# Patient Record
Sex: FEMALE | Race: WHITE | Employment: FULL TIME | URBAN - METROPOLITAN AREA
[De-identification: names, ages, dates, MRNs, and addresses within clinical notes are randomized per-mention and may not be internally consistent; named-entity substitution may affect disease eponyms.]

---

## 2017-01-29 ENCOUNTER — OFFICE VISIT (OUTPATIENT)
Dept: URGENT CARE | Facility: URGENT CARE | Age: 47
End: 2017-01-29
Payer: COMMERCIAL

## 2017-01-29 VITALS
OXYGEN SATURATION: 100 % | DIASTOLIC BLOOD PRESSURE: 80 MMHG | SYSTOLIC BLOOD PRESSURE: 122 MMHG | BODY MASS INDEX: 37.56 KG/M2 | WEIGHT: 220 LBS | TEMPERATURE: 98 F | HEART RATE: 64 BPM | HEIGHT: 64 IN

## 2017-01-29 DIAGNOSIS — J98.01 ACUTE BRONCHOSPASM: Primary | ICD-10-CM

## 2017-01-29 DIAGNOSIS — R05.9 COUGH: ICD-10-CM

## 2017-01-29 PROCEDURE — 99213 OFFICE O/P EST LOW 20 MIN: CPT | Performed by: PHYSICIAN ASSISTANT

## 2017-01-29 RX ORDER — CODEINE PHOSPHATE AND GUAIFENESIN 10; 100 MG/5ML; MG/5ML
1 SOLUTION ORAL EVERY 4 HOURS PRN
Qty: 120 ML | Refills: 0 | Status: SHIPPED | OUTPATIENT
Start: 2017-01-29 | End: 2018-08-16

## 2017-01-29 RX ORDER — ALBUTEROL SULFATE 90 UG/1
2 AEROSOL, METERED RESPIRATORY (INHALATION) EVERY 4 HOURS PRN
Qty: 1 INHALER | Refills: 1 | Status: SHIPPED | OUTPATIENT
Start: 2017-01-29 | End: 2018-08-16

## 2017-01-29 RX ORDER — PREDNISONE 20 MG/1
40 TABLET ORAL DAILY
Qty: 10 TABLET | Refills: 0 | Status: SHIPPED | OUTPATIENT
Start: 2017-01-29 | End: 2017-02-03

## 2017-01-29 RX ORDER — BENZONATATE 100 MG/1
200 CAPSULE ORAL 3 TIMES DAILY PRN
Qty: 42 CAPSULE | Refills: 0 | Status: SHIPPED | OUTPATIENT
Start: 2017-01-29 | End: 2018-08-16

## 2017-01-29 NOTE — NURSING NOTE
"Natalee Maya;   Chief Complaint   Patient presents with     Cough     started not feeling well approx 6 days ago, now feeling like she cant catch her breath when coughing      Urgent Care     Initial /80 mmHg  Pulse 64  Temp(Src) 98  F (36.7  C) (Oral)  Ht 5' 4\" (1.626 m)  Wt 220 lb (99.791 kg)  BMI 37.74 kg/m2  SpO2 100% Estimated body mass index is 37.74 kg/(m^2) as calculated from the following:    Height as of this encounter: 5' 4\" (1.626 m).    Weight as of this encounter: 220 lb (99.791 kg)..  BP completed using cuff size large.  Daylin Branham R.N.  "

## 2017-02-09 NOTE — PROGRESS NOTES
"SUBJECTIVE:  Natalee Maya is a 46 year old female who presents to the clinic today with a chief complaint of cough  for 6 day(s).  Her cough is described as persistent, nonproductive and spasmodic.    The patient's symptoms are mild and moderate and worsening.  Associated symptoms include none. The patient's symptoms are exacerbated by lying down and no particular triggers  Patient has been using OTC cough suppressants  to improve symptoms.    Past Medical History   Diagnosis Date     Calculus of kidney      History of kidney stone -- Abstracted 7/22/02       Current Outpatient Prescriptions   Medication Sig Dispense Refill     albuterol (PROAIR HFA/PROVENTIL HFA/VENTOLIN HFA) 108 (90 BASE) MCG/ACT Inhaler Inhale 2 puffs into the lungs every 4 hours as needed for shortness of breath / dyspnea or wheezing 1 Inhaler 1     guaiFENesin-codeine (ROBITUSSIN AC) 100-10 MG/5ML SOLN solution Take 5 mLs by mouth every 4 hours as needed for cough 120 mL 0     benzonatate (TESSALON) 100 MG capsule Take 2 capsules (200 mg) by mouth 3 times daily as needed for cough 42 capsule 0     IBUPROFEN        ACETAMINOPHEN          Social History   Substance Use Topics     Smoking status: Never Smoker      Smokeless tobacco: Never Used     Alcohol Use: No       ROS  Review of systems negative except as stated above.    OBJECTIVE:  /80 mmHg  Pulse 64  Temp(Src) 98  F (36.7  C) (Oral)  Ht 5' 4\" (1.626 m)  Wt 220 lb (99.791 kg)  BMI 37.74 kg/m2  SpO2 100%  GENERAL APPEARANCE: healthy, alert and no distress  EYES: EOMI,  PERRL, conjunctiva clear  HENT: ear canals and TM's normal.  Nose and mouth without ulcers, erythema or lesions  NECK: supple, nontender, no lymphadenopathy  RESP: rhonchi R upper anterior and L upper anterior  CV: regular rates and rhythm, normal S1 S2, no murmur noted  SKIN: no suspicious lesions or rashes    ASSESSMENT:  Broncospasm  Cough    PLAN:  See orders in Epic  Symptomatic measures encouraged, " humidified air, plenty of fluids.  Red flag signs discussed and to FU with PCP as needed if sx worsen or new sx develop

## 2017-06-01 DIAGNOSIS — J98.01 ACUTE BRONCHOSPASM: ICD-10-CM

## 2017-06-01 DIAGNOSIS — R05.9 COUGH: ICD-10-CM

## 2017-06-01 RX ORDER — PREDNISONE 20 MG/1
40 TABLET ORAL DAILY
Qty: 10 TABLET | Refills: 0 | Status: CANCELLED | OUTPATIENT
Start: 2017-06-01

## 2017-06-01 RX ORDER — BENZONATATE 100 MG/1
200 CAPSULE ORAL 3 TIMES DAILY PRN
Qty: 42 CAPSULE | Refills: 0 | Status: CANCELLED | OUTPATIENT
Start: 2017-06-01

## 2017-06-01 RX ORDER — CODEINE PHOSPHATE AND GUAIFENESIN 10; 100 MG/5ML; MG/5ML
1 SOLUTION ORAL EVERY 4 HOURS PRN
Qty: 120 ML | Refills: 0 | OUTPATIENT
Start: 2017-06-01

## 2017-06-01 NOTE — TELEPHONE ENCOUNTER
Please call pt, needs to be seen for cough suppressant refill.  Has only been seen at , not clear why this refill was sent to me.  Can establish care with one of the clinicians taking new patients or come back to urgent care.

## 2017-06-01 NOTE — TELEPHONE ENCOUNTER
guaiFENesin-codeine (ROBITUSSIN AC) 100-10 MG/5ML SOLN solution      Last Written Prescription Date:  1/29/2017  Last Fill Quantity: 120 ML,   # refills: 0  Last Office Visit with [facility], [facility] or  Health prescribing provider: 5/20/2014  Future Office visit:       Routing refill request to provider for review/approval because:  Drug not on the [facility], [facility] or  Health refill protocol or controlled substance

## 2017-06-01 NOTE — TELEPHONE ENCOUNTER
benzonatate (TESSALON) 100 MG capsule      Last Written Prescription Date:  1/29/2017  Last Fill Quantity: 42,   # refills: 0  Last Office Visit with [facility] or [facility] prescribing provider: 5/20/2014  Future Office visit:       Routing refill request to provider for review/approval because:  Drug not on the [facility], [facility] or [facility] refill protocol or controlled substance    PREDNISONE 25MG      Last Written Prescription Date:  1/29/2017  Last Fill Quantity: 10,   # refills: 0  Last Office Visit with [facility] or  [facility] prescribing provider: 5/20/2014  Future Office visit:       Routing refill request to provider for review/approval because:  Drug not on the [facility] or [facility] refill protocol or controlled substance

## 2017-06-02 NOTE — TELEPHONE ENCOUNTER
Called patient advised refill request will need a office visit with an primary provider for follow up.  Pt stated if worst tomorrow she will come in to urgent care. //Nicky Brower MA// June 2, 2017 5:11 PM

## 2017-06-05 NOTE — TELEPHONE ENCOUNTER
Pt last seen in urgent care in 01/27/17, patient will need to follow up with primary care if having same symptoms or return to urgent care. [facility].   Naz Griffin CMA (AAMA) 6/5/2017 9:07 AM

## 2017-06-05 NOTE — TELEPHONE ENCOUNTER
Routing refill request to provider for review/approval because:  Drug not on the [facility] refill protocol   Louisa Frias, RN  Triage Nurse

## 2017-06-13 ENCOUNTER — OFFICE VISIT (OUTPATIENT)
Dept: PEDIATRICS | Facility: CLINIC | Age: 47
End: 2017-06-13
Payer: COMMERCIAL

## 2017-06-13 VITALS
HEIGHT: 64 IN | OXYGEN SATURATION: 98 % | SYSTOLIC BLOOD PRESSURE: 124 MMHG | DIASTOLIC BLOOD PRESSURE: 78 MMHG | BODY MASS INDEX: 39.95 KG/M2 | HEART RATE: 80 BPM | WEIGHT: 234 LBS | TEMPERATURE: 99.6 F

## 2017-06-13 DIAGNOSIS — Z23 NEED FOR VACCINATION: ICD-10-CM

## 2017-06-13 DIAGNOSIS — R01.1 HEART MURMUR: ICD-10-CM

## 2017-06-13 DIAGNOSIS — Z01.818 PREOP GENERAL PHYSICAL EXAM: Primary | ICD-10-CM

## 2017-06-13 LAB
BASOPHILS # BLD AUTO: 0 10E9/L (ref 0–0.2)
BASOPHILS NFR BLD AUTO: 0.4 %
DIFFERENTIAL METHOD BLD: ABNORMAL
EOSINOPHIL # BLD AUTO: 0.1 10E9/L (ref 0–0.7)
EOSINOPHIL NFR BLD AUTO: 2 %
ERYTHROCYTE [DISTWIDTH] IN BLOOD BY AUTOMATED COUNT: 15.4 % (ref 10–15)
HCT VFR BLD AUTO: 33.7 % (ref 35–47)
HGB BLD-MCNC: 10.6 G/DL (ref 11.7–15.7)
LYMPHOCYTES # BLD AUTO: 1.5 10E9/L (ref 0.8–5.3)
LYMPHOCYTES NFR BLD AUTO: 22 %
MCH RBC QN AUTO: 26.3 PG (ref 26.5–33)
MCHC RBC AUTO-ENTMCNC: 31.5 G/DL (ref 31.5–36.5)
MCV RBC AUTO: 84 FL (ref 78–100)
MONOCYTES # BLD AUTO: 0.5 10E9/L (ref 0–1.3)
MONOCYTES NFR BLD AUTO: 6.5 %
NEUTROPHILS # BLD AUTO: 4.8 10E9/L (ref 1.6–8.3)
NEUTROPHILS NFR BLD AUTO: 69.1 %
PLATELET # BLD AUTO: 237 10E9/L (ref 150–450)
RBC # BLD AUTO: 4.03 10E12/L (ref 3.8–5.2)
WBC # BLD AUTO: 7 10E9/L (ref 4–11)

## 2017-06-13 PROCEDURE — 82728 ASSAY OF FERRITIN: CPT | Performed by: NURSE PRACTITIONER

## 2017-06-13 PROCEDURE — 99214 OFFICE O/P EST MOD 30 MIN: CPT | Mod: 25 | Performed by: NURSE PRACTITIONER

## 2017-06-13 PROCEDURE — 85025 COMPLETE CBC W/AUTO DIFF WBC: CPT | Performed by: NURSE PRACTITIONER

## 2017-06-13 PROCEDURE — 36415 COLL VENOUS BLD VENIPUNCTURE: CPT | Performed by: NURSE PRACTITIONER

## 2017-06-13 PROCEDURE — 90715 TDAP VACCINE 7 YRS/> IM: CPT | Performed by: NURSE PRACTITIONER

## 2017-06-13 PROCEDURE — 90471 IMMUNIZATION ADMIN: CPT | Performed by: NURSE PRACTITIONER

## 2017-06-13 PROCEDURE — 93000 ELECTROCARDIOGRAM COMPLETE: CPT | Performed by: NURSE PRACTITIONER

## 2017-06-13 NOTE — PATIENT INSTRUCTIONS
-Please call to schedule your heart ultrasound (echo) [phone]. Assuming the results are normal, I will call yo    Before Your Surgery      Call your surgeon if there is any change in your health. This includes signs of a cold or flu (such as a sore throat, runny nose, cough, rash or fever).    Do not smoke, drink alcohol or take over the counter medicine (unless your surgeon or primary care doctor tells you to) for the 24 hours before and after surgery.    If you take prescribed drugs: Follow your doctor s orders about which medicines to take and which to stop until after surgery.    Eating and drinking prior to surgery: follow the instructions from your surgeon    Take a shower or bath the night before surgery. Use the soap your surgeon gave you to gently clean your skin. If you do not have soap from your surgeon, use your regular soap. Do not shave or scrub the surgery site.  Wear clean pajamas and have clean sheets on your bed.

## 2017-06-13 NOTE — MR AVS SNAPSHOT
After Visit Summary   6/13/2017    Natalee Maya    MRN: [id number]           Patient Information     Date Of Birth          1970        Visit Information        Provider Department      6/13/2017 2:20 PM Carolina Gomez APRN CNP [place] [facility]        Today's Diagnoses     Preop general physical exam    -  1    Need for vaccination        Heart murmur          Care Instructions    -Please call to schedule your heart ultrasound (echo) [phone]. Assuming the results are normal, I will call yo    Before Your Surgery      Call your surgeon if there is any change in your health. This includes signs of a cold or flu (such as a sore throat, runny nose, cough, rash or fever).    Do not smoke, drink alcohol or take over the counter medicine (unless your surgeon or primary care doctor tells you to) for the 24 hours before and after surgery.    If you take prescribed drugs: Follow your doctor s orders about which medicines to take and which to stop until after surgery.    Eating and drinking prior to surgery: follow the instructions from your surgeon    Take a shower or bath the night before surgery. Use the soap your surgeon gave you to gently clean your skin. If you do not have soap from your surgeon, use your regular soap. Do not shave or scrub the surgery site.  Wear clean pajamas and have clean sheets on your bed.           Follow-ups after your visit        Future tests that were ordered for you today     Open Future Orders        Priority Expected Expires Ordered    Echocardiogram Complete Routine  6/13/2018 6/13/2017            Who to contact     If you have questions or need follow up information about today's clinic visit or your schedule please contact [facility] directly at [phone].  Normal or non-critical lab and imaging results will be communicated to you by MyChart, letter or phone within 4 business days after the clinic has received the results. If  "you do not hear from us within 7 days, please contact the clinic through [employer] or phone. If you have a critical or abnormal lab result, we will notify you by phone as soon as possible.  Submit refill requests through [employer] or call your pharmacy and they will forward the refill request to us. Please allow 3 business days for your refill to be completed.          Additional Information About Your Visit        The [employer] Information     [employer] gives you secure access to your electronic health record. If you see a primary care provider, you can also send messages to your care team and make appointments. If you have questions, please call your primary care clinic.  If you do not have a primary care provider, please call [phone] and they will assist you.        Care EveryWhere ID     This is your Care EveryWhere ID. This could be used by other organizations to access your [place] medical records  [id number]        Your Vitals Were     Pulse Temperature Height Last Period Pulse Oximetry BMI (Body Mass Index)    80 99.6  F (37.6  C) (Tympanic) 5' 4\" (1.626 m) 05/21/2017 (Exact Date) 98% 40.17 kg/m2       Blood Pressure from Last 3 Encounters:   06/13/17 124/78   01/29/17 122/80   04/22/16 128/88    Weight from Last 3 Encounters:   06/13/17 234 lb (106.1 kg)   01/29/17 220 lb (99.8 kg)   04/22/16 219 lb 14.4 oz (99.7 kg)              We Performed the Following     ADMIN 1st VACCINE     CBC with platelets differential     EKG 12-lead complete w/read - Clinics     Ferritin     TDAP VACCINE (ADACEL)        Primary Care Provider    None       No address on file        Thank you!     Thank you for choosing [facility]  for your care. Our goal is always to provide you with excellent care. Hearing back from our patients is one way we can continue to improve our services. Please take a few minutes to complete the written survey that you may receive in the mail after your visit with us. Thank you!           "   Your Updated Medication List - Protect others around you: Learn how to safely use, store and throw away your medicines at www.disposemymeds.org.          This list is accurate as of: 6/13/17  3:31 PM.  Always use your most recent med list.                   Brand Name Dispense Instructions for use    ACETAMINOPHEN          albuterol 108 (90 BASE) MCG/ACT Inhaler    PROAIR HFA/PROVENTIL HFA/VENTOLIN HFA    1 Inhaler    Inhale 2 puffs into the lungs every 4 hours as needed for shortness of breath / dyspnea or wheezing       benzonatate 100 MG capsule    TESSALON    42 capsule    Take 2 capsules (200 mg) by mouth 3 times daily as needed for cough       guaiFENesin-codeine 100-10 MG/5ML Soln solution    ROBITUSSIN AC    120 mL    Take 5 mLs by mouth every 4 hours as needed for cough       IBUPROFEN

## 2017-06-13 NOTE — NURSING NOTE
"Chief Complaint   Patient presents with     Pre-Op Exam       Initial /78 (Cuff Size: Adult Large)  Pulse 80  Temp 99.6  F (37.6  C) (Tympanic)  Ht 5' 4\" (1.626 m)  Wt 234 lb (106.1 kg)  LMP 05/21/2017 (Exact Date)  SpO2 98%  BMI 40.17 kg/m2 Estimated body mass index is 40.17 kg/(m^2) as calculated from the following:    Height as of this encounter: 5' 4\" (1.626 m).    Weight as of this encounter: 234 lb (106.1 kg).  Medication Reconciliation: complete   Vani Farmer CMA    "

## 2017-06-13 NOTE — PROGRESS NOTES
[facility]  [address]  [address]  [place] [address]  [phone]  Dept: [phone]    PRE-OP EVALUATION:  Today's date: 2017    Natalee Maya (: 1970) presents for pre-operative evaluation assessment as requested by Dr. Vanessa.  She requires evaluation and anesthesia risk assessment prior to undergoing surgery/procedure for treatment of right foot .  Proposed procedure: repair posterior tibial tendon - right    Date of Surgery/ Procedure: 17  Time of Surgery/ Procedure: 7:00am  Hospital/Surgical Facility: [facility]  Fax number for surgical facility: [phone]  Primary Physician: None  Type of Anesthesia Anticipated: General    Patient has a Health Care Directive or Living Will:  NO    Preop Questions 2017   1.  Do you have a history of heart attack, stroke, stent, bypass or surgery on an artery in the head, neck, heart or legs? No   2.  Do you ever have any pain or discomfort in your chest? No   3.  Do you have a history of  Heart Failure? No   4.   Are you troubled by shortness of breath when:  walking on a level surface, or up a slight hill, or at night? No   5.  Do you currently have a cold, bronchitis or other respiratory infection? No   6.  Do you have a cough, shortness of breath, or wheezing? No   7.  Do you sometimes get pains in the calves of your legs when you walk? No   8. Do you or anyone in your family have previous history of blood clots? No   9.  Do you or does anyone in your family have a serious bleeding problem such as prolonged bleeding following surgeries or cuts? No   10. Have you ever had problems with anemia or been told to take iron pills? No   11. Have you had any abnormal blood loss such as black, tarry or bloody stools, or abnormal vaginal bleeding? No   12. Have you ever had a blood transfusion? No   13. Have you or any of your relatives ever had problems with anesthesia? No   14. Do you have  sleep apnea, excessive snoring or daytime drowsiness? No   15. Do you have any prosthetic heart valves? No   16. Do you have prosthetic joints? No   17. Is there any chance that you may be pregnant? No         HPI:                                                      Brief HPI related to upcoming procedure: Proposed procedure: repair posterior tibial tendon - right      See problem list for active medical problems.  Problems all longstanding and stable, except as noted/documented.  See ROS for pertinent symptoms related to these conditions.                                                                                                  .    MEDICAL HISTORY:                                                      Patient Active Problem List    Diagnosis Date Noted     CARDIOVASCULAR SCREENING; LDL GOAL LESS THAN 160 10/31/2010     Priority: Medium     Numbness 2009     Priority: Medium      Past Medical History:   Diagnosis Date     Calculus of kidney     History of kidney stone -- Abstracted 02     Past Surgical History:   Procedure Laterality Date     C NONSPECIFIC PROCEDURE       -- Abstracted 02     Current Outpatient Prescriptions   Medication Sig Dispense Refill     albuterol (PROAIR HFA/PROVENTIL HFA/VENTOLIN HFA) 108 (90 BASE) MCG/ACT Inhaler Inhale 2 puffs into the lungs every 4 hours as needed for shortness of breath / dyspnea or wheezing (Patient not taking: Reported on 2017) 1 Inhaler 1     guaiFENesin-codeine (ROBITUSSIN AC) 100-10 MG/5ML SOLN solution Take 5 mLs by mouth every 4 hours as needed for cough (Patient not taking: Reported on 2017) 120 mL 0     benzonatate (TESSALON) 100 MG capsule Take 2 capsules (200 mg) by mouth 3 times daily as needed for cough (Patient not taking: Reported on 2017) 42 capsule 0     IBUPROFEN        ACETAMINOPHEN        OTC products: none    Allergies   Allergen Reactions     No Known Drug Allergies       Latex Allergy: NO    Social  "History   Substance Use Topics     Smoking status: Never Smoker     Smokeless tobacco: Never Used     Alcohol use No     History   Drug Use No       REVIEW OF SYSTEMS:                                                    Constitutional, neuro, ENT, endocrine, pulmonary, cardiac, gastrointestinal, genitourinary, musculoskeletal, integument and psychiatric systems are negative, except as otherwise noted.    EXAM:                                                    /78 (Cuff Size: Adult Large)  Pulse 80  Temp 99.6  F (37.6  C) (Tympanic)  Ht 5' 4\" (1.626 m)  Wt 234 lb (106.1 kg)  LMP 05/21/2017 (Exact Date)  SpO2 98%  BMI 40.17 kg/m2    GENERAL APPEARANCE: healthy, alert and no distress     EYES: EOMI, PERRL     HENT: ear canals and TM's normal and nose and mouth without ulcers or lesions     NECK: no adenopathy, no asymmetry, masses, or scars and thyroid normal to palpation     RESP: lungs clear to auscultation - no rales, rhonchi or wheezes     CV: regular rates and rhythm, normal S1 S2, no S3 or S4  2/6 soft holosystolic murmur,no click or rub     ABDOMEN:  soft, nontender, no HSM or masses and bowel sounds normal     MS: extremities normal- no gross deformities noted, no evidence of inflammation in joints, FROM in all extremities.     SKIN: no suspicious lesions or rashes     NEURO: Normal strength and tone, sensory exam grossly normal, mentation intact and speech normal     PSYCH: mentation appears normal. and affect normal/bright     LYMPHATICS: No axillary, cervical, or supraclavicular nodes    DIAGNOSTICS:                                                    Hemoglobin pending.     Recent Labs   Lab Test  10/17/09   0817   NA  142   POTASSIUM  4.5   CR  0.70        IMPRESSION:                                                    Reason for surgery/procedure: Proposed procedure: repair posterior tibial tendon - right    The proposed surgical procedure is considered INTERMEDIATE risk.    REVISED CARDIAC " RISK INDEX  The patient has the following serious cardiovascular risks for perioperative complications such as (MI, PE, VFib and 3  AV Block):  No serious cardiac risks    The patient has the following additional risks for perioperative complications:  No identified additional risks      ICD-10-CM    1. Preop general physical exam Z01.818 CBC with platelets differential     Ferritin     EKG 12-lead complete w/read - Clinics   2. Need for vaccination Z23 TDAP VACCINE (ADACEL)     ADMIN 1st VACCINE   3. Heart murmur R01.1 Echocardiogram Complete   4. Aortic sclerosis (H) I70.0 New murmur led to EGK and echo. EKG normal. Echo shows mild aortic sclerosis, which should not affect surgery.        RECOMMENDATIONS:                                                        APPROVAL GIVEN to proceed with proposed procedure, without further diagnostic evaluation    Signed Electronically by: SHAQ Peraza CNP    Copy of this evaluation report is provided to requesting physician.    [facility] Preop Guidelines    Screening Questionnaire for Adult Immunization    Are you sick today?   No   Do you have allergies to medications, food, a vaccine component or latex?   No   Have you ever had a serious reaction after receiving a vaccination?   No   Do you have a long-term health problem with heart disease, lung disease, asthma, kidney disease, metabolic disease (e.g. diabetes), anemia, or other blood disorder?   No   Do you have cancer, leukemia, HIV/AIDS, or any other immune system problem?   No   In the past 3 months, have you taken medications that affect  your immune system, such as prednisone, other steroids, or anticancer drugs; drugs for the treatment of rheumatoid arthritis, Crohn s disease, or psoriasis; or have you had radiation treatments?   No   Have you had a seizure, or a brain or other nervous system problem?   No   During the past year, have you received a transfusion of blood or blood     products, or been  given immune (gamma) globulin or antiviral drug?   No   For women: Are you pregnant or is there a chance you could become        pregnant during the next month?   No   Have you received any vaccinations in the past 4 weeks?   No     Immunization questionnaire answers were all negative.      MNVFC doesn't apply on this patient    Per orders of Carolina campos FNP-DNP, injection of Adacel given by Vani Farmer. Patient instructed to remain in clinic for 20 minutes afterwards, and to report any adverse reaction to me immediately.     Screening performed by Vani Farmer on 6/13/2017 at 1:27 PM.

## 2017-06-15 ENCOUNTER — HOSPITAL ENCOUNTER (OUTPATIENT)
Dept: CARDIOLOGY | Facility: CLINIC | Age: 47
Discharge: HOME | End: 2017-06-15
Attending: NURSE PRACTITIONER | Admitting: NURSE PRACTITIONER
Payer: COMMERCIAL

## 2017-06-15 DIAGNOSIS — D50.9 IRON DEFICIENCY ANEMIA, UNSPECIFIED IRON DEFICIENCY ANEMIA TYPE: Primary | ICD-10-CM

## 2017-06-15 DIAGNOSIS — R01.1 HEART MURMUR: ICD-10-CM

## 2017-06-15 LAB — FERRITIN SERPL-MCNC: 7 NG/ML (ref 8–252)

## 2017-06-15 PROCEDURE — 93306 TTE W/DOPPLER COMPLETE: CPT

## 2017-06-15 PROCEDURE — 93306 TTE W/DOPPLER COMPLETE: CPT | Mod: 26 | Performed by: INTERNAL MEDICINE

## 2017-06-15 RX ORDER — FERROUS SULFATE 325(65) MG
325 TABLET ORAL
Qty: 90 TABLET | Refills: 2 | Status: SHIPPED | OUTPATIENT
Start: 2017-06-15 | End: 2018-07-26

## 2017-06-19 DIAGNOSIS — I51.7 CARDIOMEGALY: ICD-10-CM

## 2017-06-19 DIAGNOSIS — R01.1 NEWLY RECOGNIZED MURMUR: ICD-10-CM

## 2017-06-19 DIAGNOSIS — D64.9 ANEMIA, UNSPECIFIED TYPE: Primary | ICD-10-CM

## 2017-06-24 ENCOUNTER — HEALTH MAINTENANCE LETTER (OUTPATIENT)
Age: 47
End: 2017-06-24

## 2017-08-09 ENCOUNTER — PRE VISIT (OUTPATIENT)
Dept: CARDIOLOGY | Facility: CLINIC | Age: 47
End: 2017-08-09

## 2017-09-08 ENCOUNTER — PRE VISIT (OUTPATIENT)
Dept: CARDIOLOGY | Facility: CLINIC | Age: 47
End: 2017-09-08

## 2017-09-11 ENCOUNTER — TELEPHONE (OUTPATIENT)
Dept: PEDIATRICS | Facility: CLINIC | Age: 47
End: 2017-09-11

## 2017-09-11 NOTE — TELEPHONE ENCOUNTER
Panel Management Review      Patient has the following on her problem list: None      Composite cancer screening  Chart review shows that this patient is due/due soon for the following Pap Smear  Summary:    Patient is due/failing the following:   LDL, PAP and PHYSICAL    Action needed:   Patient needs office visit for pap, cholesterol check and physical.    Type of outreach:    Sent [employer] message.    Questions for provider review:    None                                                                                                                                  Vani Farmer CMA    Chart routed to Care Team .

## 2017-09-11 NOTE — LETTER
September 21, 2017      Natalee Maya  [address] [place] [id number]        Dear Natalee,       We care about your health and have reviewed your health plan including your medical conditions, medications, and lab results.  Based on this review, it is recommended that you follow up regarding the following health topic(s):  -Cervical Cancer Screening  -Wellness (Physical) Visit     We recommend you take the following action(s):  -schedule a WELLNESS (Physical) APPOINTMENT.  We will perform the following labs: Lipids (fasting cholesterol - nothing to eat except water and/or meds for 8-10 hours) and pap smear.     Please call us at the [facility] - [phone] (or use [employer]) to address the above recommendations.     Thank you for trusting [facility] and we appreciate the opportunity to serve you.  We look forward to supporting your healthcare needs in the future.    Healthy Regards,    Your Health Care Team  [facility]      Sincerely,    SHAQ Peraza CNP

## 2017-09-15 ENCOUNTER — TELEPHONE (OUTPATIENT)
Dept: PEDIATRICS | Facility: CLINIC | Age: 47
End: 2017-09-15

## 2017-09-15 ENCOUNTER — DOCUMENTATION ONLY (OUTPATIENT)
Dept: CARDIOLOGY | Facility: CLINIC | Age: 47
End: 2017-09-15

## 2017-09-15 NOTE — TELEPHONE ENCOUNTER
Emiliano calls back.  Dr. May reviewed her chart and echo.  Normal functioning heart.  He looked at the heart dimensions which are normal.  Unless symptomatic does not need to be seen and could just have a repeat echo next year.  He would not do anything other than what you are doing unless she is symptomatic.  She said Dr. May would be happy to answer any questions if needed.  He is trying to save her appt if she does not need to be seen.

## 2017-09-15 NOTE — TELEPHONE ENCOUNTER
Please relay this info to patient. We will check in on sx every year at physical.    She is due for her pap. Please have her schedule physical

## 2017-09-15 NOTE — TELEPHONE ENCOUNTER
Received a call from Emiliano at [facility] [phone].    She was referred to Cardiology for a heart murmur.  She said that they saw her in 2009 for a heart murmur and the notes are in epic, seen on 1/6/2009.  She had a systolic murmur, holter monitor was done which was fine, echo was fine.      So, she is wondering if she is symtomatic.  Do you want her to be seen since she was seen in the past?  She said the order was put in in June 2017.  Please advise and they are happy to see her, but want to make sure.

## 2017-09-15 NOTE — TELEPHONE ENCOUNTER
Pt calls back.  Advised of below.  She says she is not symptomatic.      She says she did her pap over the summer.  She will get us paperwork from her pap.  She is going to make an appt with Carolina anyway about b12 and a few other things.

## 2017-09-15 NOTE — PROGRESS NOTES
Patient is schedule to see Dr. May next Tuesday. Patient was referred by PCP to evaluate new murmur and Cardiomegaly. Patient was last seen in our clinic on 01-. Patient had a systolic murmur at that time. Dr. May reviewed patient's recent echo. Valves look fine, no significant abnormalities. Patient has a normal functioning heart. Patient does not have cardiomegaly. Dr. May stated, if patient is symptomatic then patient should keep her appt. If patient is asymptomatic appt is not necessary. He would not recommend any treatments for patient at this time.     I reviewed this with Carolina OVALLE's nurse, Jacklyn. They will get back to me if they still want patient to be seen.     Eliza WREN  [facility]

## 2017-09-18 NOTE — TELEPHONE ENCOUNTER
Called and left VM for patient to contact clinic.  Patient needs physical, pap , LDL.  Vani Farmer, CMA

## 2017-12-26 ENCOUNTER — OFFICE VISIT (OUTPATIENT)
Dept: URGENT CARE | Facility: URGENT CARE | Age: 47
End: 2017-12-26
Payer: COMMERCIAL

## 2017-12-26 VITALS
TEMPERATURE: 97.7 F | RESPIRATION RATE: 20 BRPM | BODY MASS INDEX: 39.48 KG/M2 | OXYGEN SATURATION: 97 % | SYSTOLIC BLOOD PRESSURE: 122 MMHG | HEART RATE: 68 BPM | WEIGHT: 230 LBS | DIASTOLIC BLOOD PRESSURE: 90 MMHG

## 2017-12-26 DIAGNOSIS — R05.9 COUGH: Primary | ICD-10-CM

## 2017-12-26 PROCEDURE — 99213 OFFICE O/P EST LOW 20 MIN: CPT | Performed by: FAMILY MEDICINE

## 2017-12-26 RX ORDER — BENZONATATE 200 MG/1
200 CAPSULE ORAL 3 TIMES DAILY PRN
Qty: 21 CAPSULE | Refills: 0 | Status: SHIPPED | OUTPATIENT
Start: 2017-12-26 | End: 2018-08-16

## 2017-12-26 RX ORDER — PREDNISONE 20 MG/1
40 TABLET ORAL DAILY
Qty: 10 TABLET | Refills: 0 | Status: SHIPPED | OUTPATIENT
Start: 2017-12-26 | End: 2017-12-31

## 2017-12-26 RX ORDER — AZITHROMYCIN 250 MG/1
TABLET, FILM COATED ORAL
Qty: 6 TABLET | Refills: 0 | Status: SHIPPED | OUTPATIENT
Start: 2017-12-26 | End: 2018-08-16

## 2017-12-26 NOTE — NURSING NOTE
"Chief Complaint   Patient presents with     Urgent Care     Cough     cough, sob x 1 week, crackling in morning       Initial /90 (BP Location: Right arm, Patient Position: Chair, Cuff Size: Adult Regular)  Pulse 68  Temp 97.7  F (36.5  C) (Oral)  Resp 20  Wt 230 lb (104.3 kg)  SpO2 97%  BMI 39.48 kg/m2 Estimated body mass index is 39.48 kg/(m^2) as calculated from the following:    Height as of 6/13/17: 5' 4\" (1.626 m).    Weight as of this encounter: 230 lb (104.3 kg).  Medication Reconciliation: unable or not appropriate to perform   Zachary Phan Medical Assistant    "

## 2017-12-26 NOTE — PROGRESS NOTES
SUBJECTIVE:  Natalee Maya is a 47 year old female who presents to the clinic today with a croupy cough for 2 weeks.  Associated symptoms include congestion and cough.  The patient's symptoms are worsening.  The patient's symptoms are exacerbated by no particular triggers.  Patient has been using ibuprofen to improve symptoms.    Past Medical History:   Diagnosis Date     Calculus of kidney     History of kidney stone -- Abstracted 7/22/02      Social History   Substance Use Topics     Smoking status: Never Smoker     Smokeless tobacco: Never Used     Alcohol use No       OBJECTIVE:  Exam:  Blood pressure 122/90, pulse 68, temperature 97.7  F (36.5  C), temperature source Oral, resp. rate 20, weight 230 lb (104.3 kg), SpO2 97 %.  General: Mild distress  Head: NORMAL - atraumatic, nontender.  Ears: normal canals, TMs bilaterally, normal TM mobility  Eyes: NORMAL - no injection no discharge, no periorbital swelling.  Nose: ABNORMAL - swollen nasal turbinates.  Neck: Positive bilateral adenopathy nontender  Throat: ABNORMAL - erythematous.  Resp: ABNORMAL -coarse breath sounds bilaterally with rhonchi at the bases  Cardiac: NORMAL - regular rate and rhythm without murmur.    Current Outpatient Prescriptions   Medication Sig Dispense Refill     azithromycin (ZITHROMAX) 250 MG tablet Two tablets first day, then one tablet daily for four days. 6 tablet 0     benzonatate (TESSALON) 200 MG capsule Take 1 capsule (200 mg) by mouth 3 times daily as needed for cough 21 capsule 0     ferrous sulfate (IRON) 325 (65 FE) MG tablet Take 1 tablet (325 mg) by mouth daily (with breakfast) 90 tablet 2     albuterol (PROAIR HFA/PROVENTIL HFA/VENTOLIN HFA) 108 (90 BASE) MCG/ACT Inhaler Inhale 2 puffs into the lungs every 4 hours as needed for shortness of breath / dyspnea or wheezing 1 Inhaler 1     IBUPROFEN        guaiFENesin-codeine (ROBITUSSIN AC) 100-10 MG/5ML SOLN solution Take 5 mLs by mouth every 4 hours as needed for cough  (Patient not taking: Reported on 6/13/2017) 120 mL 0     benzonatate (TESSALON) 100 MG capsule Take 2 capsules (200 mg) by mouth 3 times daily as needed for cough (Patient not taking: Reported on 6/13/2017) 42 capsule 0     ACETAMINOPHEN            ASSESSMENT:    Acute Bronchitis    PLAN:    Symptomatic measures encouraged, humidified air, plenty of fluids.  Tessalon for cough  Started on antibiotics  Side effects discussed with the patient.  Reportable signs and symptoms discussed.  Follow-up in 2 weeks if not improving and a chest x-ray will be considered.  nneds follow with pcp

## 2018-01-19 ENCOUNTER — TELEPHONE (OUTPATIENT)
Dept: PEDIATRICS | Facility: CLINIC | Age: 48
End: 2018-01-19

## 2018-01-19 ENCOUNTER — HOSPITAL ENCOUNTER (OUTPATIENT)
Dept: OCCUPATIONAL THERAPY | Facility: CLINIC | Age: 48
End: 2018-01-19
Payer: COMMERCIAL

## 2018-01-19 DIAGNOSIS — R26.89 DECREASED MOBILITY: ICD-10-CM

## 2018-01-19 DIAGNOSIS — I89.0 LYMPHEDEMA: Primary | ICD-10-CM

## 2018-01-19 PROCEDURE — 40000445 ZZHC STATISTIC OT VISIT, LYMPHEDEMA: Mod: GO

## 2018-01-19 PROCEDURE — 97165 OT EVAL LOW COMPLEX 30 MIN: CPT | Mod: GO

## 2018-01-19 PROCEDURE — 97140 MANUAL THERAPY 1/> REGIONS: CPT | Mod: GO

## 2018-01-19 NOTE — PROGRESS NOTES
01/19/18 0800   Rehab Discipline   Discipline OT   Type of Visit   Type of visit Initial Edema Evaluation   General Information   Start of care 01/19/18   Referring physician Mimi Vanessa   Orders Evaluate and treat as indicated   Order date 01/19/18   Medical diagnosis lymphedema   Onset of illness / date of surgery 01/19/18   Edema onset 01/19/18  (chronic since June 2017)   Affected body parts LLE;RLE  (RLE>LLE)   Edema etiology Surgery   Edema etiology comments Pt has no remarkable helath history other than heart murmur detected at time pre-op physical June 2017. Pt has no history cancer care, cardiovascular disease, or identified vascular deterioration. She is however obese and has been inactive secondary to recovery from foot/ankle surgery.   Pertinent history of current problem (PT: include personal factors and/or comorbidities that impact the POC; OT: include additional occupational profile info) Pt with PMH significant for 3 pregnancies and foot/ankle surgery June 2017   Surgical / medical history reviewed Yes   Prior level of functional mobility I   Prior treatment Elevation   Community support Family / friend caregiver   Patient role / employment history Employed   Living environment [facility]   Fall Risk Screen   Fall screen completed by OT   Have you fallen 2 or more times in the past year? No   Have you fallen and had an injury in the past year? No   Is patient a fall risk? No   System Outcome Measures   Lymphedema Life Impact Scale (score range 0-72). A higher score indicates greater impairment. 19   Subjective Report   Patient report of symptoms swelling that will no go away in RLE. Socks digging into swelling; discomfort   Patient / Family Goals   Patient / family goals statement to reduce RLE swelling   Pain   Patient currently in pain No   Cognitive Status   Orientation Orientation to person, place and time   Level of consciousness Alert   Follows commands and answers questions 100% of the  time   Personal safety and judgement Intact   Memory Intact   Edema Exam / Assessment   Skin condition Pitting;Intact   Skin condition comments 2+ pitting ankle-knee RLE; 1+ pitting LLE ankle-knee   Pitting 1+;2+   Pitting location BLE   Dorsal pedal pulse (did not palpate-no signs ischemia)   Girth Measurements   Girth Measurements (will obtain upon return for services)   Range of Motion   ROM comments WFL   Strength   Strength comments NT'd   Activities of Daily Living   Activities of Daily Living I   Bed Mobility   Bed mobility I   Transfers   Transfers I   Gait / Locomotion   Gait / Locomotion I   Sensory   Sensory perception comments no issues reported/identified   Coordination   Coordination Gross motor coordination appropriate   Muscle Tone   Muscle tone No deficits were identified   Planned Edema Interventions   Planned edema interventions Manual lymph drainage;Gradient compression bandaging;Fit for compression garment;Exercises;Precautions to prevent infection / exacerbation;Education;Skin care / precautions;Myofascial release;Home management program development   Clinical Impression   Criteria for skilled therapeutic intervention met Yes   Therapy diagnosis lymphedema   Influenced by the following impairments / conditions Stage 1   Assessment of Occupational Performance 1-3 Performance Deficits   Identified Performance Deficits decreased mobility engagement; decreased fit LB clothing   Clinical Decision Making (Complexity) Low complexity   Treatment frequency 2 times / week;3 times / week   Treatment duration 3x/wk x 1 week, then 2x/wk x 1 week, then 0x/wk x 2 weeks, then 1x/wk x 1 week   Patient / family and/or staff in agreement with plan of care Yes   Risks and benefits of therapy have been explained Yes   Clinical impression comments Pt will benefit from skilled lymphedema services to reduce RLE swelling for imporved functional ambulation/mobility and imporved LB clothing fit   Goals   Edema Eval Goals  1;2;3;4;5;6   Goal 1   Goal identifier 1   Goal description In order to improve functional mobility for activities of living, by the completion of intensive treatment,  patient and/or caregiver will:   Target date 02/23/18   Goal 2   Goal identifier 1a   Goal description tolerate gradient compression bandaging/wearing compression garments 23 hrs/day to prevent re-acccumulation of extracellular fluid for reductions needed to aide improvements in mobility and LB clothing fit   Target date 02/23/18   Goal 3   Goal identifier 1b   Goal description demonstrate independence in applying gradient compression bandages to build I with home management of LE edema/lymphedema for improved mobility engagement and LB clothing fit   Target date 02/23/18   Goal 4   Goal identifier 1c   Goal description demonstrate independence in performing prescribed exercises to facilate the muscle pumping system for max reductions to aide improvements in mobility and LB clothing fit   Target date 02/23/18   Goal 5   Goal identifier 1d   Goal description be independent in donning/doffing, wearing schedule, and care of compression garments for I building with home management of LE edema/lymphedema for improvements in mobility and LB clothing fit   Target date 02/23/18   Goal 6   Goal identifier 2   Goal description Reduce RLE swelling from pitting to nonpitting edema for reductions in swelling needed to aide improvements in mobility and LB clothing fit   Target date 02/23/18   Total Evaluation Time   Total evaluation time 13

## 2018-02-07 ENCOUNTER — HOSPITAL ENCOUNTER (OUTPATIENT)
Dept: OCCUPATIONAL THERAPY | Facility: CLINIC | Age: 48
End: 2018-02-07
Payer: COMMERCIAL

## 2018-02-07 DIAGNOSIS — R26.89 DECREASED MOBILITY: ICD-10-CM

## 2018-02-07 DIAGNOSIS — I89.0 LYMPHEDEMA: Primary | ICD-10-CM

## 2018-02-07 PROCEDURE — 40000445 ZZHC STATISTIC OT VISIT, LYMPHEDEMA

## 2018-02-07 PROCEDURE — 97140 MANUAL THERAPY 1/> REGIONS: CPT | Mod: GO

## 2018-02-08 ENCOUNTER — HOSPITAL ENCOUNTER (OUTPATIENT)
Dept: OCCUPATIONAL THERAPY | Facility: CLINIC | Age: 48
End: 2018-02-08
Payer: COMMERCIAL

## 2018-02-08 DIAGNOSIS — R26.89 DECREASED MOBILITY: ICD-10-CM

## 2018-02-08 DIAGNOSIS — I89.0 LYMPHEDEMA: Primary | ICD-10-CM

## 2018-02-08 PROCEDURE — 40000445 ZZHC STATISTIC OT VISIT, LYMPHEDEMA

## 2018-02-08 PROCEDURE — 97140 MANUAL THERAPY 1/> REGIONS: CPT | Mod: GO

## 2018-02-09 ENCOUNTER — HOSPITAL ENCOUNTER (OUTPATIENT)
Dept: OCCUPATIONAL THERAPY | Facility: CLINIC | Age: 48
End: 2018-02-09
Payer: COMMERCIAL

## 2018-02-09 DIAGNOSIS — I89.0 LYMPHEDEMA: Primary | ICD-10-CM

## 2018-02-09 DIAGNOSIS — R26.89 DECREASED MOBILITY: ICD-10-CM

## 2018-02-09 PROCEDURE — 40000445 ZZHC STATISTIC OT VISIT, LYMPHEDEMA

## 2018-02-09 PROCEDURE — 97140 MANUAL THERAPY 1/> REGIONS: CPT | Mod: GO

## 2018-02-09 NOTE — ADDENDUM NOTE
Encounter addended by: Wilfrido Braun OT on: 2/9/2018  8:20 AM<BR>     Actions taken: Charge Capture section accepted, Flowsheet data copied forward, Flowsheet accepted

## 2018-02-14 ENCOUNTER — HOSPITAL ENCOUNTER (OUTPATIENT)
Dept: OCCUPATIONAL THERAPY | Facility: CLINIC | Age: 48
End: 2018-02-14
Payer: COMMERCIAL

## 2018-02-14 DIAGNOSIS — R26.89 DECREASED MOBILITY: ICD-10-CM

## 2018-02-14 DIAGNOSIS — I89.0 LYMPHEDEMA: Primary | ICD-10-CM

## 2018-02-14 PROCEDURE — 40000445 ZZHC STATISTIC OT VISIT, LYMPHEDEMA: Mod: GO

## 2018-02-14 PROCEDURE — 97140 MANUAL THERAPY 1/> REGIONS: CPT | Mod: GO

## 2018-02-14 NOTE — ADDENDUM NOTE
Encounter addended by: Wilfrido Braun OT on: 2/14/2018  7:16 AM<BR>     Actions taken:  activity accessed, Diagnosis association updated

## 2018-02-26 ENCOUNTER — TELEPHONE (OUTPATIENT)
Dept: PEDIATRICS | Facility: CLINIC | Age: 48
End: 2018-02-26

## 2018-02-26 NOTE — LETTER
March 5, 2018      Natalee Maya  [address] [place] [id number]        Dear Natalee,       We care about your health and have reviewed your health plan including your medical conditions, medications, and lab results.  Based on this review, it is recommended that you follow up regarding the following health topic(s):  -Cholesterol  -Cervical Cancer Screening  -Wellness (Physical) Visit     We recommend you take the following action(s):  -schedule a WELLNESS (Physical) APPOINTMENT.  We will perform the following labs: Lipids (fasting cholesterol - nothing to eat except water and/or meds for 8-10 hours) and pap smear..     Please call us at the [facility] - [phone] (or use Online [employer] Platform) to address the above recommendations.     Thank you for trusting [facility] and we appreciate the opportunity to serve you.  We look forward to supporting your healthcare needs in the future.    Healthy Regards,    Your Health Care Team  [facility]      Sincerely,    SHAQ Peraza CNP

## 2018-02-26 NOTE — TELEPHONE ENCOUNTER
Panel Management Review      Patient has the following on her problem list: None      Composite cancer screening  Chart review shows that this patient is due/due soon for the following Pap Smear  Summary:    Patient is due/failing the following:   LDL, PAP and PHYSICAL    Action needed:   Patient needs office visit for PHYSICAL, PAP & CHOLESTEROL CHECK.    Type of outreach:    Sent VODECLICt message.    Questions for provider review:    None                                                                                                                                  Vani Farmer CMA    Chart routed to Care Team .

## 2018-03-12 NOTE — TELEPHONE ENCOUNTER
Called and left VM for patient to contact clinic.  Patient needs physical, pap and cholesterol check.  Vani Farmer, CMA

## 2018-03-27 ENCOUNTER — HOSPITAL ENCOUNTER (OUTPATIENT)
Dept: OCCUPATIONAL THERAPY | Facility: CLINIC | Age: 48
Setting detail: THERAPIES SERIES
End: 2018-03-27
Attending: PODIATRIST
Payer: COMMERCIAL

## 2018-03-27 PROCEDURE — 97535 SELF CARE MNGMENT TRAINING: CPT | Mod: GO

## 2018-03-27 PROCEDURE — 40000445 ZZHC STATISTIC OT VISIT, LYMPHEDEMA

## 2018-06-11 ENCOUNTER — TELEPHONE (OUTPATIENT)
Dept: PEDIATRICS | Facility: CLINIC | Age: 48
End: 2018-06-11

## 2018-06-11 NOTE — LETTER
June 18, 2018      Natalee Maya  [address] [place] [id number]        Dear Natalee,       We care about your health and have reviewed your health plan including your medical conditions, medications, and lab results.  Based on this review, it is recommended that you follow up regarding the following health topic(s):  -Cervical Cancer Screening  -Wellness (Physical) Visit     We recommend you take the following action(s):  -schedule a WELLNESS (Physical) APPOINTMENT.  We will perform the following labs: Lipids (fasting cholesterol - nothing to eat except water and/or meds for 8-10 hours) and pap smear.     Please call us at the [facility] - [phone] (or use riskmethods) to address the above recommendations.     Thank you for trusting [facility] and we appreciate the opportunity to serve you.  We look forward to supporting your healthcare needs in the future.    Healthy Regards,    Your Health Care Team  [facility]

## 2018-06-11 NOTE — TELEPHONE ENCOUNTER
Panel Management Review      Patient has the following on her problem list: None      Composite cancer screening  Chart review shows that this patient is due/due soon for the following Pap Smear  Summary:    Patient is due/failing the following:   LDL, PAP and PHYSICAL    Action needed:   Patient needs office visit for pap, cholesterol check and physical - patient last seen by provider 6/13/17.    Type of outreach:    Sent [employer] Mobile message.    Questions for provider review:    None                                                                                                                                  Vani Farmer CMA    Chart routed to Care Team .

## 2018-07-26 DIAGNOSIS — D50.9 IRON DEFICIENCY ANEMIA, UNSPECIFIED IRON DEFICIENCY ANEMIA TYPE: ICD-10-CM

## 2018-07-26 NOTE — LETTER
August 3, 2018      Natalee Maya  [address] [place] [id number]        Dear Natalee,       We care about your health and have reviewed your health plan including your medical conditions, medications, and lab results.  Based on this review, it is recommended that you follow up regarding the following health topic(s):  -Medications  It has been over a year since you have had an office visit with your provider.  We have given you a 1 month oscar refill of your medication.  Please make an appointment to be seen before further refills are needed.    We recommend you take the following action(s):  -schedule a WELLNESS (Physical) APPOINTMENT.  We will perform the following labs: hemoglobin.     Please call us at the [facility] - [phone] (or use MedWhat) to address the above recommendations.     Thank you for trusting [facility] and we appreciate the opportunity to serve you.  We look forward to supporting your healthcare needs in the future.    Healthy Regards,    Your Health Care Team  [facility]

## 2018-07-27 RX ORDER — FERROUS SULFATE 325(65) MG
TABLET ORAL
Qty: 90 TABLET | Refills: 0 | Status: SHIPPED | OUTPATIENT
Start: 2018-07-27 | End: 2019-02-03

## 2018-08-16 ENCOUNTER — OFFICE VISIT (OUTPATIENT)
Dept: PEDIATRICS | Facility: CLINIC | Age: 48
End: 2018-08-16
Payer: COMMERCIAL

## 2018-08-16 VITALS
OXYGEN SATURATION: 98 % | BODY MASS INDEX: 42.36 KG/M2 | DIASTOLIC BLOOD PRESSURE: 74 MMHG | HEIGHT: 63 IN | TEMPERATURE: 98.1 F | SYSTOLIC BLOOD PRESSURE: 114 MMHG | HEART RATE: 76 BPM | WEIGHT: 239.1 LBS

## 2018-08-16 DIAGNOSIS — Z00.00 HEALTH CARE MAINTENANCE: Primary | ICD-10-CM

## 2018-08-16 DIAGNOSIS — Q25.40 AORTIC ANOMALY: ICD-10-CM

## 2018-08-16 DIAGNOSIS — D64.9 ANEMIA, UNSPECIFIED TYPE: ICD-10-CM

## 2018-08-16 PROBLEM — I51.7 CARDIOMEGALY: Status: RESOLVED | Noted: 2017-06-19 | Resolved: 2018-08-16

## 2018-08-16 PROBLEM — E66.01 MORBID OBESITY (H): Status: ACTIVE | Noted: 2018-08-16

## 2018-08-16 PROCEDURE — G0145 SCR C/V CYTO,THINLAYER,RESCR: HCPCS | Performed by: NURSE PRACTITIONER

## 2018-08-16 PROCEDURE — 87624 HPV HI-RISK TYP POOLED RSLT: CPT | Performed by: NURSE PRACTITIONER

## 2018-08-16 PROCEDURE — 99396 PREV VISIT EST AGE 40-64: CPT | Performed by: NURSE PRACTITIONER

## 2018-08-16 ASSESSMENT — ENCOUNTER SYMPTOMS
PALPITATIONS: 0
PARESTHESIAS: 0
HEADACHES: 0
MYALGIAS: 0
HEMATOCHEZIA: 0
SHORTNESS OF BREATH: 0
BREAST MASS: 0
DIARRHEA: 0
CHILLS: 0
ABDOMINAL PAIN: 0
JOINT SWELLING: 0
WEAKNESS: 0
HEARTBURN: 0
EYE PAIN: 0
FREQUENCY: 0
DIZZINESS: 0
SORE THROAT: 0
HEMATURIA: 0
DYSURIA: 0
NAUSEA: 0
FEVER: 0
CONSTIPATION: 0
ARTHRALGIAS: 0
NERVOUS/ANXIOUS: 0

## 2018-08-16 NOTE — PROGRESS NOTES
SUBJECTIVE:   CC: Natalee Maya is an 47 year old woman who presents for preventive health visit.     Physical   Annual:     Getting at least 3 servings of Calcium per day:  Yes    Bi-annual eye exam:  Yes    Dental care twice a year:  Yes    Sleep apnea or symptoms of sleep apnea:  None    Diet:  Regular (no restrictions)    Frequency of exercise:  2-3 days/week    Duration of exercise:  15-30 minutes    Taking medications regularly:  Yes    Medication side effects:  None    Additional concerns today:  No    Concerns today: wondering if she needs records form [facility] transferred here?  Patient is not fasting.    -------------------------------------    Today's PHQ-2 Score:   PHQ-2 ( 1999 [employer]) 8/16/2018   Q1: Little interest or pleasure in doing things 0   Q2: Feeling down, depressed or hopeless 0   PHQ-2 Score 0   Q1: Little interest or pleasure in doing things Not at all   Q2: Feeling down, depressed or hopeless Not at all   PHQ-2 Score 0     Abuse: Current or Past(Physical, Sexual or Emotional)- No  Do you feel safe in your environment - Yes    Social History   Substance Use Topics     Smoking status: Never Smoker     Smokeless tobacco: Never Used     Alcohol use No     Alcohol Use 8/16/2018   If you drink alcohol do you typically have greater than 3 drinks per day OR greater than 7 drinks per week? No   No flowsheet data found.    Reviewed orders with patient.  Reviewed health maintenance and updated orders accordingly - Yes  Labs reviewed in EPIC    Patient under age 50, mutual decision reflected in health maintenance.      Pertinent mammograms are reviewed under the imaging tab.  History of abnormal Pap smear: NO - age 30-65 PAP every 5 years with negative HPV co-testing recommended     Reviewed and updated as needed this visit by clinical staff  Tobacco  Allergies  Meds  Med Hx  Surg Hx  Fam Hx  Soc Hx        Reviewed and updated as needed this visit by Provider            Review of  "Systems   Constitutional: Negative for chills and fever.   HENT: Negative for congestion, ear pain, hearing loss and sore throat.    Eyes: Negative for pain and visual disturbance.   Respiratory: Negative for shortness of breath.    Cardiovascular: Positive for peripheral edema. Negative for chest pain and palpitations.   Gastrointestinal: Negative for abdominal pain, constipation, diarrhea, heartburn, hematochezia and nausea.   Breasts:  Negative for tenderness, breast mass and discharge.   Genitourinary: Negative for dysuria, frequency, genital sores, hematuria, pelvic pain, urgency, vaginal bleeding and vaginal discharge.   Musculoskeletal: Negative for arthralgias, joint swelling and myalgias.   Skin: Negative for rash.   Neurological: Negative for dizziness, weakness, headaches and paresthesias.   Psychiatric/Behavioral: Negative for mood changes. The patient is not nervous/anxious.      OBJECTIVE:   /74 (BP Location: Right arm, Cuff Size: Adult Large)  Pulse 76  Temp 98.1  F (36.7  C) (Tympanic)  Ht 5' 3.25\" (1.607 m)  Wt 239 lb 1.6 oz (108.5 kg)  LMP 07/24/2018 (Exact Date)  SpO2 98%  BMI 42.02 kg/m2  Physical Exam  GENERAL: healthy, alert and no distress  EYES: Eyes grossly normal to inspection, PERRL and conjunctivae and sclerae normal  HENT: ear canals and TM's normal, nose and mouth without ulcers or lesions  NECK: no adenopathy, no asymmetry, masses, or scars and thyroid normal to palpation  RESP: lungs clear to auscultation - no rales, rhonchi or wheezes  BREAST: normal without masses, tenderness or nipple discharge and no palpable axillary masses or adenopathy  CV: regular rate and rhythm, normal S1 S2, no S3 or S4, no murmur, click or rub, no peripheral edema and peripheral pulses strong  ABDOMEN: soft, nontender, no hepatosplenomegaly, no masses and bowel sounds normal   (female): normal female external genitalia, normal urethral meatus, vaginal mucosa pink, moist, well rugated, and " "normal cervix/adnexa/uterus without masses or discharge  MS: no gross musculoskeletal defects noted, no edema  SKIN: no suspicious lesions or rashes  NEURO: Normal strength and tone, mentation intact and speech normal  PSYCH: mentation appears normal, affect normal/bright    Diagnostic Test Results:  none     ASSESSMENT/PLAN:   1. Health care maintenance  - Lipid panel reflex to direct LDL Fasting; Future  - **Glucose FUTURE 1yr; Future  - Pap imaged thin layer screen with HPV - recommended age 30 - 65  - HPV High Risk Types DNA Cervical  - *MA Screening Digital Bilateral; Future    2. Aortic anomaly  See problem list. Asymptomatic. Recheck echo 2022.    3. Anemia, unspecified type  Unclear etiology. MCV was normal but ferritin was low. She has been taking iron for a year.  Has very heavy  Menses.   - Vitamin B12; Future  - Ferritin; Future  - Iron and iron binding capacity; Future  - CBC with platelets differential; Future    4. BMI 40.0-44.9, adult (H)  Recommended lifestyle changes.   - ENDOCRINOLOGY ADULT REFERRAL    COUNSELING:  Reviewed preventive health counseling, as reflected in patient instructions    BP Readings from Last 1 Encounters:   08/16/18 114/74     Estimated body mass index is 42.02 kg/(m^2) as calculated from the following:    Height as of this encounter: 5' 3.25\" (1.607 m).    Weight as of this encounter: 239 lb 1.6 oz (108.5 kg).      Weight management plan: Discussed healthy diet and exercise guidelines and patient will follow up in 12 months in clinic to re-evaluate.     reports that she has never smoked. She has never used smokeless tobacco.      Counseling Resources:  ATP IV Guidelines  Pooled Cohorts Equation Calculator  Breast Cancer Risk Calculator  FRAX Risk Assessment  ICSI Preventive Guidelines  Dietary Guidelines for Americans, 2010  USDA's MyPlate  ASA Prophylaxis  Lung CA Screening    Carolina Gomez, [facility]  "

## 2018-08-16 NOTE — MR AVS SNAPSHOT
After Visit Summary   8/16/2018    Natalee Maya    MRN: [id number]           Patient Information     Date Of Birth          1970        Visit Information        Provider Department      8/16/2018 1:40 PM Carolina Gomez APRN [facility] ([facility]3] [place]        Today's Diagnoses     Health care maintenance    -  1    Aortic anomaly        Anemia, unspecified type        BMI 40.0-44.9, adult (H)          Care Instructions      Preventive Health Recommendations  Female Ages 40 to 49    Yearly exam:     See your health care provider every year in order to  1. Review health changes.   2. Discuss preventive care.    3. Review your medicines if your doctor prescribed any.      Get a Pap test every three years (unless you have an abnormal result and your provider advises testing more often).      If you get Pap tests with HPV test, you only need to test every 5 years, unless you have an abnormal result. You do not need a Pap test if your uterus was removed (hysterectomy) and you have not had cancer.      You should be tested each year for STDs (sexually transmitted diseases), if you're at risk.     Ask your doctor if you should have a mammogram.      Have a colonoscopy (test for colon cancer) if someone in your family has had colon cancer or polyps before age 50.       Have a cholesterol test every 5 years.       Have a diabetes test (fasting glucose) after age 45. If you are at risk for diabetes, you should have this test every 3 years.    Shots: Get a flu shot each year. Get a tetanus shot every 10 years.     Nutrition:     Eat at least 5 servings of fruits and vegetables each day.    Eat whole-grain bread, whole-wheat pasta and brown rice instead of white grains and rice.    Get adequate Calcium and Vitamin D.      Lifestyle    Exercise at least 150 minutes a week (an average of 30 minutes a day, 5 days a week). This will help you control your weight and prevent disease.    Limit alcohol to  "one drink per day.    No smoking.     Wear sunscreen to prevent skin cancer.    See your dentist every six months for an exam and cleaning.          Follow-ups after your visit        Additional Services     ENDOCRINOLOGY ADULT REFERRAL       Your provider has referred you to: [facility]: [facility] [phone]   [email or web address]      Please be aware that coverage of these services is subject to the terms and limitations of your health insurance plan.  Call member services at your health plan with any benefit or coverage questions.      Please bring the following to your appointment:    >>   Any x-rays, CTs or MRIs which have been performed.  Contact the facility where they were done to arrange for  prior to your scheduled appointment.    >>   List of current medications   >>   This referral request   >>   Any documents/labs given to you for this referral                  Your next 10 appointments already scheduled     Aug 20, 2018  8:00 AM CDT   MA SCREENING DIGITAL BILATERAL with EAMA1   [facility]    [address] ,[address]  [facility] [address] [phone]           Do not use any powder, lotion or deodorant under your arms or on your breast. If you do, we will ask you to remove it before your exam.  Wear comfortable, two-piece clothing.  If you have any allergies, tell your care team.  Bring any previous mammograms from other facilities or have them mailed to the breast center. Three-dimensional (3D) mammograms are available at [place] locations in [facility], [facility], [facility], [facility], and [place]. [facility] locations include [place] [facility] in [place]. Benefits of 3D mammograms include: - Improved rate of cancer detection - Decreases your chance of having to go back for more tests, which means fewer: - \"False-positive\" results " (This means that there is an abnormal area but it isn't cancer.) - Invasive testing procedures, such as a biopsy or surgery - Can provide clearer images of the breast if you have dense breast tissue. 3D mammography is an optional exam that anyone can have with a 2D mammogram. It doesn't replace or take the place of a 2D mammogram. 2D mammograms remain an effective screening test for all women.  Not all insurance companies cover the cost of a 3D mammogram. Check with your insurance.              Future tests that were ordered for you today     Open Future Orders        Priority Expected Expires Ordered    Vitamin B12 Routine  8/16/2019 8/16/2018    Ferritin Routine  8/16/2019 8/16/2018    Iron and iron binding capacity Routine  8/16/2019 8/16/2018    CBC with platelets differential Routine  8/16/2019 8/16/2018    *MA Screening Digital Bilateral Routine  8/16/2019 8/16/2018    Lipid panel reflex to direct LDL Fasting Routine 7/17/2019 8/16/2019 8/16/2018    **Glucose FUTURE 1yr Routine 7/17/2019 8/16/2019 8/16/2018            Who to contact     If you have questions or need follow up information about today's clinic visit or your schedule please contact [facility] directly at [phone].  Normal or non-critical lab and imaging results will be communicated to you by FeeFightershart, letter or phone within 4 business days after the clinic has received the results. If you do not hear from us within 7 days, please contact the clinic through [employer] or phone. If you have a critical or abnormal lab result, we will notify you by phone as soon as possible.  Submit refill requests through [facility] or call your pharmacy and they will forward the refill request to us. Please allow 3 business days for your refill to be completed.          Additional Information About Your Visit        [facility] Information     [facility] gives you secure access to your electronic health record. If you see a primary care provider, you can also  "send messages to your care team and make appointments. If you have questions, please call your primary care clinic.  If you do not have a primary care provider, please call [phone] and they will assist you.        Care EveryWhere ID     This is your Care EveryWhere ID. This could be used by other organizations to access your [facility] medical records  [id number]        Your Vitals Were     Pulse Temperature Height Last Period Pulse Oximetry BMI (Body Mass Index)    76 98.1  F (36.7  C) (Tympanic) 5' 3.25\" (1.607 m) 07/24/2018 (Exact Date) 98% 42.02 kg/m2       Blood Pressure from Last 3 Encounters:   08/16/18 114/74   12/26/17 122/90   06/13/17 124/78    Weight from Last 3 Encounters:   08/16/18 239 lb 1.6 oz (108.5 kg)   12/26/17 230 lb (104.3 kg)   06/13/17 234 lb (106.1 kg)              We Performed the Following     ENDOCRINOLOGY ADULT REFERRAL     HPV High Risk Types DNA Cervical     Pap imaged thin layer screen with HPV - recommended age 30 - 65        Primary Care Provider Office Phone # Fax #    Carolina Gomez, SHAQ [facility] [phone] [address] [place] [address]        Equal Access to Services     [facility] : Hadii aad ku hadasho Soomaali, waaxda luqadaha, qaybta kaalmada adeegyada, waxay idiin haylorenzon addie simeon . So [facility] [phone].    ATENCIÓN: Si habla español, tiene a erickson disposición servicios gratuitos de asistencia lingüística. Lllexie al [phone].    We comply with applicable federal civil rights laws and [place] laws. We do not discriminate on the basis of race, color, national origin, age, disability, sex, sexual orientation, or gender identity.            Thank you!     Thank you for choosing [facility]  for your care. Our goal is always to provide you with excellent care. Hearing back from our patients is one way we can continue to improve our services. Please take a few minutes to complete the written survey that you " may receive in the mail after your visit with us. Thank you!             Your Updated Medication List - Protect others around you: Learn how to safely use, store and throw away your medicines at www.disposemymeds.org.          This list is accurate as of 8/16/18  2:09 PM.  Always use your most recent med list.                   Brand Name Dispense Instructions for use Diagnosis    ACETAMINOPHEN           ferrous sulfate 325 (65 Fe) MG tablet    IRON    90 tablet    TAKE 1 TABLET(325 MG) BY MOUTH DAILY WITH BREAKFAST    Iron deficiency anemia, unspecified iron deficiency anemia type       IBUPROFEN           order for DME     1 each    1: Gradient Compression wraps; 2: BLE knee or thigh high 20-30 or 30-40 mm Hg compression stockings; 3: BLE knee or thigh high custom compression stockings    Lymphedema

## 2018-08-20 ENCOUNTER — RADIANT APPOINTMENT (OUTPATIENT)
Dept: MAMMOGRAPHY | Facility: CLINIC | Age: 48
End: 2018-08-20
Payer: COMMERCIAL

## 2018-08-20 DIAGNOSIS — E53.8 FOLATE DEFICIENCY: ICD-10-CM

## 2018-08-20 DIAGNOSIS — D64.9 ANEMIA, UNSPECIFIED TYPE: ICD-10-CM

## 2018-08-20 DIAGNOSIS — Z12.31 VISIT FOR SCREENING MAMMOGRAM: ICD-10-CM

## 2018-08-20 DIAGNOSIS — Z00.00 HEALTH CARE MAINTENANCE: ICD-10-CM

## 2018-08-20 DIAGNOSIS — R79.89 ABNORMAL CBC: Primary | ICD-10-CM

## 2018-08-20 LAB
BASOPHILS # BLD AUTO: 0 10E9/L (ref 0–0.2)
BASOPHILS NFR BLD AUTO: 0.6 %
CHOLEST SERPL-MCNC: 230 MG/DL
COPATH REPORT: NORMAL
DIFFERENTIAL METHOD BLD: ABNORMAL
EOSINOPHIL # BLD AUTO: 0.1 10E9/L (ref 0–0.7)
EOSINOPHIL NFR BLD AUTO: 3.5 %
ERYTHROCYTE [DISTWIDTH] IN BLOOD BY AUTOMATED COUNT: 13.2 % (ref 10–15)
FERRITIN SERPL-MCNC: 25 NG/ML (ref 8–252)
FOLATE SERPL-MCNC: 3.1 NG/ML
GLUCOSE SERPL-MCNC: 87 MG/DL (ref 70–99)
HCT VFR BLD AUTO: 37.2 % (ref 35–47)
HDLC SERPL-MCNC: 51 MG/DL
HGB BLD-MCNC: 12.2 G/DL (ref 11.7–15.7)
IRON SATN MFR SERPL: 21 % (ref 15–46)
IRON SERPL-MCNC: 70 UG/DL (ref 35–180)
LDLC SERPL CALC-MCNC: 148 MG/DL
LYMPHOCYTES # BLD AUTO: 1.2 10E9/L (ref 0.8–5.3)
LYMPHOCYTES NFR BLD AUTO: 35 %
MCH RBC QN AUTO: 31.1 PG (ref 26.5–33)
MCHC RBC AUTO-ENTMCNC: 32.8 G/DL (ref 31.5–36.5)
MCV RBC AUTO: 95 FL (ref 78–100)
MONOCYTES # BLD AUTO: 0.3 10E9/L (ref 0–1.3)
MONOCYTES NFR BLD AUTO: 9.4 %
NEUTROPHILS # BLD AUTO: 1.8 10E9/L (ref 1.6–8.3)
NEUTROPHILS NFR BLD AUTO: 51.5 %
NONHDLC SERPL-MCNC: 179 MG/DL
PAP: NORMAL
PLATELET # BLD AUTO: 215 10E9/L (ref 150–450)
RBC # BLD AUTO: 3.92 10E12/L (ref 3.8–5.2)
TIBC SERPL-MCNC: 340 UG/DL (ref 240–430)
TRIGL SERPL-MCNC: 157 MG/DL
VIT B12 SERPL-MCNC: 343 PG/ML (ref 193–986)
WBC # BLD AUTO: 3.4 10E9/L (ref 4–11)

## 2018-08-20 PROCEDURE — 77067 SCR MAMMO BI INCL CAD: CPT | Mod: TC

## 2018-08-20 PROCEDURE — 82746 ASSAY OF FOLIC ACID SERUM: CPT | Performed by: NURSE PRACTITIONER

## 2018-08-20 PROCEDURE — 82728 ASSAY OF FERRITIN: CPT | Performed by: NURSE PRACTITIONER

## 2018-08-20 PROCEDURE — 83540 ASSAY OF IRON: CPT | Performed by: NURSE PRACTITIONER

## 2018-08-20 PROCEDURE — 82607 VITAMIN B-12: CPT | Performed by: NURSE PRACTITIONER

## 2018-08-20 PROCEDURE — 83550 IRON BINDING TEST: CPT | Performed by: NURSE PRACTITIONER

## 2018-08-20 PROCEDURE — 36415 COLL VENOUS BLD VENIPUNCTURE: CPT | Performed by: NURSE PRACTITIONER

## 2018-08-20 PROCEDURE — 82947 ASSAY GLUCOSE BLOOD QUANT: CPT | Performed by: NURSE PRACTITIONER

## 2018-08-20 PROCEDURE — 80061 LIPID PANEL: CPT | Performed by: NURSE PRACTITIONER

## 2018-08-20 PROCEDURE — 85025 COMPLETE CBC W/AUTO DIFF WBC: CPT | Performed by: NURSE PRACTITIONER

## 2018-08-23 LAB
FINAL DIAGNOSIS: NORMAL
HPV HR 12 DNA CVX QL NAA+PROBE: NEGATIVE
HPV16 DNA SPEC QL NAA+PROBE: NEGATIVE
HPV18 DNA SPEC QL NAA+PROBE: NEGATIVE
SPECIMEN DESCRIPTION: NORMAL
SPECIMEN SOURCE CVX/VAG CYTO: NORMAL

## 2018-08-28 DIAGNOSIS — E53.8 FOLATE DEFICIENCY: ICD-10-CM

## 2018-08-28 DIAGNOSIS — D64.9 ANEMIA, UNSPECIFIED TYPE: ICD-10-CM

## 2018-08-28 DIAGNOSIS — R79.89 ABNORMAL CBC: ICD-10-CM

## 2018-08-28 LAB
ALBUMIN SERPL-MCNC: 3.7 G/DL (ref 3.4–5)
ALP SERPL-CCNC: 80 U/L (ref 40–150)
ALT SERPL W P-5'-P-CCNC: 30 U/L (ref 0–50)
AST SERPL W P-5'-P-CCNC: 17 U/L (ref 0–45)
BASOPHILS # BLD AUTO: 0.1 10E9/L (ref 0–0.2)
BASOPHILS NFR BLD AUTO: 2 %
BILIRUB DIRECT SERPL-MCNC: 0.1 MG/DL (ref 0–0.2)
BILIRUB SERPL-MCNC: 0.4 MG/DL (ref 0.2–1.3)
COPATH REPORT: NORMAL
DIFFERENTIAL METHOD BLD: ABNORMAL
EOSINOPHIL # BLD AUTO: 0 10E9/L (ref 0–0.7)
EOSINOPHIL NFR BLD AUTO: 1 %
ERYTHROCYTE [DISTWIDTH] IN BLOOD BY AUTOMATED COUNT: 13.7 % (ref 10–15)
FOLATE SERPL-MCNC: 4.4 NG/ML
HCT VFR BLD AUTO: 36 % (ref 35–47)
HGB BLD-MCNC: 12 G/DL (ref 11.7–15.7)
LYMPHOCYTES # BLD AUTO: 0.8 10E9/L (ref 0.8–5.3)
LYMPHOCYTES NFR BLD AUTO: 23 %
MCH RBC QN AUTO: 31.4 PG (ref 26.5–33)
MCHC RBC AUTO-ENTMCNC: 33.3 G/DL (ref 31.5–36.5)
MCV RBC AUTO: 94 FL (ref 78–100)
MONOCYTES # BLD AUTO: 0.4 10E9/L (ref 0–1.3)
MONOCYTES NFR BLD AUTO: 13 %
NEUTROPHILS # BLD AUTO: 2 10E9/L (ref 1.6–8.3)
NEUTROPHILS NFR BLD AUTO: 61 %
PLATELET # BLD AUTO: 205 10E9/L (ref 150–450)
PLATELET # BLD EST: ABNORMAL 10*3/UL
PROT SERPL-MCNC: 6.6 G/DL (ref 6.8–8.8)
RBC # BLD AUTO: 3.82 10E12/L (ref 3.8–5.2)
RBC MORPH BLD: ABNORMAL
RETICS # AUTO: 82.1 10E9/L (ref 25–95)
RETICS/RBC NFR AUTO: 2.2 % (ref 0.5–2)
VIT B12 SERPL-MCNC: 310 PG/ML (ref 193–986)
WBC # BLD AUTO: 3.3 10E9/L (ref 4–11)

## 2018-08-28 PROCEDURE — 99000 SPECIMEN HANDLING OFFICE-LAB: CPT | Performed by: NURSE PRACTITIONER

## 2018-08-28 PROCEDURE — 85060 BLOOD SMEAR INTERPRETATION: CPT | Performed by: NURSE PRACTITIONER

## 2018-08-28 PROCEDURE — 85025 COMPLETE CBC W/AUTO DIFF WBC: CPT | Performed by: NURSE PRACTITIONER

## 2018-08-28 PROCEDURE — 36415 COLL VENOUS BLD VENIPUNCTURE: CPT | Performed by: NURSE PRACTITIONER

## 2018-08-28 PROCEDURE — 83921 ORGANIC ACID SINGLE QUANT: CPT | Mod: 90 | Performed by: NURSE PRACTITIONER

## 2018-08-28 PROCEDURE — 83516 IMMUNOASSAY NONANTIBODY: CPT | Performed by: NURSE PRACTITIONER

## 2018-08-28 PROCEDURE — 85045 AUTOMATED RETICULOCYTE COUNT: CPT | Performed by: NURSE PRACTITIONER

## 2018-08-28 PROCEDURE — 83516 IMMUNOASSAY NONANTIBODY: CPT | Mod: 59 | Performed by: NURSE PRACTITIONER

## 2018-08-28 PROCEDURE — 82607 VITAMIN B-12: CPT | Performed by: NURSE PRACTITIONER

## 2018-08-28 PROCEDURE — 82784 ASSAY IGA/IGD/IGG/IGM EACH: CPT | Performed by: NURSE PRACTITIONER

## 2018-08-28 PROCEDURE — 80076 HEPATIC FUNCTION PANEL: CPT | Performed by: NURSE PRACTITIONER

## 2018-08-28 PROCEDURE — 82746 ASSAY OF FOLIC ACID SERUM: CPT | Performed by: NURSE PRACTITIONER

## 2018-08-29 LAB
IGA SERPL-MCNC: 116 MG/DL (ref 70–380)
TTG IGA SER-ACNC: <1 U/ML
TTG IGG SER-ACNC: <1 U/ML

## 2018-08-30 LAB — METHYLMALONATE SERPL-SCNC: 0.18 UMOL/L (ref 0–0.4)

## 2018-09-10 DIAGNOSIS — E53.8 FOLATE DEFICIENCY: Primary | ICD-10-CM

## 2018-09-10 RX ORDER — FOLIC ACID 1 MG/1
1 TABLET ORAL DAILY
Qty: 100 TABLET | Refills: 3 | Status: SHIPPED | OUTPATIENT
Start: 2018-09-10 | End: 2019-11-10

## 2018-10-15 ENCOUNTER — OFFICE VISIT (OUTPATIENT)
Dept: URGENT CARE | Facility: URGENT CARE | Age: 48
End: 2018-10-15
Payer: COMMERCIAL

## 2018-10-15 VITALS
DIASTOLIC BLOOD PRESSURE: 96 MMHG | HEART RATE: 61 BPM | OXYGEN SATURATION: 99 % | TEMPERATURE: 97.8 F | SYSTOLIC BLOOD PRESSURE: 144 MMHG

## 2018-10-15 DIAGNOSIS — R42 DIZZINESS: Primary | ICD-10-CM

## 2018-10-15 LAB
ANION GAP SERPL CALCULATED.3IONS-SCNC: 7 MMOL/L (ref 3–14)
BASOPHILS # BLD AUTO: 0 10E9/L (ref 0–0.2)
BASOPHILS NFR BLD AUTO: 0.4 %
BUN SERPL-MCNC: 10 MG/DL (ref 7–30)
CALCIUM SERPL-MCNC: 9.6 MG/DL (ref 8.5–10.1)
CHLORIDE SERPL-SCNC: 105 MMOL/L (ref 94–109)
CO2 SERPL-SCNC: 31 MMOL/L (ref 20–32)
CREAT SERPL-MCNC: 0.4 MG/DL (ref 0.52–1.04)
DIFFERENTIAL METHOD BLD: NORMAL
EOSINOPHIL # BLD AUTO: 0.1 10E9/L (ref 0–0.7)
EOSINOPHIL NFR BLD AUTO: 1.9 %
ERYTHROCYTE [DISTWIDTH] IN BLOOD BY AUTOMATED COUNT: 13 % (ref 10–15)
GFR SERPL CREATININE-BSD FRML MDRD: >90 ML/MIN/1.7M2
GLUCOSE SERPL-MCNC: 90 MG/DL (ref 70–99)
HCT VFR BLD AUTO: 37.8 % (ref 35–47)
HGB BLD-MCNC: 12.4 G/DL (ref 11.7–15.7)
LYMPHOCYTES # BLD AUTO: 1.8 10E9/L (ref 0.8–5.3)
LYMPHOCYTES NFR BLD AUTO: 26.5 %
MCH RBC QN AUTO: 31 PG (ref 26.5–33)
MCHC RBC AUTO-ENTMCNC: 32.8 G/DL (ref 31.5–36.5)
MCV RBC AUTO: 95 FL (ref 78–100)
MONOCYTES # BLD AUTO: 0.5 10E9/L (ref 0–1.3)
MONOCYTES NFR BLD AUTO: 7.7 %
NEUTROPHILS # BLD AUTO: 4.3 10E9/L (ref 1.6–8.3)
NEUTROPHILS NFR BLD AUTO: 63.5 %
PLATELET # BLD AUTO: 215 10E9/L (ref 150–450)
POTASSIUM SERPL-SCNC: 4.4 MMOL/L (ref 3.4–5.3)
RBC # BLD AUTO: 4 10E12/L (ref 3.8–5.2)
SODIUM SERPL-SCNC: 143 MMOL/L (ref 133–144)
WBC # BLD AUTO: 6.8 10E9/L (ref 4–11)

## 2018-10-15 PROCEDURE — 80048 BASIC METABOLIC PNL TOTAL CA: CPT | Performed by: PHYSICIAN ASSISTANT

## 2018-10-15 PROCEDURE — 99214 OFFICE O/P EST MOD 30 MIN: CPT | Performed by: PHYSICIAN ASSISTANT

## 2018-10-15 PROCEDURE — 85025 COMPLETE CBC W/AUTO DIFF WBC: CPT | Performed by: PHYSICIAN ASSISTANT

## 2018-10-15 PROCEDURE — 84439 ASSAY OF FREE THYROXINE: CPT | Performed by: PHYSICIAN ASSISTANT

## 2018-10-15 PROCEDURE — 93000 ELECTROCARDIOGRAM COMPLETE: CPT | Performed by: PHYSICIAN ASSISTANT

## 2018-10-15 PROCEDURE — 84443 ASSAY THYROID STIM HORMONE: CPT | Performed by: PHYSICIAN ASSISTANT

## 2018-10-15 PROCEDURE — 36415 COLL VENOUS BLD VENIPUNCTURE: CPT | Performed by: PHYSICIAN ASSISTANT

## 2018-10-15 NOTE — MR AVS SNAPSHOT
After Visit Summary   10/15/2018    Natalee Maya    MRN: [id number]           Patient Information     Date Of Birth          1970        Visit Information        Provider Department      10/15/2018 7:35 PM Stefany Meng PA-C [facility]        Today's Diagnoses     Dizziness    -  1      Care Instructions      Dizziness (Uncertain Cause)  Dizziness is a common symptom. It may be described as lightheadedness, spinning, or feeling like you are going to faint. Dizziness can have many causes.  Be sure to tell the healthcare provider about:    All medicines you take, including prescription, over-the-counter, herbs, and supplements    Any other symptoms you have    Any health problems you are being treated for    Any past major health problems you've had, such as a heart attack, balance issues, hearing problems, or blood pressure problems    Anything that causes the dizziness to get worse or better  Today's exam did not show an exact cause for your dizziness. Other tests may be needed. Follow up with your healthcare provider.  Home care    Dizziness that occurs with sudden standing may be a sign of mild dehydration. Drink extra fluids for the next few days.    If you recently started a new medicine, stopped a medicine, or had the dose of a current medicine changed, talk with the prescribing healthcare provider. Your medicine plan may need adjustment.    If dizziness lasts more than a few seconds, sit or lie down until it passes. This may help prevent injury in case you pass out. Get up slowly when you feel better.    Don't drive or use power tools or dangerous equipment until you have had no dizziness for at least 48 hours.  Follow-up care  Follow up with your healthcare provider for further evaluation within the next 7 days or as advised.  When to seek medical advice  Call your healthcare provider for any of the following:    Worsening of symptoms or new  symptoms    Passing out or seizure    Repeated vomiting    Headache    Palpitations (the sense that your heart is fluttering or beating fast or hard)    Shortness of breath    Blood in vomit or stool (black or red color)    Weakness of an arm or leg or 1 side of the face    Vision or hearing changes    Trouble walking or speaking    Chest, arm, neck, back, or jaw pain  Date Last Reviewed: 11/1/2017 2000-2017 [employer]. [address] [place] [address]. All rights reserved. This information is not intended as a substitute for professional medical care. Always follow your healthcare professional's instructions.                Follow-ups after your visit        Your next 10 appointments already scheduled     Nov 21, 2018  4:00 PM CST   New Visit with SHAQ Virgen CNP   [facility]    [address] [facility] [address] [phone]              Who to contact     If you have questions or need follow up information about today's clinic visit or your schedule please contact [facility] directly at [phone].  Normal or non-critical lab and imaging results will be communicated to you by Sofa Labshart, letter or phone within 4 business days after the clinic has received the results. If you do not hear from us within 7 days, please contact the clinic through Sofa Labshart or phone. If you have a critical or abnormal lab result, we will notify you by phone as soon as possible.  Submit refill requests through [employer] or call your pharmacy and they will forward the refill request to us. Please allow 3 business days for your refill to be completed.          Additional Information About Your Visit        [facility] Information     [employer] gives you secure access to your electronic health record. If you see a primary care provider, you can also send messages to your care team and make appointments. If you have questions,  please call your primary care clinic.  If you do not have a primary care provider, please call [phone] and they will assist you.        Care EveryWhere ID     This is your Care EveryWhere ID. This could be used by other organizations to access your [facility] medical records  [id number]        Your Vitals Were     Pulse Temperature Pulse Oximetry             61 97.8  F (36.6  C) (Oral) 99%          Blood Pressure from Last 3 Encounters:   10/15/18 (!) 144/96   08/16/18 114/74   12/26/17 122/90    Weight from Last 3 Encounters:   08/16/18 239 lb 1.6 oz (108.5 kg)   12/26/17 230 lb (104.3 kg)   06/13/17 234 lb (106.1 kg)              We Performed the Following     Basic metabolic panel  (Ca, Cl, CO2, Creat, Gluc, K, Na, BUN)     CBC with platelets and differential     EKG 12-lead complete w/read - Clinics     TSH with free T4 reflex          Today's Medication Changes          These changes are accurate as of 10/15/18  8:47 PM.  If you have any questions, ask your nurse or doctor.               Stop taking these medicines if you haven't already. Please contact your care team if you have questions.     ACETAMINOPHEN   Stopped by:  Stefany Meng PA-C           IBUPROFEN   Stopped by:  Stefany Meng PA-C                    Primary Care Provider Office Phone # Fax #    Carolina SHAQ [facility] [phone] [address] [place] [address]        Equal Access to Services     [facility] AH: Hadii aad ku hadasho Soomaali, waaxda luqadaha, qaybta kaalmada adeegyada, shen simeon . So [facility] [phone].    ATENCIÓN: Si habla español, tiene a erickson disposición servicios gratuitos de asistencia lingüística. Llame al [phone].    We comply with applicable federal civil rights laws and [place] laws. We do not discriminate on the basis of race, color, national origin, age, disability, sex, sexual orientation, or gender identity.             Thank you!     Thank you for choosing [facility]  for your care. Our goal is always to provide you with excellent care. Hearing back from our patients is one way we can continue to improve our services. Please take a few minutes to complete the written survey that you may receive in the mail after your visit with us. Thank you!             Your Updated Medication List - Protect others around you: Learn how to safely use, store and throw away your medicines at www.disposemymeds.org.          This list is accurate as of 10/15/18  8:47 PM.  Always use your most recent med list.                   Brand Name Dispense Instructions for use Diagnosis    ferrous sulfate 325 (65 Fe) MG tablet    IRON    90 tablet    TAKE 1 TABLET(325 MG) BY MOUTH DAILY WITH BREAKFAST    Iron deficiency anemia, unspecified iron deficiency anemia type       folic acid 1 MG tablet    FOLVITE    100 tablet    Take 1 tablet (1 mg) by mouth daily    Folate deficiency       order for DME     1 each    1: Gradient Compression wraps; 2: BLE knee or thigh high 20-30 or 30-40 mm Hg compression stockings; 3: BLE knee or thigh high custom compression stockings    Lymphedema

## 2018-10-16 ENCOUNTER — TELEPHONE (OUTPATIENT)
Dept: URGENT CARE | Facility: URGENT CARE | Age: 48
End: 2018-10-16

## 2018-10-16 LAB
T4 FREE SERPL-MCNC: 0.97 NG/DL (ref 0.76–1.46)
TSH SERPL DL<=0.005 MIU/L-ACNC: 12.02 MU/L (ref 0.4–4)

## 2018-10-16 NOTE — TELEPHONE ENCOUNTER
Pt's TSH is elevated, which means that her thyroid function is decreased.  She needs to follow up with her primary care provider within 1 week to discuss further evaluation of this lab finding.

## 2018-10-16 NOTE — PATIENT INSTRUCTIONS
Dizziness (Uncertain Cause)  Dizziness is a common symptom. It may be described as lightheadedness, spinning, or feeling like you are going to faint. Dizziness can have many causes.  Be sure to tell the healthcare provider about:    All medicines you take, including prescription, over-the-counter, herbs, and supplements    Any other symptoms you have    Any health problems you are being treated for    Any past major health problems you've had, such as a heart attack, balance issues, hearing problems, or blood pressure problems    Anything that causes the dizziness to get worse or better  Today's exam did not show an exact cause for your dizziness. Other tests may be needed. Follow up with your healthcare provider.  Home care    Dizziness that occurs with sudden standing may be a sign of mild dehydration. Drink extra fluids for the next few days.    If you recently started a new medicine, stopped a medicine, or had the dose of a current medicine changed, talk with the prescribing healthcare provider. Your medicine plan may need adjustment.    If dizziness lasts more than a few seconds, sit or lie down until it passes. This may help prevent injury in case you pass out. Get up slowly when you feel better.    Don't drive or use power tools or dangerous equipment until you have had no dizziness for at least 48 hours.  Follow-up care  Follow up with your healthcare provider for further evaluation within the next 7 days or as advised.  When to seek medical advice  Call your healthcare provider for any of the following:    Worsening of symptoms or new symptoms    Passing out or seizure    Repeated vomiting    Headache    Palpitations (the sense that your heart is fluttering or beating fast or hard)    Shortness of breath    Blood in vomit or stool (black or red color)    Weakness of an arm or leg or 1 side of the face    Vision or hearing changes    Trouble walking or speaking    Chest, arm, neck, back, or jaw pain  Date  Last Reviewed: 11/1/2017 2000-2017 [employer]. [address] [place] [address], [place] [address]. All rights reserved. This information is not intended as a substitute for professional medical care. Always follow your healthcare professional's instructions.

## 2018-10-16 NOTE — PROGRESS NOTES
CHIEF COMPLAINT:   Chief Complaint   Patient presents with     Urgent Care     Dizziness     dizzy spells off and on all day today       HPI: Natalee Maya is a 48 year old female without significant medical history who presents to clinic today for evaluation of dizzy spells. The first episode happened this AM at 9A. She was sitting down, and began to feel lightheaded. The episode lasted approximately 30s and then passed. Her jaw felt tingling with the episodes. These episodes happened several more times throughout the day. She saw her school nurse this afternoon and was noted to have high BP. She denies having weakness, change in mentation, slurred speech with these episodes. She has never had these symptoms in the past. She denies having CP, SOB, palpitations. Her mom has a history of thyroid problems.     Past Medical History:   Diagnosis Date     Calculus of kidney     History of kidney stone -- Abstracted 02     Lymphedema of lower extremity      Past Surgical History:   Procedure Laterality Date     C NONSPECIFIC PROCEDURE       -- Abstracted 02     FOOT SURGERY      2017     Social History   Substance Use Topics     Smoking status: Never Smoker     Smokeless tobacco: Never Used     Alcohol use No     Current Outpatient Prescriptions   Medication     ferrous sulfate (IRON) 325 (65 Fe) MG tablet     folic acid (FOLVITE) 1 MG tablet     order for DME     No current facility-administered medications for this visit.      Allergies   Allergen Reactions     No Known Drug Allergies        10 point ROS of systems including Constitutional, Eyes, Respiratory, Cardiovascular, Gastroenterology, Genitourinary, Integumentary, Muscularskeletal, Psychiatric were all negative except for pertinent positives noted in my HPI.        Exam:  BP (!) 144/96 (Cuff Size: Adult Large)  Pulse 61  Temp 97.8  F (36.6  C) (Oral)  SpO2 99%  Constitutional: healthy, alert and no distress  Head: Normocephalic,  atraumatic.  Eyes: conjunctiva clear, no drainage  ENT: TMs clear and shiny irene, nasal mucosa pink and moist, throat without tonsillar hypertrophy or erythema  Neck: neck is supple, no cervical lymphadenopathy or nuchal rigidity  Cardiovascular: RRR  Respiratory: CTA bilaterally, no rhonchi or rales  Gastrointestinal: soft and nontender  Skin: no rashes  Neurologic:CN2-12 intact, No facial weakness, Speech clear, gait normal. Moves all extremities.    Results for orders placed or performed in visit on 10/15/18   CBC with platelets and differential   Result Value Ref Range    WBC 6.8 4.0 - 11.0 10e9/L    RBC Count 4.00 3.8 - 5.2 10e12/L    Hemoglobin 12.4 11.7 - 15.7 g/dL    Hematocrit 37.8 35.0 - 47.0 %    MCV 95 78 - 100 fl    MCH 31.0 26.5 - 33.0 pg    MCHC 32.8 31.5 - 36.5 g/dL    RDW 13.0 10.0 - 15.0 %    Platelet Count 215 150 - 450 10e9/L    Diff Method Automated Method     % Neutrophils 63.5 %    % Lymphocytes 26.5 %    % Monocytes 7.7 %    % Eosinophils 1.9 %    % Basophils 0.4 %    Absolute Neutrophil 4.3 1.6 - 8.3 10e9/L    Absolute Lymphocytes 1.8 0.8 - 5.3 10e9/L    Absolute Monocytes 0.5 0.0 - 1.3 10e9/L    Absolute Eosinophils 0.1 0.0 - 0.7 10e9/L    Absolute Basophils 0.0 0.0 - 0.2 10e9/L   Basic metabolic panel  (Ca, Cl, CO2, Creat, Gluc, K, Na, BUN)   Result Value Ref Range    Sodium 143 133 - 144 mmol/L    Potassium 4.4 3.4 - 5.3 mmol/L    Chloride 105 94 - 109 mmol/L    Carbon Dioxide 31 20 - 32 mmol/L    Anion Gap 7 3 - 14 mmol/L    Glucose 90 70 - 99 mg/dL    Urea Nitrogen 10 7 - 30 mg/dL    Creatinine 0.40 (L) 0.52 - 1.04 mg/dL    GFR Estimate >90 >60 mL/min/1.7m2    GFR Estimate If Black >90 >60 mL/min/1.7m2    Calcium 9.6 8.5 - 10.1 mg/dL   TSH with free T4 reflex   Result Value Ref Range    TSH 12.02 (H) 0.40 - 4.00 mU/L   T4 free   Result Value Ref Range    T4 Free 0.97 0.76 - 1.46 ng/dL     EKG -- NSR    ASSESSMENT/PLAN:  1. Dizziness  48 year old female presents to the clinic with a one  day history of dizziness. No source of her dizziness was found at todays visit. A broad differential was considered including, MI, cardiac arrhythmia, hypotension, TIA, CVA, BPPV and anemia. I advised that she follow-up with her PCP in 3-5 days. RED FLAG symptoms discussed with patient to ED if any occur. Patient agrees with treatment plan.   - EKG 12-lead complete w/read - Clinics  - CBC with platelets and differential  - Basic metabolic panel  (Ca, Cl, CO2, Creat, Gluc, K, Na, BUN)  - TSH with free T4 reflex  - T4 free  - T4 free      Stefany Meng PA-C

## 2018-10-16 NOTE — NURSING NOTE
Chief Complaint   Patient presents with     Urgent Care     Dizziness     dizzy spells off and on all day today        S GAIL MENDENHALL

## 2018-10-17 NOTE — TELEPHONE ENCOUNTER
Called patient, she is aware of lab results and will schedule an appointment with Carolina Gomez.     Zachary Phan, Medical Assistant

## 2018-10-18 ENCOUNTER — MYC MEDICAL ADVICE (OUTPATIENT)
Dept: PEDIATRICS | Facility: CLINIC | Age: 48
End: 2018-10-18

## 2018-10-18 NOTE — TELEPHONE ENCOUNTER
Informed pt to schedule a f/u office visit, per recommendation of urgent care provider.    Aliza Martinez RN

## 2018-10-24 DIAGNOSIS — D64.9 ANEMIA, UNSPECIFIED TYPE: ICD-10-CM

## 2018-10-24 LAB — FOLATE SERPL-MCNC: 16 NG/ML

## 2018-10-24 PROCEDURE — 36415 COLL VENOUS BLD VENIPUNCTURE: CPT | Performed by: NURSE PRACTITIONER

## 2018-10-24 PROCEDURE — 82746 ASSAY OF FOLIC ACID SERUM: CPT | Performed by: NURSE PRACTITIONER

## 2018-10-27 ENCOUNTER — HOSPITAL ENCOUNTER (EMERGENCY)
Facility: CLINIC | Age: 48
Discharge: HOME | End: 2018-10-27
Attending: EMERGENCY MEDICINE | Admitting: EMERGENCY MEDICINE
Payer: COMMERCIAL

## 2018-10-27 ENCOUNTER — APPOINTMENT (OUTPATIENT)
Dept: CT IMAGING | Facility: CLINIC | Age: 48
End: 2018-10-27
Attending: EMERGENCY MEDICINE
Payer: COMMERCIAL

## 2018-10-27 VITALS
TEMPERATURE: 98.1 F | WEIGHT: 241.4 LBS | RESPIRATION RATE: 14 BRPM | HEART RATE: 65 BPM | BODY MASS INDEX: 42.43 KG/M2 | DIASTOLIC BLOOD PRESSURE: 84 MMHG | SYSTOLIC BLOOD PRESSURE: 126 MMHG | OXYGEN SATURATION: 98 %

## 2018-10-27 DIAGNOSIS — R20.2 PARESTHESIA: ICD-10-CM

## 2018-10-27 LAB
ANION GAP SERPL CALCULATED.3IONS-SCNC: 6 MMOL/L (ref 3–14)
APTT PPP: 28 SEC (ref 22–37)
BASOPHILS # BLD AUTO: 0 10E9/L (ref 0–0.2)
BASOPHILS NFR BLD AUTO: 0.4 %
BUN SERPL-MCNC: 14 MG/DL (ref 7–30)
CALCIUM SERPL-MCNC: 8.8 MG/DL (ref 8.5–10.1)
CHLORIDE SERPL-SCNC: 106 MMOL/L (ref 94–109)
CO2 SERPL-SCNC: 29 MMOL/L (ref 20–32)
CREAT SERPL-MCNC: 0.63 MG/DL (ref 0.52–1.04)
DIFFERENTIAL METHOD BLD: NORMAL
EOSINOPHIL # BLD AUTO: 0.1 10E9/L (ref 0–0.7)
EOSINOPHIL NFR BLD AUTO: 2.8 %
ERYTHROCYTE [DISTWIDTH] IN BLOOD BY AUTOMATED COUNT: 13.4 % (ref 10–15)
GFR SERPL CREATININE-BSD FRML MDRD: >90 ML/MIN/1.7M2
GLUCOSE BLDC GLUCOMTR-MCNC: 95 MG/DL (ref 70–99)
GLUCOSE SERPL-MCNC: 96 MG/DL (ref 70–99)
HCT VFR BLD AUTO: 38.7 % (ref 35–47)
HGB BLD-MCNC: 13 G/DL (ref 11.7–15.7)
IMM GRANULOCYTES # BLD: 0 10E9/L (ref 0–0.4)
IMM GRANULOCYTES NFR BLD: 0.4 %
INR PPP: 0.85 (ref 0.86–1.14)
LYMPHOCYTES # BLD AUTO: 1.3 10E9/L (ref 0.8–5.3)
LYMPHOCYTES NFR BLD AUTO: 27.6 %
MCH RBC QN AUTO: 31.4 PG (ref 26.5–33)
MCHC RBC AUTO-ENTMCNC: 33.6 G/DL (ref 31.5–36.5)
MCV RBC AUTO: 94 FL (ref 78–100)
MONOCYTES # BLD AUTO: 0.5 10E9/L (ref 0–1.3)
MONOCYTES NFR BLD AUTO: 9.7 %
NEUTROPHILS # BLD AUTO: 2.7 10E9/L (ref 1.6–8.3)
NEUTROPHILS NFR BLD AUTO: 59.1 %
NRBC # BLD AUTO: 0 10*3/UL
NRBC BLD AUTO-RTO: 0 /100
PLATELET # BLD AUTO: 209 10E9/L (ref 150–450)
POTASSIUM SERPL-SCNC: 3.8 MMOL/L (ref 3.4–5.3)
RBC # BLD AUTO: 4.14 10E12/L (ref 3.8–5.2)
SODIUM SERPL-SCNC: 141 MMOL/L (ref 133–144)
TROPONIN I SERPL-MCNC: <0.015 UG/L (ref 0–0.04)
WBC # BLD AUTO: 4.6 10E9/L (ref 4–11)

## 2018-10-27 PROCEDURE — 70450 CT HEAD/BRAIN W/O DYE: CPT | Mod: XS

## 2018-10-27 PROCEDURE — 80048 BASIC METABOLIC PNL TOTAL CA: CPT | Performed by: EMERGENCY MEDICINE

## 2018-10-27 PROCEDURE — 00000146 ZZHCL STATISTIC GLUCOSE BY METER IP

## 2018-10-27 PROCEDURE — 85610 PROTHROMBIN TIME: CPT | Performed by: EMERGENCY MEDICINE

## 2018-10-27 PROCEDURE — 70496 CT ANGIOGRAPHY HEAD: CPT

## 2018-10-27 PROCEDURE — 93005 ELECTROCARDIOGRAM TRACING: CPT

## 2018-10-27 PROCEDURE — 25000128 H RX IP 250 OP 636: Performed by: EMERGENCY MEDICINE

## 2018-10-27 PROCEDURE — 84484 ASSAY OF TROPONIN QUANT: CPT | Performed by: EMERGENCY MEDICINE

## 2018-10-27 PROCEDURE — 85025 COMPLETE CBC W/AUTO DIFF WBC: CPT | Performed by: EMERGENCY MEDICINE

## 2018-10-27 PROCEDURE — 85730 THROMBOPLASTIN TIME PARTIAL: CPT | Performed by: EMERGENCY MEDICINE

## 2018-10-27 PROCEDURE — 96361 HYDRATE IV INFUSION ADD-ON: CPT

## 2018-10-27 PROCEDURE — 99285 EMERGENCY DEPT VISIT HI MDM: CPT | Mod: 25

## 2018-10-27 PROCEDURE — 96360 HYDRATION IV INFUSION INIT: CPT | Mod: 59

## 2018-10-27 RX ORDER — IOPAMIDOL 755 MG/ML
500 INJECTION, SOLUTION INTRAVASCULAR ONCE
Status: COMPLETED | OUTPATIENT
Start: 2018-10-27 | End: 2018-10-27

## 2018-10-27 RX ADMIN — IOPAMIDOL 70 ML: 755 INJECTION, SOLUTION INTRAVENOUS at 19:31

## 2018-10-27 RX ADMIN — SODIUM CHLORIDE 80 ML: 9 INJECTION, SOLUTION INTRAVENOUS at 19:31

## 2018-10-27 RX ADMIN — SODIUM CHLORIDE 500 ML: 9 INJECTION, SOLUTION INTRAVENOUS at 18:42

## 2018-10-27 ASSESSMENT — ENCOUNTER SYMPTOMS
DIZZINESS: 1
HEADACHES: 1
NUMBNESS: 1
WEAKNESS: 1

## 2018-10-27 NOTE — ED TRIAGE NOTES
Here with weakness and dizzy  Seen about 2 weeks ago for the dizziness at a  and EKG and lab work done Told her thyroid was low . Now about 3 days ago she started having some left sided weakness three or four times a day. No nausea with this weakness . Weakness did not affect her standing but her facial numbness was involved and her tooth were numb . Even smile Weakness lasts about a minute

## 2018-10-27 NOTE — ED NOTES
AO X 4.  ABCs intact.  Pt reports worsening episodic dizziness and lightheadedness.  Pt also reports R sided paresthesias during these episodes.  Last episode was several hours ago and resolved during pt transport to ER.

## 2018-10-27 NOTE — ED AVS SNAPSHOT
" [facility] Emergency Department    [address]    [facility] [address]    Phone:  [phone]    Fax:  [phone]                                       Natalee Maya   MRN: [id number]    Department:  [facility] Emergency Department   Date of Visit:  10/27/2018           Patient Information     Date Of Birth          1970        Your diagnoses for this visit were:     Paresthesia        You were seen by Josh Jenkins DO.      Follow-up Information     Follow up with Carolina Gomez APRN CNP. Call in 2 days.    Specialty:  Nurse Practitioner    Why:  As needed    Contact information:    [address] [place] [address]  [phone]          Follow up with [facility] NEUR[facility].    Why:  They should reach out to you and schedule an appointment. If you do not hear from them in 1-2 business days, please call the listed number.    Contact information:    [address]  [facility] [id number] [phone]        Go to [facility] Emergency Department.    Specialty:  EMERGENCY MEDICINE    Why:  If symptoms worsen    Contact information:    [address]  [facility] [id number] [phone]        Discharge Instructions         Paraesthesias  Paraesthesia is a burning or prickling sensation that is sometimes felt in the hands, arms, legs or feet. It can also occur in other parts of the body. It can also feel like tingling or numbness, skin crawling, or itching. The feeling is not comfortable, but it is not painful. (The \"pins and needles\" feeling that happens when a foot or hand \"falls asleep\" is a temporary paraesthesia.)  Paraesthesias that last or come and go may be caused by medical issues that need to be treated. These include stroke, a bulging disk pressing on a nerve, a trapped nerve, vitamin deficiencies, or even certain medicines.  Tests are often done. These tests may " include blood tests, X-ray, CT (computerized tomography) scan, or a muscle test (electromyography). Depending on the cause, treatment may include physical therapy.  Home care    Tell the healthcare provider about all medicines you take. This includes prescription and over-the-counter medicines, vitamins, and herbs. Ask if any of the medicines may be causing your problems. Do not make any changes to prescription medicines without talking to your healthcare provider first.    You may be prescribed medicines to help relieve the tingling feeling or for pain. Take all medicines as directed.    A numb hand or foot may be more prone to injury. To help protect it:  ? Always use oven mitts.  ? Test water with an unaffected hand or foot.  ? Use caution when trimming nails. File sharp areas.  ? Wear shoes that fit well to avoid pressure points, blisters, and ulcers.  ? Inspect your hands and feet carefully (including the soles of your feet and between your toes) at least once a week. If you see red areas, sores, or other problems, tell your healthcare provider.  Follow-up care  Follow up with your doctor or as advised by our staff. You may need further testing or evaluation.  When to seek medical advice  Call your healthcare provider right away if any of the following occur:    Numbness or weakness of the face, one arm, or one leg    Slurred speech, confusion, trouble speaking, walking, or seeing    Severe headache, fainting spell, dizziness, or seizure    Chest, arm, neck, or upper back pain    Loss of bladder or bowel control    Open wound with redness, swelling, or pus  Date Last Reviewed: 9/25/2015 2000-2017 [employer]. [address] [place] [address]. All rights reserved. This information is not intended as a substitute for professional medical care. Always follow your healthcare professional's instructions.          Your next 10 appointments already scheduled     Nov 21, 2018  4:00 PM CST    New Visit with SHAQ Virgen CNP   [facility] ([facility])    [address]  [facility] [address] [phone]              24 Hour Appointment Hotline       To make an appointment at any [facility], call [phone]). If you don't have a family doctor or clinic, we will help you find one. [facility] are conveniently located to serve the needs of you and your family.             Review of your medicines      Our records show that you are taking the medicines listed below. If these are incorrect, please call your family doctor or clinic.        Dose / Directions Last dose taken    ferrous sulfate 325 (65 Fe) MG tablet   Commonly known as:  IRON   Quantity:  90 tablet        TAKE 1 TABLET(325 MG) BY MOUTH DAILY WITH BREAKFAST   Refills:  0        folic acid 1 MG tablet   Commonly known as:  FOLVITE   Dose:  1 mg   Quantity:  100 tablet        Take 1 tablet (1 mg) by mouth daily   Refills:  3        IBUPROFEN PO        Refills:  0        order for DME   Quantity:  1 each        1: Gradient Compression wraps; 2: BLE knee or thigh high 20-30 or 30-40 mm Hg compression stockings; 3: BLE knee or thigh high custom compression stockings   Refills:  o                Procedures and tests performed during your visit     Activity: Bedrest    Basic metabolic panel    CBC with platelets differential    CT Head Neck Angio w/o & w Contrast    CT Head w/o Contrast    EKG 12-lead, tracing only    Glucose by meter    Glucose monitor nursing POCT    INR    Partial thromboplastin time    Pulse oximetry nursing    Troponin I      Orders Needing Specimen Collection     None      Pending Results     Date and Time Order Name Status Description    10/27/2018 1756 CT Head Neck Angio w/o & w Contrast Preliminary     10/27/2018 1756 CT Head w/o Contrast Preliminary             Pending Culture Results     No orders found from 10/25/2018 to 10/28/2018.             Pending Results Instructions     If you had any lab results that were not finalized at the time of your Discharge, you can call the ED Lab Result RN at [phone]. You will be contacted by this team for any positive Lab results or changes in treatment. The nurses are available 7 days a week from 10A to 6:30P.  You can leave a message 24 hours per day and they will return your call.        Test Results From Your Hospital Stay        10/27/2018  6:13 PM      Component Results     Component Value Ref Range & Units Status    WBC 4.6 4.0 - 11.0 10e9/L Final    RBC Count 4.14 3.8 - 5.2 10e12/L Final    Hemoglobin 13.0 11.7 - 15.7 g/dL Final    Hematocrit 38.7 35.0 - 47.0 % Final    MCV 94 78 - 100 fl Final    MCH 31.4 26.5 - 33.0 pg Final    MCHC 33.6 31.5 - 36.5 g/dL Final    RDW 13.4 10.0 - 15.0 % Final    Platelet Count 209 150 - 450 10e9/L Final    Diff Method Automated Method  Final    % Neutrophils 59.1 % Final    % Lymphocytes 27.6 % Final    % Monocytes 9.7 % Final    % Eosinophils 2.8 % Final    % Basophils 0.4 % Final    % Immature Granulocytes 0.4 % Final    Nucleated RBCs 0 0 /100 Final    Absolute Neutrophil 2.7 1.6 - 8.3 10e9/L Final    Absolute Lymphocytes 1.3 0.8 - 5.3 10e9/L Final    Absolute Monocytes 0.5 0.0 - 1.3 10e9/L Final    Absolute Eosinophils 0.1 0.0 - 0.7 10e9/L Final    Absolute Basophils 0.0 0.0 - 0.2 10e9/L Final    Abs Immature Granulocytes 0.0 0 - 0.4 10e9/L Final    Absolute Nucleated RBC 0.0  Final         10/27/2018  6:30 PM      Component Results     Component Value Ref Range & Units Status    Sodium 141 133 - 144 mmol/L Final    Potassium 3.8 3.4 - 5.3 mmol/L Final    Chloride 106 94 - 109 mmol/L Final    Carbon Dioxide 29 20 - 32 mmol/L Final    Anion Gap 6 3 - 14 mmol/L Final    Glucose 96 70 - 99 mg/dL Final    Urea Nitrogen 14 7 - 30 mg/dL Final    Creatinine 0.63 0.52 - 1.04 mg/dL Final    GFR Estimate >90 >60 mL/min/1.7m2 Final    Non  GFR Calc    GFR  Estimate If Black >90 >60 mL/min/1.7m2 Final    African American GFR Calc    Calcium 8.8 8.5 - 10.1 mg/dL Final         10/27/2018  6:41 PM      Component Results     Component Value Ref Range & Units Status    INR 0.85 (L) 0.86 - 1.14 Final         10/27/2018  6:23 PM      Component Results     Component Value Ref Range & Units Status    PTT 28 22 - 37 sec Final         10/27/2018  7:51 PM      Narrative     CT OF THE HEAD WITHOUT CONTRAST 10/27/2018 7:41 PM     COMPARISON: None.    HISTORY:  Transient left-sided weakness.     TECHNIQUE: Axial CT images of the head from the skull base to the  vertex were acquired without IV contrast.    FINDINGS: The ventricles and basal cisterns are within normal limits  in configuration. There is no midline shift. There are no extra-axial  fluid collections.  Gray-white differentiation is well maintained.    No intracranial hemorrhage, mass or recent infarct.    The visualized paranasal sinuses are well-aerated. There is no  mastoiditis. There are no fractures of the visualized bones.        Impression     IMPRESSION:  Normal head CT.    Radiation dose for this scan was reduced using automated exposure  control, adjustment of the mA and/or kV according to patient size, or  iterative reconstruction technique.          10/27/2018  7:54 PM      Narrative     CT ANGIOGRAM OF THE HEAD AND NECK WITHOUT AND WITH CONTRAST   10/27/2018 7:43 PM     COMPARISON: None.    HISTORY: Evaluate for dissection/thromboembolism.    TECHNIQUE:  Precontrast localizing scans were followed by CT  angiography with an injection of 70mL Isovue-370 nonionic intravenous  contrast material with scans through the head and neck.  Images were  transferred to a separate 3-D workstation where multiplanar  reformations and 3-D images were created.  Estimates of carotid  stenoses are made relative to the distal internal carotid artery  diameters except as noted.      FINDINGS:   Neck CTA: The common carotid arteries  bilaterally are patent without  stenosis. The cervical internal carotid arteries bilaterally are  patent and unremarkable. The vertebral arteries bilaterally are patent  and unremarkable.    Head CTA: The basilar, bilateral distal internal carotid, bilateral  anterior cerebral, bilateral middle cerebral and bilateral posterior  cerebral arteries are patent and unremarkable. The anterior  communicating artery is patent and unremarkable.        Impression     IMPRESSION: Normal neck and head CTA.    Radiation dose for this scan was reduced using automated exposure  control, adjustment of the mA and/or kV according to patient size, or  iterative reconstruction technique.           10/27/2018  6:30 PM      Component Results     Component Value Ref Range & Units Status    Troponin I ES <0.015 0.000 - 0.045 ug/L Final    The 99th percentile for upper reference range is 0.045 ug/L.  Troponin values   in the range of 0.045 - 0.120 ug/L may be associated with risks of adverse   clinical events.           10/27/2018  6:22 PM      Component Results     Component Value Ref Range & Units Status    Glucose 95 70 - 99 mg/dL Final                Clinical Quality Measure: Blood Pressure Screening     Your blood pressure was checked while you were in the emergency department today. The last reading we obtained was  BP: 127/58 (Simultaneous filing. User may not have seen previous data.) . Please read the guidelines below about what these numbers mean and what you should do about them.  If your systolic blood pressure (the top number) is less than 120 and your diastolic blood pressure (the bottom number) is less than 80, then your blood pressure is normal. There is nothing more that you need to do about it.  If your systolic blood pressure (the top number) is 120-139 or your diastolic blood pressure (the bottom number) is 80-89, your blood pressure may be higher than it should be. You should have your blood pressure rechecked within a  year by a primary care provider.  If your systolic blood pressure (the top number) is 140 or greater or your diastolic blood pressure (the bottom number) is 90 or greater, you may have high blood pressure. High blood pressure is treatable, but if left untreated over time it can put you at risk for heart attack, stroke, or kidney failure. You should have your blood pressure rechecked by a primary care provider within the next 4 weeks.  If your provider in the emergency department today gave you specific instructions to follow-up with your doctor or provider even sooner than that, you should follow that instruction and not wait for up to 4 weeks for your follow-up visit.        Thank you for choosing [facility]       Thank you for choosing [facility] for your care. Our goal is always to provide you with excellent care. Hearing back from our patients is one way we can continue to improve our services. Please take a few minutes to complete the written survey that you may receive in the mail after you visit with us. Thank you!        [employer] Information     [employer] gives you secure access to your electronic health record. If you see a primary care provider, you can also send messages to your care team and make appointments. If you have questions, please call your primary care clinic.  If you do not have a primary care provider, please call [phone] and they will assist you.        Care EveryWhere ID     This is your Care EveryWhere ID. This could be used by other organizations to access your [facility] medical records  [id number]        Equal Access to Services     NÉSTOR CAPONE : Hadii татьяна Ha, waaxda edgar, qaybta augustusalshen linton. So [facility] [phone].    ATENCIÓN: Si habla español, tiene a erickson disposición servicios gratuitos de asistencia lingüística. Edenilson al [phone].    We comply with applicable federal civil rights laws and [place] laws. We do  not discriminate on the basis of race, color, national origin, age, disability, sex, sexual orientation, or gender identity.            After Visit Summary       This is your record. Keep this with you and show to your community pharmacist(s) and doctor(s) at your next visit.

## 2018-10-27 NOTE — ED PROVIDER NOTES
History     Chief Complaint:    Dizziness and Tingling     The history is provided by the patient.      Natalee Maya is a 48 year old female who presents with dizziness and tingling. Two weeks ago, the patient was seen at urgent care for dizziness and tingling in her mouth/jaw, where she had an EKG and blood drawn for testing. She was told her thyroid was low but that everything else showed no concern. The patient states that she was sent home and the symptoms persisted.  On 10/23/18, the patient states that she started to notice that the dizziness was still present but that she had developed intermittent left sided weakness and tingling in lower and upper extremities, that will last 1-2 minutes each episode. The patient continues to state that she would feel hot and as if she was going to have a syncopal episode when she gets dizzy. Today, the symptoms persist and she has a pressure headache mid forehead. She continues to annotate that she has tried to determine cause of symptoms but they are indifferent to time of day, sitting vs standing, food, etc. She denies syncopal episode, difficult to use extremities, clumsiness, coordination/gait issues, or cardiac and stroke history.     Of note, the patient's father () has a significant cardiac history including triple bypass, strokes, diabetes, hypertension.     Allergies:  No known drug allergies.       Medications:    Iron  Folvite     Past Medical History:    Calculus of Kidney  Lymphedema of lower extremity   Numbness   Morbid Obesity  Aortic anomaly     Past Surgical History:    C Section  Foot Surgery     Family History:    Father: Diabetes, C.A.D.  Mother: Bleeding Disorder    Social History:  The patient was accompanied to the ED by .  Smoking Status: Never Smoker  Smokeless Tobacco: Never Used  Alcohol Use: No  Marital Status:       Review of Systems   Musculoskeletal: Negative for gait problem.        No difficulty using  extremities    Neurological: Positive for dizziness, weakness, numbness (tingling) and headaches. Negative for syncope.   All other systems reviewed and are negative.    Physical Exam     Patient Vitals for the past 24 hrs:   BP Temp Temp src Pulse Heart Rate Resp SpO2 Weight   10/27/18 2044 - - - - - 14 - -   10/27/18 2043 - - - - - - 98 % -   10/27/18 2042 - - - - - - 97 % -   10/27/18 2041 126/84 - - - - - - -   10/27/18 1900 - - - - 66 20 - -   10/27/18 1845 127/58 - - - 64 15 - -   10/27/18 1830 128/72 - - - 63 19 - -   10/27/18 1815 (!) 146/91 - - - 66 14 - -   10/27/18 1744 (!) 182/110 98.1  F (36.7  C) Oral 65 - 18 97 % 109.5 kg (241 lb 6.5 oz)     Physical Exam  Constitutional: Vital signs reviewed as above.   HENT:    Head: No external signs of trauma noted.   Eyes: Pupils are equal, round, and reactive to light.   Cardiovascular: Normal rate, regular rhythm, normal heart sounds and intact distal pulses.    Pulmonary/Chest: Effort normal and breath sounds normal. No respiratory distress. No wheezes noted.   Abdominal: Soft. There is no tenderness. There is no rebound.   Musculoskeletal:   No deformities appreciated   No edema noted  Neurological:    Patient is alert and oriented to person, place, and time.    Speech is fluent, cognition is normal.   CN 2-12 intact (PERRL, EOMI, symmetric smile, equal eye squeeze and forehead raise, normal and equal sensation to bilateral forehead/cheek/chin, equal hearing to finger rub, midline tongue protrusion with nl side-to-side movement, normal shoulder shrug).    RUE strength 5/5: , finger abd, wrist flex/ext, elbow flex/ext.    LUE strength 5/5: , finger abd, wrist flex/ext, elbow flex/ext.    RLE strength 5/5: ankle flex/ext, knee flex/ext, hip flex.    LLE strength 5/5: ankle flex/ext, knee flex/ext, hip flex.    Sensation equal in all 4 extremities.    No arm drift.     Cerebellar: Normal rapid alternating movements     ( finger-nose-finger, rapid  pronation/supination, hand rolling)    Normal heel-to-shin  Skin: Skin is warm and dry.   Psychiatric: The patient appears calm        Emergency Department Course     ECG:  ECG taken at 1800, ECG read at 1812  Sinus bradycardia  Otherwise normal ECG  Rate 59 bpm. ID interval 192 ms. QRS duration 100 ms. QT/QTc 416/411 ms. P-R-T axes 54 28 32.    Imaging:  Radiology findings were communicated with the patient who voiced understanding of the findings.    CT Head Neck Angio w/o & w Contrast   Preliminary Result   IMPRESSION: Normal neck and head CTA.      Radiation dose for this scan was reduced using automated exposure   control, adjustment of the mA and/or kV according to patient size, or   iterative reconstruction technique.        CT Head w/o Contrast   Preliminary Result   IMPRESSION:  Normal head CT.      Radiation dose for this scan was reduced using automated exposure   control, adjustment of the mA and/or kV according to patient size, or   iterative reconstruction technique.         Laboratory:  Laboratory findings were communicated with the patient who voiced understanding of the findings.    Glucose by meter: 95  CBC: WBC 4.6, HGB 13.0,   BMP: WNL (Creatinine 0.63)  INR: 0.85 (L)  Partial Thromboplastin Time: 28  Troponin (Collected 1806): <0.015    Interventions:  1842  mL IV  1931 NS 80 mL IV  1931 ISOVUE-370 70 mL IV    Emergency Department Course:    1744 Nursing notes and vitals reviewed.    1800 EKG obtained as noted above.    1815 I performed an exam of the patient as documented above.     2016 I spoke with Dr. Og of the stroke neurology service regarding patient's presentation, findings, and plan of care.    2019 Recheck and update.    2045 I personally reviewed the lab, imaging, and EKG results with the patient and answered all related questions prior to discharge.    Impression & Plan      Medical Decision Making:  This 48-year-old female patient presents the ED due to  paresthesias.  Please see the HPI and exam for specifics.  The patient remained stable in the ED.  She had no further flare ups of this condition.  Radiological imaging was negative.  The patient's blood pressure initially was elevated though it came down spontaneously to normal values.  Her ABCD2 score is 1 (initial BP) which is low risk.  I will encourage her to follow-up in the outpatient setting and give her neurology follow-up to do so.  Anticipatory guidance given to patient and  prior to discharge    Diagnosis:    ICD-10-CM    1. Paresthesia R20.2      Disposition:   The patient is discharged to home.    Discharge Medications:  No discharge medications.    Scribe Disclosure:  I, Orla Severson, am serving as a scribe at 5:56 PM on 10/27/2018 to document services personally performed by Josh Jenkins DO based on my observations and the provider's statements to me.    [facility] EMERGENCY DEPARTMENT       Josh Jenkins DO  10/27/18 4242

## 2018-10-27 NOTE — ED AVS SNAPSHOT
[facility] Emergency Department    [address]    [facility] [address]    Phone:  [phone]    Fax:  [phone]                                       Natalee Maya   MRN: [id number]    Department:  [facility] Emergency Department   Date of Visit:  10/27/2018           After Visit Summary Signature Page     I have received my discharge instructions, and my questions have been answered. I have discussed any challenges I see with this plan with the nurse or doctor.    ..........................................................................................................................................  Patient/Patient Representative Signature      ..........................................................................................................................................  Patient Representative Print Name and Relationship to Patient    ..................................................               ................................................  Date                                   Time    ..........................................................................................................................................  Reviewed by Signature/Title    ...................................................              ..............................................  Date                                               Time          22EPIC Rev 08/18

## 2018-10-28 NOTE — DISCHARGE INSTRUCTIONS
"  Paraesthesias  Paraesthesia is a burning or prickling sensation that is sometimes felt in the hands, arms, legs or feet. It can also occur in other parts of the body. It can also feel like tingling or numbness, skin crawling, or itching. The feeling is not comfortable, but it is not painful. (The \"pins and needles\" feeling that happens when a foot or hand \"falls asleep\" is a temporary paraesthesia.)  Paraesthesias that last or come and go may be caused by medical issues that need to be treated. These include stroke, a bulging disk pressing on a nerve, a trapped nerve, vitamin deficiencies, or even certain medicines.  Tests are often done. These tests may include blood tests, X-ray, CT (computerized tomography) scan, or a muscle test (electromyography). Depending on the cause, treatment may include physical therapy.  Home care    Tell the healthcare provider about all medicines you take. This includes prescription and over-the-counter medicines, vitamins, and herbs. Ask if any of the medicines may be causing your problems. Do not make any changes to prescription medicines without talking to your healthcare provider first.    You may be prescribed medicines to help relieve the tingling feeling or for pain. Take all medicines as directed.    A numb hand or foot may be more prone to injury. To help protect it:  ? Always use oven mitts.  ? Test water with an unaffected hand or foot.  ? Use caution when trimming nails. File sharp areas.  ? Wear shoes that fit well to avoid pressure points, blisters, and ulcers.  ? Inspect your hands and feet carefully (including the soles of your feet and between your toes) at least once a week. If you see red areas, sores, or other problems, tell your healthcare provider.  Follow-up care  Follow up with your doctor or as advised by our staff. You may need further testing or evaluation.  When to seek medical advice  Call your healthcare provider right away if any of the following " occur:    Numbness or weakness of the face, one arm, or one leg    Slurred speech, confusion, trouble speaking, walking, or seeing    Severe headache, fainting spell, dizziness, or seizure    Chest, arm, neck, or upper back pain    Loss of bladder or bowel control    Open wound with redness, swelling, or pus  Date Last Reviewed: 9/25/2015 2000-2017 [employer]. [address] [place] [address]. All rights reserved. This information is not intended as a substitute for professional medical care. Always follow your healthcare professional's instructions.

## 2018-10-29 LAB — INTERPRETATION ECG - MUSE: NORMAL

## 2018-11-21 ENCOUNTER — OFFICE VISIT (OUTPATIENT)
Dept: ENDOCRINOLOGY | Facility: CLINIC | Age: 48
End: 2018-11-21
Payer: COMMERCIAL

## 2018-11-21 VITALS
SYSTOLIC BLOOD PRESSURE: 132 MMHG | BODY MASS INDEX: 42.44 KG/M2 | DIASTOLIC BLOOD PRESSURE: 86 MMHG | HEART RATE: 60 BPM | WEIGHT: 241.5 LBS

## 2018-11-21 DIAGNOSIS — R79.89 ELEVATED TSH: ICD-10-CM

## 2018-11-21 DIAGNOSIS — E66.01 MORBID OBESITY (H): Primary | ICD-10-CM

## 2018-11-21 PROCEDURE — 84439 ASSAY OF FREE THYROXINE: CPT | Performed by: CLINICAL NURSE SPECIALIST

## 2018-11-21 PROCEDURE — 36415 COLL VENOUS BLD VENIPUNCTURE: CPT | Performed by: CLINICAL NURSE SPECIALIST

## 2018-11-21 PROCEDURE — 84443 ASSAY THYROID STIM HORMONE: CPT | Performed by: CLINICAL NURSE SPECIALIST

## 2018-11-21 PROCEDURE — 86376 MICROSOMAL ANTIBODY EACH: CPT | Performed by: CLINICAL NURSE SPECIALIST

## 2018-11-21 PROCEDURE — 99203 OFFICE O/P NEW LOW 30 MIN: CPT | Performed by: CLINICAL NURSE SPECIALIST

## 2018-11-21 PROCEDURE — 86800 THYROGLOBULIN ANTIBODY: CPT | Performed by: CLINICAL NURSE SPECIALIST

## 2018-11-21 RX ORDER — PHENTERMINE HYDROCHLORIDE 37.5 MG/1
37.5 TABLET ORAL
Qty: 32 TABLET | Refills: 2 | Status: SHIPPED | OUTPATIENT
Start: 2018-11-21 | End: 2019-02-21

## 2018-11-21 NOTE — LETTER
2018         RE: Natalee Maya  [address]  [facility] [address]        Dear Colleague,    Thank you for referring your patient, Natalee Maya, to the [facility]. Please see a copy of my visit note below.    Name: Natalee Maya  Seen at the request of No ref. provider found for obesity.  HPI:  Natalee Maya is a 48 year old female who presents for the evaluation of obestiy.  Gained weight with 3 pregnancies the life got busy and gain more weight.    Was able to lose 20-25 lbs with diet + exercise this past summer.  Then had foot surgery and was non-weight bearing x 6 weeks - gained back all the weight she had lost.  Has resumed diet efforts + exercise.  Struggles with appetite control.  Is interested in weight loss medication.    Diarrhea/Constipation:No  Changes in Hair or Skin: No  Diabetes No  Sleep Apnea/Snores: No  Hypertension or CAD: No  Hyperlipidemia: No  Use of Steroids: No  Family history of Obesity:  Diet: Does not like to count calories or follow fad diets.  Currently using portion control, healthy food choices, no junk food  Exercise: Yes, 12,000 steps per day  PMH/PSH:  Past Medical History:   Diagnosis Date     Calculus of kidney     History of kidney stone -- Abstracted 02     Lymphedema of lower extremity      Past Surgical History:   Procedure Laterality Date     C NONSPECIFIC PROCEDURE       -- Abstracted 02     FOOT SURGERY      2017     Family Hx:  Family History   Problem Relation Age of Onset     Diabetes Father      C.A.D. Father      hyperlipidemia     Bleeding Disorder Mother      Factor 5     Breast Cancer No family hx of      Thyroid disease: Yes mother and MGM with hypothyroidism      DM2:  Yes, father        Autoimmune: DM1, SLE, RA, Vitiligo     Social Hx:  Social History     Social History     Marital status:      Spouse name: N/A     Number of children: N/A     Years of education: N/A      Occupational History     Not on file.     Social History Main Topics     Smoking status: Never Smoker     Smokeless tobacco: Never Used     Alcohol use No     Drug use: No     Sexual activity: Yes     Partners: Male     Other Topics Concern     Not on file     Social History Narrative          MEDICATIONS:  has a current medication list which includes the following prescription(s): ferrous sulfate and folic acid.    ROS     Review Of Systems    GENERAL: no weight loss fevers, chills, malaise, night sweats.   HEENT: no dysphagia, odonophagia, diplopia, neck pain  CV: no chest pain, pressure, palpitations, skipped beats, LOC  LUNGS: no SOB or DENIS   ABDOMEN: no diarrhea, constipation, abdominal pain  EXTREMITIES: no rashes, ulcers, edema  NEUROLOGY: no changes in vision, tingling or numbness in hands or feet.   MSK: no muscle aches or pains, weakness  SKIN: no rashes or lesions  ENDOCRINE: no heat or cold intolerance      Physical Exam   VS: /86 (BP Location: Left arm, Patient Position: Chair, Cuff Size: Adult Large)  Pulse 60  Wt 109.5 kg (241 lb 8 oz)  LMP 10/25/2018  Breastfeeding? No  BMI 42.44 kg/m2  GENERAL: AXOX3, NAD, well dressed, answering questions appropriately, appears stated age.  HEENT: no exopthalmous, no proptosis, no lig lag, no retraction  NECK:  Supple, no thyromegaly, no adenopathy  CV: RRR, no rubs, gallops, no murmurs  LUNGS: CTAB, no wheezes, rales, or ronchi  EXTREMITIES: no edema  NEUROLOGY: CN grossly intact, no tremors  MSK: grossly intact    LABS:  !COMPREHENSIVE Latest Ref Rng & Units 10/27/2018   SODIUM 133 - 144 mmol/L 141   POTASSIUM 3.4 - 5.3 mmol/L 3.8   CHLORIDE 94 - 109 mmol/L 106   BUN 7 - 30 mg/dL 14   Creatinine 0.52 - 1.04 mg/dL 0.63   Glucose 70 - 99 mg/dL 96   ANION GAP 3 - 14 mmol/L 6   CALCIUM 8.5 - 10.1 mg/dL 8.8     !THYROID Latest Ref Rng & Units 10/15/2018 10/17/2009   TSH 0.40 - 4.00 mU/L 12.02 (H) 2.85   T4 FREE 0.76 - 1.46 ng/dL 0.97      !LIPID/HEPATIC  Latest Ref Rng & Units 8/20/2018 8/28/2018   CHOLESTEROL <200 mg/dL 230 (H)    TRIGLYCERIDES <150 mg/dL 157 (H)    HDL CHOLESTEROL >49 mg/dL 51    LDL CHOLESTEROL, CALCULATED <100 mg/dL 148 (H)    VLDL-CHOLESTEROL 0 - 30 mg/dL     NON HDL CHOLESTEROL <130 mg/dL 179 (H)    CHOLESTEROL/HDL RATIO 0.0 - 5.0     AST 0 - 45 U/L  17   ALT 0 - 50 U/L  30     Vital Signs 11/21/2018   Weight (LB) 241 lb 8 oz   Height    BMI (Calculated) 42.44     All pertinent notes, labs, and images personally reviewed by me.     A/P  Ms.Karla JUAN Maya is a 48 year old here for the evaluation of obestiy:    1. Morbid obesity-  BMI 42-43.  No obesity-associated comorbidy.  Obesity is associated with a significant increase in mortality and risk of many disorders, including diabetes mellitus, hypertension, dyslipidemia, heart disease, stroke, sleep apnea, cancer, and many others. Conversely, weight loss is associated with a reduction in obesity-associated morbidity.    Endocrine evaluation of obesity includes Diabetes/prediabetes, Cushing's and thyroid dysfunction.  The relevant work up for the diagnosis and management of obesity and various treatment options were discussed. Body Mass Index (BMI) has been a standard means for calculating risk for overweight and obesity. The new American Association of Clinical Endocrinology (AACE) algorithm recommends lifestyle modifications as the initial phase of treatment for all patients with the BMI equal or greater than 25 kg/m2. Lifestyle modifications includes use of medical nutrition therapy, exercise, tobacco cessation, adequate quality and quantity of sleep, limited consumption of alcohol and reduced stress through implementation of a structured, multidisciplinary program.    In patients with complications associated with obesity, graded interventions are recommended including pharmacological therapy such as phentermine, orlistat, lorcaserin and phentermine/topiramate ER, contrave (  buproprion/naltreone) and the use of very low calorie meal replacement programs.    If medical intervention is insufficient, surgical therapy may be considered, especially in patients with BMI greater than or equal to 35 kg/m2 with multiple complications. Surgical treatments include lap-band, gastric sleeve or gastric bypass surgery.    I informed the pt that:  1.Weight loss medications can be taken to assist with weight reduction when combined with appropriate healthy lifestyle changes.  2.I discussed possible s/e, risks and benefits of weight loss medications.  3.All medications are FDA approved, however, some may be used ''off label'' for their weight loss benefits and some ''short term'' medications can be used for longer periods to achieve desired clinical outcomes.  4.All patients taking weight loss medications must be seen in clinic for refill authorization.    Counseling exercise ( V65.41)  Dietary counseling( V65.3)  Calculated BMI above the upper parameter and f/u plan was documented in the medical record.  Discussed indications, risks and benefits of all medications prescribed, and answered questions to patient's satisfaction.  Start Phentermine 37.5 mg/day      Labs ordered today: No orders of the defined types were placed in this encounter.    Radiology/Consults ordered today: None    More than 50% of the time spent with Ms. Maya on counseling / coordinating her care.  Total face to face time was 30 minutes.      Follow-up:  3 months    Celestina Gray NP  Endocrinology  [facility]  CC: Carolina Gomez   ____________________________________________________________          Again, thank you for allowing me to participate in the care of your patient.        Sincerely,        SHAQ Virgen CNP

## 2018-11-21 NOTE — PROGRESS NOTES
Name: Natalee Maya  Seen at the request of No ref. provider found for obesity.  HPI:  Natalee Maya is a 48 year old female who presents for the evaluation of obestiy.  Gained weight with 3 pregnancies the life got busy and gain more weight.    Was able to lose 20-25 lbs with diet + exercise this past summer.  Then had foot surgery and was non-weight bearing x 6 weeks - gained back all the weight she had lost.  Has resumed diet efforts + exercise.  Struggles with appetite control.  Is interested in weight loss medication.    Diarrhea/Constipation:No  Changes in Hair or Skin: No  Diabetes No  Sleep Apnea/Snores: No  Hypertension or CAD: No  Hyperlipidemia: No  Use of Steroids: No  Family history of Obesity:  Diet: Does not like to count calories or follow fad diets.  Currently using portion control, healthy food choices, no junk food  Exercise: Yes, 12,000 steps per day  PMH/PSH:  Past Medical History:   Diagnosis Date     Calculus of kidney     History of kidney stone -- Abstracted 02     Lymphedema of lower extremity      Past Surgical History:   Procedure Laterality Date     C NONSPECIFIC PROCEDURE       -- Abstracted 02     FOOT SURGERY      2017     Family Hx:  Family History   Problem Relation Age of Onset     Diabetes Father      C.A.D. Father      hyperlipidemia     Bleeding Disorder Mother      Factor 5     Breast Cancer No family hx of      Thyroid disease: Yes mother and MGM with hypothyroidism      DM2: Yes, father        Autoimmune: DM1, SLE, RA, Vitiligo     Social Hx:  Social History     Social History     Marital status:      Spouse name: N/A     Number of children: N/A     Years of education: N/A     Occupational History     Not on file.     Social History Main Topics     Smoking status: Never Smoker     Smokeless tobacco: Never Used     Alcohol use No     Drug use: No     Sexual activity: Yes     Partners: Male     Other Topics Concern     Not on file      Social History Narrative          MEDICATIONS:  has a current medication list which includes the following prescription(s): ferrous sulfate and folic acid.    ROS     Review Of Systems    GENERAL: no weight loss fevers, chills, malaise, night sweats.   HEENT: no dysphagia, odonophagia, diplopia, neck pain  CV: no chest pain, pressure, palpitations, skipped beats, LOC  LUNGS: no SOB or DENIS   ABDOMEN: no diarrhea, constipation, abdominal pain  EXTREMITIES: no rashes, ulcers, edema  NEUROLOGY: no changes in vision, tingling or numbness in hands or feet.   MSK: no muscle aches or pains, weakness  SKIN: no rashes or lesions  ENDOCRINE: no heat or cold intolerance      Physical Exam   VS: /86 (BP Location: Left arm, Patient Position: Chair, Cuff Size: Adult Large)  Pulse 60  Wt 109.5 kg (241 lb 8 oz)  LMP 10/25/2018  Breastfeeding? No  BMI 42.44 kg/m2  GENERAL: AXOX3, NAD, well dressed, answering questions appropriately, appears stated age.  HEENT: no exopthalmous, no proptosis, no lig lag, no retraction  NECK:  Supple, no thyromegaly, no adenopathy  CV: RRR, no rubs, gallops, no murmurs  LUNGS: CTAB, no wheezes, rales, or ronchi  EXTREMITIES: no edema  NEUROLOGY: CN grossly intact, no tremors  MSK: grossly intact    LABS:  !COMPREHENSIVE Latest Ref Rng & Units 10/27/2018   SODIUM 133 - 144 mmol/L 141   POTASSIUM 3.4 - 5.3 mmol/L 3.8   CHLORIDE 94 - 109 mmol/L 106   BUN 7 - 30 mg/dL 14   Creatinine 0.52 - 1.04 mg/dL 0.63   Glucose 70 - 99 mg/dL 96   ANION GAP 3 - 14 mmol/L 6   CALCIUM 8.5 - 10.1 mg/dL 8.8     !THYROID Latest Ref Rng & Units 10/15/2018 10/17/2009   TSH 0.40 - 4.00 mU/L 12.02 (H) 2.85   T4 FREE 0.76 - 1.46 ng/dL 0.97      !LIPID/HEPATIC Latest Ref Rng & Units 8/20/2018 8/28/2018   CHOLESTEROL <200 mg/dL 230 (H)    TRIGLYCERIDES <150 mg/dL 157 (H)    HDL CHOLESTEROL >49 mg/dL 51    LDL CHOLESTEROL, CALCULATED <100 mg/dL 148 (H)    VLDL-CHOLESTEROL 0 - 30 mg/dL     NON HDL CHOLESTEROL <130  mg/dL 179 (H)    CHOLESTEROL/HDL RATIO 0.0 - 5.0     AST 0 - 45 U/L  17   ALT 0 - 50 U/L  30     Vital Signs 11/21/2018   Weight (LB) 241 lb 8 oz   Height    BMI (Calculated) 42.44     All pertinent notes, labs, and images personally reviewed by me.     A/P  Ms.Karla JUAN Maya is a 48 year old here for the evaluation of obestiy:    1. Morbid obesity-  BMI 42-43.  No obesity-associated comorbidy.  Obesity is associated with a significant increase in mortality and risk of many disorders, including diabetes mellitus, hypertension, dyslipidemia, heart disease, stroke, sleep apnea, cancer, and many others. Conversely, weight loss is associated with a reduction in obesity-associated morbidity.    Endocrine evaluation of obesity includes Diabetes/prediabetes, Cushing's and thyroid dysfunction.  The relevant work up for the diagnosis and management of obesity and various treatment options were discussed. Body Mass Index (BMI) has been a standard means for calculating risk for overweight and obesity. The new American Association of Clinical Endocrinology (AACE) algorithm recommends lifestyle modifications as the initial phase of treatment for all patients with the BMI equal or greater than 25 kg/m2. Lifestyle modifications includes use of medical nutrition therapy, exercise, tobacco cessation, adequate quality and quantity of sleep, limited consumption of alcohol and reduced stress through implementation of a structured, multidisciplinary program.    In patients with complications associated with obesity, graded interventions are recommended including pharmacological therapy such as phentermine, orlistat, lorcaserin and phentermine/topiramate ER, contrave ( buproprion/naltreone) and the use of very low calorie meal replacement programs.    If medical intervention is insufficient, surgical therapy may be considered, especially in patients with BMI greater than or equal to 35 kg/m2 with multiple complications. Surgical  treatments include lap-band, gastric sleeve or gastric bypass surgery.    I informed the pt that:  1.Weight loss medications can be taken to assist with weight reduction when combined with appropriate healthy lifestyle changes.  2.I discussed possible s/e, risks and benefits of weight loss medications.  3.All medications are FDA approved, however, some may be used ''off label'' for their weight loss benefits and some ''short term'' medications can be used for longer periods to achieve desired clinical outcomes.  4.All patients taking weight loss medications must be seen in clinic for refill authorization.    Counseling exercise ( V65.41)  Dietary counseling( V65.3)  Calculated BMI above the upper parameter and f/u plan was documented in the medical record.  Discussed indications, risks and benefits of all medications prescribed, and answered questions to patient's satisfaction.  Start Phentermine 37.5 mg/day      Labs ordered today: No orders of the defined types were placed in this encounter.    Radiology/Consults ordered today: None    More than 50% of the time spent with Ms. Maya on counseling / coordinating her care.  Total face to face time was 30 minutes.      Follow-up:  3 months    Celestina Gray NP  Endocrinology  [facility]  CC: Carolina Gomez   ____________________________________________________________

## 2018-11-21 NOTE — MR AVS SNAPSHOT
After Visit Summary   11/21/2018    Natalee Maya    MRN: [id number]           Patient Information     Date Of Birth          1970        Visit Information        Provider Department      11/21/2018 4:00 PM Celestina Gray APRN CNP [facility]        Today's Diagnoses     Morbid obesity (H)    -  1    Elevated TSH           Follow-ups after your visit        Follow-up notes from your care team     Return in about 3 months (around 2/21/2019).      Your next 10 appointments already scheduled     Nov 27, 2018  7:20 AM CST   [facility] with SHAQ Peraza CNP   [facility]    [address]  [address]  [facility] [address]   [phone]            Feb 21, 2019  3:00 PM CST   Return Visit with SHAQ Virgen CNP   [facility]    [address]  [facility] [id number] [phone]              Who to contact     If you have questions or need follow up information about today's clinic visit or your schedule please contact [facility] directly at [phone].  Normal or non-critical lab and imaging results will be communicated to you by MyChart, letter or phone within 4 business days after the clinic has received the results. If you do not hear from us within 7 days, please contact the clinic through MyChart or phone. If you have a critical or abnormal lab result, we will notify you by phone as soon as possible.  Submit refill requests through [employer] or call your pharmacy and they will forward the refill request to us. Please allow 3 business days for your refill to be completed.          Additional Information About Your Visit        MyChart Information     [employer] gives you secure access to your electronic health record. If you see a primary care provider, you can also send messages to your care team and make appointments.  If you have questions, please call your primary care clinic.  If you do not have a primary care provider, please call [phone] and they will assist you.        Care EveryWhere ID     This is your Care EveryWhere ID. This could be used by other organizations to access your [facility] medical records  [id number]        Your Vitals Were     Pulse Last Period Breastfeeding? BMI (Body Mass Index)          60 10/25/2018 No 42.44 kg/m2         Blood Pressure from Last 3 Encounters:   11/21/18 132/86   10/27/18 126/84   10/15/18 (!) 144/96    Weight from Last 3 Encounters:   11/21/18 109.5 kg (241 lb 8 oz)   10/27/18 109.5 kg (241 lb 6.5 oz)   08/16/18 108.5 kg (239 lb 1.6 oz)              We Performed the Following     Anti thyroglobulin antibody     T4 FREE     Thyroid peroxidase antibody     TSH          Today's Medication Changes          These changes are accurate as of 11/21/18 11:59 PM.  If you have any questions, ask your nurse or doctor.               Start taking these medicines.        Dose/Directions    phentermine 37.5 MG tablet   Commonly known as:  ADIPEX-P   Used for:  Morbid obesity (H)   Started by:  Celestina Gray APRN CNP        Dose:  37.5 mg   Take 1 tablet (37.5 mg) by mouth every morning (before breakfast)   Quantity:  32 tablet   Refills:  2            Where to get your medicines      Some of these will need a paper prescription and others can be bought over the counter.  Ask your nurse if you have questions.     Bring a paper prescription for each of these medications     phentermine 37.5 MG tablet                Primary Care Provider Office Phone # Fax #    SHAQ Peraza [phone] [address] [place] [address]        Equal Access to Services     [facility]: Aamir Ha, nate hernández, magi coffmanalshen linton. [facility] [phone].    ATENCIÓN: yesi Beebe  a erickson disposición servicios gratuitos de asistencia lingüística. Edenilson chance [phone].    We comply with applicable federal civil rights laws and [place] laws. We do not discriminate on the basis of race, color, national origin, age, disability, sex, sexual orientation, or gender identity.            Thank you!     Thank you for choosing [facility]  for your care. Our goal is always to provide you with excellent care. Hearing back from our patients is one way we can continue to improve our services. Please take a few minutes to complete the written survey that you may receive in the mail after your visit with us. Thank you!             Your Updated Medication List - Protect others around you: Learn how to safely use, store and throw away your medicines at www.disposemymeds.org.          This list is accurate as of 11/21/18 11:59 PM.  Always use your most recent med list.                   Brand Name Dispense Instructions for use Diagnosis    ferrous sulfate 325 (65 Fe) MG tablet    IRON    90 tablet    TAKE 1 TABLET(325 MG) BY MOUTH DAILY WITH BREAKFAST    Iron deficiency anemia, unspecified iron deficiency anemia type       folic acid 1 MG tablet    FOLVITE    100 tablet    Take 1 tablet (1 mg) by mouth daily    Folate deficiency       phentermine 37.5 MG tablet    ADIPEX-P    32 tablet    Take 1 tablet (37.5 mg) by mouth every morning (before breakfast)    Morbid obesity (H)

## 2018-11-23 LAB
T4 FREE SERPL-MCNC: 0.86 NG/DL (ref 0.76–1.46)
TSH SERPL DL<=0.005 MIU/L-ACNC: 4 MU/L (ref 0.4–4)

## 2018-11-26 LAB
THYROGLOB AB SERPL IA-ACNC: <20 IU/ML (ref 0–40)
THYROPEROXIDASE AB SERPL-ACNC: <10 IU/ML

## 2018-11-27 ENCOUNTER — OFFICE VISIT (OUTPATIENT)
Dept: PEDIATRICS | Facility: CLINIC | Age: 48
End: 2018-11-27
Payer: COMMERCIAL

## 2018-11-27 VITALS
HEIGHT: 63 IN | OXYGEN SATURATION: 99 % | TEMPERATURE: 97.8 F | BODY MASS INDEX: 41.87 KG/M2 | DIASTOLIC BLOOD PRESSURE: 78 MMHG | SYSTOLIC BLOOD PRESSURE: 116 MMHG | HEART RATE: 63 BPM | RESPIRATION RATE: 14 BRPM | WEIGHT: 236.3 LBS

## 2018-11-27 DIAGNOSIS — E66.01 MORBID OBESITY (H): ICD-10-CM

## 2018-11-27 DIAGNOSIS — D50.9 IRON DEFICIENCY ANEMIA, UNSPECIFIED IRON DEFICIENCY ANEMIA TYPE: ICD-10-CM

## 2018-11-27 DIAGNOSIS — R20.0 NUMBNESS: Primary | ICD-10-CM

## 2018-11-27 PROCEDURE — 99214 OFFICE O/P EST MOD 30 MIN: CPT | Performed by: NURSE PRACTITIONER

## 2018-11-27 RX ORDER — PHENTERMINE HYDROCHLORIDE 37.5 MG/1
37.5 TABLET ORAL
Qty: 32 TABLET | Refills: 2 | Status: CANCELLED | OUTPATIENT
Start: 2018-11-27

## 2018-11-27 RX ORDER — FERROUS SULFATE 325(65) MG
TABLET ORAL
Qty: 90 TABLET | Refills: 0 | Status: CANCELLED | OUTPATIENT
Start: 2018-11-27

## 2018-11-27 NOTE — MR AVS SNAPSHOT
After Visit Summary   11/27/2018    Natalee Maya    MRN: [id number]           Patient Information     Date Of Birth          1970        Visit Information        Provider Department      11/27/2018 7:20 AM Carolina Gomez APRN CNP [facility]        Today's Diagnoses     Numbness    -  1    Iron deficiency anemia, unspecified iron deficiency anemia type        Morbid obesity (H)          Care Instructions    Cause still unknown for dizziness but Im glad symptoms are gone.    Please have your iron checked next time you have labs to be sure it isn't high.           Follow-ups after your visit        Your next 10 appointments already scheduled     Feb 21, 2019  3:00 PM CST   [facility]    [address]  [facility] [address] [phone]              Who to contact     If you have questions or need follow up information about today's clinic visit or your schedule please contact [facility] directly at [phone].  Normal or non-critical lab and imaging results will be communicated to you by MyChart, letter or phone within 4 business days after the clinic has received the results. If you do not hear from us within 7 days, please contact the clinic through [employer] or phone. If you have a critical or abnormal lab result, we will notify you by phone as soon as possible.  Submit refill requests through [employer] or call your pharmacy and they will forward the refill request to us. Please allow 3 business days for your refill to be completed.          Additional Information About Your Visit        MyChart Information     [employer] gives you secure access to your electronic health record. If you see a primary care provider, you can also send messages to your care team and make appointments. If you have questions, please call your primary care clinic.  If you do not  "have a primary care provider, please call [phone] and they will assist you.        Care EveryWhere ID     This is your Care EveryWhere ID. This could be used by other organizations to access your [facility] medical records  [id number]        Your Vitals Were     Pulse Temperature Respirations Height Pulse Oximetry BMI (Body Mass Index)    63 97.8  F (36.6  C) (Tympanic) 14 5' 3.25\" (1.607 m) 99% 41.53 kg/m2       Blood Pressure from Last 3 Encounters:   11/27/18 116/78   11/21/18 132/86   10/27/18 126/84    Weight from Last 3 Encounters:   11/27/18 236 lb 4.8 oz (107.2 kg)   11/21/18 241 lb 8 oz (109.5 kg)   10/27/18 241 lb 6.5 oz (109.5 kg)              We Performed the Following     Ferritin        Primary Care Provider Office Phone # Fax #    SHAQ Peraza [facility] [phone] [address] [place] [address]        Equal Access to Services     [facility]: Hadii татьяна ku hadasho Sohina, waaxda luqadaha, qaybta kaalmada andrew, shen simeon . So [facility] [phone].    ATENCIÓN: Si habla español, tiene a erickson disposición servicios gratuitos de asistencia lingüística. [facility] [phone].    We comply with applicable federal civil rights laws and [place] laws. We do not discriminate on the basis of race, color, national origin, age, disability, sex, sexual orientation, or gender identity.            Thank you!     [facility]  [facility]. Our goal is always to provide you with excellent care. Hearing back from our patients is one way we can continue to improve our services. Please take a few minutes to complete the written survey that you may receive in the mail after your visit with us. Thank you!             Your Updated Medication List - Protect others around you: Learn how to safely use, store and throw away your medicines at www.disposemymeds.org.          This list is accurate as of 11/27/18  " 7:33 AM.  Always use your most recent med list.                   Brand Name Dispense Instructions for use Diagnosis    ferrous sulfate 325 (65 Fe) MG tablet    IRON    90 tablet    TAKE 1 TABLET(325 MG) BY MOUTH DAILY WITH BREAKFAST    Iron deficiency anemia, unspecified iron deficiency anemia type       folic acid 1 MG tablet    FOLVITE    100 tablet    Take 1 tablet (1 mg) by mouth daily    Folate deficiency       phentermine 37.5 MG tablet    ADIPEX-P    32 tablet    Take 1 tablet (37.5 mg) by mouth every morning (before breakfast)    Morbid obesity (H)

## 2018-11-27 NOTE — PROGRESS NOTES
"  SUBJECTIVE:   Natalee Maya is a 48 year old female who presents to clinic today for the following health issues:    ED/UC Followup:    Facility:  [facility] ED  Date of visit: 10/27/18  Reason for visit: dizziness  Current Status: Patient states has not had any dizzy spells for 2 weeks - before had intermittent dizziness x 1 week.:       Patient declines flu vaccine today.    Patient reports having had 1 week of intermittent dizziness associated on one occasion had some left sided jaw numbness. No weakness, palpitations, shortness of breath, facial droop, or other neuro sx. States these episodes lasted 1-2 minutes at a time and were not temporally related to position, eating, anxiety, etc. Was seen in UC and the ED with a negative workup including CT head neck angio and CT head.  Her sx have now entirely resolved.     No FMH stroke risk.     ROS: const/heent/neuro/cv/resp/psych otherwise negative     OBJECTIVE:  /78 (BP Location: Right arm, Cuff Size: Adult Large)  Pulse 63  Temp 97.8  F (36.6  C) (Tympanic)  Resp 14  Ht 5' 3.25\" (1.607 m)  Wt 236 lb 4.8 oz (107.2 kg)  SpO2 99%  BMI 41.53 kg/m2  CONSTITUTIONAL: Alert, well-nourished, well-groomed, NAD  RESP: Lungs CTA. No wheeze, rhonchi, rales.  CV: HRRR S1 S2 No MRG. No peripheral edema  NEURO: CN II-XII grossly intact. No nystagmus. Speech and mentation appropriate. Normal gait. Romberg negative.       The 10-year ASCVD risk score (Khadijah DC Jr, et al., 2013) is: 1.2%    Values used to calculate the score:      Age: 48 years      Sex: Female      Is Non- : No      Diabetic: No      Tobacco smoker: No      Systolic Blood Pressure: 116 mmHg      Is BP treated: No      HDL Cholesterol: 51 mg/dL      Total Cholesterol: 230 mg/dL      ASSESSMENT/PLAN:  (R20.0) Numbness  (primary encounter diagnosis)  Comment: See above for history. At this point the cause of her dizziness is unknown. Stroke was ruled out with head " imaging. Her risk of TIA is low. ASCVD risk is very low and no FMH stroke/TIA. She is on phentermine but sx started prior to use of this. No sx to suggest menieres, BPPV, arrhythmia, etc. Since sx have resolved will continue to monitor for now. Offered neuro referral but she prefers to hold off for now.   Plan:           -Monitor sx  -Discussed reasons to seek care urgently.       (D50.9) Iron deficiency anemia, unspecified iron deficiency anemia type  Comment: Resolved. Pt wishes to continue iron.   Plan:   -I asked her to have her ferritin checked to be sure she didn't get iron overload. Will have this checked in 2-3 months at endo f/u.     (E66.01) Morbid obesity (H)  Comment: Follows with endo.   Plan: phentermine (ADIPEX-P) 37.5 MG tablet              Carolina Jason, FNP-DNP.

## 2018-11-27 NOTE — PATIENT INSTRUCTIONS
Cause still unknown for dizziness but Im glad symptoms are gone.    Please have your iron checked next time you have labs to be sure it isn't high.

## 2018-12-04 NOTE — PROGRESS NOTES
Natalee,  Your thyroid levels have improved.  The thyroid antibodies were not elevated so no indication of autoimmune thyroid disease.  We'll continue to monitor your thyroid levels.  Celestina Gray NP  Endocrinology

## 2019-02-03 DIAGNOSIS — D50.9 IRON DEFICIENCY ANEMIA, UNSPECIFIED IRON DEFICIENCY ANEMIA TYPE: ICD-10-CM

## 2019-02-04 NOTE — TELEPHONE ENCOUNTER
"Requested Prescriptions   Pending Prescriptions Disp Refills     FEROSUL 325 (65 Fe) MG tablet [Pharmacy Med Name: FERROUS SULFATE 325MG (5GR) TABS] 90 tablet 0     Sig: TAKE 1 TABLET(325 MG) BY MOUTH DAILY WITH BREAKFAST    Iron Supplements Passed - 2/3/2019  8:39 PM       Passed - Patient is 12 years of age or older       Passed - Recent (12 mo) or future (30 days) visit within the authorizing provider's specialty    Patient had office visit in the last 12 months or has a visit in the next 30 days with authorizing provider or within the authorizing provider's specialty.  See \"Patient Info\" tab in inbasket, or \"Choose Columns\" in Meds & Orders section of the refill encounter.         Last Written Prescription Date:  7/27/2018  Last Fill Quantity: 90,  # refills: 0   Last office visit: 11/27/2018 with prescribing provider:  IAIN Gomez  Future Office Visit:   Next 5 appointments (look out 90 days)    Feb 21, 2019  3:00 PM CST  [facility] SHAQ [facility]gen C[facility]  [facility] [address]  [facility] [address] [phone]             Passed - Hgb OR Hct on record within the past 12 mos.    Patient need only have had a HGB or HCT on file in the past 12 mos. That result does not need to be normal.    Recent Labs   Lab Test 10/27/18  1806 10/15/18  2009 08/28/18  0730   HGB 13.0 12.4 12.0     Recent Labs   Lab Test 10/27/18  1806 10/15/18  2009 08/28/18  0730   HCT 38.7 37.8 36.0       Please verify a HGB or HCT has been checked SINCE THE LAST DOSE CHANGE.           Passed - Medication is active on med list          "

## 2019-02-05 RX ORDER — FERROUS SULFATE 325(65) MG
TABLET ORAL
Qty: 90 TABLET | Refills: 1 | Status: SHIPPED | OUTPATIENT
Start: 2019-02-05 | End: 2019-09-02

## 2019-02-05 NOTE — TELEPHONE ENCOUNTER
Prescription approved per [facility], [facility] or [facility] refill protocol.  Gina GREGORIO RN - Triage  [facility]

## 2019-02-21 ENCOUNTER — OFFICE VISIT (OUTPATIENT)
Dept: ENDOCRINOLOGY | Facility: CLINIC | Age: 49
End: 2019-02-21
Payer: COMMERCIAL

## 2019-02-21 VITALS
OXYGEN SATURATION: 96 % | HEIGHT: 63 IN | SYSTOLIC BLOOD PRESSURE: 130 MMHG | DIASTOLIC BLOOD PRESSURE: 94 MMHG | TEMPERATURE: 97.6 F | RESPIRATION RATE: 12 BRPM | BODY MASS INDEX: 37.17 KG/M2 | HEART RATE: 66 BPM | WEIGHT: 209.8 LBS

## 2019-02-21 DIAGNOSIS — R79.89 ELEVATED TSH: Primary | ICD-10-CM

## 2019-02-21 DIAGNOSIS — R03.0 ELEVATED BLOOD PRESSURE READING WITHOUT DIAGNOSIS OF HYPERTENSION: ICD-10-CM

## 2019-02-21 DIAGNOSIS — E66.01 MORBID OBESITY (H): ICD-10-CM

## 2019-02-21 DIAGNOSIS — D50.9 IRON DEFICIENCY ANEMIA, UNSPECIFIED IRON DEFICIENCY ANEMIA TYPE: ICD-10-CM

## 2019-02-21 PROCEDURE — 84439 ASSAY OF FREE THYROXINE: CPT | Performed by: CLINICAL NURSE SPECIALIST

## 2019-02-21 PROCEDURE — 84443 ASSAY THYROID STIM HORMONE: CPT | Performed by: CLINICAL NURSE SPECIALIST

## 2019-02-21 PROCEDURE — 82728 ASSAY OF FERRITIN: CPT | Performed by: CLINICAL NURSE SPECIALIST

## 2019-02-21 PROCEDURE — 36415 COLL VENOUS BLD VENIPUNCTURE: CPT | Performed by: CLINICAL NURSE SPECIALIST

## 2019-02-21 PROCEDURE — 99214 OFFICE O/P EST MOD 30 MIN: CPT | Performed by: CLINICAL NURSE SPECIALIST

## 2019-02-21 RX ORDER — PHENTERMINE HYDROCHLORIDE 37.5 MG/1
37.5 TABLET ORAL
Qty: 32 TABLET | Refills: 2 | Status: SHIPPED | OUTPATIENT
Start: 2019-02-21 | End: 2019-05-30

## 2019-02-21 ASSESSMENT — MIFFLIN-ST. JEOR: SCORE: 1554.74

## 2019-02-21 NOTE — PROGRESS NOTES
Name: Natalee Maya  F/u for obesity (Last seen 2018).  HPI:  Natalee Maya is a 48 year old female who presents for the management of obestiy.  Gained weight with 3 pregnancies the life got busy and gain more weight.    Was able to lose 20-25 lbs with diet + exercise this past summer.  Then had foot surgery and was non-weight bearing x 6 weeks - gained back all the weight she had lost.  Has resumed diet efforts + exercise.  Struggles with appetite control.  Started Phentermine 37.5 mg every day 2018.  Tolerating Phentermine well.  Has lost 32 lbs in the past 3 months.    Diet: Does not like to count calories or follow fad diets.  Currently using portion control, healthy food choices, no junk food  Exercise: Yes, 12,000 steps per day  Elevated TSH.  + FH of mother with hypothyroidism.  Repeat TSH in high normal range at 4.00.  Thyroid antibodies were not elevated.  PMH/PSH:  Past Medical History:   Diagnosis Date     Calculus of kidney     History of kidney stone -- Abstracted 02     Lymphedema of lower extremity      Past Surgical History:   Procedure Laterality Date     C NONSPECIFIC PROCEDURE       -- Abstracted 02     FOOT SURGERY      2017     Family Hx:  Family History   Problem Relation Age of Onset     Diabetes Father      C.A.D. Father         hyperlipidemia     Bleeding Disorder Mother         Factor 5     Breast Cancer No family hx of      Thyroid disease: Yes mother and MGM with hypothyroidism      DM2: Yes, father        Autoimmune: DM1, SLE, RA, Vitiligo     Social Hx:  Social History     Socioeconomic History     Marital status:      Spouse name: Not on file     Number of children: Not on file     Years of education: Not on file     Highest education level: Not on file   Occupational History     Not on file   Social Needs     Financial resource strain: Not on file     Food insecurity:     Worry: Not on file     Inability: Not on file      "Transportation needs:     Medical: Not on file     Non-medical: Not on file   Tobacco Use     Smoking status: Never Smoker     Smokeless tobacco: Never Used   Substance and Sexual Activity     Alcohol use: No     Drug use: No     Sexual activity: Yes     Partners: Male   Lifestyle     Physical activity:     Days per week: Not on file     Minutes per session: Not on file     Stress: Not on file   Relationships     Social connections:     Talks on phone: Not on file     Gets together: Not on file     Attends [employer] service: Not on file     Active member of club or organization: Not on file     Attends meetings of clubs or organizations: Not on file     Relationship status: Not on file     Intimate partner violence:     Fear of current or ex partner: Not on file     Emotionally abused: Not on file     Physically abused: Not on file     Forced sexual activity: Not on file   Other Topics Concern     Parent/sibling w/ CABG, MI or angioplasty before 65F 55M? Not Asked   Social History Narrative     Not on file          MEDICATIONS:  has a current medication list which includes the following prescription(s): phentermine, ferosul, and folic acid.    ROS   ROS: 10 point ROS neg other than the symptoms noted above in the HPI.    Physical Exam   VS: BP (!) 130/94 (BP Location: Left arm, Patient Position: Chair, Cuff Size: Adult Large)   Pulse 66   Temp 97.6  F (36.4  C) (Tympanic)   Resp 12   Ht 1.607 m (5' 3.25\")   Wt 95.2 kg (209 lb 12.8 oz)   LMP 02/15/2019   SpO2 96%   Breastfeeding? No   BMI 36.87 kg/m    GENERAL: AXOX3, NAD, well dressed, answering questions appropriately, appears stated age.  HEENT: no exopthalmous, no proptosis, no lig lag, no retraction  NECK:  Supple, no thyromegaly, no adenopathy  CV: RRR, no rubs, gallops, no murmurs  LUNGS: CTAB, no wheezes, rales, or ronchi  EXTREMITIES: no edema  NEUROLOGY: CN grossly intact, no tremors  MSK: grossly intact    LABS:  !COMPREHENSIVE Latest Ref Rng & " "Units 10/27/2018   SODIUM 133 - 144 mmol/L 141   POTASSIUM 3.4 - 5.3 mmol/L 3.8   CHLORIDE 94 - 109 mmol/L 106   BUN 7 - 30 mg/dL 14   Creatinine 0.52 - 1.04 mg/dL 0.63   Glucose 70 - 99 mg/dL 96   ANION GAP 3 - 14 mmol/L 6   CALCIUM 8.5 - 10.1 mg/dL 8.8     ENDO THYROID LABS-[facility] Latest Ref Rng & Units 11/21/2018 10/15/2018   TSH 0.40 - 4.00 mU/L 4.00 12.02 (H)   T4 FREE 0.76 - 1.46 ng/dL 0.86 0.97   THYROGLOBULIN ANTIBODY <40 IU/mL <20    THYR PEROXIDASE NICOLE <35 IU/mL <10      ENDO THYROID LABS-[facility] Latest Ref Rng & Units 10/17/2009   TSH 0.40 - 4.00 mU/L 2.85   T4 FREE 0.76 - 1.46 ng/dL    THYROGLOBULIN ANTIBODY <40 IU/mL    THYR PEROXIDASE NICOLE <35 IU/mL      !LIPID/HEPATIC Latest Ref Rng & Units 8/20/2018 8/28/2018   CHOLESTEROL <200 mg/dL 230 (H)    TRIGLYCERIDES <150 mg/dL 157 (H)    HDL CHOLESTEROL >49 mg/dL 51    LDL CHOLESTEROL, CALCULATED <100 mg/dL 148 (H)    VLDL-CHOLESTEROL 0 - 30 mg/dL     NON HDL CHOLESTEROL <130 mg/dL 179 (H)    CHOLESTEROL/HDL RATIO 0.0 - 5.0     AST 0 - 45 U/L  17   ALT 0 - 50 U/L  30     Vital Signs 11/21/2018 11/27/2018 2/21/2019   Weight (LB) 241 lb 8 oz 236 lb 4.8 oz 209 lb 12.8 oz   Height  5' 3.25\" 5' 3.25\"   BMI (Calculated)  41.62 36.95    Waist Circumference:  40\" (today)    All pertinent notes, labs, and images personally reviewed by me.     A/P  Ms.Karla JUAN Maya is a 48 year old here for the evaluation of obestiy:    1. Morbid obesity-  BMI 42-->36.95.  No obesity-associated comorbidy.  Obesity is associated with a significant increase in mortality and risk of many disorders, including diabetes mellitus, hypertension, dyslipidemia, heart disease, stroke, sleep apnea, cancer, and many others. Conversely, weight loss is associated with a reduction in obesity-associated morbidity.    Endocrine evaluation of obesity includes Diabetes/prediabetes, Cushing's and thyroid dysfunction.  The relevant work up for the diagnosis and management of obesity and various treatment " options were discussed. Body Mass Index (BMI) has been a standard means for calculating risk for overweight and obesity. The new American Association of Clinical Endocrinology (AACE) algorithm recommends lifestyle modifications as the initial phase of treatment for all patients with the BMI equal or greater than 25 kg/m2. Lifestyle modifications includes use of medical nutrition therapy, exercise, tobacco cessation, adequate quality and quantity of sleep, limited consumption of alcohol and reduced stress through implementation of a structured, multidisciplinary program.    In patients with complications associated with obesity, graded interventions are recommended including pharmacological therapy such as phentermine, orlistat, lorcaserin and phentermine/topiramate ER, contrave ( buproprion/naltreone) and the use of very low calorie meal replacement programs.    If medical intervention is insufficient, surgical therapy may be considered, especially in patients with BMI greater than or equal to 35 kg/m2 with multiple complications. Surgical treatments include lap-band, gastric sleeve or gastric bypass surgery.    I informed the pt that:  1.Weight loss medications can be taken to assist with weight reduction when combined with appropriate healthy lifestyle changes.  2.I discussed possible s/e, risks and benefits of weight loss medications.  3.All medications are FDA approved, however, some may be used ''off label'' for their weight loss benefits and some ''short term'' medications can be used for longer periods to achieve desired clinical outcomes.  4.All patients taking weight loss medications must be seen in clinic for refill authorization.    Counseling exercise ( V65.41)  Dietary counseling( V65.3)  Calculated BMI above the upper parameter and f/u plan was documented in the medical record.  Discussed indications, risks and benefits of all medications prescribed, and answered questions to patient's  satisfaction.  Started Phentermine 37.5 mg/day 11/2018.  Has lost 32 lbs,241--> 209 lbs.  Continue Phentermine 37.5 mg every day.    Elevated TSH.  + FH of mother with hypothyroidism.  Repeat TSH in high normal range at 4.00.  Thyroid antibodies were not elevated. Repeat TFT's today.    BP slightly elevated today.  Historically well controlled.  Continue to monitor.  If worsens or continues, will consider trial off Phentermine to see if BP improves.  Nurse-only BP check appointment scheduled in 2 weeks.    Labs ordered today:   Orders Placed This Encounter   Procedures     TSH     T4 FREE     Radiology/Consults ordered today: None    More than 50% of the time spent with Ms. Maya on counseling / coordinating her care.  Total face to face time was 20 minutes.      Follow-up:  3 months    Celestina Gray NP  Endocrinology  [facility]  CC: Carolina Gomez   ____________________________________________________________

## 2019-02-21 NOTE — LETTER
2019         RE: Natalee Maya  [address]  [facility] [address]        Dear Colleague,    Thank you for referring your patient, Natalee Maya, to the [facility]. Please see a copy of my visit note below.    Name: Natalee Maya  F/u for obesity (Last seen 2018).  HPI:  Natalee Maya is a 48 year old female who presents for the management of obestiy.  Gained weight with 3 pregnancies the life got busy and gain more weight.    Was able to lose 20-25 lbs with diet + exercise this past summer.  Then had foot surgery and was non-weight bearing x 6 weeks - gained back all the weight she had lost.  Has resumed diet efforts + exercise.  Struggles with appetite control.  Started Phentermine 37.5 mg every day 2018.  Tolerating Phentermine well.  Has lost 32 lbs in the past 3 months.    Diet: Does not like to count calories or follow fad diets.  Currently using portion control, healthy food choices, no junk food  Exercise: Yes, 12,000 steps per day  Elevated TSH.  + FH of mother with hypothyroidism.  Repeat TSH in high normal range at 4.00.  Thyroid antibodies were not elevated.  PMH/PSH:  Past Medical History:   Diagnosis Date     Calculus of kidney     History of kidney stone -- Abstracted 02     Lymphedema of lower extremity      Past Surgical History:   Procedure Laterality Date     C NONSPECIFIC PROCEDURE       -- Abstracted 02     FOOT SURGERY      2017     Family Hx:  Family History   Problem Relation Age of Onset     Diabetes Father      C.A.D. Father         hyperlipidemia     Bleeding Disorder Mother         Factor 5     Breast Cancer No family hx of      Thyroid disease: Yes mother and MGM with hypothyroidism      DM2: Yes, father        Autoimmune: DM1, SLE, RA, Vitiligo     Social Hx:  Social History     Socioeconomic History     Marital status:      Spouse name: Not on file     Number of children: Not on file      "Years of education: Not on file     Highest education level: Not on file   Occupational History     Not on file   Social Needs     Financial resource strain: Not on file     Food insecurity:     Worry: Not on file     Inability: Not on file     Transportation needs:     Medical: Not on file     Non-medical: Not on file   Tobacco Use     Smoking status: Never Smoker     Smokeless tobacco: Never Used   Substance and Sexual Activity     Alcohol use: No     Drug use: No     Sexual activity: Yes     Partners: Male   Lifestyle     Physical activity:     Days per week: Not on file     Minutes per session: Not on file     Stress: Not on file   Relationships     Social connections:     Talks on phone: Not on file     Gets together: Not on file     Attends Judaism service: Not on file     Active member of club or organization: Not on file     Attends meetings of clubs or organizations: Not on file     Relationship status: Not on file     Intimate partner violence:     Fear of current or ex partner: Not on file     Emotionally abused: Not on file     Physically abused: Not on file     Forced sexual activity: Not on file   Other Topics Concern     Parent/sibling w/ CABG, MI or angioplasty before 65F 55M? Not Asked   Social History Narrative     Not on file          MEDICATIONS:  has a current medication list which includes the following prescription(s): phentermine, ferosul, and folic acid.    ROS   ROS: 10 point ROS neg other than the symptoms noted above in the HPI.    Physical Exam   VS: BP (!) 130/94 (BP Location: Left arm, Patient Position: Chair, Cuff Size: Adult Large)   Pulse 66   Temp 97.6  F (36.4  C) (Tympanic)   Resp 12   Ht 1.607 m (5' 3.25\")   Wt 95.2 kg (209 lb 12.8 oz)   LMP 02/15/2019   SpO2 96%   Breastfeeding? No   BMI 36.87 kg/m     GENERAL: AXOX3, NAD, well dressed, answering questions appropriately, appears stated age.  HEENT: no exopthalmous, no proptosis, no lig lag, no retraction  NECK:  " "Supple, no thyromegaly, no adenopathy  CV: RRR, no rubs, gallops, no murmurs  LUNGS: CTAB, no wheezes, rales, or ronchi  EXTREMITIES: no edema  NEUROLOGY: CN grossly intact, no tremors  MSK: grossly intact    LABS:  !COMPREHENSIVE Latest Ref Rng & Units 10/27/2018   SODIUM 133 - 144 mmol/L 141   POTASSIUM 3.4 - 5.3 mmol/L 3.8   CHLORIDE 94 - 109 mmol/L 106   BUN 7 - 30 mg/dL 14   Creatinine 0.52 - 1.04 mg/dL 0.63   Glucose 70 - 99 mg/dL 96   ANION GAP 3 - 14 mmol/L 6   CALCIUM 8.5 - 10.1 mg/dL 8.8     ENDO THYROID LABS-[facility] Latest Ref Rng & Units 11/21/2018 10/15/2018   TSH 0.40 - 4.00 mU/L 4.00 12.02 (H)   T4 FREE 0.76 - 1.46 ng/dL 0.86 0.97   THYROGLOBULIN ANTIBODY <40 IU/mL <20    THYR PEROXIDASE NICOLE <35 IU/mL <10      ENDO THYROID LABS-[facility] Latest Ref Rng & Units 10/17/2009   TSH 0.40 - 4.00 mU/L 2.85   T4 FREE 0.76 - 1.46 ng/dL    THYROGLOBULIN ANTIBODY <40 IU/mL    THYR PEROXIDASE NICOLE <35 IU/mL      !LIPID/HEPATIC Latest Ref Rng & Units 8/20/2018 8/28/2018   CHOLESTEROL <200 mg/dL 230 (H)    TRIGLYCERIDES <150 mg/dL 157 (H)    HDL CHOLESTEROL >49 mg/dL 51    LDL CHOLESTEROL, CALCULATED <100 mg/dL 148 (H)    VLDL-CHOLESTEROL 0 - 30 mg/dL     NON HDL CHOLESTEROL <130 mg/dL 179 (H)    CHOLESTEROL/HDL RATIO 0.0 - 5.0     AST 0 - 45 U/L  17   ALT 0 - 50 U/L  30     Vital Signs 11/21/2018 11/27/2018 2/21/2019   Weight (LB) 241 lb 8 oz 236 lb 4.8 oz 209 lb 12.8 oz   Height  5' 3.25\" 5' 3.25\"   BMI (Calculated)  41.62 36.95    Waist Circumference:  40\" (today)    All pertinent notes, labs, and images personally reviewed by me.     A/P  Ms.Karla JUAN Maya is a 48 year old here for the evaluation of obestiy:    1. Morbid obesity-  BMI 42-->36.95.  No obesity-associated comorbidy.  Obesity is associated with a significant increase in mortality and risk of many disorders, including diabetes mellitus, hypertension, dyslipidemia, heart disease, stroke, sleep apnea, cancer, and many others. Conversely, weight loss is " associated with a reduction in obesity-associated morbidity.    Endocrine evaluation of obesity includes Diabetes/prediabetes, Cushing's and thyroid dysfunction.  The relevant work up for the diagnosis and management of obesity and various treatment options were discussed. Body Mass Index (BMI) has been a standard means for calculating risk for overweight and obesity. The new American Association of Clinical Endocrinology (AACE) algorithm recommends lifestyle modifications as the initial phase of treatment for all patients with the BMI equal or greater than 25 kg/m2. Lifestyle modifications includes use of medical nutrition therapy, exercise, tobacco cessation, adequate quality and quantity of sleep, limited consumption of alcohol and reduced stress through implementation of a structured, multidisciplinary program.    In patients with complications associated with obesity, graded interventions are recommended including pharmacological therapy such as phentermine, orlistat, lorcaserin and phentermine/topiramate ER, contrave ( buproprion/naltreone) and the use of very low calorie meal replacement programs.    If medical intervention is insufficient, surgical therapy may be considered, especially in patients with BMI greater than or equal to 35 kg/m2 with multiple complications. Surgical treatments include lap-band, gastric sleeve or gastric bypass surgery.    I informed the pt that:  1.Weight loss medications can be taken to assist with weight reduction when combined with appropriate healthy lifestyle changes.  2.I discussed possible s/e, risks and benefits of weight loss medications.  3.All medications are FDA approved, however, some may be used ''off label'' for their weight loss benefits and some ''short term'' medications can be used for longer periods to achieve desired clinical outcomes.  4.All patients taking weight loss medications must be seen in clinic for refill authorization.    Counseling exercise (  V65.41)  Dietary counseling( V65.3)  Calculated BMI above the upper parameter and f/u plan was documented in the medical record.  Discussed indications, risks and benefits of all medications prescribed, and answered questions to patient's satisfaction.  Started Phentermine 37.5 mg/day 11/2018.  Has lost 32 lbs,241--> 209 lbs.  Continue Phentermine 37.5 mg every day.    Elevated TSH.  + FH of mother with hypothyroidism.  Repeat TSH in high normal range at 4.00.  Thyroid antibodies were not elevated. Repeat TFT's today.    BP slightly elevated today.  Historically well controlled.  Continue to monitor.  If worsens or continues, will consider trial off Phentermine to see if BP improves.  Nurse-only BP check appointment scheduled in 2 weeks.    Labs ordered today:   Orders Placed This Encounter   Procedures     TSH     T4 FREE     Radiology/Consults ordered today: None    More than 50% of the time spent with Ms. Maya on counseling / coordinating her care.  Total face to face time was 20 minutes.      Follow-up:  3 months    Celestina Gray NP  Endocrinology  [facility]  CC: Carolina Gomez   ____________________________________________________________          Again, thank you for allowing me to participate in the care of your patient.        Sincerely,        SHAQ Virgen CNP

## 2019-02-21 NOTE — PATIENT INSTRUCTIONS
"Waist circ. 40\"    Natalee to follow up with Primary Care provider regarding elevated blood pressure.  Nurse only appointment scheduled.  "

## 2019-02-22 LAB
FERRITIN SERPL-MCNC: 76 NG/ML (ref 8–252)
T4 FREE SERPL-MCNC: 0.96 NG/DL (ref 0.76–1.46)
TSH SERPL DL<=0.005 MIU/L-ACNC: 4.07 MU/L (ref 0.4–4)

## 2019-02-25 ENCOUNTER — OFFICE VISIT (OUTPATIENT)
Dept: PEDIATRICS | Facility: CLINIC | Age: 49
End: 2019-02-25
Payer: COMMERCIAL

## 2019-02-25 ENCOUNTER — TELEPHONE (OUTPATIENT)
Dept: PEDIATRICS | Facility: CLINIC | Age: 49
End: 2019-02-25

## 2019-02-25 VITALS
HEART RATE: 80 BPM | WEIGHT: 209 LBS | HEIGHT: 63 IN | OXYGEN SATURATION: 97 % | SYSTOLIC BLOOD PRESSURE: 110 MMHG | TEMPERATURE: 98.8 F | DIASTOLIC BLOOD PRESSURE: 72 MMHG | BODY MASS INDEX: 37.03 KG/M2

## 2019-02-25 DIAGNOSIS — J06.9 VIRAL URI WITH COUGH: Primary | ICD-10-CM

## 2019-02-25 LAB
FLUAV+FLUBV AG SPEC QL: NEGATIVE
FLUAV+FLUBV AG SPEC QL: POSITIVE
SPECIMEN SOURCE: ABNORMAL

## 2019-02-25 PROCEDURE — 99213 OFFICE O/P EST LOW 20 MIN: CPT | Performed by: PEDIATRICS

## 2019-02-25 PROCEDURE — 87804 INFLUENZA ASSAY W/OPTIC: CPT | Performed by: PEDIATRICS

## 2019-02-25 RX ORDER — LEVOTHYROXINE SODIUM 50 UG/1
50 TABLET ORAL DAILY
Qty: 90 TABLET | Refills: 0 | Status: SHIPPED | OUTPATIENT
Start: 2019-02-25 | End: 2019-05-19

## 2019-02-25 ASSESSMENT — MIFFLIN-ST. JEOR: SCORE: 1551.11

## 2019-02-25 NOTE — PROGRESS NOTES
"  SUBJECTIVE:   Natalee Maya is a 48 year old female who presents to clinic today for the following health issues:      Fever, headache,       Duration: 3-4 days    Description  cough, fever, chills, ear pain right, headache, fatigue/malaise and diarrhea    Severity: moderate    Accompanying signs and symptoms: None    History (predisposing factors):  none    Precipitating or alleviating factors: None    Therapies tried and outcome:  rest and fluids acetaminophen    tmax 101, last dose of tylenol at 6am     with multiple medical problems at home and 2 kids - no  One has the flu shot.     Problem list and histories reviewed & adjusted, as indicated.  Additional history: as documented    Reviewed and updated as needed this visit by clinical staff       Reviewed and updated as needed this visit by Provider         ROS:  Constitutional, HEENT, cardiovascular, pulmonary, gi and gu systems are negative, except as otherwise noted.    OBJECTIVE:     /72 (BP Location: Right arm, Cuff Size: Adult Large)   Pulse 80   Temp 98.8  F (37.1  C) (Oral)   Ht 1.607 m (5' 3.25\")   Wt 94.8 kg (209 lb)   LMP 02/15/2019   SpO2 97%   BMI 36.73 kg/m    Body mass index is 36.73 kg/m .  GENERAL APPEARANCE: healthy, alert and no distress  EYES: Eyes grossly normal to inspection, PERRL and conjunctivae and sclerae normal  HENT: ear canals and TM's normal and nose and mouth without ulcers or lesions  NECK: no adenopathy, no asymmetry, masses, or scars and thyroid normal to palpation  RESP: lungs clear to auscultation - no rales, rhonchi or wheezes  CV: regular rates and rhythm, normal S1 S2, no S3 or S4 and no murmur, click or rub  ABDOMEN: soft, nontender, without hepatosplenomegaly or masses and bowel sounds normal    Diagnostic Test Results:  none     ASSESSMENT/PLAN:       1. Viral URI with cough  Exam and vitals unremarkable - outside treatment window to treat flu, but due to at risk family members will swab.  - " Influenza A and B and RSV PCR    Patient Instructions       Patient Education     Viral Upper Respiratory Illness (Adult)  You have a viral upper respiratory illness (URI), which is another term for the common cold. This illness is contagious during the first few days. It is spread through the air by coughing and sneezing. It may also be spread by direct contact (touching the sick person and then touching your own eyes, nose, or mouth). Frequent handwashing will decrease risk of spread. Most viral illnesses go away within 7 to 10 days with rest and simple home remedies. Sometimes the illness may last for several weeks. Antibiotics will not kill a virus, and they are generally not prescribed for this condition.    Home care    If symptoms are severe, rest at home for the first 2 to 3 days. When you resume activity, don't let yourself get too tired.    Don't smoke. If you need help stopping, talk with your healthcare provider.    Avoid being exposed to cigarette smoke (yours or others ).    You may use acetaminophen or ibuprofen to control pain and fever, unless another medicine was prescribed. If you have chronic liver or kidney disease, have ever had a stomach ulcer or gastrointestinal bleeding, or are taking blood-thinning medicines, talk with your healthcare provider before using these medicines. Aspirin should never be given to anyone under 18 years of age who is ill with a viral infection or fever. It may cause severe liver or brain damage.    Your appetite may be poor, so a light diet is fine. Stay well hydrated by drinking 6 to 8 glasses of fluids per day (water, soft drinks, juices, tea, or soup). Extra fluids will help loosen secretions in the nose and lungs.    Over-the-counter cold medicines will not shorten the length of time you re sick, but they may be helpful for the following symptoms: cough, sore throat, and nasal and sinus congestion. If you take prescription medicines, ask your healthcare provider  or pharmacist which over-the-counter medicines are safe to use. (Note: Don't use decongestants if you have high blood pressure.)  Follow-up care  Follow up with your healthcare provider, or as advised.  When to seek medical advice  Call your healthcare provider right away if any of these occur:    Cough with lots of colored sputum (mucus)    Severe headache; face, neck, or ear pain    Difficulty swallowing due to throat pain    Fever of 100.4 F (38 C) or higher, or as directed by your healthcare provider  Call 911  Call 911 if any of these occur:    Chest pain, shortness of breath, wheezing, or difficulty breathing    Coughing up blood    Very severe pain with swallowing, especially if it goes along with a muffled voice   Date Last Reviewed: 6/1/2018 2000-2018 [employer]. [address] [place] [address], [place] [address]. All rights reserved. This information is not intended as a substitute for professional medical care. Always follow your healthcare professional's instructions.               Carolina Heath MD  [facility]

## 2019-02-25 NOTE — TELEPHONE ENCOUNTER
Left VM with +influenza.    Notified to have  call his doctor to see if he needs prophylactic treatment.    Carolina Heath MD  Internal Medicine/Pediatrics  [facility]

## 2019-02-25 NOTE — RESULT ENCOUNTER NOTE
Please call  Natalee,  Your TSH is elevated again.  I recommend you start thyroid hormone - levothyroxine 50 mcg once daily.  Take the levothyroxine on an empty stomach and wait 30 minutes before eating breakfast.  We'll recheck your levels again at your next follow up visit.  Celestina Gray NP  Endocrinology

## 2019-02-25 NOTE — PATIENT INSTRUCTIONS

## 2019-05-19 DIAGNOSIS — R79.89 ELEVATED TSH: ICD-10-CM

## 2019-05-20 RX ORDER — LEVOTHYROXINE SODIUM 50 UG/1
TABLET ORAL
Qty: 90 TABLET | Refills: 0 | Status: SHIPPED | OUTPATIENT
Start: 2019-05-20 | End: 2019-06-04

## 2019-05-21 NOTE — TELEPHONE ENCOUNTER
Medication is being filled for 1 time refill only due to:  Patient needs labs TSH.   Pt has upcoming appt.  Georgina Cowan RN

## 2019-05-30 ENCOUNTER — OFFICE VISIT (OUTPATIENT)
Dept: ENDOCRINOLOGY | Facility: CLINIC | Age: 49
End: 2019-05-30
Payer: COMMERCIAL

## 2019-05-30 VITALS
SYSTOLIC BLOOD PRESSURE: 135 MMHG | TEMPERATURE: 98 F | RESPIRATION RATE: 12 BRPM | DIASTOLIC BLOOD PRESSURE: 83 MMHG | WEIGHT: 191.1 LBS | HEART RATE: 69 BPM | BODY MASS INDEX: 33.58 KG/M2

## 2019-05-30 DIAGNOSIS — E03.8 SUBCLINICAL HYPOTHYROIDISM: ICD-10-CM

## 2019-05-30 DIAGNOSIS — E66.01 MORBID OBESITY (H): ICD-10-CM

## 2019-05-30 DIAGNOSIS — R79.89 ELEVATED TSH: ICD-10-CM

## 2019-05-30 PROCEDURE — 84443 ASSAY THYROID STIM HORMONE: CPT | Performed by: CLINICAL NURSE SPECIALIST

## 2019-05-30 PROCEDURE — 99213 OFFICE O/P EST LOW 20 MIN: CPT | Performed by: CLINICAL NURSE SPECIALIST

## 2019-05-30 PROCEDURE — 36415 COLL VENOUS BLD VENIPUNCTURE: CPT | Performed by: CLINICAL NURSE SPECIALIST

## 2019-05-30 PROCEDURE — 84439 ASSAY OF FREE THYROXINE: CPT | Performed by: CLINICAL NURSE SPECIALIST

## 2019-05-30 RX ORDER — PHENTERMINE HYDROCHLORIDE 37.5 MG/1
37.5 TABLET ORAL
Qty: 32 TABLET | Refills: 2 | Status: SHIPPED | OUTPATIENT
Start: 2019-05-30 | End: 2019-09-13

## 2019-05-30 NOTE — LETTER
2019         RE: aNtalee Maya  [address]  [facility] [address]        Dear Colleague,    Thank you for referring your patient, Natalee Maya, to the [facility]. Please see a copy of my visit note below.    Name: Natalee Maya  F/u for obesity (Last seen 2019).  HPI:  Natalee Maya is a 48 year old female who presents for the management of obestiy.  Gained weight with 3 pregnancies the life got busy and gain more weight.    Was able to lose 20-25 lbs with diet + exercise this past summer.  Then had foot surgery and was non-weight bearing x 6 weeks - gained back all the weight she had lost.  Has resumed diet efforts + exercise.  Struggles with appetite control.  Started Phentermine 37.5 mg every day 2018.  Tolerating Phentermine well.  Has lost 32 lbs in the past 3 months.    Diet: Does not like to count calories or follow fad diets.  Currently using portion control, healthy food choices, no junk food  Exercise: Yes, 12,000 steps per day  Elevated TSH.  + FH of mother with hypothyroidism.  Repeat TSH in high normal range at 4.00.  Thyroid antibodies were not elevated.  PMH/PSH:  Past Medical History:   Diagnosis Date     Calculus of kidney     History of kidney stone -- Abstracted 02     Lymphedema of lower extremity      Past Surgical History:   Procedure Laterality Date     C NONSPECIFIC PROCEDURE       -- Abstracted 02     FOOT SURGERY      2017     Family Hx:  Family History   Problem Relation Age of Onset     Diabetes Father      C.A.D. Father         hyperlipidemia     Bleeding Disorder Mother         Factor 5     Breast Cancer No family hx of      Thyroid disease: Yes mother and MGM with hypothyroidism      DM2: Yes, father        Autoimmune: DM1, SLE, RA, Vitiligo     Social Hx:  Social History     Socioeconomic History     Marital status:      Spouse name: Not on file     Number of children: Not on file      Years of education: Not on file     Highest education level: Not on file   Occupational History     Not on file   Social Needs     Financial resource strain: Not on file     Food insecurity:     Worry: Not on file     Inability: Not on file     Transportation needs:     Medical: Not on file     Non-medical: Not on file   Tobacco Use     Smoking status: Never Smoker     Smokeless tobacco: Never Used   Substance and Sexual Activity     Alcohol use: No     Drug use: No     Sexual activity: Yes     Partners: Male   Lifestyle     Physical activity:     Days per week: Not on file     Minutes per session: Not on file     Stress: Not on file   Relationships     Social connections:     Talks on phone: Not on file     Gets together: Not on file     Attends Oriental orthodox service: Not on file     Active member of club or organization: Not on file     Attends meetings of clubs or organizations: Not on file     Relationship status: Not on file     Intimate partner violence:     Fear of current or ex partner: Not on file     Emotionally abused: Not on file     Physically abused: Not on file     Forced sexual activity: Not on file   Other Topics Concern     Parent/sibling w/ CABG, MI or angioplasty before 65F 55M? Not Asked   Social History Narrative     Not on file          MEDICATIONS:  has a current medication list which includes the following prescription(s): ferosul, folic acid, levothyroxine, and phentermine.    ROS   ROS: 10 point ROS neg other than the symptoms noted above in the HPI.    Physical Exam   VS: /83 (BP Location: Right arm, Patient Position: Chair, Cuff Size: Adult Regular)   Pulse 69   Temp 98  F (36.7  C) (Oral)   Resp 12   Wt 86.7 kg (191 lb 1.6 oz)   Breastfeeding? No   BMI 33.58 kg/m     GENERAL: AXOX3, NAD, well dressed, answering questions appropriately, appears stated age.  HEENT: no exopthalmous, no proptosis, no lig lag, no retraction  NECK:  Supple, no thyromegaly, no adenopathy  CV: RRR,  "no rubs, gallops, no murmurs  LUNGS: CTAB, no wheezes, rales, or ronchi  EXTREMITIES: no edema  NEUROLOGY: CN grossly intact, no tremors  MSK: grossly intact    LABS:  !COMPREHENSIVE Latest Ref Rng & Units 10/27/2018   SODIUM 133 - 144 mmol/L 141   POTASSIUM 3.4 - 5.3 mmol/L 3.8   CHLORIDE 94 - 109 mmol/L 106   BUN 7 - 30 mg/dL 14   Creatinine 0.52 - 1.04 mg/dL 0.63   Glucose 70 - 99 mg/dL 96   ANION GAP 3 - 14 mmol/L 6   CALCIUM 8.5 - 10.1 mg/dL 8.8     !THYROID Latest Ref Rng & Units 2/21/2019 11/21/2018 10/15/2018   TSH 0.40 - 4.00 mU/L 4.07 (H) 4.00 12.02 (H)   T4 FREE 0.76 - 1.46 ng/dL 0.96 0.86 0.97     !THYROID Latest Ref Rng & Units 10/17/2009   TSH 0.40 - 4.00 mU/L 2.85   T4 FREE 0.76 - 1.46 ng/dL      ENDO THYROID LABS-UMP Latest Ref Rng & Units 11/21/2018   THYROGLOBULIN ANTIBODY <40 IU/mL <20   THYR PEROXIDASE NICOLE <35 IU/mL <10     !LIPID/HEPATIC Latest Ref Rng & Units 8/20/2018 8/28/2018   CHOLESTEROL <200 mg/dL 230 (H)    TRIGLYCERIDES <150 mg/dL 157 (H)    HDL CHOLESTEROL >49 mg/dL 51    LDL CHOLESTEROL, CALCULATED <100 mg/dL 148 (H)    VLDL-CHOLESTEROL 0 - 30 mg/dL     NON HDL CHOLESTEROL <130 mg/dL 179 (H)    CHOLESTEROL/HDL RATIO 0.0 - 5.0     AST 0 - 45 U/L  17   ALT 0 - 50 U/L  30     Component      Latest Ref Rng & Units 6/13/2017 8/20/2018 2/21/2019   Ferritin      8 - 252 ng/mL 7 (L) 25 76     Vital Signs 11/21/2018 11/27/2018 2/21/2019   Weight (LB) 241 lb 8 oz 236 lb 4.8 oz 209 lb 12.8 oz   Height  5' 3.25\" 5' 3.25\"   BMI (Calculated)  41.62 36.95     Vital Signs 2/25/2019 5/30/2019   Weight (LB) 209 lb 191 lb 1.6 oz   Height 5' 3.25\"    BMI (Calculated) 36.81       Waist Circumference:  40\" (2/2019). 37.5\" (today)    All pertinent notes, labs, and images personally reviewed by me.     A/P  Ms.Karla JUAN Maya is a 48 year old here for the evaluation of obestiy:    1. Morbid obesity-  BMI 42-->36.95.  No obesity-associated comorbidy.  Obesity is associated with a significant increase in " mortality and risk of many disorders, including diabetes mellitus, hypertension, dyslipidemia, heart disease, stroke, sleep apnea, cancer, and many others. Conversely, weight loss is associated with a reduction in obesity-associated morbidity.    Endocrine evaluation of obesity includes Diabetes/prediabetes, Cushing's and thyroid dysfunction.  The relevant work up for the diagnosis and management of obesity and various treatment options were discussed. Body Mass Index (BMI) has been a standard means for calculating risk for overweight and obesity. The new American Association of Clinical Endocrinology (AACE) algorithm recommends lifestyle modifications as the initial phase of treatment for all patients with the BMI equal or greater than 25 kg/m2. Lifestyle modifications includes use of medical nutrition therapy, exercise, tobacco cessation, adequate quality and quantity of sleep, limited consumption of alcohol and reduced stress through implementation of a structured, multidisciplinary program.    In patients with complications associated with obesity, graded interventions are recommended including pharmacological therapy such as phentermine, orlistat, lorcaserin and phentermine/topiramate ER, contrave ( buproprion/naltreone) and the use of very low calorie meal replacement programs.    If medical intervention is insufficient, surgical therapy may be considered, especially in patients with BMI greater than or equal to 35 kg/m2 with multiple complications. Surgical treatments include lap-band, gastric sleeve or gastric bypass surgery.    I informed the pt that:  1.Weight loss medications can be taken to assist with weight reduction when combined with appropriate healthy lifestyle changes.  2.I discussed possible s/e, risks and benefits of weight loss medications.  3.All medications are FDA approved, however, some may be used ''off label'' for their weight loss benefits and some ''short term'' medications can be used  for longer periods to achieve desired clinical outcomes.  4.All patients taking weight loss medications must be seen in clinic for refill authorization.    Counseling exercise ( V65.41)  Dietary counseling( V65.3)  Calculated BMI above the upper parameter and f/u plan was documented in the medical record.  Discussed indications, risks and benefits of all medications prescribed, and answered questions to patient's satisfaction.  Started Phentermine 37.5 mg/day 11/2018.  Has lost 32 lbs,241--> 209 lbs.  Continue Phentermine 37.5 mg every day.    Elevated TSH.  + FH of mother with hypothyroidism.  Repeat TSH in high normal range at 4.00.  Thyroid antibodies were not elevated. Repeat TFT's today.    BP slightly elevated today.  Historically well controlled.  Continue to monitor.  If worsens or continues, will consider trial off Phentermine to see if BP improves.  Nurse-only BP check appointment scheduled in 2 weeks.    Labs ordered today:   No orders of the defined types were placed in this encounter.    Radiology/Consults ordered today: None    More than 50% of the time spent with Ms. Maya on counseling / coordinating her care.  Total face to face time was 20 minutes.      Follow-up:  3 months    Celestina Gray NP  Endocrinology  [facility]  CC: Carolina Gomez   ____________________________________________________________          Name: Natalee Maya  F/u for obesity (Last seen 2/21/2019).  HPI:  Natalee Maya is a 48 year old female who presents for the management of obestiy.  Gained weight with 3 pregnancies the life got busy and gain more weight.    Was able to lose 20-25 lbs with diet + exercise this past summer.  Then had foot surgery and was non-weight bearing x 6 weeks - gained back all the weight she had lost.  Has resumed diet efforts + exercise.  Struggles with appetite control.  Started Phentermine 37.5 mg every day November 2018.  Tolerating Phentermine well.  Has lost 50  lbs.    Diet: Does not like to count calories or follow fad diets.  Currently using portion control, healthy food choices, no junk food  Exercise: Yes, 12,000 steps per day  Subclinical hypothyroidism.  + FH of mother with hypothyroidism.  Currently treated with levothyroxine 50 mcg/day.  PMH/PSH:  Past Medical History:   Diagnosis Date     Calculus of kidney     History of kidney stone -- Abstracted 02     Lymphedema of lower extremity      Past Surgical History:   Procedure Laterality Date     C NONSPECIFIC PROCEDURE       -- Abstracted 02     FOOT SURGERY      2017     Family Hx:  Family History   Problem Relation Age of Onset     Diabetes Father      C.A.D. Father         hyperlipidemia     Bleeding Disorder Mother         Factor 5     Breast Cancer No family hx of      Thyroid disease: Yes mother and MGM with hypothyroidism      DM2: Yes, father        Autoimmune: DM1, SLE, RA, Vitiligo     Social Hx:  Social History     Socioeconomic History     Marital status:      Spouse name: Not on file     Number of children: Not on file     Years of education: Not on file     Highest education level: Not on file   Occupational History     Not on file   Social Needs     Financial resource strain: Not on file     Food insecurity:     Worry: Not on file     Inability: Not on file     Transportation needs:     Medical: Not on file     Non-medical: Not on file   Tobacco Use     Smoking status: Never Smoker     Smokeless tobacco: Never Used   Substance and Sexual Activity     Alcohol use: No     Drug use: No     Sexual activity: Yes     Partners: Male   Lifestyle     Physical activity:     Days per week: Not on file     Minutes per session: Not on file     Stress: Not on file   Relationships     Social connections:     Talks on phone: Not on file     Gets together: Not on file     Attends Yarsanism service: Not on file     Active member of club or organization: Not on file     Attends meetings of  clubs or organizations: Not on file     Relationship status: Not on file     Intimate partner violence:     Fear of current or ex partner: Not on file     Emotionally abused: Not on file     Physically abused: Not on file     Forced sexual activity: Not on file   Other Topics Concern     Parent/sibling w/ CABG, MI or angioplasty before 65F 55M? Not Asked   Social History Narrative     Not on file          MEDICATIONS:  has a current medication list which includes the following prescription(s): ferosul, folic acid, levothyroxine, and phentermine.    ROS   ROS: 10 point ROS neg other than the symptoms noted above in the HPI.    Physical Exam   VS: /83 (BP Location: Right arm, Patient Position: Chair, Cuff Size: Adult Regular)   Pulse 69   Temp 98  F (36.7  C) (Oral)   Resp 12   Wt 86.7 kg (191 lb 1.6 oz)   Breastfeeding? No   BMI 33.58 kg/m     GENERAL: AXOX3, NAD, well dressed, answering questions appropriately, appears stated age.  HEENT: no exopthalmous, no proptosis, no lig lag, no retraction  NECK:  Supple, no thyromegaly, no adenopathy  CV: RRR, no rubs, gallops, no murmurs  LUNGS: CTAB, no wheezes, rales, or ronchi  EXTREMITIES: no edema  NEUROLOGY: CN grossly intact, no tremors  MSK: grossly intact    LABS:  !COMPREHENSIVE Latest Ref Rng & Units 10/27/2018   SODIUM 133 - 144 mmol/L 141   POTASSIUM 3.4 - 5.3 mmol/L 3.8   CHLORIDE 94 - 109 mmol/L 106   BUN 7 - 30 mg/dL 14   Creatinine 0.52 - 1.04 mg/dL 0.63   Glucose 70 - 99 mg/dL 96   ANION GAP 3 - 14 mmol/L 6   CALCIUM 8.5 - 10.1 mg/dL 8.8     !THYROID Latest Ref Rng & Units 2/21/2019 11/21/2018 10/15/2018   TSH 0.40 - 4.00 mU/L 4.07 (H) 4.00 12.02 (H)   T4 FREE 0.76 - 1.46 ng/dL 0.96 0.86 0.97     !THYROID Latest Ref Rng & Units 10/17/2009   TSH 0.40 - 4.00 mU/L 2.85   T4 FREE 0.76 - 1.46 ng/dL      ENDO THYROID LABS-UMP Latest Ref Rng & Units 11/21/2018   THYROGLOBULIN ANTIBODY <40 IU/mL <20   THYR PEROXIDASE NICOLE <35 IU/mL <10     !LIPID/HEPATIC  "Latest Ref Rng & Units 8/20/2018 8/28/2018   CHOLESTEROL <200 mg/dL 230 (H)    TRIGLYCERIDES <150 mg/dL 157 (H)    HDL CHOLESTEROL >49 mg/dL 51    LDL CHOLESTEROL, CALCULATED <100 mg/dL 148 (H)    VLDL-CHOLESTEROL 0 - 30 mg/dL     NON HDL CHOLESTEROL <130 mg/dL 179 (H)    CHOLESTEROL/HDL RATIO 0.0 - 5.0     AST 0 - 45 U/L  17   ALT 0 - 50 U/L  30     Component      Latest Ref Rng & Units 6/13/2017 8/20/2018 2/21/2019   Ferritin      8 - 252 ng/mL 7 (L) 25 76     Vital Signs 11/21/2018 11/27/2018 2/21/2019   Weight (LB) 241 lb 8 oz 236 lb 4.8 oz 209 lb 12.8 oz   Height  5' 3.25\" 5' 3.25\"   BMI (Calculated)  41.62 36.95     Vital Signs 2/25/2019 5/30/2019   Weight (LB) 209 lb 191 lb 1.6 oz   Height 5' 3.25\"    BMI (Calculated) 36.81       Waist Circumference:  40\" (2/2019). 37.5\" (today)    All pertinent notes, labs, and images personally reviewed by me.     A/P  Ms.Karla JUAN Maya is a 48 year old here for the evaluation of obestiy:    1. Morbid obesity-  BMI 42-->36.95-->33.58.  No obesity-associated comorbidy.  Obesity is associated with a significant increase in mortality and risk of many disorders, including diabetes mellitus, hypertension, dyslipidemia, heart disease, stroke, sleep apnea, cancer, and many others. Conversely, weight loss is associated with a reduction in obesity-associated morbidity.    Endocrine evaluation of obesity includes Diabetes/prediabetes, Cushing's and thyroid dysfunction.  The relevant work up for the diagnosis and management of obesity and various treatment options were discussed. Body Mass Index (BMI) has been a standard means for calculating risk for overweight and obesity. The new American Association of Clinical Endocrinology (AACE) algorithm recommends lifestyle modifications as the initial phase of treatment for all patients with the BMI equal or greater than 25 kg/m2. Lifestyle modifications includes use of medical nutrition therapy, exercise, tobacco cessation, adequate " quality and quantity of sleep, limited consumption of alcohol and reduced stress through implementation of a structured, multidisciplinary program.    In patients with complications associated with obesity, graded interventions are recommended including pharmacological therapy such as phentermine, orlistat, lorcaserin and phentermine/topiramate ER, contrave ( buproprion/naltreone) and the use of very low calorie meal replacement programs.    If medical intervention is insufficient, surgical therapy may be considered, especially in patients with BMI greater than or equal to 35 kg/m2 with multiple complications. Surgical treatments include lap-band, gastric sleeve or gastric bypass surgery.    I informed the pt that:  1.Weight loss medications can be taken to assist with weight reduction when combined with appropriate healthy lifestyle changes.  2.I discussed possible s/e, risks and benefits of weight loss medications.  3.All medications are FDA approved, however, some may be used ''off label'' for their weight loss benefits and some ''short term'' medications can be used for longer periods to achieve desired clinical outcomes.  4.All patients taking weight loss medications must be seen in clinic for refill authorization.    Counseling exercise ( V65.41)  Dietary counseling( V65.3)  Calculated BMI above the upper parameter and f/u plan was documented in the medical record.  Discussed indications, risks and benefits of all medications prescribed, and answered questions to patient's satisfaction.  Started Phentermine 37.5 mg/day 11/2018.  Has lost 50 lbs,241--> 191 lbs.  Continue Phentermine 37.5 mg every day.    Hypothyroidism.  Thyroid antibodies were not elevated. Currently treated with levothyroxine 50 mcg/day.  Repeat TFT's today.      Labs ordered today:   No orders of the defined types were placed in this encounter.    Radiology/Consults ordered today: None    More than 50% of the time spent with Ms. Maya  on counseling / coordinating her care.  Total face to face time was 20 minutes.      Follow-up:  3 months    Celestina Gray NP  Endocrinology  [facility]  CC: Carolina Gomez   ____________________________________________________________          Again, thank you for allowing me to participate in the care of your patient.        Sincerely,        SHAQ Virgen CNP

## 2019-05-30 NOTE — PROGRESS NOTES
Name: Natalee Maya  F/u for obesity (Last seen 2019).  HPI:  Natalee Maya is a 48 year old female who presents for the management of obestiy.  Gained weight with 3 pregnancies the life got busy and gain more weight.    Was able to lose 20-25 lbs with diet + exercise this past summer.  Then had foot surgery and was non-weight bearing x 6 weeks - gained back all the weight she had lost.  Has resumed diet efforts + exercise.  Struggles with appetite control.  Started Phentermine 37.5 mg every day 2018.  Tolerating Phentermine well.  Has lost 32 lbs in the past 3 months.    Diet: Does not like to count calories or follow fad diets.  Currently using portion control, healthy food choices, no junk food  Exercise: Yes, 12,000 steps per day  Elevated TSH.  + FH of mother with hypothyroidism.  Repeat TSH in high normal range at 4.00.  Thyroid antibodies were not elevated.  PMH/PSH:  Past Medical History:   Diagnosis Date     Calculus of kidney     History of kidney stone -- Abstracted 02     Lymphedema of lower extremity      Past Surgical History:   Procedure Laterality Date     C NONSPECIFIC PROCEDURE       -- Abstracted 02     FOOT SURGERY      2017     Family Hx:  Family History   Problem Relation Age of Onset     Diabetes Father      C.A.D. Father         hyperlipidemia     Bleeding Disorder Mother         Factor 5     Breast Cancer No family hx of      Thyroid disease: Yes mother and MGM with hypothyroidism      DM2: Yes, father        Autoimmune: DM1, SLE, RA, Vitiligo     Social Hx:  Social History     Socioeconomic History     Marital status:      Spouse name: Not on file     Number of children: Not on file     Years of education: Not on file     Highest education level: Not on file   Occupational History     Not on file   Social Needs     Financial resource strain: Not on file     Food insecurity:     Worry: Not on file     Inability: Not on file      Transportation needs:     Medical: Not on file     Non-medical: Not on file   Tobacco Use     Smoking status: Never Smoker     Smokeless tobacco: Never Used   Substance and Sexual Activity     Alcohol use: No     Drug use: No     Sexual activity: Yes     Partners: Male   Lifestyle     Physical activity:     Days per week: Not on file     Minutes per session: Not on file     Stress: Not on file   Relationships     Social connections:     Talks on phone: Not on file     Gets together: Not on file     Attends Spiritism service: Not on file     Active member of club or organization: Not on file     Attends meetings of clubs or organizations: Not on file     Relationship status: Not on file     Intimate partner violence:     Fear of current or ex partner: Not on file     Emotionally abused: Not on file     Physically abused: Not on file     Forced sexual activity: Not on file   Other Topics Concern     Parent/sibling w/ CABG, MI or angioplasty before 65F 55M? Not Asked   Social History Narrative     Not on file          MEDICATIONS:  has a current medication list which includes the following prescription(s): ferosul, folic acid, levothyroxine, and phentermine.    ROS   ROS: 10 point ROS neg other than the symptoms noted above in the HPI.    Physical Exam   VS: /83 (BP Location: Right arm, Patient Position: Chair, Cuff Size: Adult Regular)   Pulse 69   Temp 98  F (36.7  C) (Oral)   Resp 12   Wt 86.7 kg (191 lb 1.6 oz)   Breastfeeding? No   BMI 33.58 kg/m    GENERAL: AXOX3, NAD, well dressed, answering questions appropriately, appears stated age.  HEENT: no exopthalmous, no proptosis, no lig lag, no retraction  NECK:  Supple, no thyromegaly, no adenopathy  CV: RRR, no rubs, gallops, no murmurs  LUNGS: CTAB, no wheezes, rales, or ronchi  EXTREMITIES: no edema  NEUROLOGY: CN grossly intact, no tremors  MSK: grossly intact    LABS:  !COMPREHENSIVE Latest Ref Rng & Units 10/27/2018   SODIUM 133 - 144 mmol/L 141  "  POTASSIUM 3.4 - 5.3 mmol/L 3.8   CHLORIDE 94 - 109 mmol/L 106   BUN 7 - 30 mg/dL 14   Creatinine 0.52 - 1.04 mg/dL 0.63   Glucose 70 - 99 mg/dL 96   ANION GAP 3 - 14 mmol/L 6   CALCIUM 8.5 - 10.1 mg/dL 8.8     !THYROID Latest Ref Rng & Units 2/21/2019 11/21/2018 10/15/2018   TSH 0.40 - 4.00 mU/L 4.07 (H) 4.00 12.02 (H)   T4 FREE 0.76 - 1.46 ng/dL 0.96 0.86 0.97     !THYROID Latest Ref Rng & Units 10/17/2009   TSH 0.40 - 4.00 mU/L 2.85   T4 FREE 0.76 - 1.46 ng/dL      ENDO THYROID LABS-UMP Latest Ref Rng & Units 11/21/2018   THYROGLOBULIN ANTIBODY <40 IU/mL <20   THYR PEROXIDASE NICOLE <35 IU/mL <10     !LIPID/HEPATIC Latest Ref Rng & Units 8/20/2018 8/28/2018   CHOLESTEROL <200 mg/dL 230 (H)    TRIGLYCERIDES <150 mg/dL 157 (H)    HDL CHOLESTEROL >49 mg/dL 51    LDL CHOLESTEROL, CALCULATED <100 mg/dL 148 (H)    VLDL-CHOLESTEROL 0 - 30 mg/dL     NON HDL CHOLESTEROL <130 mg/dL 179 (H)    CHOLESTEROL/HDL RATIO 0.0 - 5.0     AST 0 - 45 U/L  17   ALT 0 - 50 U/L  30     Component      Latest Ref Rng & Units 6/13/2017 8/20/2018 2/21/2019   Ferritin      8 - 252 ng/mL 7 (L) 25 76     Vital Signs 11/21/2018 11/27/2018 2/21/2019   Weight (LB) 241 lb 8 oz 236 lb 4.8 oz 209 lb 12.8 oz   Height  5' 3.25\" 5' 3.25\"   BMI (Calculated)  41.62 36.95     Vital Signs 2/25/2019 5/30/2019   Weight (LB) 209 lb 191 lb 1.6 oz   Height 5' 3.25\"    BMI (Calculated) 36.81       Waist Circumference:  40\" (2/2019). 37.5\" (today)    All pertinent notes, labs, and images personally reviewed by me.     A/P  Ms.Karla JUAN Maya is a 48 year old here for the evaluation of obestiy:    1. Morbid obesity-  BMI 42-->36.95.  No obesity-associated comorbidy.  Obesity is associated with a significant increase in mortality and risk of many disorders, including diabetes mellitus, hypertension, dyslipidemia, heart disease, stroke, sleep apnea, cancer, and many others. Conversely, weight loss is associated with a reduction in obesity-associated morbidity.  "   Endocrine evaluation of obesity includes Diabetes/prediabetes, Cushing's and thyroid dysfunction.  The relevant work up for the diagnosis and management of obesity and various treatment options were discussed. Body Mass Index (BMI) has been a standard means for calculating risk for overweight and obesity. The new American Association of Clinical Endocrinology (AACE) algorithm recommends lifestyle modifications as the initial phase of treatment for all patients with the BMI equal or greater than 25 kg/m2. Lifestyle modifications includes use of medical nutrition therapy, exercise, tobacco cessation, adequate quality and quantity of sleep, limited consumption of alcohol and reduced stress through implementation of a structured, multidisciplinary program.    In patients with complications associated with obesity, graded interventions are recommended including pharmacological therapy such as phentermine, orlistat, lorcaserin and phentermine/topiramate ER, contrave ( buproprion/naltreone) and the use of very low calorie meal replacement programs.    If medical intervention is insufficient, surgical therapy may be considered, especially in patients with BMI greater than or equal to 35 kg/m2 with multiple complications. Surgical treatments include lap-band, gastric sleeve or gastric bypass surgery.    I informed the pt that:  1.Weight loss medications can be taken to assist with weight reduction when combined with appropriate healthy lifestyle changes.  2.I discussed possible s/e, risks and benefits of weight loss medications.  3.All medications are FDA approved, however, some may be used ''off label'' for their weight loss benefits and some ''short term'' medications can be used for longer periods to achieve desired clinical outcomes.  4.All patients taking weight loss medications must be seen in clinic for refill authorization.    Counseling exercise ( V65.41)  Dietary counseling( V65.3)  Calculated BMI above the upper  parameter and f/u plan was documented in the medical record.  Discussed indications, risks and benefits of all medications prescribed, and answered questions to patient's satisfaction.  Started Phentermine 37.5 mg/day 11/2018.  Has lost 32 lbs,241--> 209 lbs.  Continue Phentermine 37.5 mg every day.    Elevated TSH.  + FH of mother with hypothyroidism.  Repeat TSH in high normal range at 4.00.  Thyroid antibodies were not elevated. Repeat TFT's today.    BP slightly elevated today.  Historically well controlled.  Continue to monitor.  If worsens or continues, will consider trial off Phentermine to see if BP improves.  Nurse-only BP check appointment scheduled in 2 weeks.    Labs ordered today:   No orders of the defined types were placed in this encounter.    Radiology/Consults ordered today: None    More than 50% of the time spent with Ms. Maya on counseling / coordinating her care.  Total face to face time was 20 minutes.      Follow-up:  3 months    Celestina Gray NP  Endocrinology  [facility]  CC: Carolina Gomez   ____________________________________________________________

## 2019-05-31 LAB
T4 FREE SERPL-MCNC: 1.09 NG/DL (ref 0.76–1.46)
TSH SERPL DL<=0.005 MIU/L-ACNC: 1.59 MU/L (ref 0.4–4)

## 2019-06-04 RX ORDER — LEVOTHYROXINE SODIUM 50 UG/1
50 TABLET ORAL DAILY
Qty: 90 TABLET | Refills: 3 | Status: SHIPPED | OUTPATIENT
Start: 2019-06-04 | End: 2020-03-27

## 2019-06-04 NOTE — RESULT ENCOUNTER NOTE
Natalee,  Your thyroid levels are in a good range.  Please continue your current dose of levothyroxine.  Celestina Gray NP  Endocrinology

## 2019-06-04 NOTE — PROGRESS NOTES
Name: Natalee Maya  F/u for obesity (Last seen 2019).  HPI:  Natalee Maya is a 48 year old female who presents for the management of obestiy.  Gained weight with 3 pregnancies the life got busy and gain more weight.    Was able to lose 20-25 lbs with diet + exercise this past summer.  Then had foot surgery and was non-weight bearing x 6 weeks - gained back all the weight she had lost.  Has resumed diet efforts + exercise.  Struggles with appetite control.  Started Phentermine 37.5 mg every day 2018.  Tolerating Phentermine well.  Has lost 50 lbs.    Diet: Does not like to count calories or follow fad diets.  Currently using portion control, healthy food choices, no junk food  Exercise: Yes, 12,000 steps per day  Subclinical hypothyroidism.  + FH of mother with hypothyroidism.  Currently treated with levothyroxine 50 mcg/day.  PMH/PSH:  Past Medical History:   Diagnosis Date     Calculus of kidney     History of kidney stone -- Abstracted 02     Lymphedema of lower extremity      Past Surgical History:   Procedure Laterality Date     C NONSPECIFIC PROCEDURE       -- Abstracted 02     FOOT SURGERY      2017     Family Hx:  Family History   Problem Relation Age of Onset     Diabetes Father      C.A.D. Father         hyperlipidemia     Bleeding Disorder Mother         Factor 5     Breast Cancer No family hx of      Thyroid disease: Yes mother and MGM with hypothyroidism      DM2: Yes, father        Autoimmune: DM1, SLE, RA, Vitiligo     Social Hx:  Social History     Socioeconomic History     Marital status:      Spouse name: Not on file     Number of children: Not on file     Years of education: Not on file     Highest education level: Not on file   Occupational History     Not on file   Social Needs     Financial resource strain: Not on file     Food insecurity:     Worry: Not on file     Inability: Not on file     Transportation needs:     Medical: Not on file      Non-medical: Not on file   Tobacco Use     Smoking status: Never Smoker     Smokeless tobacco: Never Used   Substance and Sexual Activity     Alcohol use: No     Drug use: No     Sexual activity: Yes     Partners: Male   Lifestyle     Physical activity:     Days per week: Not on file     Minutes per session: Not on file     Stress: Not on file   Relationships     Social connections:     Talks on phone: Not on file     Gets together: Not on file     Attends Roman [employer] service: Not on file     Active member of club or organization: Not on file     Attends meetings of clubs or organizations: Not on file     Relationship status: Not on file     Intimate partner violence:     Fear of current or ex partner: Not on file     Emotionally abused: Not on file     Physically abused: Not on file     Forced sexual activity: Not on file   Other Topics Concern     Parent/sibling w/ CABG, MI or angioplasty before 65F 55M? Not Asked   Social History Narrative     Not on file          MEDICATIONS:  has a current medication list which includes the following prescription(s): ferosul, folic acid, levothyroxine, and phentermine.    ROS   ROS: 10 point ROS neg other than the symptoms noted above in the HPI.    Physical Exam   VS: /83 (BP Location: Right arm, Patient Position: Chair, Cuff Size: Adult Regular)   Pulse 69   Temp 98  F (36.7  C) (Oral)   Resp 12   Wt 86.7 kg (191 lb 1.6 oz)   Breastfeeding? No   BMI 33.58 kg/m    GENERAL: AXOX3, NAD, well dressed, answering questions appropriately, appears stated age.  HEENT: no exopthalmous, no proptosis, no lig lag, no retraction  NECK:  Supple, no thyromegaly, no adenopathy  CV: RRR, no rubs, gallops, no murmurs  LUNGS: CTAB, no wheezes, rales, or ronchi  EXTREMITIES: no edema  NEUROLOGY: CN grossly intact, no tremors  MSK: grossly intact    LABS:  !COMPREHENSIVE Latest Ref Rng & Units 10/27/2018   SODIUM 133 - 144 mmol/L 141   POTASSIUM 3.4 - 5.3 mmol/L 3.8   CHLORIDE 94 -  "109 mmol/L 106   BUN 7 - 30 mg/dL 14   Creatinine 0.52 - 1.04 mg/dL 0.63   Glucose 70 - 99 mg/dL 96   ANION GAP 3 - 14 mmol/L 6   CALCIUM 8.5 - 10.1 mg/dL 8.8     !THYROID Latest Ref Rng & Units 2/21/2019 11/21/2018 10/15/2018   TSH 0.40 - 4.00 mU/L 4.07 (H) 4.00 12.02 (H)   T4 FREE 0.76 - 1.46 ng/dL 0.96 0.86 0.97     !THYROID Latest Ref Rng & Units 10/17/2009   TSH 0.40 - 4.00 mU/L 2.85   T4 FREE 0.76 - 1.46 ng/dL      ENDO THYROID LABS-UMP Latest Ref Rng & Units 11/21/2018   THYROGLOBULIN ANTIBODY <40 IU/mL <20   THYR PEROXIDASE NICOLE <35 IU/mL <10     !LIPID/HEPATIC Latest Ref Rng & Units 8/20/2018 8/28/2018   CHOLESTEROL <200 mg/dL 230 (H)    TRIGLYCERIDES <150 mg/dL 157 (H)    HDL CHOLESTEROL >49 mg/dL 51    LDL CHOLESTEROL, CALCULATED <100 mg/dL 148 (H)    VLDL-CHOLESTEROL 0 - 30 mg/dL     NON HDL CHOLESTEROL <130 mg/dL 179 (H)    CHOLESTEROL/HDL RATIO 0.0 - 5.0     AST 0 - 45 U/L  17   ALT 0 - 50 U/L  30     Component      Latest Ref Rng & Units 6/13/2017 8/20/2018 2/21/2019   Ferritin      8 - 252 ng/mL 7 (L) 25 76     Vital Signs 11/21/2018 11/27/2018 2/21/2019   Weight (LB) 241 lb 8 oz 236 lb 4.8 oz 209 lb 12.8 oz   Height  5' 3.25\" 5' 3.25\"   BMI (Calculated)  41.62 36.95     Vital Signs 2/25/2019 5/30/2019   Weight (LB) 209 lb 191 lb 1.6 oz   Height 5' 3.25\"    BMI (Calculated) 36.81       Waist Circumference:  40\" (2/2019). 37.5\" (today)    All pertinent notes, labs, and images personally reviewed by me.     A/P  Ms.Karla JUAN Maya is a 48 year old here for the evaluation of obestiy:    1. Morbid obesity-  BMI 42-->36.95-->33.58.  No obesity-associated comorbidy.  Obesity is associated with a significant increase in mortality and risk of many disorders, including diabetes mellitus, hypertension, dyslipidemia, heart disease, stroke, sleep apnea, cancer, and many others. Conversely, weight loss is associated with a reduction in obesity-associated morbidity.    Endocrine evaluation of obesity includes " Diabetes/prediabetes, Cushing's and thyroid dysfunction.  The relevant work up for the diagnosis and management of obesity and various treatment options were discussed. Body Mass Index (BMI) has been a standard means for calculating risk for overweight and obesity. The new American Association of Clinical Endocrinology (AACE) algorithm recommends lifestyle modifications as the initial phase of treatment for all patients with the BMI equal or greater than 25 kg/m2. Lifestyle modifications includes use of medical nutrition therapy, exercise, tobacco cessation, adequate quality and quantity of sleep, limited consumption of alcohol and reduced stress through implementation of a structured, multidisciplinary program.    In patients with complications associated with obesity, graded interventions are recommended including pharmacological therapy such as phentermine, orlistat, lorcaserin and phentermine/topiramate ER, contrave ( buproprion/naltreone) and the use of very low calorie meal replacement programs.    If medical intervention is insufficient, surgical therapy may be considered, especially in patients with BMI greater than or equal to 35 kg/m2 with multiple complications. Surgical treatments include lap-band, gastric sleeve or gastric bypass surgery.    I informed the pt that:  1.Weight loss medications can be taken to assist with weight reduction when combined with appropriate healthy lifestyle changes.  2.I discussed possible s/e, risks and benefits of weight loss medications.  3.All medications are FDA approved, however, some may be used ''off label'' for their weight loss benefits and some ''short term'' medications can be used for longer periods to achieve desired clinical outcomes.  4.All patients taking weight loss medications must be seen in clinic for refill authorization.    Counseling exercise ( V65.41)  Dietary counseling( V65.3)  Calculated BMI above the upper parameter and f/u plan was documented in  the medical record.  Discussed indications, risks and benefits of all medications prescribed, and answered questions to patient's satisfaction.  Started Phentermine 37.5 mg/day 11/2018.  Has lost 50 lbs,241--> 191 lbs.  Continue Phentermine 37.5 mg every day.    Hypothyroidism.  Thyroid antibodies were not elevated. Currently treated with levothyroxine 50 mcg/day.  Repeat TFT's today.      Labs ordered today:   No orders of the defined types were placed in this encounter.    Radiology/Consults ordered today: None    More than 50% of the time spent with Ms. Maya on counseling / coordinating her care.  Total face to face time was 20 minutes.      Follow-up:  3 months    Celestina Gray NP  Endocrinology  [facility]  CC: Carolina Gomez   ____________________________________________________________

## 2019-07-16 ENCOUNTER — MYC MEDICAL ADVICE (OUTPATIENT)
Dept: PEDIATRICS | Facility: CLINIC | Age: 49
End: 2019-07-16

## 2019-07-16 ENCOUNTER — MYC MEDICAL ADVICE (OUTPATIENT)
Dept: ENDOCRINOLOGY | Facility: CLINIC | Age: 49
End: 2019-07-16

## 2019-07-16 DIAGNOSIS — Z63.0 MARITAL STRESS: Primary | ICD-10-CM

## 2019-07-16 SDOH — SOCIAL STABILITY - SOCIAL INSECURITY: PROBLEMS IN RELATIONSHIP WITH SPOUSE OR PARTNER: Z63.0

## 2019-07-18 NOTE — TELEPHONE ENCOUNTER
Please let pt know I referred her to Dre Weiner at our clinic. He is opening up some new available appts tomorrow, so she should call then. Please give her the referral info. If she doesn't want a male any of the [facility] people would be fine, but I know that Dre is great.

## 2019-07-18 NOTE — TELEPHONE ENCOUNTER
Richard Albright,  Thank you for your kind words.  I am not familiar with any particular counselor.  We do have a counselor who will be starting mid-August but I haven't met her yet.  Her name is Mimi Mckeon.  There is another counselor here now but she is only temporary so I don't know if you'd want to start with someone now just to change again in August.  The only other counselor that was highly recommended by another patient is Darin Weiner but I think he works out of the [facility].  The phone number to schedule with a counselor here is [phone].  I'm sorry I can't be more helpful.  Please let me know if there's anything else I can help with.  Celestina Gray NP  Endocrinology

## 2019-09-02 DIAGNOSIS — D50.9 IRON DEFICIENCY ANEMIA, UNSPECIFIED IRON DEFICIENCY ANEMIA TYPE: ICD-10-CM

## 2019-09-03 NOTE — TELEPHONE ENCOUNTER
"Requested Prescriptions   Pending Prescriptions Disp Refills     Ferrous Sulfate (IRON) 325 (65 Fe) MG tablet [Pharmacy Med Name: IRON 325 (65 FE)MG TABS] 90 tablet 0     Sig: TAKE ONE TABLET BY MOUTH EVERY DAY WITH BREAKFAST       Iron Supplements Passed - 9/2/2019  8:31 PM        Passed - Patient is 12 years of age or older        Passed - Recent (12 mo) or future (30 days) visit within the authorizing provider's specialty     Patient had office visit in the last 12 months or has a visit in the next 30 days with authorizing provider or within the authorizing provider's specialty.  See \"Patient Info\" tab in inbasket, or \"Choose Columns\" in Meds & Orders section of the refill encounter.              Passed - Hgb OR Hct on record within the past 12 mos.     Patient need only have had a HGB or HCT on file in the past 12 mos. That result does not need to be normal.    Recent Labs   Lab Test 10/27/18  1806 10/15/18  2009 08/28/18  0730   HGB 13.0 12.4 12.0     Recent Labs   Lab Test 10/27/18  1806 10/15/18  2009 08/28/18  0730   HCT 38.7 37.8 36.0       Please verify a HGB or HCT has been checked SINCE THE LAST DOSE CHANGE.            Passed - Medication is active on med list        FEROSUL 325 (65 Fe) MG tablet 90 tablet 1 2/5/2019        Last Written Prescription Date:  02/05/2019  Last Fill Quantity: 90,  # refills: 1   Last office visit: 2/25/2019 with prescribing provider:  Dr. Heath   Future Office Visit: 09/13/2019  Next 5 appointments (look out 90 days)    Sep 13, 2019  2:30 PM CDT  Return Visit with SHAQ Virgen [facility] [address]  [facility] [address] [phone]           "

## 2019-09-04 RX ORDER — PNV NO.95/FERROUS FUM/FOLIC AC 28MG-0.8MG
TABLET ORAL
Qty: 90 TABLET | Refills: 0 | Status: SHIPPED | OUTPATIENT
Start: 2019-09-04 | End: 2019-11-10

## 2019-09-04 NOTE — TELEPHONE ENCOUNTER
Prescription approved per [facility] Refill Protocol.  Ted Virk, RN, BSN

## 2019-09-13 ENCOUNTER — OFFICE VISIT (OUTPATIENT)
Dept: ENDOCRINOLOGY | Facility: CLINIC | Age: 49
End: 2019-09-13
Payer: COMMERCIAL

## 2019-09-13 VITALS
WEIGHT: 188 LBS | BODY MASS INDEX: 33.04 KG/M2 | SYSTOLIC BLOOD PRESSURE: 133 MMHG | DIASTOLIC BLOOD PRESSURE: 86 MMHG | TEMPERATURE: 98 F | HEART RATE: 71 BPM | RESPIRATION RATE: 12 BRPM

## 2019-09-13 DIAGNOSIS — E66.01 MORBID OBESITY (H): ICD-10-CM

## 2019-09-13 PROCEDURE — 99213 OFFICE O/P EST LOW 20 MIN: CPT | Performed by: CLINICAL NURSE SPECIALIST

## 2019-09-13 RX ORDER — PHENTERMINE HYDROCHLORIDE 37.5 MG/1
37.5 TABLET ORAL
Qty: 32 TABLET | Refills: 3 | Status: SHIPPED | OUTPATIENT
Start: 2019-09-13 | End: 2019-12-18

## 2019-09-13 NOTE — LETTER
2019         RE: Natalee Maya  [address]  [facility] [id number]        Dear Colleague,    Thank you for referring your patient, Natalee Maya, to the [facility]. Please see a copy of my visit note below.    Name: Natalee Maya  F/u for obesity (Last seen 2019).  HPI:  Natalee Maya is a 49 year old female who presents for the management of obestiy.  Gained weight with 3 pregnancies the life got busy and gain more weight.    Was able to lose 20-25 lbs with diet + exercise this past summer.  Then had foot surgery and was non-weight bearing x 6 weeks - gained back all the weight she had lost.  Has resumed diet efforts + exercise.  Struggles with appetite control.  Started Phentermine 37.5 mg every day 2018.  Tolerating Phentermine well.  Has lost 50 lbs.    Diet: Does not like to count calories or follow fad diets.  Currently using portion control, healthy food choices, no junk food  Exercise: Yes, 12,000 steps per day  Subclinical hypothyroidism.  + FH of mother with hypothyroidism.  Currently treated with levothyroxine 50 mcg/day.  PMH/PSH:  Past Medical History:   Diagnosis Date     Calculus of kidney     History of kidney stone -- Abstracted 02     Lymphedema of lower extremity      Past Surgical History:   Procedure Laterality Date     C NONSPECIFIC PROCEDURE       -- Abstracted 02     FOOT SURGERY      2017     Family Hx:  Family History   Problem Relation Age of Onset     Diabetes Father      C.A.D. Father         hyperlipidemia     Bleeding Disorder Mother         Factor 5     Breast Cancer No family hx of      Thyroid disease: Yes mother and MGM with hypothyroidism      DM2: Yes, father        Autoimmune: DM1, SLE, RA, Vitiligo     Social Hx:  Social History     Socioeconomic History     Marital status:      Spouse name: Not on file     Number of children: Not on file     Years of education: Not on file      Highest education level: Not on file   Occupational History     Not on file   Social Needs     Financial resource strain: Not on file     Food insecurity:     Worry: Not on file     Inability: Not on file     Transportation needs:     Medical: Not on file     Non-medical: Not on file   Tobacco Use     Smoking status: Never Smoker     Smokeless tobacco: Never Used   Substance and Sexual Activity     Alcohol use: No     Drug use: No     Sexual activity: Yes     Partners: Male   Lifestyle     Physical activity:     Days per week: Not on file     Minutes per session: Not on file     Stress: Not on file   Relationships     Social connections:     Talks on phone: Not on file     Gets together: Not on file     Attends Restorationist service: Not on file     Active member of club or organization: Not on file     Attends meetings of clubs or organizations: Not on file     Relationship status: Not on file     Intimate partner violence:     Fear of current or ex partner: Not on file     Emotionally abused: Not on file     Physically abused: Not on file     Forced sexual activity: Not on file   Other Topics Concern     Parent/sibling w/ CABG, MI or angioplasty before 65F 55M? Not Asked   Social History Narrative     Not on file          MEDICATIONS:  has a current medication list which includes the following prescription(s): phentermine, iron, folic acid, and levothyroxine.    ROS   ROS: 10 point ROS neg other than the symptoms noted above in the HPI.    Physical Exam   VS: /86 (BP Location: Right arm, Patient Position: Chair, Cuff Size: Adult Large)   Pulse 71   Temp 98  F (36.7  C) (Oral)   Resp 12   Wt 85.3 kg (188 lb)   LMP 08/31/2019   Breastfeeding? No   BMI 33.04 kg/m     GENERAL: AXOX3, NAD, well dressed, answering questions appropriately, appears stated age.  HEENT: no exopthalmous, no proptosis, no lig lag, no retraction  NECK:  Supple, no thyromegaly, no adenopathy  CV: RRR, no rubs, gallops, no  "murmurs  LUNGS: CTAB, no wheezes, rales, or ronchi  EXTREMITIES: no edema  NEUROLOGY: CN grossly intact, no tremors  MSK: grossly intact    LABS:  !COMPREHENSIVE Latest Ref Rng & Units 10/27/2018   SODIUM 133 - 144 mmol/L 141   POTASSIUM 3.4 - 5.3 mmol/L 3.8   CHLORIDE 94 - 109 mmol/L 106   BUN 7 - 30 mg/dL 14   Creatinine 0.52 - 1.04 mg/dL 0.63   Glucose 70 - 99 mg/dL 96   ANION GAP 3 - 14 mmol/L 6   CALCIUM 8.5 - 10.1 mg/dL 8.8     !THYROID Latest Ref Rng & Units 2/21/2019 11/21/2018 10/15/2018   TSH 0.40 - 4.00 mU/L 4.07 (H) 4.00 12.02 (H)   T4 FREE 0.76 - 1.46 ng/dL 0.96 0.86 0.97     !THYROID Latest Ref Rng & Units 10/17/2009   TSH 0.40 - 4.00 mU/L 2.85   T4 FREE 0.76 - 1.46 ng/dL      ENDO THYROID LABS-UMP Latest Ref Rng & Units 11/21/2018   THYROGLOBULIN ANTIBODY <40 IU/mL <20   THYR PEROXIDASE NICOLE <35 IU/mL <10     !LIPID/HEPATIC Latest Ref Rng & Units 8/20/2018 8/28/2018   CHOLESTEROL <200 mg/dL 230 (H)    TRIGLYCERIDES <150 mg/dL 157 (H)    HDL CHOLESTEROL >49 mg/dL 51    LDL CHOLESTEROL, CALCULATED <100 mg/dL 148 (H)    VLDL-CHOLESTEROL 0 - 30 mg/dL     NON HDL CHOLESTEROL <130 mg/dL 179 (H)    CHOLESTEROL/HDL RATIO 0.0 - 5.0     AST 0 - 45 U/L  17   ALT 0 - 50 U/L  30     Component      Latest Ref Rng & Units 6/13/2017 8/20/2018 2/21/2019   Ferritin      8 - 252 ng/mL 7 (L) 25 76     Vital Signs 11/21/2018 11/27/2018 2/21/2019   Weight (LB) 241 lb 8 oz 236 lb 4.8 oz 209 lb 12.8 oz   Height  5' 3.25\" 5' 3.25\"   BMI (Calculated)  41.62 36.95     Vital Signs 2/25/2019 5/30/2019   Weight (LB) 209 lb 191 lb 1.6 oz   Height 5' 3.25\"    BMI (Calculated) 36.81      Vital Signs 9/13/2019   Weight (LB) 188 lb   Height    BMI (Calculated) 33.04      Waist Circumference:  40\" (2/2019). 37.5\" (5/2019).  38.25\" (today)    All pertinent notes, labs, and images personally reviewed by me.     A/P  Ms.Natalee MARTINEZ Francesco is a 49 year old here for the evaluation of obestiy:    1. Morbid obesity-  BMI 42-->36.95-->33.04.  No " obesity-associated comorbidy.  Obesity is associated with a significant increase in mortality and risk of many disorders, including diabetes mellitus, hypertension, dyslipidemia, heart disease, stroke, sleep apnea, cancer, and many others. Conversely, weight loss is associated with a reduction in obesity-associated morbidity.    Endocrine evaluation of obesity includes Diabetes/prediabetes, Cushing's and thyroid dysfunction.  The relevant work up for the diagnosis and management of obesity and various treatment options were discussed. Body Mass Index (BMI) has been a standard means for calculating risk for overweight and obesity. The new American Association of Clinical Endocrinology (AACE) algorithm recommends lifestyle modifications as the initial phase of treatment for all patients with the BMI equal or greater than 25 kg/m2. Lifestyle modifications includes use of medical nutrition therapy, exercise, tobacco cessation, adequate quality and quantity of sleep, limited consumption of alcohol and reduced stress through implementation of a structured, multidisciplinary program.    In patients with complications associated with obesity, graded interventions are recommended including pharmacological therapy such as phentermine, orlistat, lorcaserin and phentermine/topiramate ER, contrave ( buproprion/naltreone) and the use of very low calorie meal replacement programs.    If medical intervention is insufficient, surgical therapy may be considered, especially in patients with BMI greater than or equal to 35 kg/m2 with multiple complications. Surgical treatments include lap-band, gastric sleeve or gastric bypass surgery.    I informed the pt that:  1.Weight loss medications can be taken to assist with weight reduction when combined with appropriate healthy lifestyle changes.  2.I discussed possible s/e, risks and benefits of weight loss medications.  3.All medications are FDA approved, however, some may be used ''off  label'' for their weight loss benefits and some ''short term'' medications can be used for longer periods to achieve desired clinical outcomes.  4.All patients taking weight loss medications must be seen in clinic for refill authorization.    Counseling exercise ( V65.41)  Dietary counseling( V65.3)  Calculated BMI above the upper parameter and f/u plan was documented in the medical record.  Discussed indications, risks and benefits of all medications prescribed, and answered questions to patient's satisfaction.  Started Phentermine 37.5 mg/day 11/2018.  Has lost 50 lbs,241--> 188 lbs.  Continue Phentermine 37.5 mg every day.    Hypothyroidism.  Thyroid antibodies were not elevated. Currently treated with levothyroxine 50 mcg/day.  Repeat TFT's today.      Labs ordered today:   No orders of the defined types were placed in this encounter.    Radiology/Consults ordered today: None    More than 50% of the time spent with Ms. Maya on counseling / coordinating her care.  Total face to face time was 20 minutes.      Follow-up:  3 months    Celestina Gray NP  Endocrinology  [facility]  CC: Carolina Gomez   ____________________________________________________________          Again, thank you for allowing me to participate in the care of your patient.        Sincerely,        SHAQ Virgen CNP

## 2019-09-13 NOTE — PROGRESS NOTES
Name: Natalee Maya  F/u for obesity (Last seen 2019).  HPI:  Natalee Maya is a 49 year old female who presents for the management of obestiy.  Gained weight with 3 pregnancies the life got busy and gain more weight.    Was able to lose 20-25 lbs with diet + exercise this past summer.  Then had foot surgery and was non-weight bearing x 6 weeks - gained back all the weight she had lost.  Has resumed diet efforts + exercise.  Struggles with appetite control.  Started Phentermine 37.5 mg every day 2018.  Tolerating Phentermine well.  Has lost 50 lbs.    Diet: Does not like to count calories or follow fad diets.  Currently using portion control, healthy food choices, no junk food  Exercise: Yes, 12,000 steps per day  Subclinical hypothyroidism.  + FH of mother with hypothyroidism.  Currently treated with levothyroxine 50 mcg/day.  PMH/PSH:  Past Medical History:   Diagnosis Date     Calculus of kidney     History of kidney stone -- Abstracted 02     Lymphedema of lower extremity      Past Surgical History:   Procedure Laterality Date     C NONSPECIFIC PROCEDURE       -- Abstracted 02     FOOT SURGERY      2017     Family Hx:  Family History   Problem Relation Age of Onset     Diabetes Father      C.A.D. Father         hyperlipidemia     Bleeding Disorder Mother         Factor 5     Breast Cancer No family hx of      Thyroid disease: Yes mother and MGM with hypothyroidism      DM2: Yes, father        Autoimmune: DM1, SLE, RA, Vitiligo     Social Hx:  Social History     Socioeconomic History     Marital status:      Spouse name: Not on file     Number of children: Not on file     Years of education: Not on file     Highest education level: Not on file   Occupational History     Not on file   Social Needs     Financial resource strain: Not on file     Food insecurity:     Worry: Not on file     Inability: Not on file     Transportation needs:     Medical: Not on file      Non-medical: Not on file   Tobacco Use     Smoking status: Never Smoker     Smokeless tobacco: Never Used   Substance and Sexual Activity     Alcohol use: No     Drug use: No     Sexual activity: Yes     Partners: Male   Lifestyle     Physical activity:     Days per week: Not on file     Minutes per session: Not on file     Stress: Not on file   Relationships     Social connections:     Talks on phone: Not on file     Gets together: Not on file     Attends Ep[employer] service: Not on file     Active member of club or organization: Not on file     Attends meetings of clubs or organizations: Not on file     Relationship status: Not on file     Intimate partner violence:     Fear of current or ex partner: Not on file     Emotionally abused: Not on file     Physically abused: Not on file     Forced sexual activity: Not on file   Other Topics Concern     Parent/sibling w/ CABG, MI or angioplasty before 65F 55M? Not Asked   Social History Narrative     Not on file          MEDICATIONS:  has a current medication list which includes the following prescription(s): phentermine, iron, folic acid, and levothyroxine.    ROS   ROS: 10 point ROS neg other than the symptoms noted above in the HPI.    Physical Exam   VS: /86 (BP Location: Right arm, Patient Position: Chair, Cuff Size: Adult Large)   Pulse 71   Temp 98  F (36.7  C) (Oral)   Resp 12   Wt 85.3 kg (188 lb)   LMP 08/31/2019   Breastfeeding? No   BMI 33.04 kg/m    GENERAL: AXOX3, NAD, well dressed, answering questions appropriately, appears stated age.  HEENT: no exopthalmous, no proptosis, no lig lag, no retraction  NECK:  Supple, no thyromegaly, no adenopathy  CV: RRR, no rubs, gallops, no murmurs  LUNGS: CTAB, no wheezes, rales, or ronchi  EXTREMITIES: no edema  NEUROLOGY: CN grossly intact, no tremors  MSK: grossly intact    LABS:  !COMPREHENSIVE Latest Ref Rng & Units 10/27/2018   SODIUM 133 - 144 mmol/L 141   POTASSIUM 3.4 - 5.3 mmol/L 3.8   CHLORIDE  "94 - 109 mmol/L 106   BUN 7 - 30 mg/dL 14   Creatinine 0.52 - 1.04 mg/dL 0.63   Glucose 70 - 99 mg/dL 96   ANION GAP 3 - 14 mmol/L 6   CALCIUM 8.5 - 10.1 mg/dL 8.8     !THYROID Latest Ref Rng & Units 2/21/2019 11/21/2018 10/15/2018   TSH 0.40 - 4.00 mU/L 4.07 (H) 4.00 12.02 (H)   T4 FREE 0.76 - 1.46 ng/dL 0.96 0.86 0.97     !THYROID Latest Ref Rng & Units 10/17/2009   TSH 0.40 - 4.00 mU/L 2.85   T4 FREE 0.76 - 1.46 ng/dL      ENDO THYROID LABS-UMP Latest Ref Rng & Units 11/21/2018   THYROGLOBULIN ANTIBODY <40 IU/mL <20   THYR PEROXIDASE NICOLE <35 IU/mL <10     !LIPID/HEPATIC Latest Ref Rng & Units 8/20/2018 8/28/2018   CHOLESTEROL <200 mg/dL 230 (H)    TRIGLYCERIDES <150 mg/dL 157 (H)    HDL CHOLESTEROL >49 mg/dL 51    LDL CHOLESTEROL, CALCULATED <100 mg/dL 148 (H)    VLDL-CHOLESTEROL 0 - 30 mg/dL     NON HDL CHOLESTEROL <130 mg/dL 179 (H)    CHOLESTEROL/HDL RATIO 0.0 - 5.0     AST 0 - 45 U/L  17   ALT 0 - 50 U/L  30     Component      Latest Ref Rng & Units 6/13/2017 8/20/2018 2/21/2019   Ferritin      8 - 252 ng/mL 7 (L) 25 76     Vital Signs 11/21/2018 11/27/2018 2/21/2019   Weight (LB) 241 lb 8 oz 236 lb 4.8 oz 209 lb 12.8 oz   Height  5' 3.25\" 5' 3.25\"   BMI (Calculated)  41.62 36.95     Vital Signs 2/25/2019 5/30/2019   Weight (LB) 209 lb 191 lb 1.6 oz   Height 5' 3.25\"    BMI (Calculated) 36.81      Vital Signs 9/13/2019   Weight (LB) 188 lb   Height    BMI (Calculated) 33.04      Waist Circumference:  40\" (2/2019). 37.5\" (5/2019).  38.25\" (today)    All pertinent notes, labs, and images personally reviewed by me.     A/P  Ms.Karla JUAN Maya is a 49 year old here for the evaluation of obestiy:    1. Morbid obesity-  BMI 42-->36.95-->33.04.  No obesity-associated comorbidy.  Obesity is associated with a significant increase in mortality and risk of many disorders, including diabetes mellitus, hypertension, dyslipidemia, heart disease, stroke, sleep apnea, cancer, and many others. Conversely, weight loss is " associated with a reduction in obesity-associated morbidity.    Endocrine evaluation of obesity includes Diabetes/prediabetes, Cushing's and thyroid dysfunction.  The relevant work up for the diagnosis and management of obesity and various treatment options were discussed. Body Mass Index (BMI) has been a standard means for calculating risk for overweight and obesity. The new American Association of Clinical Endocrinology (AACE) algorithm recommends lifestyle modifications as the initial phase of treatment for all patients with the BMI equal or greater than 25 kg/m2. Lifestyle modifications includes use of medical nutrition therapy, exercise, tobacco cessation, adequate quality and quantity of sleep, limited consumption of alcohol and reduced stress through implementation of a structured, multidisciplinary program.    In patients with complications associated with obesity, graded interventions are recommended including pharmacological therapy such as phentermine, orlistat, lorcaserin and phentermine/topiramate ER, contrave ( buproprion/naltreone) and the use of very low calorie meal replacement programs.    If medical intervention is insufficient, surgical therapy may be considered, especially in patients with BMI greater than or equal to 35 kg/m2 with multiple complications. Surgical treatments include lap-band, gastric sleeve or gastric bypass surgery.    I informed the pt that:  1.Weight loss medications can be taken to assist with weight reduction when combined with appropriate healthy lifestyle changes.  2.I discussed possible s/e, risks and benefits of weight loss medications.  3.All medications are FDA approved, however, some may be used ''off label'' for their weight loss benefits and some ''short term'' medications can be used for longer periods to achieve desired clinical outcomes.  4.All patients taking weight loss medications must be seen in clinic for refill authorization.    Counseling exercise (  V65.41)  Dietary counseling( V65.3)  Calculated BMI above the upper parameter and f/u plan was documented in the medical record.  Discussed indications, risks and benefits of all medications prescribed, and answered questions to patient's satisfaction.  Started Phentermine 37.5 mg/day 11/2018.  Has lost 50 lbs,241--> 188 lbs.  Continue Phentermine 37.5 mg every day.    Hypothyroidism.  Thyroid antibodies were not elevated. Currently treated with levothyroxine 50 mcg/day.  Repeat TFT's today.      Labs ordered today:   No orders of the defined types were placed in this encounter.    Radiology/Consults ordered today: None    More than 50% of the time spent with Ms. Maya on counseling / coordinating her care.  Total face to face time was 20 minutes.      Follow-up:  3 months    Celestina Gray NP  Endocrinology  [facility]  CC: Carolina Gomez   ____________________________________________________________

## 2019-10-02 ENCOUNTER — HEALTH MAINTENANCE LETTER (OUTPATIENT)
Age: 49
End: 2019-10-02

## 2019-11-10 DIAGNOSIS — E53.8 FOLATE DEFICIENCY: ICD-10-CM

## 2019-11-10 DIAGNOSIS — D50.9 IRON DEFICIENCY ANEMIA, UNSPECIFIED IRON DEFICIENCY ANEMIA TYPE: ICD-10-CM

## 2019-11-11 RX ORDER — FOLIC ACID 1 MG/1
TABLET ORAL
Qty: 90 TABLET | Refills: 0 | Status: SHIPPED | OUTPATIENT
Start: 2019-11-11 | End: 2020-02-24

## 2019-11-11 RX ORDER — PNV NO.95/FERROUS FUM/FOLIC AC 28MG-0.8MG
TABLET ORAL
Qty: 90 TABLET | Refills: 0 | Status: SHIPPED | OUTPATIENT
Start: 2019-11-11 | End: 2020-03-18

## 2019-11-11 NOTE — TELEPHONE ENCOUNTER
"Routing refill request to provider for review/approval because:  Labs not current:  hgb  Pt due for follow up 11/27/19    Meds pended for 30 day supply with reminder              Requested Prescriptions   Pending Prescriptions Disp Refills     folic acid (FOLVITE) 1 MG tablet [Pharmacy Med Name: FOLIC ACID 1MG TABS] 214 tablet 0     Sig: TAKE ONE TABLET BY MOUTH EVERY DAY       Vitamin Supplements (Adult) Protocol Failed - 11/11/2019  9:15 AM        Failed - Normal Hgb on file in past 12 mos     Recent Labs   Lab Test 10/27/18  1806   HGB 13.0               Passed - High dose Vitamin D not ordered        Passed - Recent (12 mo) or future (30 days) visit within the authorizing provider's specialty     Patient has had an office visit with the authorizing provider or a provider within the authorizing providers department within the previous 12 mos or has a future within next 30 days. See \"Patient Info\" tab in inbasket, or \"Choose Columns\" in Meds & Orders section of the refill encounter.              Passed - Medication is active on med list        Ferrous Sulfate (IRON) 325 (65 Fe) MG tablet [Pharmacy Med Name: IRON 325 (65 FE)MG TABS] 90 tablet 0     Sig: TAKE ONE TABLET BY MOUTH EVERY DAY WITH BREAKFAST       Iron Supplements Failed - 11/11/2019  9:15 AM        Failed - Hgb OR Hct on record within the past 12 mos.     Patient need only have had a HGB or HCT on file in the past 12 mos. That result does not need to be normal.    Recent Labs   Lab Test 10/27/18  1806 10/15/18  2009 08/28/18  0730   HGB 13.0 12.4 12.0     Recent Labs   Lab Test 10/27/18  1806 10/15/18  2009 08/28/18  0730   HCT 38.7 37.8 36.0       Please verify a HGB or HCT has been checked SINCE THE LAST DOSE CHANGE.            Passed - Patient is 12 years of age or older        Passed - Recent (12 mo) or future (30 days) visit within the authorizing provider's specialty     Patient has had an office visit with the authorizing provider or a provider " "within the authorizing providers department within the previous 12 mos or has a future within next 30 days. See \"Patient Info\" tab in inbasket, or \"Choose Columns\" in Meds & Orders section of the refill encounter.              Passed - Medication is active on med list          "

## 2019-11-21 ENCOUNTER — TELEPHONE (OUTPATIENT)
Dept: PEDIATRICS | Facility: CLINIC | Age: 49
End: 2019-11-21

## 2019-11-21 NOTE — TELEPHONE ENCOUNTER
Reason for call:  Other   Patient called regarding (reason for call): call back  Additional comments: Pt calling to have behavioral health referral push back into [facility]. This writer was able to see 3 different encounters starting in July 18, July 19 and July 22 about pt being informed about the referral. Pt would like to have access to the document through [facility].     Please call pt for further questions.     Phone number to reach patient:  Home number on file [phone] (home)    Best Time:  ANY TIME    Can we leave a detailed message on this number?  YES

## 2019-12-18 ENCOUNTER — OFFICE VISIT (OUTPATIENT)
Dept: ENDOCRINOLOGY | Facility: CLINIC | Age: 49
End: 2019-12-18
Payer: COMMERCIAL

## 2019-12-18 VITALS
RESPIRATION RATE: 16 BRPM | HEART RATE: 97 BPM | BODY MASS INDEX: 32.13 KG/M2 | WEIGHT: 182.8 LBS | SYSTOLIC BLOOD PRESSURE: 144 MMHG | DIASTOLIC BLOOD PRESSURE: 83 MMHG

## 2019-12-18 DIAGNOSIS — E03.8 SUBCLINICAL HYPOTHYROIDISM: ICD-10-CM

## 2019-12-18 DIAGNOSIS — E66.01 MORBID OBESITY (H): Primary | ICD-10-CM

## 2019-12-18 PROCEDURE — 36415 COLL VENOUS BLD VENIPUNCTURE: CPT | Performed by: CLINICAL NURSE SPECIALIST

## 2019-12-18 PROCEDURE — 99213 OFFICE O/P EST LOW 20 MIN: CPT | Performed by: CLINICAL NURSE SPECIALIST

## 2019-12-18 PROCEDURE — 84439 ASSAY OF FREE THYROXINE: CPT | Performed by: CLINICAL NURSE SPECIALIST

## 2019-12-18 PROCEDURE — 80053 COMPREHEN METABOLIC PANEL: CPT | Performed by: CLINICAL NURSE SPECIALIST

## 2019-12-18 PROCEDURE — 84443 ASSAY THYROID STIM HORMONE: CPT | Performed by: CLINICAL NURSE SPECIALIST

## 2019-12-18 RX ORDER — PHENTERMINE HYDROCHLORIDE 37.5 MG/1
37.5 TABLET ORAL
Qty: 32 TABLET | Refills: 3 | Status: SHIPPED | OUTPATIENT
Start: 2019-12-18 | End: 2020-03-11

## 2019-12-18 NOTE — PATIENT INSTRUCTIONS
You have lost a total of 59 lbs in the past year - since 11/2018.    You have lost 6 lbs since last seen.    Keep up the good work!

## 2019-12-18 NOTE — LETTER
2019         RE: Natalee Maya  [address]  [place] [address]        Dear Colleague,    Thank you for referring your patient, Natalee Maya, to the [facility]. Please see a copy of my visit note below.    Name: Natalee Maya  F/u for obesity (Last seen 2019).  HPI:  Natalee Maya is a 49 year old female who presents for the management of obesity    Gained weight with 3 pregnancies the life got busy and gain more weight.    Was able to lose 20-25 lbs with diet + exercise this past summer.  Then had foot surgery and was non-weight bearing x 6 weeks - gained back all the weight she had lost.  Has resumed diet efforts + exercise.  Struggles with appetite control.    Started Phentermine 37.5 mg every day 2018.  Tolerating Phentermine well.  Has lost 59 lbs total in the past year and 6 lbs since last seen 2019.  Recently increased exercise - walking indoor track 3x/week    Diet: Does not like to count calories or follow fad diets.  Currently using portion control, healthy food choices, no junk food + intermittent fasting.  Exercise: Yes, 12,000 steps per day  Hypothyroidism.  + FH of mother with hypothyroidism.  Currently treated with levothyroxine 50 mcg/day.  PMH/PSH:  Past Medical History:   Diagnosis Date     Calculus of kidney     History of kidney stone -- Abstracted 02     Lymphedema of lower extremity      Past Surgical History:   Procedure Laterality Date     C NONSPECIFIC PROCEDURE       -- Abstracted 02     FOOT SURGERY      2017     Family Hx:  Family History   Problem Relation Age of Onset     Diabetes Father      C.A.D. Father         hyperlipidemia     Bleeding Disorder Mother         Factor 5     Breast Cancer No family hx of      Thyroid disease: Yes mother and MGM with hypothyroidism      DM2: Yes, father        Autoimmune: DM1, SLE, RA, Vitiligo     Social Hx:  Social History     Socioeconomic History      Marital status:      Spouse name: Not on file     Number of children: Not on file     Years of education: Not on file     Highest education level: Not on file   Occupational History     Not on file   Social Needs     Financial resource strain: Not on file     Food insecurity:     Worry: Not on file     Inability: Not on file     Transportation needs:     Medical: Not on file     Non-medical: Not on file   Tobacco Use     Smoking status: Never Smoker     Smokeless tobacco: Never Used   Substance and Sexual Activity     Alcohol use: No     Drug use: No     Sexual activity: Yes     Partners: Male   Lifestyle     Physical activity:     Days per week: Not on file     Minutes per session: Not on file     Stress: Not on file   Relationships     Social connections:     Talks on phone: Not on file     Gets together: Not on file     Attends Yazidi service: Not on file     Active member of club or organization: Not on file     Attends meetings of clubs or organizations: Not on file     Relationship status: Not on file     Intimate partner violence:     Fear of current or ex partner: Not on file     Emotionally abused: Not on file     Physically abused: Not on file     Forced sexual activity: Not on file   Other Topics Concern     Parent/sibling w/ CABG, MI or angioplasty before 65F 55M? Not Asked   Social History Narrative     Not on file          MEDICATIONS:  has a current medication list which includes the following prescription(s): iron, folic acid, levothyroxine, and phentermine.    ROS   ROS: 10 point ROS neg other than the symptoms noted above in the HPI.    Physical Exam   VS: BP (!) 144/83 (BP Location: Right arm, Patient Position: Chair, Cuff Size: Adult Regular)   Pulse 97   Resp 16   Wt 82.9 kg (182 lb 12.8 oz)   BMI 32.13 kg/m     GENERAL: AXOX3, NAD, well dressed, answering questions appropriately, appears stated age.  HEENT: no exopthalmous, no proptosis, no lig lag, no retraction  NECK:   "Supple, no thyromegaly, no adenopathy  CV: RRR, no rubs, gallops, no murmurs  LUNGS: CTAB, no wheezes, rales, or ronchi  EXTREMITIES: no edema  NEUROLOGY: CN grossly intact, no tremors  MSK: grossly intact    LABS:  !COMPREHENSIVE Latest Ref Rng & Units 10/27/2018   SODIUM 133 - 144 mmol/L 141   POTASSIUM 3.4 - 5.3 mmol/L 3.8   CHLORIDE 94 - 109 mmol/L 106   BUN 7 - 30 mg/dL 14   Creatinine 0.52 - 1.04 mg/dL 0.63   Glucose 70 - 99 mg/dL 96   ANION GAP 3 - 14 mmol/L 6   CALCIUM 8.5 - 10.1 mg/dL 8.8     ENDO THYROID LABS-[facility] Latest Ref Rng & Units 5/30/2019 2/21/2019   TSH 0.40 - 4.00 mU/L 1.59 4.07 (H)   T4 FREE 0.76 - 1.46 ng/dL 1.09 0.96     ENDO THYROID LABS-[facility] Latest Ref Rng & Units 11/21/2018 10/15/2018   TSH 0.40 - 4.00 mU/L 4.00 12.02 (H)   T4 FREE 0.76 - 1.46 ng/dL 0.86 0.97     ENDO THYROID LABS-[facility] Latest Ref Rng & Units 10/17/2009   TSH 0.40 - 4.00 mU/L 2.85   T4 FREE 0.76 - 1.46 ng/dL      ENDO THYROID LABS-[facility] Latest Ref Rng & Units 11/21/2018   THYROGLOBULIN ANTIBODY <40 IU/mL <20   THYR PEROXIDASE NICOLE <35 IU/mL <10     !LIPID/HEPATIC Latest Ref Rng & Units 8/20/2018 8/28/2018   CHOLESTEROL <200 mg/dL 230 (H)    TRIGLYCERIDES <150 mg/dL 157 (H)    HDL CHOLESTEROL >49 mg/dL 51    LDL CHOLESTEROL, CALCULATED <100 mg/dL 148 (H)    VLDL-CHOLESTEROL 0 - 30 mg/dL     NON HDL CHOLESTEROL <130 mg/dL 179 (H)    CHOLESTEROL/HDL RATIO 0.0 - 5.0     AST 0 - 45 U/L  17   ALT 0 - 50 U/L  30     Vital Signs 11/21/2018 11/27/2018 2/21/2019   Weight (LB) 241 lb 8 oz 236 lb 4.8 oz 209 lb 12.8 oz   Height  5' 3.25\" 5' 3.25\"   BMI (Calculated)  41.62 36.95     Vital Signs 2/25/2019 5/30/2019   Weight (LB) 209 lb 191 lb 1.6 oz   Height 5' 3.25\"    BMI (Calculated) 36.81      Vital Signs 9/13/2019 12/18/2019   Weight (LB) 188 lb 182 lb 12.8 oz   Height     BMI (Calculated)  32.13      Waist Circumference:  40\" (2/2019). 37.5\" (5/2019).  38.25\" (9/2019).  38.5\" (today).    All pertinent notes, labs, and images personally " reviewed by me.     A/P  Ms.Karla JUAN Maya is a 49 year old here for the evaluation of obestiy:    1. Morbid obesity-  BMI 42-->36.95-->32.13.  No obesity-associated comorbidy.  Obesity is associated with a significant increase in mortality and risk of many disorders, including diabetes mellitus, hypertension, dyslipidemia, heart disease, stroke, sleep apnea, cancer, and many others. Conversely, weight loss is associated with a reduction in obesity-associated morbidity.    Endocrine evaluation of obesity includes Diabetes/prediabetes, Cushing's and thyroid dysfunction.  The relevant work up for the diagnosis and management of obesity and various treatment options were discussed. Body Mass Index (BMI) has been a standard means for calculating risk for overweight and obesity. The new American Association of Clinical Endocrinology (AACE) algorithm recommends lifestyle modifications as the initial phase of treatment for all patients with the BMI equal or greater than 25 kg/m2. Lifestyle modifications includes use of medical nutrition therapy, exercise, tobacco cessation, adequate quality and quantity of sleep, limited consumption of alcohol and reduced stress through implementation of a structured, multidisciplinary program.    In patients with complications associated with obesity, graded interventions are recommended including pharmacological therapy such as phentermine, orlistat, lorcaserin and phentermine/topiramate ER, contrave ( buproprion/naltreone) and the use of very low calorie meal replacement programs.    If medical intervention is insufficient, surgical therapy may be considered, especially in patients with BMI greater than or equal to 35 kg/m2 with multiple complications. Surgical treatments include lap-band, gastric sleeve or gastric bypass surgery.    I informed the pt that:  1.Weight loss medications can be taken to assist with weight reduction when combined with appropriate healthy lifestyle  changes.  2.I discussed possible s/e, risks and benefits of weight loss medications.  3.All medications are FDA approved, however, some may be used ''off label'' for their weight loss benefits and some ''short term'' medications can be used for longer periods to achieve desired clinical outcomes.  4.All patients taking weight loss medications must be seen in clinic for refill authorization.    Counseling exercise ( V65.41)  Dietary counseling( V65.3)  Calculated BMI above the upper parameter and f/u plan was documented in the medical record.  Discussed indications, risks and benefits of all medications prescribed, and answered questions to patient's satisfaction.  Started Phentermine 37.5 mg/day 11/2018.  Has lost 59 lbs,241--> 1822 lbs.  Continue Phentermine 37.5 mg every day.    Hypothyroidism.  Thyroid antibodies were not elevated. Currently treated with levothyroxine 50 mcg/day.  Repeat TFT's today.    Blood pressure slightly elevated today.  BP historically well controlled.  Has upcoming appointment with her PCP so if BP remains uncontrolled, this can be addressed.    Labs ordered today:   No orders of the defined types were placed in this encounter.    Radiology/Consults ordered today: None    More than 50% of the time spent with Ms. Maya on counseling / coordinating her care discussing the above plan of care.The patient indicates understanding of the above issues and agrees with the plan set forth.  Total face to face time was 15 minutes.      Follow-up:  3 months    Celestina Gray NP  Endocrinology  [facility]  CC: Jason, Carolina Cruz   ____________________________________________________________          Again, thank you for allowing me to participate in the care of your patient.        Sincerely,        SHAQ Virgen CNP

## 2019-12-18 NOTE — PROGRESS NOTES
Name: Natalee Maya  F/u for obesity (Last seen 2019).  HPI:  Natalee Maya is a 49 year old female who presents for the management of obesity    Gained weight with 3 pregnancies the life got busy and gain more weight.    Was able to lose 20-25 lbs with diet + exercise this past summer.  Then had foot surgery and was non-weight bearing x 6 weeks - gained back all the weight she had lost.  Has resumed diet efforts + exercise.  Struggles with appetite control.    Started Phentermine 37.5 mg every day 2018.  Tolerating Phentermine well.  Has lost 59 lbs total in the past year and 6 lbs since last seen 2019.  Recently increased exercise - walking indoor track 3x/week    Diet: Does not like to count calories or follow fad diets.  Currently using portion control, healthy food choices, no junk food + intermittent fasting.  Exercise: Yes, 12,000 steps per day  Hypothyroidism.  + FH of mother with hypothyroidism.  Currently treated with levothyroxine 50 mcg/day.  PMH/PSH:  Past Medical History:   Diagnosis Date     Calculus of kidney     History of kidney stone -- Abstracted 02     Lymphedema of lower extremity      Past Surgical History:   Procedure Laterality Date     C NONSPECIFIC PROCEDURE       -- Abstracted 02     FOOT SURGERY      2017     Family Hx:  Family History   Problem Relation Age of Onset     Diabetes Father      C.A.D. Father         hyperlipidemia     Bleeding Disorder Mother         Factor 5     Breast Cancer No family hx of      Thyroid disease: Yes mother and MGM with hypothyroidism      DM2: Yes, father        Autoimmune: DM1, SLE, RA, Vitiligo     Social Hx:  Social History     Socioeconomic History     Marital status:      Spouse name: Not on file     Number of children: Not on file     Years of education: Not on file     Highest education level: Not on file   Occupational History     Not on file   Social Needs     Financial resource strain: Not  on file     Food insecurity:     Worry: Not on file     Inability: Not on file     Transportation needs:     Medical: Not on file     Non-medical: Not on file   Tobacco Use     Smoking status: Never Smoker     Smokeless tobacco: Never Used   Substance and Sexual Activity     Alcohol use: No     Drug use: No     Sexual activity: Yes     Partners: Male   Lifestyle     Physical activity:     Days per week: Not on file     Minutes per session: Not on file     Stress: Not on file   Relationships     Social connections:     Talks on phone: Not on file     Gets together: Not on file     Attends Synagogue service: Not on file     Active member of club or organization: Not on file     Attends meetings of clubs or organizations: Not on file     Relationship status: Not on file     Intimate partner violence:     Fear of current or ex partner: Not on file     Emotionally abused: Not on file     Physically abused: Not on file     Forced sexual activity: Not on file   Other Topics Concern     Parent/sibling w/ CABG, MI or angioplasty before 65F 55M? Not Asked   Social History Narrative     Not on file          MEDICATIONS:  has a current medication list which includes the following prescription(s): iron, folic acid, levothyroxine, and phentermine.    ROS   ROS: 10 point ROS neg other than the symptoms noted above in the HPI.    Physical Exam   VS: BP (!) 144/83 (BP Location: Right arm, Patient Position: Chair, Cuff Size: Adult Regular)   Pulse 97   Resp 16   Wt 82.9 kg (182 lb 12.8 oz)   BMI 32.13 kg/m    GENERAL: AXOX3, NAD, well dressed, answering questions appropriately, appears stated age.  HEENT: no exopthalmous, no proptosis, no lig lag, no retraction  NECK:  Supple, no thyromegaly, no adenopathy  CV: RRR, no rubs, gallops, no murmurs  LUNGS: CTAB, no wheezes, rales, or ronchi  EXTREMITIES: no edema  NEUROLOGY: CN grossly intact, no tremors  MSK: grossly intact    LABS:  !COMPREHENSIVE Latest Ref Rng & Units 10/27/2018  "  SODIUM 133 - 144 mmol/L 141   POTASSIUM 3.4 - 5.3 mmol/L 3.8   CHLORIDE 94 - 109 mmol/L 106   BUN 7 - 30 mg/dL 14   Creatinine 0.52 - 1.04 mg/dL 0.63   Glucose 70 - 99 mg/dL 96   ANION GAP 3 - 14 mmol/L 6   CALCIUM 8.5 - 10.1 mg/dL 8.8     ENDO THYROID LABS-[facility] [place] Latest Ref Rng & Units 5/30/2019 2/21/2019   TSH 0.40 - 4.00 mU/L 1.59 4.07 (H)   T4 FREE 0.76 - 1.46 ng/dL 1.09 0.96     ENDO THYROID LABS-[facility] [place] Latest Ref Rng & Units 11/21/2018 10/15/2018   TSH 0.40 - 4.00 mU/L 4.00 12.02 (H)   T4 FREE 0.76 - 1.46 ng/dL 0.86 0.97     ENDO THYROID LABS-[facility] [place] Latest Ref Rng & Units 10/17/2009   TSH 0.40 - 4.00 mU/L 2.85   T4 FREE 0.76 - 1.46 ng/dL      ENDO THYROID LABS-[facility] [place] Latest Ref Rng & Units 11/21/2018   THYROGLOBULIN ANTIBODY <40 IU/mL <20   THYR PEROXIDASE NICOLE <35 IU/mL <10     !LIPID/HEPATIC Latest Ref Rng & Units 8/20/2018 8/28/2018   CHOLESTEROL <200 mg/dL 230 (H)    TRIGLYCERIDES <150 mg/dL 157 (H)    HDL CHOLESTEROL >49 mg/dL 51    LDL CHOLESTEROL, CALCULATED <100 mg/dL 148 (H)    VLDL-CHOLESTEROL 0 - 30 mg/dL     NON HDL CHOLESTEROL <130 mg/dL 179 (H)    CHOLESTEROL/HDL RATIO 0.0 - 5.0     AST 0 - 45 U/L  17   ALT 0 - 50 U/L  30     Vital Signs 11/21/2018 11/27/2018 2/21/2019   Weight (LB) 241 lb 8 oz 236 lb 4.8 oz 209 lb 12.8 oz   Height  5' 3.25\" 5' 3.25\"   BMI (Calculated)  41.62 36.95     Vital Signs 2/25/2019 5/30/2019   Weight (LB) 209 lb 191 lb 1.6 oz   Height 5' 3.25\"    BMI (Calculated) 36.81      Vital Signs 9/13/2019 12/18/2019   Weight (LB) 188 lb 182 lb 12.8 oz   Height     BMI (Calculated)  32.13      Waist Circumference:  40\" (2/2019). 37.5\" (5/2019).  38.25\" (9/2019).  38.5\" (today).    All pertinent notes, labs, and images personally reviewed by me.     A/P  Ms.Karla JUAN Maya is a 49 year old here for the evaluation of obestiy:    1. Morbid obesity-  BMI 42-->36.95-->32.13.  No obesity-associated comorbidy.  Obesity is associated with a significant increase in mortality and risk of many " disorders, including diabetes mellitus, hypertension, dyslipidemia, heart disease, stroke, sleep apnea, cancer, and many others. Conversely, weight loss is associated with a reduction in obesity-associated morbidity.    Endocrine evaluation of obesity includes Diabetes/prediabetes, Cushing's and thyroid dysfunction.  The relevant work up for the diagnosis and management of obesity and various treatment options were discussed. Body Mass Index (BMI) has been a standard means for calculating risk for overweight and obesity. The new American Association of Clinical Endocrinology (AACE) algorithm recommends lifestyle modifications as the initial phase of treatment for all patients with the BMI equal or greater than 25 kg/m2. Lifestyle modifications includes use of medical nutrition therapy, exercise, tobacco cessation, adequate quality and quantity of sleep, limited consumption of alcohol and reduced stress through implementation of a structured, multidisciplinary program.    In patients with complications associated with obesity, graded interventions are recommended including pharmacological therapy such as phentermine, orlistat, lorcaserin and phentermine/topiramate ER, contrave ( buproprion/naltreone) and the use of very low calorie meal replacement programs.    If medical intervention is insufficient, surgical therapy may be considered, especially in patients with BMI greater than or equal to 35 kg/m2 with multiple complications. Surgical treatments include lap-band, gastric sleeve or gastric bypass surgery.    I informed the pt that:  1.Weight loss medications can be taken to assist with weight reduction when combined with appropriate healthy lifestyle changes.  2.I discussed possible s/e, risks and benefits of weight loss medications.  3.All medications are FDA approved, however, some may be used ''off label'' for their weight loss benefits and some ''short term'' medications can be used for longer periods to  achieve desired clinical outcomes.  4.All patients taking weight loss medications must be seen in clinic for refill authorization.    Counseling exercise ( V65.41)  Dietary counseling( V65.3)  Calculated BMI above the upper parameter and f/u plan was documented in the medical record.  Discussed indications, risks and benefits of all medications prescribed, and answered questions to patient's satisfaction.  Started Phentermine 37.5 mg/day 11/2018.  Has lost 59 lbs,241--> 1822 lbs.  Continue Phentermine 37.5 mg every day.    Hypothyroidism.  Thyroid antibodies were not elevated. Currently treated with levothyroxine 50 mcg/day.  Repeat TFT's today.    Blood pressure slightly elevated today.  BP historically well controlled.  Has upcoming appointment with her PCP so if BP remains uncontrolled, this can be addressed.    Labs ordered today:   No orders of the defined types were placed in this encounter.    Radiology/Consults ordered today: None    More than 50% of the time spent with Ms. Maya on counseling / coordinating her care discussing the above plan of care.The patient indicates understanding of the above issues and agrees with the plan set forth.  Total face to face time was 15 minutes.      Follow-up:  3 months    Celestina Gray NP  Endocrinology  [facility]  CC: Carolina Gomez   ____________________________________________________________

## 2019-12-19 LAB
ALBUMIN SERPL-MCNC: 3.7 G/DL (ref 3.4–5)
ALP SERPL-CCNC: 46 U/L (ref 40–150)
ALT SERPL W P-5'-P-CCNC: 28 U/L (ref 0–50)
ANION GAP SERPL CALCULATED.3IONS-SCNC: 8 MMOL/L (ref 3–14)
AST SERPL W P-5'-P-CCNC: 20 U/L (ref 0–45)
BILIRUB SERPL-MCNC: 0.8 MG/DL (ref 0.2–1.3)
BUN SERPL-MCNC: 13 MG/DL (ref 7–30)
CALCIUM SERPL-MCNC: 8.8 MG/DL (ref 8.5–10.1)
CHLORIDE SERPL-SCNC: 107 MMOL/L (ref 94–109)
CO2 SERPL-SCNC: 25 MMOL/L (ref 20–32)
CREAT SERPL-MCNC: 0.6 MG/DL (ref 0.52–1.04)
GFR SERPL CREATININE-BSD FRML MDRD: >90 ML/MIN/{1.73_M2}
GLUCOSE SERPL-MCNC: 85 MG/DL (ref 70–99)
POTASSIUM SERPL-SCNC: 3.7 MMOL/L (ref 3.4–5.3)
PROT SERPL-MCNC: 6.4 G/DL (ref 6.8–8.8)
SODIUM SERPL-SCNC: 140 MMOL/L (ref 133–144)
T4 FREE SERPL-MCNC: 1.08 NG/DL (ref 0.76–1.46)
TSH SERPL DL<=0.005 MIU/L-ACNC: 2.22 MU/L (ref 0.4–4)

## 2019-12-19 NOTE — RESULT ENCOUNTER NOTE
Natalee,  Your labs are all normal.  Here's a copy for your records.  Celestina Gray NP  Endocrinology

## 2019-12-30 ENCOUNTER — FCC EXTENDED DOCUMENTATION (OUTPATIENT)
Dept: PSYCHOLOGY | Facility: CLINIC | Age: 49
End: 2019-12-30

## 2019-12-30 ENCOUNTER — OFFICE VISIT (OUTPATIENT)
Dept: PSYCHOLOGY | Facility: CLINIC | Age: 49
End: 2019-12-30
Payer: COMMERCIAL

## 2019-12-30 DIAGNOSIS — F43.20 ADJUSTMENT DISORDER: Primary | ICD-10-CM

## 2019-12-30 PROCEDURE — 90834 PSYTX W PT 45 MINUTES: CPT | Performed by: SOCIAL WORKER

## 2019-12-30 ASSESSMENT — ANXIETY QUESTIONNAIRES
3. WORRYING TOO MUCH ABOUT DIFFERENT THINGS: NOT AT ALL
IF YOU CHECKED OFF ANY PROBLEMS ON THIS QUESTIONNAIRE, HOW DIFFICULT HAVE THESE PROBLEMS MADE IT FOR YOU TO DO YOUR WORK, TAKE CARE OF THINGS AT HOME, OR GET ALONG WITH OTHER PEOPLE: SOMEWHAT DIFFICULT
5. BEING SO RESTLESS THAT IT IS HARD TO SIT STILL: NOT AT ALL
6. BECOMING EASILY ANNOYED OR IRRITABLE: SEVERAL DAYS
4. TROUBLE RELAXING: SEVERAL DAYS
2. NOT BEING ABLE TO STOP OR CONTROL WORRYING: SEVERAL DAYS
7. FEELING AFRAID AS IF SOMETHING AWFUL MIGHT HAPPEN: NOT AT ALL
GAD7 TOTAL SCORE: 4
1. FEELING NERVOUS, ANXIOUS, OR ON EDGE: SEVERAL DAYS

## 2019-12-30 ASSESSMENT — COLUMBIA-SUICIDE SEVERITY RATING SCALE - C-SSRS
6. HAVE YOU EVER DONE ANYTHING, STARTED TO DO ANYTHING, OR PREPARED TO DO ANYTHING TO END YOUR LIFE?: NO
1. IN THE PAST MONTH, HAVE YOU WISHED YOU WERE DEAD OR WISHED YOU COULD GO TO SLEEP AND NOT WAKE UP?: NO
6. HAVE YOU EVER DONE ANYTHING, STARTED TO DO ANYTHING, OR PREPARED TO DO ANYTHING TO END YOUR LIFE?: NO
2. HAVE YOU ACTUALLY HAD ANY THOUGHTS OF KILLING YOURSELF?: NO
2. HAVE YOU ACTUALLY HAD ANY THOUGHTS OF KILLING YOURSELF LIFETIME?: NO
TOTAL  NUMBER OF INTERRUPTED ATTEMPTS PAST 3 MONTHS: NO
1. IN THE PAST MONTH, HAVE YOU WISHED YOU WERE DEAD OR WISHED YOU COULD GO TO SLEEP AND NOT WAKE UP?: NO
ATTEMPT PAST THREE MONTHS: NO
TOTAL  NUMBER OF ABORTED OR SELF INTERRUPTED ATTEMPTS PAST 3 MONTHS: NO
ATTEMPT LIFETIME: NO
TOTAL  NUMBER OF ABORTED OR SELF INTERRUPTED ATTEMPTS PAST LIFETIME: NO
TOTAL  NUMBER OF INTERRUPTED ATTEMPTS LIFETIME: NO

## 2019-12-30 ASSESSMENT — PATIENT HEALTH QUESTIONNAIRE - PHQ9: SUM OF ALL RESPONSES TO PHQ QUESTIONS 1-9: 2

## 2019-12-30 NOTE — Clinical Note
I met with client today for individual therapy and will see her again next week to complete the diagnostic assessment.  I gave her an adjustment disorder diagnosis.

## 2019-12-30 NOTE — PROGRESS NOTES
Progress Note - Initial Session    Client Name:  Natalee Maya Date: 12/30/19         Service Type: Individual  Video Visit: No     Session Start Time: 10:35 AM  Session End Time: 11:25 AM     Session Length: 38-52 minutes    Session #: 1    Attendees: Client attended alone     DATA:  Diagnostic Assessment in progress.  Unable to complete documentation at the conclusion of the first session due to completion of MADINA-7, PHQ-9, [place]  Interactive Complexity: No  Crisis: No    Intervention:  provided education on therapeutic process    ASSESSMENT:  Mental Status Assessment:  Appearance:   Appropriate   Eye Contact:   Fair   Psychomotor Behavior: Normal   Attitude:   Cooperative   Orientation:   All  Speech   Rate / Production: Normal    Volume:  Normal   Mood:    Sad   Affect:    Tearful   Thought Content:  Clear   Thought Form:  Coherent  Logical   Insight:    Good       Safety Issues and Plan for Safety and Risk Management:  Client denies current fears or concerns for personal safety.  Client denies current or recent suicidal ideation or behaviors.  Client denies current or recent homicidal ideation or behaviors.  Client denies current or recent self injurious behavior or ideation.  Client denies other safety concerns.  Recommended that patient call 911 or go to the local ED should there be a change in any of these risk factors.  Client reports there are no firearms in the house.      Diagnostic Criteria:  A. The development of emotional or behavioral symptoms in response to an identifiable stressor(s) occurring within 3 months of the onset of the stressor(s)  B. These symptoms or behaviors are clinically significant, as evidenced by one or both of the following:  C. The stress-related disturbance does not meet criteria for another disorder & is not not an exacerbation of another mental disorder  D. The symptoms do not represent normal bereavement  E. Once the stressor or its consequences have  terminated, the symptoms do not persist for more than an additional 6 months       * Adjustment Disorder with Mixed Anxiety and Depressed Mood: The predominant manifestation is a combination of depression and anxiety      DSM5 Diagnoses: (Sustained by DSM5 Criteria Listed Above)  Diagnoses: Adjustment Disorders  309.28 (F43.23) With mixed anxiety and depressed mood  Psychosocial & Contextual Factors: marital stress  WHODAS 2.0 (12 item) 17    Collateral Reports Completed:  Routed note to PCP      PLAN: (Homework, other):  Client stated that she may follow up for ongoing services with [facility].  Therapist will complete diagnostic assessment in next session.      NATIVIDAD Worhty 12/30/2019
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
## 2019-12-31 ASSESSMENT — ANXIETY QUESTIONNAIRES: GAD7 TOTAL SCORE: 4

## 2020-01-06 ENCOUNTER — OFFICE VISIT (OUTPATIENT)
Dept: PSYCHOLOGY | Facility: CLINIC | Age: 50
End: 2020-01-06
Payer: COMMERCIAL

## 2020-01-06 DIAGNOSIS — F43.23 ADJUSTMENT DISORDER WITH MIXED ANXIETY AND DEPRESSED MOOD: Primary | ICD-10-CM

## 2020-01-06 PROCEDURE — 90791 PSYCH DIAGNOSTIC EVALUATION: CPT | Performed by: SOCIAL WORKER
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
## 2020-01-20 NOTE — TELEPHONE ENCOUNTER
The referral has been copied from the chart and sent via [employer] message.   Ilene Garcia on 11/21/2019 at 10:57 AM    [facility]

## 2020-01-24 ENCOUNTER — OFFICE VISIT (OUTPATIENT)
Dept: PSYCHOLOGY | Facility: CLINIC | Age: 50
End: 2020-01-24
Payer: COMMERCIAL

## 2020-01-24 DIAGNOSIS — F43.23 ADJUSTMENT DISORDER WITH MIXED ANXIETY AND DEPRESSED MOOD: Primary | ICD-10-CM

## 2020-01-24 PROCEDURE — 90834 PSYTX W PT 45 MINUTES: CPT | Performed by: SOCIAL WORKER

## 2020-01-24 NOTE — PROGRESS NOTES
Progress Note    Patient Name: Natalee Maya  Date: 1/24/2020         Service Type: Individual  Video Visit: No     Session Start Time: 11:33 AM  Session End Time: 12:24 PM     Session Length: 38-52 minutes    Session #: 2    Attendees: Client attended alone     Treatment Plan Last Reviewed: reviewed in session  PHQ-9 / MADINA-7 : 12/30/2019    DATA  Interactive Complexity: No  Crisis: No       Progress Since Last Session (Related to Symptoms / Goals / Homework):   Symptoms: Improving client reported improvements with managing emotions during most recent conflict with her     Homework: Completed in session      Episode of Care Goals: No improvement - ACTION (Actively working towards change); Intervened by reinforcing change plan / affirming steps taken     Current / Ongoing Stressors and Concerns:       Marital stress     Client reported that she and her  had a couple months without conflict in their marriage.  She explained this changed over the weekend when her  misinterpreted a statement she made.  She reported that he has not communicated with her for the past day and a half.  She reported ways she is intentional in how and what she communicates to him and attempts to understand what he is feeling and if there are changes she can make.  She indicated he is often not open to communicating with her about what the issue is and how to resolve it.  She questioned ways she can encourage him to get his own support.  She shared that she feels well supported within her family and social network and questioned whether she needed individual therapy.  Through discussion she identified and voiced agreement of areas which she can focus on in therapy.     Treatment Objective(s) Addressed in This Session:   Increase interest, engagement, and pleasure in doing things     Intervention:   Motivational interviewing: expressed empathy/understanding, use of reflection  and open ended questions    Explored goals for therapy and addressed questions  Encouraged client to focus on areas within her control      ASSESSMENT: Current Emotional / Mental Status (status of significant symptoms):   Risk status (Self / Other harm or suicidal ideation)   Patient denies current fears or concerns for personal safety.   Patient denies current or recent suicidal ideation or behaviors.   Patientdenies current or recent homicidal ideation or behaviors.   Patient denies current or recent self injurious behavior or ideation.   Patient denies other safety concerns.   Patient reports there has been no change in risk factors since their last session.     Patientreports there has been no change in protective factors since their last session.     Recommended that patient call 911 or go to the local ED should there be a change in any of these risk factors.     Appearance:   Appropriate    Eye Contact:   Good    Psychomotor Behavior: Normal    Attitude:   Cooperative    Orientation:   All   Speech    Rate / Production: Normal     Volume:  Normal    Mood:    Sad  -minimal, frustrated    Affect:    Appropriate    Thought Content:  Clear    Thought Form:  Coherent  Logical    Insight:    Good      Medication Review:   No current psychiatric medications prescribed     Medication Compliance:   NA     Changes in Health Issues:   None reported     Chemical Use Review:   Substance Use: Chemical use reviewed, no active concerns identified      Tobacco Use: No current tobacco use.      Diagnosis:   Adjustment Disorders  309.28 (F43.23) With mixed anxiety and depressed mood    Collateral Reports Completed:   Not Applicable    PLAN: (Patient Tasks / Therapist Tasks / Other)  Client shared desire to increase engagement in friendships.  Therapist encouraged client to focus on areas within her control.      Celeste Chavez, [facility] 1/24/2020                                                     ______________________________________________________________________    Treatment Plan    Patient's Name: Natalee Maya  YOB: 1970    Date:  1/24/2020    DSM5 Diagnoses: Adjustment Disorders  309.28 (F43.23) With mixed anxiety and depressed mood  Psychosocial / Contextual Factors: marital stress  WHODAS: 17    Referral / Collaboration:  Referral to another professional/service is not indicated at this time..    Anticipated number of session or this episode of care: 12      MeasurableTreatment Goal(s) related to diagnosis / functional impairment(s)  Goal 1: Patient will develop techniques for reducing symptoms that result from marital stress and conflict by reporting MADINA-7 and PHQ-9 scores below a 5, where symptoms where symptoms occur fewer than half the days for a minimum of 4 weeks.    Objective #A (Patient Action)    Patient will learn and utilize 3 techniques to manage emotions related to marital stress/conflict.  Status: New - Date: 1/24/2020     Intervention(s)  Therapist will teach relaxation techniques and ways to engage in self-care.    Objective #B  Patient will compile a list of boundaries that they would like to set with others. Utilize the boundaries .  Status: New - Date: 1/24/2020     Intervention(s)  Therapist will teach health boundaries and encourage client to identify their boundaries and implement them.    Objective #C  Patient will learn & utilize at least 1 assertive communication skills weekly.  Status: New - Date: 1/24/2020     Intervention(s)  Therapist will teach assertive communication skills.    Patient has reviewed and agreed to the above plan.      Celeste Chavez, [facility]  January 24, 2020

## 2020-02-07 ENCOUNTER — OFFICE VISIT (OUTPATIENT)
Dept: PSYCHOLOGY | Facility: CLINIC | Age: 50
End: 2020-02-07
Payer: COMMERCIAL

## 2020-02-07 DIAGNOSIS — F43.23 ADJUSTMENT DISORDER WITH MIXED ANXIETY AND DEPRESSED MOOD: Primary | ICD-10-CM

## 2020-02-07 PROCEDURE — 90834 PSYTX W PT 45 MINUTES: CPT | Performed by: SOCIAL WORKER

## 2020-02-07 ASSESSMENT — ANXIETY QUESTIONNAIRES
5. BEING SO RESTLESS THAT IT IS HARD TO SIT STILL: NOT AT ALL
7. FEELING AFRAID AS IF SOMETHING AWFUL MIGHT HAPPEN: NOT AT ALL
IF YOU CHECKED OFF ANY PROBLEMS ON THIS QUESTIONNAIRE, HOW DIFFICULT HAVE THESE PROBLEMS MADE IT FOR YOU TO DO YOUR WORK, TAKE CARE OF THINGS AT HOME, OR GET ALONG WITH OTHER PEOPLE: SOMEWHAT DIFFICULT
4. TROUBLE RELAXING: NOT AT ALL
GAD7 TOTAL SCORE: 3
3. WORRYING TOO MUCH ABOUT DIFFERENT THINGS: SEVERAL DAYS
2. NOT BEING ABLE TO STOP OR CONTROL WORRYING: SEVERAL DAYS
1. FEELING NERVOUS, ANXIOUS, OR ON EDGE: NOT AT ALL
6. BECOMING EASILY ANNOYED OR IRRITABLE: SEVERAL DAYS

## 2020-02-07 ASSESSMENT — PATIENT HEALTH QUESTIONNAIRE - PHQ9: SUM OF ALL RESPONSES TO PHQ QUESTIONS 1-9: 3

## 2020-02-07 NOTE — PROGRESS NOTES
Progress Note    Patient Name: Natalee Maya  Date: 1/24/2020         Service Type: Individual  Video Visit: No     Session Start Time: 7:07 AM  Session End Time: 7:54 AM     Session Length: 38-52 minutes    Session #: 3    Attendees: Client attended alone     Treatment Plan Last Reviewed: 1/24/2020  PHQ-9 / MADINA-7 : 2/7/2020    DATA  Interactive Complexity: No  Crisis: No       Progress Since Last Session (Related to Symptoms / Goals / Homework):   Symptoms: No Change    Homework: Partially completed      Episode of Care Goals: No improvement - ACTION (Actively working towards change); Intervened by reinforcing change plan / affirming steps taken     Current / Ongoing Stressors and Concerns:       Marital stress    Client processed through recent conflict with her .  She reported they went 11 days without talking.  She reflected on what she believed to be contributing to the recent issues.  She identified focusing on areas within her control.       Treatment Objective(s) Addressed in This Session:   Increase interest, engagement, and pleasure in doing things     Intervention:   Motivational interviewing: expressed empathy/understanding, use of reflection and open ended questions   Encouraged client to continue focusing on areas within her control  Utilized solution focused questions and active listening     ASSESSMENT: Current Emotional / Mental Status (status of significant symptoms):   Risk status (Self / Other harm or suicidal ideation)   Patient denies current fears or concerns for personal safety.   Patient denies current or recent suicidal ideation or behaviors.   Patientdenies current or recent homicidal ideation or behaviors.   Patient denies current or recent self injurious behavior or ideation.   Patient denies other safety concerns.   Patient reports there has been no change in risk factors since their last session.     Patientreports there has been  no change in protective factors since their last session.     Recommended that patient call 911 or go to the local ED should there be a change in any of these risk factors.     Appearance:   Appropriate    Eye Contact:   Good    Psychomotor Behavior: Normal    Attitude:   Cooperative    Orientation:   All   Speech    Rate / Production: Normal     Volume:  Normal    Mood:    Sad -minimal   Affect:    Appropriate  Tearful-minimal    Thought Content:  Clear    Thought Form:  Coherent  Logical    Insight:    Good      Medication Review:   No current psychiatric medications prescribed     Medication Compliance:   NA     Changes in Health Issues:   None reported     Chemical Use Review:   Substance Use: Chemical use reviewed, no active concerns identified      Tobacco Use: No current tobacco use.      Diagnosis:   Adjustment Disorders  309.28 (F43.23) With mixed anxiety and depressed mood    Collateral Reports Completed:   Not Applicable    PLAN: (Patient Tasks / Therapist Tasks / Other)  Client to increase social engagement and encouraged to continue to focus on areas within her control.      Celeste Chavez, [facility] 2/7/2020                                                    ______________________________________________________________________    Treatment Plan    Patient's Name: Natalee Maya  YOB: 1970    Date:  1/24/2020    DSM5 Diagnoses: Adjustment Disorders  309.28 (F43.23) With mixed anxiety and depressed mood  Psychosocial / Contextual Factors: marital stress  WHODAS: 17    Referral / Collaboration:  Referral to another professional/service is not indicated at this time..    Anticipated number of session or this episode of care: 12      MeasurableTreatment Goal(s) related to diagnosis / functional impairment(s)  Goal 1: Patient will develop techniques for reducing symptoms that result from marital stress and conflict by reporting MADINA-7 and PHQ-9 scores below a 5, where symptoms where symptoms  occur fewer than half the days for a minimum of 4 weeks.    Objective #A (Patient Action)    Patient will learn and utilize 3 techniques to manage emotions related to marital stress/conflict.  Status: New - Date: 1/24/2020     Intervention(s)  Therapist will teach relaxation techniques and ways to engage in self-care.    Objective #B  Patient will compile a list of boundaries that they would like to set with others. Utilize the boundaries .  Status: New - Date: 1/24/2020     Intervention(s)  Therapist will teach health boundaries and encourage client to identify their boundaries and implement them.    Objective #C  Patient will learn & utilize at least 1 assertive communication skills weekly.  Status: New - Date: 1/24/2020     Intervention(s)  Therapist will teach assertive communication skills.    Patient has reviewed and agreed to the above plan.      Celeste Chavez [facility]  January 24, 2020

## 2020-02-08 ASSESSMENT — ANXIETY QUESTIONNAIRES: GAD7 TOTAL SCORE: 3

## 2020-02-21 ENCOUNTER — OFFICE VISIT (OUTPATIENT)
Dept: PSYCHOLOGY | Facility: CLINIC | Age: 50
End: 2020-02-21
Payer: COMMERCIAL

## 2020-02-21 DIAGNOSIS — F43.23 ADJUSTMENT DISORDER WITH MIXED ANXIETY AND DEPRESSED MOOD: Primary | ICD-10-CM

## 2020-02-21 PROCEDURE — 90834 PSYTX W PT 45 MINUTES: CPT | Performed by: SOCIAL WORKER

## 2020-02-21 NOTE — PROGRESS NOTES
Progress Note    Patient Name: Natalee Maya  Date: 2/21/2020         Service Type: Individual  Video Visit: No     Session Start Time: 7:02 AM  Session End Time: 7:48 AM     Session Length: 38-52 minutes    Session #: 4    Attendees: Client attended alone     Treatment Plan Last Reviewed: 1/24/2020  PHQ-9 / MADINA-7 : 2/7/2020    DATA  Interactive Complexity: No  Crisis: No       Progress Since Last Session (Related to Symptoms / Goals / Homework):   Symptoms: Improving due to no conflict in relationship     Homework: Partially completed      Episode of Care Goals: Minimal progress - ACTION (Actively working towards change); Intervened by reinforcing change plan / affirming steps taken     Current / Ongoing Stressors and Concerns:       Marital stress    Client reported having a good couple of weeks.  She indicated that her and her  have not had any conflict.  She processed feelings around their relationship and how she is impacted.     Treatment Objective(s) Addressed in This Session:   create a list of areas within her control and areas outside her control     Intervention:   Motivational interviewing: expressed empathy/understanding, use of reflection and open ended questions   Encouraged client to continue focusing on areas within her control  Utilized solution focused questions and reflective listening     ASSESSMENT: Current Emotional / Mental Status (status of significant symptoms):   Risk status (Self / Other harm or suicidal ideation)   Patient denies current fears or concerns for personal safety.   Patient denies current or recent suicidal ideation or behaviors.   Patientdenies current or recent homicidal ideation or behaviors.   Patient denies current or recent self injurious behavior or ideation.   Patient denies other safety concerns.   Patient reports there has been no change in risk factors since their last session.     Patientreports there has been  no change in protective factors since their last session.     Recommended that patient call 911 or go to the local ED should there be a change in any of these risk factors.     Appearance:   Appropriate    Eye Contact:   Good    Psychomotor Behavior: Normal    Attitude:   Cooperative    Orientation:   All   Speech    Rate / Production: Normal     Volume:  Normal    Mood:    Sad -minimal   Affect:    Appropriate  Tearful-minimal    Thought Content:  Clear    Thought Form:  Coherent  Logical    Insight:    Good      Medication Review:   No current psychiatric medications prescribed     Medication Compliance:   NA     Changes in Health Issues:   None reported     Chemical Use Review:   Substance Use: Chemical use reviewed, no active concerns identified      Tobacco Use: No current tobacco use.      Diagnosis:   Adjustment Disorders  309.28 (F43.23) With mixed anxiety and depressed mood    Collateral Reports Completed:   Not Applicable    PLAN: (Patient Tasks / Therapist Tasks / Other)  Encouraged client to create a list of what she has been spending time on and identify whether it is an area within her control or outside her control.    Celeste Chavez, [facility] 2/21/2020                                                    ______________________________________________________________________    Treatment Plan    Patient's Name: Natalee Maya  YOB: 1970    Date:  1/24/2020    DSM5 Diagnoses: Adjustment Disorders  309.28 (F43.23) With mixed anxiety and depressed mood  Psychosocial / Contextual Factors: marital stress  WHODAS: 17    Referral / Collaboration:  Referral to another professional/service is not indicated at this time..    Anticipated number of session or this episode of care: 12      MeasurableTreatment Goal(s) related to diagnosis / functional impairment(s)  Goal 1: Patient will develop techniques for reducing symptoms that result from marital stress and conflict by reporting MADINA-7 and PHQ-9  scores below a 5, where symptoms where symptoms occur fewer than half the days for a minimum of 4 weeks.    Objective #A (Patient Action)    Patient will learn and utilize 3 techniques to manage emotions related to marital stress/conflict.  Status: New - Date: 1/24/2020     Intervention(s)  Therapist will teach relaxation techniques and ways to engage in self-care.    Objective #B  Patient will compile a list of boundaries that they would like to set with others. Utilize the boundaries .  Status: New - Date: 1/24/2020     Intervention(s)  Therapist will teach health boundaries and encourage client to identify their boundaries and implement them.    Objective #C  Patient will learn & utilize at least 1 assertive communication skills weekly.  Status: New - Date: 1/24/2020     Intervention(s)  Therapist will teach assertive communication skills.    Patient has reviewed and agreed to the above plan.      Celeste Chavez, [facility]  January 24, 2020

## 2020-03-09 DIAGNOSIS — E66.01 MORBID OBESITY (H): ICD-10-CM

## 2020-03-09 NOTE — TELEPHONE ENCOUNTER
Controlled Substance Refill Request for phentermine (ADIPEX-P) 37.5 MG tablet   Problem List Complete:  No     PROVIDER TO CONSIDER COMPLETION OF PROBLEM LIST AND OVERVIEW/CONTROLLED SUBSTANCE AGREEMENT    Last Written Prescription Date:  12/18/2019  Last Fill Quantity: 32 tablet,   # refills: 3    THE MOST RECENT OFFICE VISIT MUST BE WITHIN THE PAST 3 MONTHS. AT LEAST ONE FACE TO FACE VISIT MUST OCCUR EVERY 6 MONTHS. ADDITIONAL VISITS CAN BE VIRTUAL.  (THIS STATEMENT SHOULD BE DELETED.)    Last Office Visit with [facility] primary care provider: 9/13/2019, Celestina Gray    Future Office visit:   Next 5 appointments (look out 90 days)    Mar 27, 2020  7:30 AM CDT  (Arrive by 7:20 AM)  Return Visit with SHAQ Virgen CNP  [facility] [address]  [facility] [id number] [phone]   Mar 27, 2020 11:30 AM CDT  Return Visit with NATIVIDAD Worthy  [facility] ([facility]) [address] [facility] [address] [phone]          Controlled substance agreement:   Encounter-Level CSA:    There are no encounter-level csa.     Patient-Level CSA:    There are no patient-level csa.         Last Urine Drug Screen: No results found for: CDAUT, No results found for: COMDAT, No results found for: THC13, PCP13, COC13, MAMP13, OPI13, AMP13, BZO13, TCA13, MTD13, BAR13, OXY13, PPX13, BUP13     Processing:  Staff will hand deliver Rx to on-site pharmacy     [email or web address]       checked in past 3 months?  No, route to RN

## 2020-03-10 NOTE — TELEPHONE ENCOUNTER
Routing refill request to provider for review/approval because:  Drug not on the [facility] refill protocol   Juan Henry RN, BSN

## 2020-03-11 RX ORDER — PHENTERMINE HYDROCHLORIDE 37.5 MG/1
37.5 TABLET ORAL
Qty: 30 TABLET | Refills: 0 | Status: SHIPPED | OUTPATIENT
Start: 2020-03-11 | End: 2020-03-27

## 2020-03-11 NOTE — TELEPHONE ENCOUNTER
Scheduled for f/u 3/27/2020.  Please fax Rx to requested pharmacy.  Celestina Gray NP  Endocrinology

## 2020-03-11 NOTE — TELEPHONE ENCOUNTER
Spoke with patient.  States she is not in need of this prescription currently.  Has enough on hand to get her to her upcoming appointment on 3/27/2020.  Prefers to get her prescription refilled at that time.  Will destroy today's prescription.    Called [facility] [place] ph# [phone] and informed Cortes in the pharmacy that refill will be addressed at upcoming appointment per patient request.  Marleny Simmons CMA on 3/11/2020 at 3:40 PM

## 2020-03-16 DIAGNOSIS — D50.9 IRON DEFICIENCY ANEMIA, UNSPECIFIED IRON DEFICIENCY ANEMIA TYPE: ICD-10-CM

## 2020-03-17 ENCOUNTER — TELEPHONE (OUTPATIENT)
Dept: PEDIATRICS | Facility: CLINIC | Age: 50
End: 2020-03-17

## 2020-03-17 NOTE — TELEPHONE ENCOUNTER
"Last Written Prescription Date:  11/11/19  Last Fill Quantity: 90,  # refills: 0   Last office visit: 2/25/2019 with prescribing provider:     Future Office Visit:   Next 5 appointments (look out 90 days)    Mar 27, 2020  7:30 AM CDT  (Arrive by 7:20 AM)  Return Visit with SHAQ Virgen CNP  [facility] ([facility]) [address]  [facility] [id number] [phone]   Mar 27, 2020 11:30 AM CDT  Return Visit with NATIVIDAD Worthy  [facility] ([facility]) [address] [facility] [address] [phone]         Requested Prescriptions   Pending Prescriptions Disp Refills     Ferrous Sulfate (IRON) 325 (65 Fe) MG tablet [Pharmacy Med Name: IRON 325 (65 FE)MG TABS] 90 tablet 0     Sig: TAKE ONE TABLET BY MOUTH EVERY DAY, AVOID AROUND SAME TIME AS LEVOTHYROXINE       Iron Supplements Failed - 3/16/2020  9:25 PM        Failed - Hgb OR Hct on record within the past 12 mos.     Patient need only have had a HGB or HCT on file in the past 12 mos. That result does not need to be normal.    Recent Labs   Lab Test 10/27/18  1806 10/15/18  2009 08/28/18  0730   HGB 13.0 12.4 12.0     Recent Labs   Lab Test 10/27/18  1806 10/15/18  2009 08/28/18  0730   HCT 38.7 37.8 36.0       Please verify a HGB or HCT has been checked SINCE THE LAST DOSE CHANGE.            Passed - Patient is 12 years of age or older        Passed - Recent (12 mo) or future (30 days) visit within the authorizing provider's specialty     Patient has had an office visit with the authorizing provider or a provider within the authorizing providers department within the previous 12 mos or has a future within next 30 days. See \"Patient Info\" tab in inbasket, or \"Choose Columns\" in Meds & Orders section of the refill encounter.              Passed - Medication is active on med list             "

## 2020-03-18 RX ORDER — PNV NO.95/FERROUS FUM/FOLIC AC 28MG-0.8MG
TABLET ORAL
Qty: 90 TABLET | Refills: 0 | Status: SHIPPED | OUTPATIENT
Start: 2020-03-18 | End: 2020-06-19

## 2020-03-18 NOTE — TELEPHONE ENCOUNTER
Routing refill request to provider for review/approval because:  Tierra given x1 and patient did not follow up, please advise  Due for labs and appointment. Route to MA/TC to follow up for appt if necessary at this time

## 2020-03-27 ENCOUNTER — VIRTUAL VISIT (OUTPATIENT)
Dept: ENDOCRINOLOGY | Facility: CLINIC | Age: 50
End: 2020-03-27
Payer: COMMERCIAL

## 2020-03-27 VITALS — WEIGHT: 182 LBS | BODY MASS INDEX: 31.99 KG/M2

## 2020-03-27 DIAGNOSIS — E03.8 SUBCLINICAL HYPOTHYROIDISM: Primary | ICD-10-CM

## 2020-03-27 DIAGNOSIS — E66.01 MORBID OBESITY (H): ICD-10-CM

## 2020-03-27 PROCEDURE — 99213 OFFICE O/P EST LOW 20 MIN: CPT | Mod: TEL | Performed by: CLINICAL NURSE SPECIALIST

## 2020-03-27 RX ORDER — PHENTERMINE HYDROCHLORIDE 37.5 MG/1
37.5 TABLET ORAL
Qty: 30 TABLET | Refills: 3 | Status: SHIPPED | OUTPATIENT
Start: 2020-03-27 | End: 2020-07-17

## 2020-03-27 RX ORDER — LEVOTHYROXINE SODIUM 50 UG/1
50 TABLET ORAL DAILY
Qty: 90 TABLET | Refills: 2 | Status: SHIPPED | OUTPATIENT
Start: 2020-03-27 | End: 2020-11-06

## 2020-03-27 RX ORDER — TOPIRAMATE 25 MG/1
25 TABLET, FILM COATED ORAL 2 TIMES DAILY
Qty: 60 TABLET | Refills: 3 | Status: SHIPPED | OUTPATIENT
Start: 2020-03-27 | End: 2020-04-21

## 2020-03-27 NOTE — PROGRESS NOTES
"Natalee Maya is a 49 year old female who is being evaluated via a billable telephone visit.      The patient has been notified of following:     \"This telephone visit will be conducted via a call between you and your physician/provider. We have found that certain health care needs can be provided without the need for a physical exam.  This service lets us provide the care you need with a short phone conversation.  If a prescription is necessary we can send it directly to your pharmacy.  If lab work is needed we can place an order for that and you can then stop by our lab to have the test done at a later time.    If during the course of the call the physician/provider feels a telephone visit is not appropriate, you will not be charged for this service.\"     Natalee Maya complains of:     #1. Obesity.  Currently treated with phentermine 37.5 mg daily.  Phentermine was started 11/2018.  She reports that her home weight is stable at 182 lbs, it fluctuates by about 5 lbs but no consistent, progressive weight loss or gain.  She attributes some of this to the current stress levels created by COVID-19.  Total weight loss since starting phentermine 11/2018 has been 59 lbs (241 lbs --> 182 lbs).    #2.  Hypothyroidism.  Currently treated with levothyroxine 50 mcg per day.  Recent TFT's 12/2019 in normal range.  She is clinically euthroid.  This is usually treated by her PCP but she had to cancel her recent PCP visit and is concerned she may run out of refill on her levothyroxine prescription before she can reschedule.    I have reviewed and updated the patient's Past Medical History, Social History, Family History and Medication List.    ALLERGIES  No known drug allergies      Assessment/Plan:  #1. Obesity.  Currently treated with phentermine 37.5 mg daily.  Phentermine was started 11/2018.  She reports that her home weight is stable at 182 lbs, it fluctuates by about 5 lbs but no consistent, progressive weight " loss or gain.  She attributes some of this to the current stress levels created by COVID-19.  Total weight loss since starting phentermine 11/2018 has been 59 lbs (241 lbs --> 182 lbs).  Discussed the probability that she has developed a tolerance to the phentermine at this point, although she feels it sill helps control her appetite, just not as effectively.  Continue Phentermine for now.    Start Topiramate 25 mg bid.    If she doesn't lose any weight between now and her follow up in July, will likely discontinue Phentermine and discuss alternative weight loss medications.  Also ok for her to discontinue phentermine before I see her for follow up July if she thinks if has become ineffective.     #2.  Hypothyroidism.  Currently treated with levothyroxine 50 mcg per day.  Recent TFT's 12/2019 in normal range.  She is clinically euthroid.  This is usually treated by her PCP but she had to cancel her recent PCP visit and is concerned she may run out of refill on her levothyroxine prescription before she can reschedule.  I recommend she continue levothyroxine 50 mcg/day.   I'll renew her prescription since labs are current and in normal range.    Follow up in 3-4 months.    Phone call duration:  10 minutes    Celestina Gray NP  Endocrinology  [facility]

## 2020-04-21 ENCOUNTER — VIRTUAL VISIT (OUTPATIENT)
Dept: PEDIATRICS | Facility: CLINIC | Age: 50
End: 2020-04-21
Payer: COMMERCIAL

## 2020-04-21 DIAGNOSIS — F41.9 ANXIETY: Primary | ICD-10-CM

## 2020-04-21 PROCEDURE — 96127 BRIEF EMOTIONAL/BEHAV ASSMT: CPT | Mod: TEL | Performed by: NURSE PRACTITIONER

## 2020-04-21 PROCEDURE — 99214 OFFICE O/P EST MOD 30 MIN: CPT | Mod: TEL | Performed by: NURSE PRACTITIONER

## 2020-04-21 ASSESSMENT — ANXIETY QUESTIONNAIRES
5. BEING SO RESTLESS THAT IT IS HARD TO SIT STILL: NOT AT ALL
IF YOU CHECKED OFF ANY PROBLEMS ON THIS QUESTIONNAIRE, HOW DIFFICULT HAVE THESE PROBLEMS MADE IT FOR YOU TO DO YOUR WORK, TAKE CARE OF THINGS AT HOME, OR GET ALONG WITH OTHER PEOPLE: VERY DIFFICULT
1. FEELING NERVOUS, ANXIOUS, OR ON EDGE: MORE THAN HALF THE DAYS
3. WORRYING TOO MUCH ABOUT DIFFERENT THINGS: NEARLY EVERY DAY
GAD7 TOTAL SCORE: 13
6. BECOMING EASILY ANNOYED OR IRRITABLE: NEARLY EVERY DAY
7. FEELING AFRAID AS IF SOMETHING AWFUL MIGHT HAPPEN: NOT AT ALL
2. NOT BEING ABLE TO STOP OR CONTROL WORRYING: NEARLY EVERY DAY

## 2020-04-21 ASSESSMENT — PATIENT HEALTH QUESTIONNAIRE - PHQ9
5. POOR APPETITE OR OVEREATING: MORE THAN HALF THE DAYS
SUM OF ALL RESPONSES TO PHQ QUESTIONS 1-9: 8

## 2020-04-21 NOTE — PROGRESS NOTES
"Natalee Maya is a 49 year old female who is being evaluated via a billable video visit.      The patient has been notified of following:     \"This video visit will be conducted via a call between you and your physician/provider. We have found that certain health care needs can be provided without the need for an in-person physical exam.  This service lets us provide the care you need with a video conversation.  If a prescription is necessary we can send it directly to your pharmacy.  If lab work is needed we can place an order for that and you can then stop by our lab to have the test done at a later time.    Video visits are billed at different rates depending on your insurance coverage.  Please reach out to your insurance provider with any questions.    If during the course of the call the physician/provider feels a video visit is not appropriate, you will not be charged for this service.\"    Patient has given verbal consent for Video visit? Yes    How would you like to obtain your AVS? Mary    Patient would like the video invitation sent by: Text to cell phone: [phone]    Subjective     Natalee Maya is a 49 year old female who presents to clinic today for the following health issues:    Medication review:    Hypothyroidism Follow-up      Since last visit, patient describes the following symptoms: Weight stable, no hair loss, no skin changes, no constipation, no loose stools    Wants to discuss mental health follow-up.    Depression and Anxiety Follow-Up  PHQ9 and MADINA done today      How are you doing with your depression since your last visit? Worsened     How are you doing with your anxiety since your last visit?  Worsened     Are you having other symptoms that might be associated with depression or anxiety? No - working on weight with Celestinarachel Gray    Have you had a significant life event? No     Do you have any concerns with your use of alcohol or other drugs? No    Social History     Tobacco " Use     Smoking status: Never Smoker     Smokeless tobacco: Never Used   Substance Use Topics     Alcohol use: No     Drug use: No     PHQ 12/30/2019 2/7/2020 4/21/2020   PHQ-9 Total Score 2 3 8   Q9: Thoughts of better off dead/self-harm past 2 weeks Not at all Not at all Not at all     MADINA-7 SCORE 12/30/2019 2/7/2020 4/21/2020   Total Score 4 3 13     Last PHQ-9 4/21/2020   1.  Little interest or pleasure in doing things 1   2.  Feeling down, depressed, or hopeless 2   3.  Trouble falling or staying asleep, or sleeping too much 0   4.  Feeling tired or having little energy 2   5.  Poor appetite or overeating 0   6.  Feeling bad about yourself 2   7.  Trouble concentrating 1   8.  Moving slowly or restless 0   Q9: Thoughts of better off dead/self-harm past 2 weeks 0   PHQ-9 Total Score 8   Difficulty at work, home, or with people Somewhat difficult     MADINA-7  4/21/2020   1. Feeling nervous, anxious, or on edge 2   2. Not being able to stop or control worrying 3   3. Worrying too much about different things 3   4. Trouble relaxing 2   5. Being so restless that it is hard to sit still 0   6. Becoming easily annoyed or irritable 3   7. Feeling afraid, as if something awful might happen 0   MADINA-7 Total Score 13   If you checked any problems, how difficult have they made it for you to do your work, take care of things at home, or get along with other people? Very difficult     In the past two weeks have you had thoughts of suicide or self-harm?  No.    Do you have concerns about your personal safety or the safety of others?   No    Suicide Assessment Five-step Evaluation and Treatment (SAFE-T)     For sure has mood swings and crying spells before her period. Feels that this is not happening once a week. Has some marital conflict, but that's not new. Seeing a therapist.  Has gone 2 weeks without talking to her .            Review of Systems   ROS COMP: Constitutional, HEENT, cardiovascular, pulmonary, gi and gu  "systems are negative, except as otherwise noted.        Assessment & Plan   (F41.9) Anxiety  (primary encounter diagnosis)  Comment: Worsened due to COVID, marital stressors, etc. Reports significant irritability and has an  \"emotional breakdown\" about twice a week.  Plan:  -Start teletherapy up again  -Re-start exercise  -Consider mindfulness  -Start Prozac.  -F/U 1 month      There are no diagnoses linked to this encounter.    Telephone visit 29 minutes    [facility] SHARI          "

## 2020-04-22 ASSESSMENT — ANXIETY QUESTIONNAIRES: GAD7 TOTAL SCORE: 13

## 2020-04-30 ENCOUNTER — TELEPHONE (OUTPATIENT)
Dept: PSYCHOLOGY | Facility: CLINIC | Age: 50
End: 2020-04-30

## 2020-04-30 NOTE — TELEPHONE ENCOUNTER
Therapist left  for client notifying her that therapy is being done remotely at this point and provided number to schedule if she is interested.

## 2020-06-28 ASSESSMENT — ENCOUNTER SYMPTOMS
ABDOMINAL PAIN: 0
DIZZINESS: 0
EYE PAIN: 0
PARESTHESIAS: 0
HEADACHES: 0
JOINT SWELLING: 0
HEARTBURN: 0
NAUSEA: 0
BREAST MASS: 0
ARTHRALGIAS: 0
CONSTIPATION: 0
FREQUENCY: 0
NERVOUS/ANXIOUS: 1
SORE THROAT: 0
MYALGIAS: 0
PALPITATIONS: 0
DIARRHEA: 0
COUGH: 0
HEMATOCHEZIA: 0
DYSURIA: 0
CHILLS: 0
FEVER: 0
HEMATURIA: 0
WEAKNESS: 0
SHORTNESS OF BREATH: 0

## 2020-06-29 ENCOUNTER — OFFICE VISIT (OUTPATIENT)
Dept: PEDIATRICS | Facility: CLINIC | Age: 50
End: 2020-06-29
Payer: COMMERCIAL

## 2020-06-29 VITALS
OXYGEN SATURATION: 99 % | HEIGHT: 63 IN | BODY MASS INDEX: 32.99 KG/M2 | HEART RATE: 74 BPM | DIASTOLIC BLOOD PRESSURE: 82 MMHG | RESPIRATION RATE: 14 BRPM | SYSTOLIC BLOOD PRESSURE: 124 MMHG | WEIGHT: 186.2 LBS | TEMPERATURE: 98.1 F

## 2020-06-29 DIAGNOSIS — D50.9 IRON DEFICIENCY ANEMIA, UNSPECIFIED IRON DEFICIENCY ANEMIA TYPE: ICD-10-CM

## 2020-06-29 DIAGNOSIS — F41.9 ANXIETY: ICD-10-CM

## 2020-06-29 DIAGNOSIS — Z00.00 ROUTINE GENERAL MEDICAL EXAMINATION AT A HEALTH CARE FACILITY: Primary | ICD-10-CM

## 2020-06-29 DIAGNOSIS — E03.8 SUBCLINICAL HYPOTHYROIDISM: ICD-10-CM

## 2020-06-29 LAB
BASOPHILS # BLD AUTO: 0 10E9/L (ref 0–0.2)
BASOPHILS NFR BLD AUTO: 1.1 %
CHOLEST SERPL-MCNC: 259 MG/DL
DIFFERENTIAL METHOD BLD: ABNORMAL
EOSINOPHIL # BLD AUTO: 0.1 10E9/L (ref 0–0.7)
EOSINOPHIL NFR BLD AUTO: 3.8 %
ERYTHROCYTE [DISTWIDTH] IN BLOOD BY AUTOMATED COUNT: 13 % (ref 10–15)
FERRITIN SERPL-MCNC: 22 NG/ML (ref 8–252)
GLUCOSE SERPL-MCNC: 87 MG/DL (ref 70–99)
HCT VFR BLD AUTO: 37 % (ref 35–47)
HDLC SERPL-MCNC: 66 MG/DL
HGB BLD-MCNC: 12.5 G/DL (ref 11.7–15.7)
LDLC SERPL CALC-MCNC: 168 MG/DL
LYMPHOCYTES # BLD AUTO: 1 10E9/L (ref 0.8–5.3)
LYMPHOCYTES NFR BLD AUTO: 54.3 %
MCH RBC QN AUTO: 31.3 PG (ref 26.5–33)
MCHC RBC AUTO-ENTMCNC: 33.8 G/DL (ref 31.5–36.5)
MCV RBC AUTO: 93 FL (ref 78–100)
MONOCYTES # BLD AUTO: 0.3 10E9/L (ref 0–1.3)
MONOCYTES NFR BLD AUTO: 14 %
NEUTROPHILS # BLD AUTO: 0.5 10E9/L (ref 1.6–8.3)
NEUTROPHILS NFR BLD AUTO: 26.8 %
NONHDLC SERPL-MCNC: 193 MG/DL
PLATELET # BLD AUTO: 192 10E9/L (ref 150–450)
RBC # BLD AUTO: 3.99 10E12/L (ref 3.8–5.2)
TRIGL SERPL-MCNC: 125 MG/DL
WBC # BLD AUTO: 1.9 10E9/L (ref 4–11)

## 2020-06-29 PROCEDURE — 80061 LIPID PANEL: CPT | Performed by: NURSE PRACTITIONER

## 2020-06-29 PROCEDURE — 82728 ASSAY OF FERRITIN: CPT | Performed by: NURSE PRACTITIONER

## 2020-06-29 PROCEDURE — 36415 COLL VENOUS BLD VENIPUNCTURE: CPT | Performed by: NURSE PRACTITIONER

## 2020-06-29 PROCEDURE — 82947 ASSAY GLUCOSE BLOOD QUANT: CPT | Performed by: NURSE PRACTITIONER

## 2020-06-29 PROCEDURE — 85025 COMPLETE CBC W/AUTO DIFF WBC: CPT | Performed by: NURSE PRACTITIONER

## 2020-06-29 PROCEDURE — 99396 PREV VISIT EST AGE 40-64: CPT | Performed by: NURSE PRACTITIONER

## 2020-06-29 PROCEDURE — 99213 OFFICE O/P EST LOW 20 MIN: CPT | Mod: 25 | Performed by: NURSE PRACTITIONER

## 2020-06-29 RX ORDER — PNV NO.95/FERROUS FUM/FOLIC AC 28MG-0.8MG
TABLET ORAL
Qty: 90 TABLET | Refills: 3 | Status: SHIPPED | OUTPATIENT
Start: 2020-06-29 | End: 2022-03-22

## 2020-06-29 RX ORDER — LEVOTHYROXINE SODIUM 50 UG/1
50 TABLET ORAL DAILY
Qty: 90 TABLET | Refills: 2 | Status: CANCELLED | OUTPATIENT
Start: 2020-06-29

## 2020-06-29 ASSESSMENT — ENCOUNTER SYMPTOMS
HEADACHES: 0
ARTHRALGIAS: 0
JOINT SWELLING: 0
DIARRHEA: 0
DIZZINESS: 0
FEVER: 0
COUGH: 0
SHORTNESS OF BREATH: 0
FREQUENCY: 0
ABDOMINAL PAIN: 0
WEAKNESS: 0
SORE THROAT: 0
BREAST MASS: 0
NERVOUS/ANXIOUS: 1
NAUSEA: 0
HEARTBURN: 0
PARESTHESIAS: 0
CHILLS: 0
HEMATOCHEZIA: 0
PALPITATIONS: 0
CONSTIPATION: 0
HEMATURIA: 0
EYE PAIN: 0
DYSURIA: 0
MYALGIAS: 0

## 2020-06-29 ASSESSMENT — MIFFLIN-ST. JEOR: SCORE: 1442.69

## 2020-06-29 NOTE — PROGRESS NOTES
SUBJECTIVE:   CC: Natalee Maya is an 49 year old woman who presents for preventive health visit.     Healthy Habits:     Getting at least 3 servings of Calcium per day:  Yes    Bi-annual eye exam:  Yes    Dental care twice a year:  Yes    Sleep apnea or symptoms of sleep apnea:  None    Diet:  Regular (no restrictions) and Breakfast skipped    Frequency of exercise:  2-3 days/week    Duration of exercise:  30-45 minutes    Taking medications regularly:  Yes    Medication side effects:  None    PHQ-2 Total Score: 2    Additional concerns today:  No    Fasting today - already stopped at lab - did glucose and ldl  mammo scheduled    -------------------------------------    Today's PHQ-2 Score:   PHQ-2 ( 1999 Pf[employer]) 6/28/2020   Q1: Little interest or pleasure in doing things 1   Q2: Feeling down, depressed or hopeless 1   PHQ-2 Score 2   Q1: Little interest or pleasure in doing things Several days   Q2: Feeling down, depressed or hopeless Several days   PHQ-2 Score 2     Abuse: Current or Past(Physical, Sexual or Emotional)- No  Do you feel safe in your environment? Yes    Social History     Tobacco Use     Smoking status: Never Smoker     Smokeless tobacco: Never Used   Substance Use Topics     Alcohol use: No       Alcohol Use 6/28/2020   Prescreen: >3 drinks/day or >7 drinks/week? No   Prescreen: >3 drinks/day or >7 drinks/week? -     Reviewed orders with patient.  Reviewed health maintenance and updated orders accordingly - Yes  Lab work is in process    Mammogram Screening: Patient under age 50, mutual decision reflected in health maintenance.      Pertinent mammograms are reviewed under the imaging tab.  History of abnormal Pap smear: NO - age 30-65 PAP every 5 years with negative HPV co-testing recommended  PAP / HPV Latest Ref Rng & Units 8/16/2018   PAP - NIL   HPV 16 DNA NEG:Negative Negative   HPV 18 DNA NEG:Negative Negative   OTHER HR HPV NEG:Negative Negative     Reviewed and updated as needed  "this visit by clinical staff  Tobacco  Allergies  Med Hx  Surg Hx  Fam Hx  Soc Hx      Reviewed and updated as needed this visit by Provider            Review of Systems   Constitutional: Negative for chills and fever.   HENT: Negative for congestion, ear pain, hearing loss and sore throat.    Eyes: Negative for pain and visual disturbance.   Respiratory: Negative for cough and shortness of breath.    Cardiovascular: Negative for chest pain, palpitations and peripheral edema.   Gastrointestinal: Negative for abdominal pain, constipation, diarrhea, heartburn, hematochezia and nausea.   Breasts:  Negative for tenderness, breast mass and discharge.   Genitourinary: Negative for dysuria, frequency, genital sores, hematuria, pelvic pain, urgency, vaginal bleeding and vaginal discharge.   Musculoskeletal: Negative for arthralgias, joint swelling and myalgias.   Skin: Negative for rash.   Neurological: Negative for dizziness, weakness, headaches and paresthesias.   Psychiatric/Behavioral: Negative for mood changes. The patient is nervous/anxious.      OBJECTIVE:   /82 (BP Location: Right arm, Cuff Size: Adult Large)   Pulse 74   Temp 98.1  F (36.7  C) (Tympanic)   Resp 14   Ht 1.607 m (5' 3.25\")   Wt 84.5 kg (186 lb 3.2 oz)   LMP 06/11/2020 (Exact Date)   SpO2 99%   BMI 32.72 kg/m    Physical Exam  GENERAL: healthy, alert and no distress  EYES: Eyes grossly normal to inspection, PERRL and conjunctivae and sclerae normal  HENT: ear canals and TM's normal, nose and mouth without ulcers or lesions  NECK: no adenopathy, no asymmetry, masses, or scars and thyroid normal to palpation  RESP: lungs clear to auscultation - no rales, rhonchi or wheezes  CV: regular rate and rhythm, normal S1 S2, no S3 or S4, no murmur, click or rub, no peripheral edema and peripheral pulses strong  ABDOMEN: soft, nontender, no hepatosplenomegaly, no masses and bowel sounds normal  MS: no gross musculoskeletal defects noted, no " "edema  SKIN: no suspicious lesions or rashes  NEURO: Normal strength and tone, mentation intact and speech normal  PSYCH: mentation appears normal, affect normal/bright    Diagnostic Test Results:  Labs reviewed in Epic    ASSESSMENT/PLAN:   1. Routine general medical examination at a health care facility  - *MA Screening Digital Bilateral; Future  - DERMATOLOGY REFERRAL    2. Iron deficiency anemia, unspecified iron deficiency anemia type  Stable. Wants to continue. Tested iron today to be sure she's not taking too muchy  - Ferrous Sulfate (IRON) 325 (65 Fe) MG tablet; TAKE ONE TABLET BY MOUTH EVERY DAY, AVOID AROUND SAME TIME AS LEVOTHYROXINE  Dispense: 90 tablet; Refill: 3  - Glucose  - Lipid panel reflex to direct LDL Fasting    3. Subclinical hypothyroidism  Managed by endo    4. Anxiety  Not improved. Partially circumstantial.  -Start therapy  -Increase to 40mg  - FLUoxetine (PROZAC) 20 MG capsule; Take 2 capsules (40 mg) by mouth daily  Dispense: 180 capsule; Refill: 3  -Will let me know via [employer] how she is doing    COUNSELING:  Reviewed preventive health counseling, as reflected in patient instructions    Estimated body mass index is 32.72 kg/m  as calculated from the following:    Height as of this encounter: 1.607 m (5' 3.25\").    Weight as of this encounter: 84.5 kg (186 lb 3.2 oz).    Weight management plan: Patient referred to endocrine and/or weight management specialty     reports that she has never smoked. She has never used smokeless tobacco.      Counseling Resources:  ATP IV Guidelines  Pooled Cohorts Equation Calculator  Breast Cancer Risk Calculator  FRAX Risk Assessment  ICSI Preventive Guidelines  Dietary Guidelines for Americans, 2010  USDA's MyPlate  ASA Prophylaxis  Lung CA Screening    Carolina Gomez, [facility] SHARI  "

## 2020-06-30 DIAGNOSIS — D70.9 NEUTROPENIA, UNSPECIFIED TYPE (H): Primary | ICD-10-CM

## 2020-06-30 DIAGNOSIS — D50.9 IRON DEFICIENCY ANEMIA, UNSPECIFIED IRON DEFICIENCY ANEMIA TYPE: ICD-10-CM

## 2020-06-30 LAB
COPATH REPORT: NORMAL
DIFFERENTIAL METHOD BLD: ABNORMAL
ERYTHROCYTE [DISTWIDTH] IN BLOOD BY AUTOMATED COUNT: 12.9 % (ref 10–15)
FOLATE SERPL-MCNC: 23.7 NG/ML
HCT VFR BLD AUTO: 37.6 % (ref 35–47)
HGB BLD-MCNC: 12.6 G/DL (ref 11.7–15.7)
LYMPHOCYTES # BLD AUTO: 1.3 10E9/L (ref 0.8–5.3)
LYMPHOCYTES NFR BLD AUTO: 59 %
MCH RBC QN AUTO: 31 PG (ref 26.5–33)
MCHC RBC AUTO-ENTMCNC: 33.5 G/DL (ref 31.5–36.5)
MCV RBC AUTO: 92 FL (ref 78–100)
MONOCYTES # BLD AUTO: 0.2 10E9/L (ref 0–1.3)
MONOCYTES NFR BLD AUTO: 11 %
NEUTROPHILS # BLD AUTO: 0.6 10E9/L (ref 1.6–8.3)
NEUTROPHILS NFR BLD AUTO: 30 %
PLATELET # BLD AUTO: 193 10E9/L (ref 150–450)
PLATELET # BLD EST: ABNORMAL 10*3/UL
RBC # BLD AUTO: 4.07 10E12/L (ref 3.8–5.2)
RBC MORPH BLD: NORMAL
RETICS # AUTO: 58.2 10E9/L (ref 25–95)
RETICS/RBC NFR AUTO: 1.4 % (ref 0.5–2)
VARIANT LYMPHS BLD QL SMEAR: PRESENT
VIT B12 SERPL-MCNC: 335 PG/ML (ref 193–986)
WBC # BLD AUTO: 2.1 10E9/L (ref 4–11)

## 2020-06-30 PROCEDURE — 82607 VITAMIN B-12: CPT | Performed by: NURSE PRACTITIONER

## 2020-06-30 PROCEDURE — 85045 AUTOMATED RETICULOCYTE COUNT: CPT | Performed by: NURSE PRACTITIONER

## 2020-06-30 PROCEDURE — 85025 COMPLETE CBC W/AUTO DIFF WBC: CPT | Performed by: NURSE PRACTITIONER

## 2020-06-30 PROCEDURE — 85060 BLOOD SMEAR INTERPRETATION: CPT | Performed by: NURSE PRACTITIONER

## 2020-06-30 PROCEDURE — 99000 SPECIMEN HANDLING OFFICE-LAB: CPT | Performed by: NURSE PRACTITIONER

## 2020-06-30 PROCEDURE — 82525 ASSAY OF COPPER: CPT | Mod: 90 | Performed by: NURSE PRACTITIONER

## 2020-06-30 PROCEDURE — 36415 COLL VENOUS BLD VENIPUNCTURE: CPT | Performed by: NURSE PRACTITIONER

## 2020-06-30 PROCEDURE — 82746 ASSAY OF FOLIC ACID SERUM: CPT | Performed by: NURSE PRACTITIONER

## 2020-07-02 ENCOUNTER — VIRTUAL VISIT (OUTPATIENT)
Dept: PSYCHOLOGY | Facility: CLINIC | Age: 50
End: 2020-07-02
Payer: COMMERCIAL

## 2020-07-02 DIAGNOSIS — F43.23 ADJUSTMENT DISORDER WITH MIXED ANXIETY AND DEPRESSED MOOD: Primary | ICD-10-CM

## 2020-07-02 LAB — COPPER SERPL-MCNC: 114.9 UG/DL (ref 80–155)

## 2020-07-02 PROCEDURE — 90834 PSYTX W PT 45 MINUTES: CPT | Mod: GT | Performed by: SOCIAL WORKER

## 2020-07-02 ASSESSMENT — PATIENT HEALTH QUESTIONNAIRE - PHQ9: SUM OF ALL RESPONSES TO PHQ QUESTIONS 1-9: 3

## 2020-07-02 ASSESSMENT — ANXIETY QUESTIONNAIRES
7. FEELING AFRAID AS IF SOMETHING AWFUL MIGHT HAPPEN: NOT AT ALL
3. WORRYING TOO MUCH ABOUT DIFFERENT THINGS: NOT AT ALL
1. FEELING NERVOUS, ANXIOUS, OR ON EDGE: SEVERAL DAYS
5. BEING SO RESTLESS THAT IT IS HARD TO SIT STILL: NOT AT ALL
2. NOT BEING ABLE TO STOP OR CONTROL WORRYING: NOT AT ALL
GAD7 TOTAL SCORE: 2
4. TROUBLE RELAXING: NOT AT ALL
6. BECOMING EASILY ANNOYED OR IRRITABLE: SEVERAL DAYS

## 2020-07-02 NOTE — TELEPHONE ENCOUNTER
Oncology/Surgical Oncology Referral Request:     Specialty Requested: Medical Oncology     Referring Provider: Carolina Mast    Referring Clinic/Organization: [facility]    Records location: [facility]     Requested Provider (if specified): Not Specified

## 2020-07-02 NOTE — PROGRESS NOTES
Progress Note    Patient Name: Natalee Maya  Date: 7/2/2020         Service Type: Individual  Video Visit: No     Session Start Time: 7:02 AM Session End Time: 7:50 AM     Session Length: 38-52 minutes    Session #: 5    Attendees: Client attended alone       Telemedicine Visit: The patient's condition can be safely assessed and treated via synchronous audio and visual telemedicine encounter.      Reason for Telemedicine Visit: Services only offered telehealth    Originating Site (Patient Location): Patient's home    Distant Site (Provider Location): Provider Remote Setting    Consent:  The patient/guardian has verbally consented to: the potential risks and benefits of telemedicine (video visit) versus in person care; bill my insurance or make self-payment for services provided; and responsibility for payment of non-covered services.        Mode of Communication: Video via [employer]     Treatment Plan Last Reviewed: 7/2/2020  PHQ-9 / MADINA-7 : 7/2/2020    DATA  Interactive Complexity: No  Crisis: No       Progress Since Last Session (Related to Symptoms / Goals / Homework):   Symptoms: Improving stable    Homework: Completed in session      Episode of Care Goals: Minimal progress - ACTION (Actively working towards change); Intervened by reinforcing change plan / affirming steps taken     Current / Ongoing Stressors and Concerns:       Marital stress     Client processed about ongoing marital stress and impact on mental health.  She reported that she and her  had a six week period when they were doing well.  She discussed conflict that occurred in May and then again a few weeks ago and how that impacted her mental health.  She reported crying for a couple of days, withdrawing from family and staying in her room.  She reported a relief from symptoms and improvements in mood since.  She explained that within the past couple of weeks she has talked with her   about him beginning individual therapy by August 1 or he will have to move out.  She indicated that she will move out if neither of those two options occur.  She indicated that she is at peace with this decision and will continue to focus on her mental and physical health.     Treatment Objective(s) Addressed in This Session:   utilize 1 coping strategy or engagement in self-care when experiencing mood related symptoms      Intervention:   Motivational interviewing: expressed empathy/understanding, use of reflection and open ended questions   Utilized solution focused questions  Shared observations of progress since previous sessions     ASSESSMENT: Current Emotional / Mental Status (status of significant symptoms):   Risk status (Self / Other harm or suicidal ideation)   Patient denies current fears or concerns for personal safety.   Patient denies current or recent suicidal ideation or behaviors.   Patientdenies current or recent homicidal ideation or behaviors.   Patient denies current or recent self injurious behavior or ideation.   Patient denies other safety concerns.   Patient reports there has been no change in risk factors since their last session.     Patientreports there has been no change in protective factors since their last session.     Recommended that patient call 911 or go to the local ED should there be a change in any of these risk factors.     Appearance:   Appropriate    Eye Contact:   Fair    Psychomotor Behavior: Normal    Attitude:   Cooperative    Orientation:   All   Speech    Rate / Production: Normal     Volume:  Normal    Mood:    Normal   Affect:    Appropriate    Thought Content:  Clear    Thought Form:  Coherent  Goal Directed  Logical    Insight:    Good      Medication Review:   Changes to psychiatric medications, see updated Medication List in EPIC.      Medication Compliance:   Yes     Changes in Health Issues:   None reported     Chemical Use Review:   Substance Use:  Chemical use reviewed, no active concerns identified      Tobacco Use: No current tobacco use.      Diagnosis:   Adjustment Disorders  309.28 (F43.23) With mixed anxiety and depressed mood  Major Depressive Disorder, Provisional    Collateral Reports Completed:   Not Applicable    PLAN: (Patient Tasks / Therapist Tasks / Other)  Client shared plan to continue to improve her mental and physical health.    Celeste Chavez, [facility] 7/2/2020                                                    ______________________________________________________________________    Treatment Plan    Patient's Name: Natalee Maya  YOB: 1970    Date:  7/2/2020    DSM5 Diagnoses: Adjustment Disorders  309.28 (F43.23) With mixed anxiety and depressed mood  Psychosocial / Contextual Factors: marital stress  WHODAS: 17    Referral / Collaboration:  Referral to another professional/service is not indicated at this time..    Anticipated number of session or this episode of care: 12      MeasurableTreatment Goal(s) related to diagnosis / functional impairment(s)  Goal 1: Patient will develop techniques for reducing symptoms that result from marital stress and conflict by reporting MADINA-7 and PHQ-9 scores below a 5, where symptoms where symptoms occur fewer than half the days for a minimum of 4 weeks.    Objective #A (Patient Action)    Patient will learn and utilize 3 techniques to manage emotions related to marital stress/conflict.  Status: Continued - Date(s):7/2/2020     Intervention(s)  Therapist will teach relaxation techniques and ways to engage in self-care.    Objective #B  Patient will compile a list of boundaries that they would like to set with others. Utilize the boundaries .  Status: Continued - Date(s):7/2/2020     Intervention(s)  Therapist will teach health boundaries and encourage client to identify their boundaries and implement them.    Objective #C  Patient will learn & utilize at least 1 assertive  communication skills weekly.  Status: Continued - Date(s):7/2/2020     Intervention(s)  Therapist will teach assertive communication skills.    Patient has reviewed and agreed to the above plan.      NATIVIDAD Worthy  7/2/2020

## 2020-07-03 ASSESSMENT — ANXIETY QUESTIONNAIRES: GAD7 TOTAL SCORE: 2

## 2020-07-03 NOTE — TELEPHONE ENCOUNTER
RECORDS STATUS - ALL OTHER DIAGNOSIS      RECORDS RECEIVED FROM: [facility]   DATE RECEIVED: 7/23/2020    NOTES STATUS DETAILS   OFFICE NOTE from referring provider Complete Carolina Gomez APRN CNP   OFFICE NOTE from medical oncologist Complete    DISCHARGE SUMMARY from hospital N/A    DISCHARGE REPORT from the ER     OPERATIVE REPORT N/A    MEDICATION LIST Complete [facility]   CLINICAL TRIAL TREATMENTS TO DATE N/A    LABS     PATHOLOGY REPORTS N/A    ANYTHING RELATED TO DIAGNOSIS Complete Labs last updated on 7/1/2020   GENONOMIC TESTING     TYPE:     IMAGING (NEED IMAGES & REPORT)     CT SCANS     MRI     MAMMO     ULTRASOUND     PET

## 2020-07-10 DIAGNOSIS — D70.9 NEUTROPENIA, UNSPECIFIED TYPE (H): ICD-10-CM

## 2020-07-10 LAB
BASOPHILS # BLD AUTO: 0 10E9/L (ref 0–0.2)
BASOPHILS NFR BLD AUTO: 0.8 %
DIFFERENTIAL METHOD BLD: ABNORMAL
EOSINOPHIL # BLD AUTO: 0.1 10E9/L (ref 0–0.7)
EOSINOPHIL NFR BLD AUTO: 3.6 %
ERYTHROCYTE [DISTWIDTH] IN BLOOD BY AUTOMATED COUNT: 13.1 % (ref 10–15)
HCT VFR BLD AUTO: 37.3 % (ref 35–47)
HGB BLD-MCNC: 12.3 G/DL (ref 11.7–15.7)
LYMPHOCYTES # BLD AUTO: 1 10E9/L (ref 0.8–5.3)
LYMPHOCYTES NFR BLD AUTO: 41.4 %
MCH RBC QN AUTO: 30.7 PG (ref 26.5–33)
MCHC RBC AUTO-ENTMCNC: 33 G/DL (ref 31.5–36.5)
MCV RBC AUTO: 93 FL (ref 78–100)
MONOCYTES # BLD AUTO: 0.2 10E9/L (ref 0–1.3)
MONOCYTES NFR BLD AUTO: 9.2 %
NEUTROPHILS # BLD AUTO: 1.1 10E9/L (ref 1.6–8.3)
NEUTROPHILS NFR BLD AUTO: 45 %
PLATELET # BLD AUTO: 206 10E9/L (ref 150–450)
RBC # BLD AUTO: 4.01 10E12/L (ref 3.8–5.2)
WBC # BLD AUTO: 2.5 10E9/L (ref 4–11)

## 2020-07-10 PROCEDURE — 85025 COMPLETE CBC W/AUTO DIFF WBC: CPT | Performed by: NURSE PRACTITIONER

## 2020-07-10 PROCEDURE — 36415 COLL VENOUS BLD VENIPUNCTURE: CPT | Performed by: NURSE PRACTITIONER

## 2020-07-14 ENCOUNTER — FCC EXTENDED DOCUMENTATION (OUTPATIENT)
Dept: PSYCHOLOGY | Facility: CLINIC | Age: 50
End: 2020-07-14

## 2020-07-14 NOTE — PROGRESS NOTES
Referral/Transfer of an [facility] Client to Another [facility] Therapist    CLIENT'S NAME: Natalee Maya  DATE of Referral/Transfer Request:  July 14, 2020    Referral/Transfer Request Information    Transfer for the same service: Therapist Initiated    Client Phone #:  [phone] (home) [phone] (work)       Telephone Information:   Mobile [phone]        Current Therapist: NATIVIDAD Worthy      Therapist Receiving Referral/Transfer: VICK Collins LP    Referral/Transfer is for: Individual    Rationale for Referral/Transfer: (to be completed by [facility] staff person initiating the referral)  Situation: (What is going on with the client?)  Client has had ongoing marital stress which impacts her mental health.  She has recently given her  until August 1st to begin individual therapy or he will have to leave the house.  Client shared plans to move out if he does not follow through with either.  Client does not have a history of mental health services or diagnoses.    Background: (What is the clinical background or context?)  I met with client a handful of times at the start of the year.  She returned to therapy in July, prior to that was not last seen in February.   Client reports history of attempts at improving marriage including; communicating with , reflection on her role on dynamics, couples therapy, and a couple's retreat.    Assessment: (What do you think the problem is?)   Client primarily seeks support around ongoing marital stress.  She reports that her  will often  shut down when she makes attempt to communicate with him.    Recommendation: (What would you do to correct it?)  Client is stable overall.  She will likely benefit from continued support, especially with potential changes regarding her marriage.    Referral/Transfer Status:    Therapist Initiated Referral:  Therapist receiving referral  has been informed of and has accepted the referral/Was routed this form in an inbasket message  Referral/Transfer Completion Date: 7/16/2020.  Completed by[facility] Manning  July 16, 2020

## 2020-07-16 ENCOUNTER — VIRTUAL VISIT (OUTPATIENT)
Dept: PSYCHOLOGY | Facility: CLINIC | Age: 50
End: 2020-07-16
Payer: COMMERCIAL

## 2020-07-16 DIAGNOSIS — F43.23 ADJUSTMENT DISORDER WITH MIXED ANXIETY AND DEPRESSED MOOD: Primary | ICD-10-CM

## 2020-07-16 PROCEDURE — 90834 PSYTX W PT 45 MINUTES: CPT | Mod: GT | Performed by: SOCIAL WORKER

## 2020-07-16 NOTE — PROGRESS NOTES
Progress Note    Patient Name: Natalee Maya  Date: 7/16/2020         Service Type: Individual  Video Visit: No     Session Start Time: 7:02 AM Session End Time:  7:54 AM     Session Length: 38-52 minutes    Session #: 6    Attendees: Client attended alone       Telemedicine Visit: The patient's condition can be safely assessed and treated via synchronous audio and visual telemedicine encounter.      Reason for Telemedicine Visit: Services only offered telehealth    Originating Site (Patient Location): Patient's home    Distant Site (Provider Location): Provider Remote Setting    Consent:  The patient/guardian has verbally consented to: the potential risks and benefits of telemedicine (video visit) versus in person care; bill my insurance or make self-payment for services provided; and responsibility for payment of non-covered services.        Mode of Communication: Video via Project 2020     Treatment Plan Last Reviewed: 7/2/2020  PHQ-9 / MADINA-7 : 7/2/2020    DATA  Interactive Complexity: No  Crisis: No       Progress Since Last Session (Related to Symptoms / Goals / Homework):   Symptoms: stable    Homework: Achieved / completed to satisfaction       Episode of Care Goals: Minimal progress - ACTION (Actively working towards change); Intervened by reinforcing change plan / affirming steps taken     Current / Ongoing Stressors and Concerns:       Marital stress     Client reported having a good couple of weeks with exception of yesterday.  She explained  she and her  have not talked about whether he was going to pursue therapy until  yesterday.  She reported that he shared not having any intention of scheduling with a  therapist.  She processed about continued stressors in their marriage and uncertainty of  what additionally she can do other than what she has already done.  She shared she  continues plan to move out if he does not move out or start therapy by  August 1st.     Treatment Objective(s) Addressed in This Session:   utilize 1 coping strategy or engagement in self-care when experiencing mood related symptoms      Intervention:   Motivational interviewing: expressed empathy/understanding, use of reflection and open ended questions   Utilized solution focused questions  Offered coaching on increasing self-care     ASSESSMENT: Current Emotional / Mental Status (status of significant symptoms):   Risk status (Self / Other harm or suicidal ideation)   Patient denies current fears or concerns for personal safety.   Patient denies current or recent suicidal ideation or behaviors.   Patientdenies current or recent homicidal ideation or behaviors.   Patient denies current or recent self injurious behavior or ideation.   Patient denies other safety concerns.   Patient reports there has been no change in risk factors since their last session.     Patientreports there has been no change in protective factors since their last session.     Recommended that patient call 911 or go to the local ED should there be a change in any of these risk factors.     Appearance:   Appropriate    Eye Contact:   Fair    Psychomotor Behavior: Normal    Attitude:   Cooperative  Interested Pleasant   Orientation:   All   Speech    Rate / Production: Normal     Volume:  Normal    Mood:    Normal Sad    Affect:    Appropriate  Tearful   Thought Content:  Clear    Thought Form:  Coherent  Goal Directed  Logical    Insight:    Good      Medication Review:   Changes to psychiatric medications, see updated Medication List in EPIC.      Medication Compliance:   Yes     Changes in Health Issues:   None reported     Chemical Use Review:   Substance Use: Chemical use reviewed, no active concerns identified      Tobacco Use: No current tobacco use.      Diagnosis:    Adjustment Disorders  309.28 (F43.23) With mixed anxiety and depressed mood    Major Depressive Disorder, Provisional    Collateral Reports  Completed:   Therapist will send transfer form to [facility]    PLAN: (Patient Tasks / Therapist Tasks / Other)  Client is aware to call [facility] to schedule future individual therapy sessions.  Encouraged client to increase engagement in self-care.    Celeste Chavez, [facility] 7/16/2020                                                    ______________________________________________________________________    Treatment Plan    Patient's Name: Natalee Maya  YOB: 1970    Date:  7/2/2020    DSM5 Diagnoses: Adjustment Disorders  309.28 (F43.23) With mixed anxiety and depressed mood  Psychosocial / Contextual Factors: marital stress  WHODAS: 17    Referral / Collaboration:  Referral to another professional/service is not indicated at this time..    Anticipated number of session or this episode of care: 12      MeasurableTreatment Goal(s) related to diagnosis / functional impairment(s)  Goal 1: Patient will develop techniques for reducing symptoms that result from marital stress and conflict by reporting MADINA-7 and PHQ-9 scores below a 5, where symptoms where symptoms occur fewer than half the days for a minimum of 4 weeks.    Objective #A (Patient Action)    Patient will learn and utilize 3 techniques to manage emotions related to marital stress/conflict.  Status: Continued - Date(s):7/2/2020     Intervention(s)  Therapist will teach relaxation techniques and ways to engage in self-care.    Objective #B  Patient will compile a list of boundaries that they would like to set with others. Utilize the boundaries .  Status: Continued - Date(s):7/2/2020     Intervention(s)  Therapist will teach health boundaries and encourage client to identify their boundaries and implement them.    Objective #C  Patient will learn & utilize at least 1 assertive communication skills weekly.  Status: Continued - Date(s):7/2/2020     Intervention(s)  Therapist will teach assertive communication skills.    Patient  has reviewed and agreed to the above plan.      Celeste Chavez, LICSW  7/2/2020

## 2020-07-16 NOTE — PROGRESS NOTES
Referral/Transfer of an [facility] Client to Another [facility] Therapist    CLIENT'S NAME: Natalee Maya  DATE of Referral/Transfer Request:  July 16, 2020    Referral/Transfer Request Information    Transfer for the same service: Therapist Initiated    Client Phone #:  [phone] (home) [phone] (work)       Telephone Information:   Mobile [phone]        Current Therapist: NATIVIDAD Worthy      Therapist Receiving Referral/Transfer: [facility]    Referral/Transfer is for: Individual    Rationale for Referral/Transfer: (to be completed by [facility] staff person initiating the referral)  Situation: (What is going on with the client?)  Client has had ongoing marital stress which impacts her mental health.  She has recently given her  until August 1st to begin individual therapy or he will have to leave the house.  Client shared plans to move out if he does not follow through with either.  Client shared in our last session today that he stated yesterday not having any intention to start therapy.  Client does not have a history of mental health services or diagnoses.    Background: (What is the clinical background or context?)  I met with client a handful of times at the start of the year.  She returned to therapy in July, prior to that was not last seen in February.   Client reports history of attempts at improving marriage including; communicating with , reflection on her role on dynamics, couples therapy, and a couple's retreat.    Assessment: (What do you think the problem is?)   Client primarily seeks support around ongoing marital stress.  She reports that her  will often shut down when she makes attempt to  communicate with him and is blaming..  Client continuously is reflecting on how she contributes to the dynamics and what she can change.  As of  late, I think she has recognized that she has  done what she can and does not want to continue the same cycle as it's impacting her well being.      Recommendation: (What would you do to correct it?)  Client is stable overall.  She will likely benefit from continued support, especially with potential changes regarding her marriage.     Referral/Transfer Status:    Therapist Initiated Referral:  Therapist receiving referral has been informed of and has accepted the referral/Was routed this form in an inbasket message  Referral/Transfer Completion Date: 7/16/2020.  Completed by: NATIVIDAD Worthy LICSW  July 16, 2020

## 2020-07-17 ENCOUNTER — VIRTUAL VISIT (OUTPATIENT)
Dept: ENDOCRINOLOGY | Facility: CLINIC | Age: 50
End: 2020-07-17
Payer: COMMERCIAL

## 2020-07-17 ENCOUNTER — ANCILLARY PROCEDURE (OUTPATIENT)
Dept: MAMMOGRAPHY | Facility: CLINIC | Age: 50
End: 2020-07-17
Payer: COMMERCIAL

## 2020-07-17 DIAGNOSIS — E66.01 MORBID OBESITY (H): ICD-10-CM

## 2020-07-17 DIAGNOSIS — D70.9 NEUTROPENIA, UNSPECIFIED TYPE (H): ICD-10-CM

## 2020-07-17 DIAGNOSIS — E03.8 SUBCLINICAL HYPOTHYROIDISM: Primary | ICD-10-CM

## 2020-07-17 DIAGNOSIS — Z00.00 ROUTINE GENERAL MEDICAL EXAMINATION AT A HEALTH CARE FACILITY: ICD-10-CM

## 2020-07-17 LAB
ERYTHROCYTE [DISTWIDTH] IN BLOOD BY AUTOMATED COUNT: 13.2 % (ref 10–15)
HCT VFR BLD AUTO: 39.1 % (ref 35–47)
HGB BLD-MCNC: 13 G/DL (ref 11.7–15.7)
MCH RBC QN AUTO: 30.7 PG (ref 26.5–33)
MCHC RBC AUTO-ENTMCNC: 33.2 G/DL (ref 31.5–36.5)
MCV RBC AUTO: 92 FL (ref 78–100)
PLATELET # BLD AUTO: 261 10E9/L (ref 150–450)
RBC # BLD AUTO: 4.23 10E12/L (ref 3.8–5.2)
WBC # BLD AUTO: 2.1 10E9/L (ref 4–11)

## 2020-07-17 PROCEDURE — 77067 SCR MAMMO BI INCL CAD: CPT | Mod: TC

## 2020-07-17 PROCEDURE — 77063 BREAST TOMOSYNTHESIS BI: CPT | Mod: TC

## 2020-07-17 PROCEDURE — 99213 OFFICE O/P EST LOW 20 MIN: CPT | Mod: GT | Performed by: CLINICAL NURSE SPECIALIST

## 2020-07-17 PROCEDURE — 36415 COLL VENOUS BLD VENIPUNCTURE: CPT | Performed by: NURSE PRACTITIONER

## 2020-07-17 PROCEDURE — 85027 COMPLETE CBC AUTOMATED: CPT | Performed by: NURSE PRACTITIONER

## 2020-07-17 RX ORDER — PHENTERMINE HYDROCHLORIDE 37.5 MG/1
37.5 TABLET ORAL
Qty: 30 TABLET | Refills: 3 | Status: SHIPPED | OUTPATIENT
Start: 2020-07-17 | End: 2020-10-16

## 2020-07-17 NOTE — PROGRESS NOTES
"Natalee Maya is a 49 year old female who is being evaluated via a billable video visit.      The patient has been notified of following:     \"This video visit will be conducted via a call between you and your physician/provider. We have found that certain health care needs can be provided without the need for an in-person physical exam.  This service lets us provide the care you need with a video conversation.  If a prescription is necessary we can send it directly to your pharmacy.  If lab work is needed we can place an order for that and you can then stop by our lab to have the test done at a later time.    Video visits are billed at different rates depending on your insurance coverage.  Please reach out to your insurance provider with any questions.    If during the course of the call the physician/provider feels a video visit is not appropriate, you will not be charged for this service.\"    Patient has given verbal consent for Video visit? Yes  How would you like to obtain your AVS? MyChart  If you are dropped from the video visit, the video invite should be resent to: Text to cell phone: [phone]  Will anyone else be joining your video visit? No        Video-Visit Details    Type of service:  Video Visit    Video Start Time: 10:59 am  Video End Time: 11:15    Originating Location (pt. Location): Home    Distant Location (provider location):  [facility]     Platform used for Video Visit: Emilee    Name: Natalee Maya  F/u for obesity (Last visit 3/27/2020).  HPI:  Natalee Maya is a 49 year old female who presents via video visit for the management of obesity    Gained weight with 3 pregnancies the life got busy and gain more weight.    Was able to lose 20-25 lbs with diet + exercise this past summer.  Then had foot surgery and was non-weight bearing x 6 weeks - gained back all the weight she had lost.  Has resumed diet efforts + exercise.  Struggles with appetite " control.    Currently treated with Phentermine 37.5 mg daily.  Tolerating Phentermine well.  Has lost 61 lbs total, 241 --> 180 lbs.  Her goal is 160 lbs.  Planned to start topamax - she filled the Rx but didn't start it, preferred to hold off for now.    Diet: Does not like to count calories or follow fad diets.  Currently using portion control, healthy food choices, no junk food + intermittent fasting.  Exercise: Yes  Hypothyroidism.  + FH of mother with hypothyroidism.  Currently treated with levothyroxine 50 mcg/day.  PMH/PSH:  Past Medical History:   Diagnosis Date     Calculus of kidney     History of kidney stone -- Abstracted 02     Lymphedema of lower extremity      Past Surgical History:   Procedure Laterality Date     C NONSPECIFIC PROCEDURE       -- Abstracted 02     FOOT SURGERY      2017     Family Hx:  Family History   Problem Relation Age of Onset     Diabetes Father      C.A.D. Father         hyperlipidemia     Bleeding Disorder Mother         Factor 5     Breast Cancer No family hx of      Thyroid disease: Yes mother and MGM with hypothyroidism      DM2: Yes, father        Autoimmune: DM1, SLE, RA, Vitiligo     Social Hx:  Social History     Socioeconomic History     Marital status:      Spouse name: Not on file     Number of children: Not on file     Years of education: Not on file     Highest education level: Not on file   Occupational History     Not on file   Social Needs     Financial resource strain: Not on file     Food insecurity     Worry: Not on file     Inability: Not on file     Transportation needs     Medical: Not on file     Non-medical: Not on file   Tobacco Use     Smoking status: Never Smoker     Smokeless tobacco: Never Used   Substance and Sexual Activity     Alcohol use: No     Drug use: No     Sexual activity: Yes     Partners: Male   Lifestyle     Physical activity     Days per week: Not on file     Minutes per session: Not on file     Stress:  Not on file   Relationships     Social connections     Talks on phone: Not on file     Gets together: Not on file     Attends [employer] service: Not on file     Active member of club or organization: Not on file     Attends meetings of clubs or organizations: Not on file     Relationship status: Not on file     Intimate partner violence     Fear of current or ex partner: Not on file     Emotionally abused: Not on file     Physically abused: Not on file     Forced sexual activity: Not on file   Other Topics Concern     Parent/sibling w/ CABG, MI or angioplasty before 65F 55M? Not Asked   Social History Narrative     Not on file          MEDICATIONS:  has a current medication list which includes the following prescription(s): phentermine, iron, fluoxetine, folic acid, and levothyroxine.    ROS   ROS: 10 point ROS neg other than the symptoms noted above in the HPI.    Physical Exam   VS: There were no vitals taken for this visit.  GENERAL: AXOX3, NAD, well dressed, answering questions appropriately, appears stated age.  HEENT: no exopthalmous, no proptosis, no lig lag, no retraction  NECK:    CV:  LUNGS: Normal respiratory effort  EXTREMITIES:   NEUROLOGY:   MSK:     LABS:  !COMPREHENSIVE Latest Ref Rng & Units 12/18/2019   SODIUM 133 - 144 mmol/L 140   POTASSIUM 3.4 - 5.3 mmol/L 3.7   CHLORIDE 94 - 109 mmol/L 107   BUN 7 - 30 mg/dL 13   Creatinine 0.52 - 1.04 mg/dL 0.60   Glucose 70 - 99 mg/dL 85   ANION GAP 3 - 14 mmol/L 8   CALCIUM 8.5 - 10.1 mg/dL 8.8   ALBUMIN 3.4 - 5.0 g/dL 3.7     ENDO THYROID LABS-UMP Latest Ref Rng & Units 12/18/2019 5/30/2019   TSH 0.40 - 4.00 mU/L 2.22 1.59   T4 FREE 0.76 - 1.46 ng/dL 1.08 1.09     ENDO THYROID LABS-UMP Latest Ref Rng & Units 2/21/2019   TSH 0.40 - 4.00 mU/L 4.07 (H)   T4 FREE 0.76 - 1.46 ng/dL 0.96     ENDO THYROID LABS-UMP Latest Ref Rng & Units 11/21/2018   THYROGLOBULIN ANTIBODY <40 IU/mL <20   THYR PEROXIDASE NICOLE <35 IU/mL <10     !LIPID/HEPATIC Latest Ref Rng & Units  "8/20/2018 8/28/2018   CHOLESTEROL <200 mg/dL 230 (H)    TRIGLYCERIDES <150 mg/dL 157 (H)    HDL CHOLESTEROL >49 mg/dL 51    LDL CHOLESTEROL, CALCULATED <100 mg/dL 148 (H)    VLDL-CHOLESTEROL 0 - 30 mg/dL     NON HDL CHOLESTEROL <130 mg/dL 179 (H)    CHOLESTEROL/HDL RATIO 0.0 - 5.0     AST 0 - 45 U/L  17   ALT 0 - 50 U/L  30     Vital Signs 11/21/2018 11/27/2018 2/21/2019   Weight (LB) 241 lb 8 oz 236 lb 4.8 oz 209 lb 12.8 oz   Height  5' 3.25\" 5' 3.25\"   BMI (Calculated)  41.62 36.95     Vital Signs 2/25/2019 5/30/2019   Weight (LB) 209 lb 191 lb 1.6 oz   Height 5' 3.25\"    BMI (Calculated) 36.81      Vital Signs 9/13/2019 12/18/2019   Weight (LB) 188 lb 182 lb 12.8 oz   Height     BMI (Calculated)  32.13   Reports weight on home scale: 180 lbs.     Waist Circumference:  40\" (2/2019). 37.5\" (5/2019).  38.25\" (9/2019).  38.5\" (12/2019).    All pertinent notes, labs, and images personally reviewed by me.     A/P  Ms.Karla JUAN Maya is a 49 year old evaluated via video visit for the management of hypothyroidism and obesity:    1. Morbid obesity-  BMI 42-->36.95-->32.13.  No obesity-associated comorbidy.  Obesity is associated with a significant increase in mortality and risk of many disorders, including diabetes mellitus, hypertension, dyslipidemia, heart disease, stroke, sleep apnea, cancer, and many others. Conversely, weight loss is associated with a reduction in obesity-associated morbidity.    Endocrine evaluation of obesity includes Diabetes/prediabetes, Cushing's and thyroid dysfunction.  The relevant work up for the diagnosis and management of obesity and various treatment options were discussed. Body Mass Index (BMI) has been a standard means for calculating risk for overweight and obesity. The new American Association of Clinical Endocrinology (AACE) algorithm recommends lifestyle modifications as the initial phase of treatment for all patients with the BMI equal or greater than 25 kg/m2. Lifestyle " modifications includes use of medical nutrition therapy, exercise, tobacco cessation, adequate quality and quantity of sleep, limited consumption of alcohol and reduced stress through implementation of a structured, multidisciplinary program.    In patients with complications associated with obesity, graded interventions are recommended including pharmacological therapy such as phentermine, orlistat, lorcaserin and phentermine/topiramate ER, contrave ( buproprion/naltreone) and the use of very low calorie meal replacement programs.    If medical intervention is insufficient, surgical therapy may be considered, especially in patients with BMI greater than or equal to 35 kg/m2 with multiple complications. Surgical treatments include lap-band, gastric sleeve or gastric bypass surgery.    I informed the pt that:  1.Weight loss medications can be taken to assist with weight reduction when combined with appropriate healthy lifestyle changes.  2.I discussed possible s/e, risks and benefits of weight loss medications.  3.All medications are FDA approved, however, some may be used ''off label'' for their weight loss benefits and some ''short term'' medications can be used for longer periods to achieve desired clinical outcomes.  4.All patients taking weight loss medications must be seen in clinic for refill authorization.    Counseling exercise ( V65.41)  Dietary counseling( V65.3)  Calculated BMI above the upper parameter and f/u plan was documented in the medical record.  Discussed indications, risks and benefits of all medications prescribed, and answered questions to patient's satisfaction.  Currently treated with Phentermine 37.5 mg/day.  Has lost 61 lbs,241--> 180 lbs.  Continue Phentermine 37.5 mg every day.  Hold off on starting Topamax for now.    Hypothyroidism.  Thyroid antibodies were not elevated. Currently treated with levothyroxine 50 mcg/day.  Repeat TFT's.      Labs ordered today:   Orders Placed This  Encounter   Procedures     TSH with free T4 reflex     Comprehensive metabolic panel     Radiology/Consults ordered today: None      Follow-up:  3-4 months    Celestina Gray, YAMILE  Endocrinology  [facility]  CC: JasonCarolina mcarthur Nancy   ____________________________________________________________          Natalee JUAN Francesco complains of:     #1. Obesity.  Currently treated with phentermine 37.5 mg daily.  Phentermine was started 11/2018.  She reports that her home weight is stable at 182 lbs, it fluctuates by about 5 lbs but no consistent, progressive weight loss or gain.  She attributes some of this to the current stress levels created by COVID-19.  Total weight loss since starting phentermine 11/2018 has been 59 lbs (241 lbs --> 182 lbs).    #2.  Hypothyroidism.  Currently treated with levothyroxine 50 mcg per day.  Recent TFT's 12/2019 in normal range.  She is clinically euthroid.  This is usually treated by her PCP but she had to cancel her recent PCP visit and is concerned she may run out of refill on her levothyroxine prescription before she can reschedule.    I have reviewed and updated the patient's Past Medical History, Social History, Family History and Medication List.    ALLERGIES  No known drug allergies      Assessment/Plan:  #1. Obesity.  Currently treated with phentermine 37.5 mg daily.  Phentermine was started 11/2018.  She reports that her home weight is stable at 182 lbs, it fluctuates by about 5 lbs but no consistent, progressive weight loss or gain.  She attributes some of this to the current stress levels created by COVID-19.  Total weight loss since starting phentermine 11/2018 has been 59 lbs (241 lbs --> 182 lbs).  Discussed the probability that she has developed a tolerance to the phentermine at this point, although she feels it sill helps control her appetite, just not as effectively.  Continue Phentermine for now.    Start Topiramate 25 mg bid.    If she doesn't lose any  weight between now and her follow up in July, will likely discontinue Phentermine and discuss alternative weight loss medications.  Also ok for her to discontinue phentermine before I see her for follow up July if she thinks if has become ineffective.     #2.  Hypothyroidism.  Currently treated with levothyroxine 50 mcg per day.  Recent TFT's 12/2019 in normal range.  She is clinically euthroid.  This is usually treated by her PCP but she had to cancel her recent PCP visit and is concerned she may run out of refill on her levothyroxine prescription before she can reschedule.  I recommend she continue levothyroxine 50 mcg/day.   I'll renew her prescription since labs are current and in normal range.    Follow up in 3-4 months.    Phone call duration:  10 minutes    Celestina Gray NP  Endocrinology  [facility]

## 2020-07-17 NOTE — LETTER
"    7/17/2020         RE: Natalee Maya  [address]  [facility] [place] [address]        Dear Colleague,    Thank you for referring your patient, Natalee Maya, to the [facility]. Please see a copy of my visit note below.    Natalee Maya is a 49 year old female who is being evaluated via a billable video visit.      The patient has been notified of following:     \"This video visit will be conducted via a call between you and your physician/provider. We have found that certain health care needs can be provided without the need for an in-person physical exam.  This service lets us provide the care you need with a video conversation.  If a prescription is necessary we can send it directly to your pharmacy.  If lab work is needed we can place an order for that and you can then stop by our lab to have the test done at a later time.    Video visits are billed at different rates depending on your insurance coverage.  Please reach out to your insurance provider with any questions.    If during the course of the call the physician/provider feels a video visit is not appropriate, you will not be charged for this service.\"    Patient has given verbal consent for Video visit? Yes  How would you like to obtain your AVS? MyChart  If you are dropped from the video visit, the video invite should be resent to: Text to cell phone: [phone]  Will anyone else be joining your video visit? No        Video-Visit Details    Type of service:  Video Visit    Video Start Time: 10:59 am  Video End Time: 11:15    Originating Location (pt. Location): Home    Distant Location (provider location):  [facility]     Platform used for Video Visit: [employer]    Name: Natalee Maya  F/u for obesity (Last visit 3/27/2020).  HPI:  Natalee Maya is a 49 year old female who presents via video visit for the management of obesity    Gained weight with 3 pregnancies the life got busy and " gain more weight.    Was able to lose 20-25 lbs with diet + exercise this past summer.  Then had foot surgery and was non-weight bearing x 6 weeks - gained back all the weight she had lost.  Has resumed diet efforts + exercise.  Struggles with appetite control.    Currently treated with Phentermine 37.5 mg daily.  Tolerating Phentermine well.  Has lost 61 lbs total, 241 --> 180 lbs.  Her goal is 160 lbs.  Planned to start topamax - she filled the Rx but didn't start it, preferred to hold off for now.    Diet: Does not like to count calories or follow fad diets.  Currently using portion control, healthy food choices, no junk food + intermittent fasting.  Exercise: Yes  Hypothyroidism.  + FH of mother with hypothyroidism.  Currently treated with levothyroxine 50 mcg/day.  PMH/PSH:  Past Medical History:   Diagnosis Date     Calculus of kidney     History of kidney stone -- Abstracted 02     Lymphedema of lower extremity      Past Surgical History:   Procedure Laterality Date     C NONSPECIFIC PROCEDURE       -- Abstracted 02     FOOT SURGERY      2017     Family Hx:  Family History   Problem Relation Age of Onset     Diabetes Father      C.A.D. Father         hyperlipidemia     Bleeding Disorder Mother         Factor 5     Breast Cancer No family hx of      Thyroid disease: Yes mother and MGM with hypothyroidism      DM2: Yes, father        Autoimmune: DM1, SLE, RA, Vitiligo     Social Hx:  Social History     Socioeconomic History     Marital status:      Spouse name: Not on file     Number of children: Not on file     Years of education: Not on file     Highest education level: Not on file   Occupational History     Not on file   Social Needs     Financial resource strain: Not on file     Food insecurity     Worry: Not on file     Inability: Not on file     Transportation needs     Medical: Not on file     Non-medical: Not on file   Tobacco Use     Smoking status: Never Smoker      Smokeless tobacco: Never Used   Substance and Sexual Activity     Alcohol use: No     Drug use: No     Sexual activity: Yes     Partners: Male   Lifestyle     Physical activity     Days per week: Not on file     Minutes per session: Not on file     Stress: Not on file   Relationships     Social connections     Talks on phone: Not on file     Gets together: Not on file     Attends Advent service: Not on file     Active member of club or organization: Not on file     Attends meetings of clubs or organizations: Not on file     Relationship status: Not on file     Intimate partner violence     Fear of current or ex partner: Not on file     Emotionally abused: Not on file     Physically abused: Not on file     Forced sexual activity: Not on file   Other Topics Concern     Parent/sibling w/ CABG, MI or angioplasty before 65F 55M? Not Asked   Social History Narrative     Not on file          MEDICATIONS:  has a current medication list which includes the following prescription(s): phentermine, iron, fluoxetine, folic acid, and levothyroxine.    ROS   ROS: 10 point ROS neg other than the symptoms noted above in the HPI.    Physical Exam   VS: There were no vitals taken for this visit.  GENERAL: AXOX3, NAD, well dressed, answering questions appropriately, appears stated age.  HEENT: no exopthalmous, no proptosis, no lig lag, no retraction  NECK:    CV:  LUNGS: Normal respiratory effort  EXTREMITIES:   NEUROLOGY:   MSK:     LABS:  !COMPREHENSIVE Latest Ref Rng & Units 12/18/2019   SODIUM 133 - 144 mmol/L 140   POTASSIUM 3.4 - 5.3 mmol/L 3.7   CHLORIDE 94 - 109 mmol/L 107   BUN 7 - 30 mg/dL 13   Creatinine 0.52 - 1.04 mg/dL 0.60   Glucose 70 - 99 mg/dL 85   ANION GAP 3 - 14 mmol/L 8   CALCIUM 8.5 - 10.1 mg/dL 8.8   ALBUMIN 3.4 - 5.0 g/dL 3.7     ENDO THYROID LABS-UMP Latest Ref Rng & Units 12/18/2019 5/30/2019   TSH 0.40 - 4.00 mU/L 2.22 1.59   T4 FREE 0.76 - 1.46 ng/dL 1.08 1.09     ENDO THYROID LABS-UMP Latest Ref Rng &  "Units 2/21/2019   TSH 0.40 - 4.00 mU/L 4.07 (H)   T4 FREE 0.76 - 1.46 ng/dL 0.96     ENDO THYROID LABS-UMP Latest Ref Rng & Units 11/21/2018   THYROGLOBULIN ANTIBODY <40 IU/mL <20   THYR PEROXIDASE NICOLE <35 IU/mL <10     !LIPID/HEPATIC Latest Ref Rng & Units 8/20/2018 8/28/2018   CHOLESTEROL <200 mg/dL 230 (H)    TRIGLYCERIDES <150 mg/dL 157 (H)    HDL CHOLESTEROL >49 mg/dL 51    LDL CHOLESTEROL, CALCULATED <100 mg/dL 148 (H)    VLDL-CHOLESTEROL 0 - 30 mg/dL     NON HDL CHOLESTEROL <130 mg/dL 179 (H)    CHOLESTEROL/HDL RATIO 0.0 - 5.0     AST 0 - 45 U/L  17   ALT 0 - 50 U/L  30     Vital Signs 11/21/2018 11/27/2018 2/21/2019   Weight (LB) 241 lb 8 oz 236 lb 4.8 oz 209 lb 12.8 oz   Height  5' 3.25\" 5' 3.25\"   BMI (Calculated)  41.62 36.95     Vital Signs 2/25/2019 5/30/2019   Weight (LB) 209 lb 191 lb 1.6 oz   Height 5' 3.25\"    BMI (Calculated) 36.81      Vital Signs 9/13/2019 12/18/2019   Weight (LB) 188 lb 182 lb 12.8 oz   Height     BMI (Calculated)  32.13   Reports weight on home scale: 180 lbs.     Waist Circumference:  40\" (2/2019). 37.5\" (5/2019).  38.25\" (9/2019).  38.5\" (12/2019).    All pertinent notes, labs, and images personally reviewed by me.     A/P  Ms.Karla JUAN Maya is a 49 year old evaluated via video visit for the management of hypothyroidism and obesity:    1. Morbid obesity-  BMI 42-->36.95-->32.13.  No obesity-associated comorbidy.  Obesity is associated with a significant increase in mortality and risk of many disorders, including diabetes mellitus, hypertension, dyslipidemia, heart disease, stroke, sleep apnea, cancer, and many others. Conversely, weight loss is associated with a reduction in obesity-associated morbidity.    Endocrine evaluation of obesity includes Diabetes/prediabetes, Cushing's and thyroid dysfunction.  The relevant work up for the diagnosis and management of obesity and various treatment options were discussed. Body Mass Index (BMI) has been a standard means for " calculating risk for overweight and obesity. The new American Association of Clinical Endocrinology (AACE) algorithm recommends lifestyle modifications as the initial phase of treatment for all patients with the BMI equal or greater than 25 kg/m2. Lifestyle modifications includes use of medical nutrition therapy, exercise, tobacco cessation, adequate quality and quantity of sleep, limited consumption of alcohol and reduced stress through implementation of a structured, multidisciplinary program.    In patients with complications associated with obesity, graded interventions are recommended including pharmacological therapy such as phentermine, orlistat, lorcaserin and phentermine/topiramate ER, contrave ( buproprion/naltreone) and the use of very low calorie meal replacement programs.    If medical intervention is insufficient, surgical therapy may be considered, especially in patients with BMI greater than or equal to 35 kg/m2 with multiple complications. Surgical treatments include lap-band, gastric sleeve or gastric bypass surgery.    I informed the pt that:  1.Weight loss medications can be taken to assist with weight reduction when combined with appropriate healthy lifestyle changes.  2.I discussed possible s/e, risks and benefits of weight loss medications.  3.All medications are FDA approved, however, some may be used ''off label'' for their weight loss benefits and some ''short term'' medications can be used for longer periods to achieve desired clinical outcomes.  4.All patients taking weight loss medications must be seen in clinic for refill authorization.    Counseling exercise ( V65.41)  Dietary counseling( V65.3)  Calculated BMI above the upper parameter and f/u plan was documented in the medical record.  Discussed indications, risks and benefits of all medications prescribed, and answered questions to patient's satisfaction.  Currently treated with Phentermine 37.5 mg/day.  Has lost 61 lbs,241--> 180  lbs.  Continue Phentermine 37.5 mg every day.  Hold off on starting Topamax for now.    Hypothyroidism.  Thyroid antibodies were not elevated. Currently treated with levothyroxine 50 mcg/day.  Repeat TFT's.      Labs ordered today:   Orders Placed This Encounter   Procedures     TSH with free T4 reflex     Comprehensive metabolic panel     Radiology/Consults ordered today: None      Follow-up:  3-4 months    Celestina Gray NP  Endocrinology  [facility]  CC: Jason, Carolina Nancy   ____________________________________________________________          Natalee Maya complains of:     #1. Obesity.  Currently treated with phentermine 37.5 mg daily.  Phentermine was started 11/2018.  She reports that her home weight is stable at 182 lbs, it fluctuates by about 5 lbs but no consistent, progressive weight loss or gain.  She attributes some of this to the current stress levels created by COVID-19.  Total weight loss since starting phentermine 11/2018 has been 59 lbs (241 lbs --> 182 lbs).    #2.  Hypothyroidism.  Currently treated with levothyroxine 50 mcg per day.  Recent TFT's 12/2019 in normal range.  She is clinically euthroid.  This is usually treated by her PCP but she had to cancel her recent PCP visit and is concerned she may run out of refill on her levothyroxine prescription before she can reschedule.    I have reviewed and updated the patient's Past Medical History, Social History, Family History and Medication List.    ALLERGIES  No known drug allergies      Assessment/Plan:  #1. Obesity.  Currently treated with phentermine 37.5 mg daily.  Phentermine was started 11/2018.  She reports that her home weight is stable at 182 lbs, it fluctuates by about 5 lbs but no consistent, progressive weight loss or gain.  She attributes some of this to the current stress levels created by COVID-19.  Total weight loss since starting phentermine 11/2018 has been 59 lbs (241 lbs --> 182 lbs).  Discussed the  probability that she has developed a tolerance to the phentermine at this point, although she feels it sill helps control her appetite, just not as effectively.  Continue Phentermine for now.    Start Topiramate 25 mg bid.    If she doesn't lose any weight between now and her follow up in July, will likely discontinue Phentermine and discuss alternative weight loss medications.  Also ok for her to discontinue phentermine before I see her for follow up July if she thinks if has become ineffective.     #2.  Hypothyroidism.  Currently treated with levothyroxine 50 mcg per day.  Recent TFT's 12/2019 in normal range.  She is clinically euthroid.  This is usually treated by her PCP but she had to cancel her recent PCP visit and is concerned she may run out of refill on her levothyroxine prescription before she can reschedule.  I recommend she continue levothyroxine 50 mcg/day.   I'll renew her prescription since labs are current and in normal range.    Follow up in 3-4 months.    Phone call duration:  10 minutes    Celestina Gray NP  Endocrinology  [facility]        Again, thank you for allowing me to participate in the care of your patient.        Sincerely,        SHAQ Virgen CNP

## 2020-07-23 ENCOUNTER — PRE VISIT (OUTPATIENT)
Dept: ONCOLOGY | Facility: CLINIC | Age: 50
End: 2020-07-23

## 2020-07-23 ENCOUNTER — VIRTUAL VISIT (OUTPATIENT)
Dept: ONCOLOGY | Facility: CLINIC | Age: 50
End: 2020-07-23
Attending: NURSE PRACTITIONER
Payer: COMMERCIAL

## 2020-07-23 DIAGNOSIS — D70.9 NEUTROPENIA, UNSPECIFIED TYPE (H): ICD-10-CM

## 2020-07-23 PROCEDURE — 99203 OFFICE O/P NEW LOW 30 MIN: CPT | Performed by: INTERNAL MEDICINE

## 2020-07-23 PROCEDURE — 40001009 ZZH VIDEO/TELEPHONE VISIT; NO CHARGE

## 2020-07-23 NOTE — PROGRESS NOTES
"Natalee Maya is a 49 year old female who is being evaluated via a billable video visit.      The patient has been notified of following:     \"This video visit will be conducted via a call between you and your physician/provider. We have found that certain health care needs can be provided without the need for an in-person physical exam.  This service lets us provide the care you need with a video conversation.  If a prescription is necessary we can send it directly to your pharmacy.  If lab work is needed we can place an order for that and you can then stop by our lab to have the test done at a later time.    Video visits are billed at different rates depending on your insurance coverage.  Please reach out to your insurance provider with any questions.    If during the course of the call the physician/provider feels a video visit is not appropriate, you will not be charged for this service.\"    Patient has given verbal consent for Video visit? Yes  How would you like to obtain your AVS? MyChart  If you are dropped from the video visit, the video invite should be resent to:     MYCHART VIDEO VISIT    Will anyone else be joining your video visit? No  {If patient encounters technical issues they should call [phone] :[id number]}    - REVIEW RECENT LABS     Bryanna Carrillo CMA      Video-Visit Details    Type of service:  Video Visit    Video Start Time: {video visit start/end time:152948}  Video End Time: {video visit start/end time:152948}    Originating Location (pt. Location): {video visit patient location:424454::\"Home\"}    Distant Location (provider location):  [facility]     Platform used for Video Visit: {Virtual Visit Platforms:538159::\"[employer]\"}    {signature options:019260}        "

## 2020-07-23 NOTE — LETTER
"    7/23/2020         RE: Natalee Maya  [address] [facility] [place] [address]        Dear Colleague,    Thank you for referring your patient, Natalee Maya, to the [facility]. Please see a copy of my visit note below.    Natalee Maya is a 49 year old female who is being evaluated via a billable video visit.      The patient has been notified of following:     \"This video visit will be conducted via a call between you and your physician/provider. We have found that certain health care needs can be provided without the need for an in-person physical exam.  This service lets us provide the care you need with a video conversation.  If a prescription is necessary we can send it directly to your pharmacy.  If lab work is needed we can place an order for that and you can then stop by our lab to have the test done at a later time.    Video visits are billed at different rates depending on your insurance coverage.  Please reach out to your insurance provider with any questions.    If during the course of the call the physician/provider feels a video visit is not appropriate, you will not be charged for this service.\"    Patient has given verbal consent for Video visit? Yes  How would you like to obtain your AVS? MyChart  If you are dropped from the video visit, the video invite should be resent to:     MYCHART VIDEO VISIT    Will anyone else be joining your video visit? No    - REVIEW RECENT LABS     Bryanna Carrillo CMA      Video-Visit Details    Type of service:  Video Visit    Video Start Time: 10:54 AM  Video End Time: 11:14 AM    Originating Location (pt. Location): Home    Distant Location (provider location):  [facility]     Platform used for Video Visit: [employer]     [facility]    Hematology/Oncology New Patient Note      Today's Date: 07/23/20    Reason for Consult: leukopenia      HISTORY OF PRESENT ILLNESS: Natalee Maya is a 49 year old female " with PMHx of hypothyroidism who presents with leukopenia.  She was found on labs in June 2020 to have low WBC of 1.9K.  Repeat checks have been in the 2K range.  Hemoglobin and platelet count were normal.  Differential shows a moderate to mild neutropenia.  On further chart review, she had a mild leukopenia in the 3K range back in 2018, but did recover back to normal a few months later.  There are not many labs prior to that.  Iron, ferritin, vitamin 12, and folate were normal.  Peripheral smear shows low WBC, but non-specific.      Currently, Natalee says that she feels well.  She denies recent illness, infections, fever.  Her Prozac dose increased a couple of months ago.  Otherwise, she has not started any new medications recently.      She does not smoke.  She drinks a glass of wine maybe once or twice a month.  She denies illicit drug use.        REVIEW OF SYSTEMS:   14 point ROS was reviewed and is negative other than as noted above in HPI.       HOME MEDICATIONS:  Current Outpatient Medications   Medication Sig Dispense Refill     Ferrous Sulfate (IRON) 325 (65 Fe) MG tablet TAKE ONE TABLET BY MOUTH EVERY DAY, AVOID AROUND SAME TIME AS LEVOTHYROXINE 90 tablet 3     FLUoxetine (PROZAC) 20 MG capsule Take 2 capsules (40 mg) by mouth daily 180 capsule 3     folic acid (FOLVITE) 1 MG tablet TAKE ONE TABLET BY MOUTH EVERY DAY 90 tablet 1     levothyroxine (SYNTHROID/LEVOTHROID) 50 MCG tablet Take 1 tablet (50 mcg) by mouth daily 90 tablet 2     phentermine (ADIPEX-P) 37.5 MG tablet Take 1 tablet (37.5 mg) by mouth every morning (before breakfast) 30 tablet 3         ALLERGIES:  Allergies   Allergen Reactions     No Known Drug Allergies          PAST MEDICAL HISTORY:  Past Medical History:   Diagnosis Date     Calculus of kidney     History of kidney stone -- Abstracted 7/22/02     Lymphedema of lower extremity          PAST SURGICAL HISTORY:  Past Surgical History:   Procedure Laterality Date     C NONSPECIFIC  PROCEDURE       -- Abstracted 02     FOOT SURGERY      2017         SOCIAL HISTORY:  Social History     Socioeconomic History     Marital status:      Spouse name: Not on file     Number of children: Not on file     Years of education: Not on file     Highest education level: Not on file   Occupational History     Not on file   Social Needs     Financial resource strain: Not on file     Food insecurity     Worry: Not on file     Inability: Not on file     Transportation needs     Medical: Not on file     Non-medical: Not on file   Tobacco Use     Smoking status: Never Smoker     Smokeless tobacco: Never Used   Substance and Sexual Activity     Alcohol use: No     Drug use: No     Sexual activity: Yes     Partners: Male   Lifestyle     Physical activity     Days per week: Not on file     Minutes per session: Not on file     Stress: Not on file   Relationships     Social connections     Talks on phone: Not on file     Gets together: Not on file     Attends Latter day service: Not on file     Active member of club or organization: Not on file     Attends meetings of clubs or organizations: Not on file     Relationship status: Not on file     Intimate partner violence     Fear of current or ex partner: Not on file     Emotionally abused: Not on file     Physically abused: Not on file     Forced sexual activity: Not on file   Other Topics Concern     Parent/sibling w/ CABG, MI or angioplasty before 65F 55M? Not Asked   Social History Narrative     Not on file         FAMILY HISTORY:  Family History   Problem Relation Age of Onset     Diabetes Father      C.A.D. Father         hyperlipidemia     Bleeding Disorder Mother         Factor 5     Breast Cancer No family hx of          PHYSICAL EXAM:  Vital signs:  There were no vitals taken for this visit.   ECO  GENERAL/CONSTITUTIONAL: No acute distress. Healthy, alert.  EYES: No scleral icterus.  No redness or discharge.    RESPIRATORY: No audible  wheeze, cough, or visible cyanosis.  No visible retractions or increased work of breathing.  Able to speak fully in complete sentences.  MUSCULOSKELETAL: Normal range of motion.  NEUROLOGIC: Alert, oriented, answers questions appropriately. No tremor. Mentation intact and speech normal  INTEGUMENTARY: No jaundice.  No obvious rash or skin lesions.  PSYCHIATRIC:  Mentation appears normal, affect normal/bright, judgement and insight intact, normal speech and appearance well-groomed.    The rest of a comprehensive physical exam is deferred due to public health emergency video visit restrictions.      LABS:  CBC RESULTS:   Recent Labs   Lab Test 07/17/20  0946   WBC 2.1*   RBC 4.23   HGB 13.0   HCT 39.1   MCV 92   MCH 30.7   MCHC 33.2   RDW 13.2             PATHOLOGY:  Peripheral smear 6/30/20:  FINAL DIAGNOSIS:   Peripheral Blood Morphology-   -Moderate leukopenia with moderate absolute neutropenia.   -See comment.     COMMENT:   The patient has history of intermittent leukopenia (see prior peripheral   blood morphology; [id number], 8/2018.   Absolute neutropenia appears to be a new finding. Possible causes of   neutropenia include drugs, infection,   autoimmune/immune-mediated disorders, hypersplenism, and bone marrow   disorder. At present, there is   insufficient morphologic evidence to suggest a clonal bone marrow   disorder. Serum vitamin B12/folate   deficiency appears as an unlikely cause, given normal reported levels in   this patient. Serum ferritin level is   additionally within normal limits. If absolute neutropenia persists   without clinical explanation, TCR-V beta   flow cytometric analysis of peripheral blood may be considered for further    evaluation.       ASSESSMENT/PLAN:  Natalee Maya is a 49 year old female with:    Leukopenia: Differential with moderate to mild neutropenia.  Other two cell lines are normal.  This seems to be a fairly new finding, although it happened to a milder and  short extent in 2018.  She has not had recent infections.  She has not started new medications.  She has no history of hepatitis or HIV.  She does not drink alcohol frequently.  We discussed other potential causes including autoimmune or primary bone marrow disorder.  We discussed options including repeat labs in 6 weeks and see if there is improvement; if there is no improvement or if there is worsening, a bone marrow can be done for further evaluation.  Other option is to proceed with bone marrow biopsy now.  Since Natalee feels well, she opted to repeat the labs in 6-8 weeks and then decide on bone marrow biopsy.  I will also add a peripheral flow to those labs.  We will also get an abdomen ultrasound to evaluate liver and spleen size.  -RTC in 6-8 weeks with repeat labs and ultrasound results            Lissa Gibson MD  Hematology/Oncology  [facility]        Again, thank you for allowing me to participate in the care of your patient.        Sincerely,        Lissa Gibson MD

## 2020-07-23 NOTE — LETTER
"    7/23/2020         RE: Natalee Maya  [address] [facility] [address]        Dear Colleague,    Thank you for referring your patient, Natalee Maya, to the [facility]. Please see a copy of my visit note below.    Natalee Maya is a 49 year old female who is being evaluated via a billable video visit.      The patient has been notified of following:     \"This video visit will be conducted via a call between you and your physician/provider. We have found that certain health care needs can be provided without the need for an in-person physical exam.  This service lets us provide the care you need with a video conversation.  If a prescription is necessary we can send it directly to your pharmacy.  If lab work is needed we can place an order for that and you can then stop by our lab to have the test done at a later time.    Video visits are billed at different rates depending on your insurance coverage.  Please reach out to your insurance provider with any questions.    If during the course of the call the physician/provider feels a video visit is not appropriate, you will not be charged for this service.\"    Patient has given verbal consent for Video visit? Yes  How would you like to obtain your AVS? MyChart  If you are dropped from the video visit, the video invite should be resent to:     MYCHART VIDEO VISIT    Will anyone else be joining your video visit? No    - REVIEW RECENT LABS     Bryanna Carrillo CMA      Video-Visit Details    Type of service:  Video Visit    Video Start Time: 10:54 AM  Video End Time: 11:14 AM    Originating Location (pt. Location): Home    Distant Location (provider location):  [facility]     Platform used for Video Visit: [employer]     [facility]    Hematology/Oncology New Patient Note      Today's Date: 07/23/20    Reason for Consult: leukopenia      HISTORY OF PRESENT ILLNESS: Natalee Maay is a 49 year old female " with PMHx of hypothyroidism who presents with leukopenia.  She was found on labs in June 2020 to have low WBC of 1.9K.  Repeat checks have been in the 2K range.  Hemoglobin and platelet count were normal.  Differential shows a moderate to mild neutropenia.  On further chart review, she had a mild leukopenia in the 3K range back in 2018, but did recover back to normal a few months later.  There are not many labs prior to that.  Iron, ferritin, vitamin 12, and folate were normal.  Peripheral smear shows low WBC, but non-specific.      Currently, Natalee says that she feels well.  She denies recent illness, infections, fever.  Her Prozac dose increased a couple of months ago.  Otherwise, she has not started any new medications recently.      She does not smoke.  She drinks a glass of wine maybe once or twice a month.  She denies illicit drug use.        REVIEW OF SYSTEMS:   14 point ROS was reviewed and is negative other than as noted above in HPI.       HOME MEDICATIONS:  Current Outpatient Medications   Medication Sig Dispense Refill     Ferrous Sulfate (IRON) 325 (65 Fe) MG tablet TAKE ONE TABLET BY MOUTH EVERY DAY, AVOID AROUND SAME TIME AS LEVOTHYROXINE 90 tablet 3     FLUoxetine (PROZAC) 20 MG capsule Take 2 capsules (40 mg) by mouth daily 180 capsule 3     folic acid (FOLVITE) 1 MG tablet TAKE ONE TABLET BY MOUTH EVERY DAY 90 tablet 1     levothyroxine (SYNTHROID/LEVOTHROID) 50 MCG tablet Take 1 tablet (50 mcg) by mouth daily 90 tablet 2     phentermine (ADIPEX-P) 37.5 MG tablet Take 1 tablet (37.5 mg) by mouth every morning (before breakfast) 30 tablet 3         ALLERGIES:  Allergies   Allergen Reactions     No Known Drug Allergies          PAST MEDICAL HISTORY:  Past Medical History:   Diagnosis Date     Calculus of kidney     History of kidney stone -- Abstracted 7/22/02     Lymphedema of lower extremity          PAST SURGICAL HISTORY:  Past Surgical History:   Procedure Laterality Date     C NONSPECIFIC  PROCEDURE       -- Abstracted 02     FOOT SURGERY      2017         SOCIAL HISTORY:  Social History     Socioeconomic History     Marital status:      Spouse name: Not on file     Number of children: Not on file     Years of education: Not on file     Highest education level: Not on file   Occupational History     Not on file   Social Needs     Financial resource strain: Not on file     Food insecurity     Worry: Not on file     Inability: Not on file     Transportation needs     Medical: Not on file     Non-medical: Not on file   Tobacco Use     Smoking status: Never Smoker     Smokeless tobacco: Never Used   Substance and Sexual Activity     Alcohol use: No     Drug use: No     Sexual activity: Yes     Partners: Male   Lifestyle     Physical activity     Days per week: Not on file     Minutes per session: Not on file     Stress: Not on file   Relationships     Social connections     Talks on phone: Not on file     Gets together: Not on file     Attends [employer] service: Not on file     Active member of club or organization: Not on file     Attends meetings of clubs or organizations: Not on file     Relationship status: Not on file     Intimate partner violence     Fear of current or ex partner: Not on file     Emotionally abused: Not on file     Physically abused: Not on file     Forced sexual activity: Not on file   Other Topics Concern     Parent/sibling w/ CABG, MI or angioplasty before 65F 55M? Not Asked   Social History Narrative     Not on file         FAMILY HISTORY:  Family History   Problem Relation Age of Onset     Diabetes Father      C.A.D. Father         hyperlipidemia     Bleeding Disorder Mother         Factor 5     Breast Cancer No family hx of          PHYSICAL EXAM:  Vital signs:  There were no vitals taken for this visit.   ECO  GENERAL/CONSTITUTIONAL: No acute distress. Healthy, alert.  EYES: No scleral icterus.  No redness or discharge.    RESPIRATORY: No audible  wheeze, cough, or visible cyanosis.  No visible retractions or increased work of breathing.  Able to speak fully in complete sentences.  MUSCULOSKELETAL: Normal range of motion.  NEUROLOGIC: Alert, oriented, answers questions appropriately. No tremor. Mentation intact and speech normal  INTEGUMENTARY: No jaundice.  No obvious rash or skin lesions.  PSYCHIATRIC:  Mentation appears normal, affect normal/bright, judgement and insight intact, normal speech and appearance well-groomed.    The rest of a comprehensive physical exam is deferred due to public health emergency video visit restrictions.      LABS:  CBC RESULTS:   Recent Labs   Lab Test 07/17/20  0946   WBC 2.1*   RBC 4.23   HGB 13.0   HCT 39.1   MCV 92   MCH 30.7   MCHC 33.2   RDW 13.2             PATHOLOGY:  Peripheral smear 6/30/20:  FINAL DIAGNOSIS:   Peripheral Blood Morphology-   -Moderate leukopenia with moderate absolute neutropenia.   -See comment.     COMMENT:   The patient has history of intermittent leukopenia (see prior peripheral   blood morphology; [id number], 8/2018.   Absolute neutropenia appears to be a new finding. Possible causes of   neutropenia include drugs, infection,   autoimmune/immune-mediated disorders, hypersplenism, and bone marrow   disorder. At present, there is   insufficient morphologic evidence to suggest a clonal bone marrow   disorder. Serum vitamin B12/folate   deficiency appears as an unlikely cause, given normal reported levels in   this patient. Serum ferritin level is   additionally within normal limits. If absolute neutropenia persists   without clinical explanation, TCR-V beta   flow cytometric analysis of peripheral blood may be considered for further    evaluation.       ASSESSMENT/PLAN:  Natalee Maya is a 49 year old female with:    Leukopenia: Differential with moderate to mild neutropenia.  Other two cell lines are normal.  This seems to be a fairly new finding, although it happened to a milder and  short extent in 2018.  She has not had recent infections.  She has not started new medications.  She has no history of hepatitis or HIV.  She does not drink alcohol frequently.  We discussed other potential causes including autoimmune or primary bone marrow disorder.  We discussed options including repeat labs in 6 weeks and see if there is improvement; if there is no improvement or if there is worsening, a bone marrow can be done for further evaluation.  Other option is to proceed with bone marrow biopsy now.  Since Natalee feels well, she opted to repeat the labs in 6-8 weeks and then decide on bone marrow biopsy.  I will also add a peripheral flow to those labs.  We will also get an abdomen ultrasound to evaluate liver and spleen size.  -RTC in 6-8 weeks with repeat labs and ultrasound results            Lissa Gibson MD  Hematology/Oncology  [facility]        Again, thank you for allowing me to participate in the care of your patient.        Sincerely,        Lissa Gibson MD

## 2020-07-23 NOTE — PROGRESS NOTES
"Natalee Maya is a 49 year old female who is being evaluated via a billable video visit.      The patient has been notified of following:     \"This video visit will be conducted via a call between you and your physician/provider. We have found that certain health care needs can be provided without the need for an in-person physical exam.  This service lets us provide the care you need with a video conversation.  If a prescription is necessary we can send it directly to your pharmacy.  If lab work is needed we can place an order for that and you can then stop by our lab to have the test done at a later time.    Video visits are billed at different rates depending on your insurance coverage.  Please reach out to your insurance provider with any questions.    If during the course of the call the physician/provider feels a video visit is not appropriate, you will not be charged for this service.\"    Patient has given verbal consent for Video visit? Yes  How would you like to obtain your AVS? MyChart  If you are dropped from the video visit, the video invite should be resent to:     MYCHART VIDEO VISIT    Will anyone else be joining your video visit? No    - REVIEW RECENT LABS     Bryanna Carrillo CMA      Video-Visit Details    Type of service:  Video Visit    Video Start Time: 10:54 AM  Video End Time: 11:14 AM    Originating Location (pt. Location): Home    Distant Location (provider location):  [facility]     Platform used for Video Visit: [facility]    Hematology/Oncology New Patient Note      Today's Date: 07/23/20    Reason for Consult: leukopenia      HISTORY OF PRESENT ILLNESS: Natalee Maya is a 49 year old female with PMHx of hypothyroidism who presents with leukopenia.  She was found on labs in June 2020 to have low WBC of 1.9K.  Repeat checks have been in the 2K range.  Hemoglobin and platelet count were normal.  Differential shows a moderate to mild " neutropenia.  On further chart review, she had a mild leukopenia in the 3K range back in 2018, but did recover back to normal a few months later.  There are not many labs prior to that.  Iron, ferritin, vitamin 12, and folate were normal.  Peripheral smear shows low WBC, but non-specific.      Currently, Natalee says that she feels well.  She denies recent illness, infections, fever.  Her Prozac dose increased a couple of months ago.  Otherwise, she has not started any new medications recently.      She does not smoke.  She drinks a glass of wine maybe once or twice a month.  She denies illicit drug use.        REVIEW OF SYSTEMS:   14 point ROS was reviewed and is negative other than as noted above in HPI.       HOME MEDICATIONS:  Current Outpatient Medications   Medication Sig Dispense Refill     Ferrous Sulfate (IRON) 325 (65 Fe) MG tablet TAKE ONE TABLET BY MOUTH EVERY DAY, AVOID AROUND SAME TIME AS LEVOTHYROXINE 90 tablet 3     FLUoxetine (PROZAC) 20 MG capsule Take 2 capsules (40 mg) by mouth daily 180 capsule 3     folic acid (FOLVITE) 1 MG tablet TAKE ONE TABLET BY MOUTH EVERY DAY 90 tablet 1     levothyroxine (SYNTHROID/LEVOTHROID) 50 MCG tablet Take 1 tablet (50 mcg) by mouth daily 90 tablet 2     phentermine (ADIPEX-P) 37.5 MG tablet Take 1 tablet (37.5 mg) by mouth every morning (before breakfast) 30 tablet 3         ALLERGIES:  Allergies   Allergen Reactions     No Known Drug Allergies          PAST MEDICAL HISTORY:  Past Medical History:   Diagnosis Date     Calculus of kidney     History of kidney stone -- Abstracted 02     Lymphedema of lower extremity          PAST SURGICAL HISTORY:  Past Surgical History:   Procedure Laterality Date     C NONSPECIFIC PROCEDURE       -- Abstracted 02     FOOT SURGERY      2017         SOCIAL HISTORY:  Social History     Socioeconomic History     Marital status:      Spouse name: Not on file     Number of children: Not on file     Years of  education: Not on file     Highest education level: Not on file   Occupational History     Not on file   Social Needs     Financial resource strain: Not on file     Food insecurity     Worry: Not on file     Inability: Not on file     Transportation needs     Medical: Not on file     Non-medical: Not on file   Tobacco Use     Smoking status: Never Smoker     Smokeless tobacco: Never Used   Substance and Sexual Activity     Alcohol use: No     Drug use: No     Sexual activity: Yes     Partners: Male   Lifestyle     Physical activity     Days per week: Not on file     Minutes per session: Not on file     Stress: Not on file   Relationships     Social connections     Talks on phone: Not on file     Gets together: Not on file     Attends [employer]ist service: Not on file     Active member of club or organization: Not on file     Attends meetings of clubs or organizations: Not on file     Relationship status: Not on file     Intimate partner violence     Fear of current or ex partner: Not on file     Emotionally abused: Not on file     Physically abused: Not on file     Forced sexual activity: Not on file   Other Topics Concern     Parent/sibling w/ CABG, MI or angioplasty before 65F 55M? Not Asked   Social History Narrative     Not on file         FAMILY HISTORY:  Family History   Problem Relation Age of Onset     Diabetes Father      C.A.D. Father         hyperlipidemia     Bleeding Disorder Mother         Factor 5     Breast Cancer No family hx of          PHYSICAL EXAM:  Vital signs:  There were no vitals taken for this visit.   ECO  GENERAL/CONSTITUTIONAL: No acute distress. Healthy, alert.  EYES: No scleral icterus.  No redness or discharge.    RESPIRATORY: No audible wheeze, cough, or visible cyanosis.  No visible retractions or increased work of breathing.  Able to speak fully in complete sentences.  MUSCULOSKELETAL: Normal range of motion.  NEUROLOGIC: Alert, oriented, answers questions appropriately. No  tremor. Mentation intact and speech normal  INTEGUMENTARY: No jaundice.  No obvious rash or skin lesions.  PSYCHIATRIC:  Mentation appears normal, affect normal/bright, judgement and insight intact, normal speech and appearance well-groomed.    The rest of a comprehensive physical exam is deferred due to public health emergency video visit restrictions.      LABS:  CBC RESULTS:   Recent Labs   Lab Test 07/17/20  0946   WBC 2.1*   RBC 4.23   HGB 13.0   HCT 39.1   MCV 92   MCH 30.7   MCHC 33.2   RDW 13.2             PATHOLOGY:  Peripheral smear 6/30/20:  FINAL DIAGNOSIS:   Peripheral Blood Morphology-   -Moderate leukopenia with moderate absolute neutropenia.   -See comment.     COMMENT:   The patient has history of intermittent leukopenia (see prior peripheral   blood morphology; [id number], 8/2018.   Absolute neutropenia appears to be a new finding. Possible causes of   neutropenia include drugs, infection,   autoimmune/immune-mediated disorders, hypersplenism, and bone marrow   disorder. At present, there is   insufficient morphologic evidence to suggest a clonal bone marrow   disorder. Serum vitamin B12/folate   deficiency appears as an unlikely cause, given normal reported levels in   this patient. Serum ferritin level is   additionally within normal limits. If absolute neutropenia persists   without clinical explanation, TCR-V beta   flow cytometric analysis of peripheral blood may be considered for further    evaluation.       ASSESSMENT/PLAN:  Natalee Maya is a 49 year old female with:    Leukopenia: Differential with moderate to mild neutropenia.  Other two cell lines are normal.  This seems to be a fairly new finding, although it happened to a milder and short extent in 2018.  She has not had recent infections.  She has not started new medications.  She has no history of hepatitis or HIV.  She does not drink alcohol frequently.  We discussed other potential causes including autoimmune or primary  bone marrow disorder.  We discussed options including repeat labs in 6 weeks and see if there is improvement; if there is no improvement or if there is worsening, a bone marrow can be done for further evaluation.  Other option is to proceed with bone marrow biopsy now.  Since Natalee feels well, she opted to repeat the labs in 6-8 weeks and then decide on bone marrow biopsy.  I will also add a peripheral flow to those labs.  We will also get an abdomen ultrasound to evaluate liver and spleen size.  -RTC in 6-8 weeks with repeat labs and ultrasound results            Lissa Gibson MD  Hematology/Oncology  [facility]

## 2020-07-24 NOTE — PATIENT INSTRUCTIONS
Labs scheduled 9/10   scheduled 9/10  Appt with Dr. Gibson scheduled 9/17  Tia Garcia, RN, BSN, [facility]  Patient Care Coordinator  [facility]  [phone]

## 2020-08-07 ENCOUNTER — MYC MEDICAL ADVICE (OUTPATIENT)
Dept: PEDIATRICS | Facility: CLINIC | Age: 50
End: 2020-08-07

## 2020-08-07 NOTE — TELEPHONE ENCOUNTER
"Sent [employer] message to the pt.    Pt seeking to taper off of prozac. Can you recommend a taper-plan?      - Karlo \"Lior\" SIENA Adams - Patient Advocate Liason (PAL)  [facility]    "

## 2020-08-11 ENCOUNTER — VIRTUAL VISIT (OUTPATIENT)
Dept: PSYCHOLOGY | Facility: CLINIC | Age: 50
End: 2020-08-11
Payer: COMMERCIAL

## 2020-08-11 DIAGNOSIS — F43.23 ADJUSTMENT DISORDER WITH MIXED ANXIETY AND DEPRESSED MOOD: Primary | ICD-10-CM

## 2020-08-11 PROCEDURE — 90834 PSYTX W PT 45 MINUTES: CPT | Mod: GT | Performed by: PSYCHOLOGIST

## 2020-08-11 NOTE — PROGRESS NOTES
Progress Note    Patient Name: Natalee Maya  Date: 8/11/2020         Service Type: Individual- video visit       Session Start Time: 9:35 AM Session End Time:  10:20 AM     Session Length: 38-52 minutes    Session #: 7 overall episode of care. Transfer from MB.   (1 with DB)    Attendees: Client attended alone       Telemedicine Visit: The patient's condition can be safely assessed and treated via synchronous audio and visual telemedicine encounter.      Reason for Telemedicine Visit: Services only offered telehealth    Originating Site (Patient Location): Patient's home    Distant Site (Provider Location): Provider Remote Setting    Consent:  The patient/guardian has verbally consented to: the potential risks and benefits of telemedicine (video visit) versus in person care; bill my insurance or make self-payment for services provided; and responsibility for payment of non-covered services.        Mode of Communication: Video via Doxy     Treatment Plan Last Reviewed: today, adopted existing [place] plan  PHQ-9 / MADINA-7 : see emr  cgi = 4/4   On  8-    Oscar completed on 8-        DATA    DSM5 Diagnoses: Adjustment Disorders  309.28 (F43.23) With mixed anxiety and depressed mood  Interactive Complexity: No  Crisis: No       Progress Since Last Session (Related to Symptoms / Goals / Homework):   Symptoms: stable    Homework: Achieved / completed to satisfaction       Episode of Care Goals: Minimal progress - ACTION (Actively working towards change); Intervened by reinforcing change plan / affirming steps taken     Current / Ongoing Stressors and Concerns:       Interpersonal identity    Marital stress, trust,  Loss of respect,  invalidation     Treatment Objective(s) Addressed in This Session:   Review session notes and tx plan   Develop rapport  Introduce resources     Intervention:     Discussed clients experience and emotions around moving to a hotel for  "  a break from her spouse. Reports there has been some initiative on his part to   come to talk to her.  Introduced gender communication components.  Client find these helpful.  Processed emotions.      ASSESSMENT: Current Emotional / Mental Status (status of significant symptoms):   Risk status (Self / Other harm or suicidal ideation)   Patient denies current fears or concerns for personal safety.   Patient denies current or recent suicidal ideation or behaviors.   Patientdenies current or recent homicidal ideation or behaviors.   Patient denies current or recent self injurious behavior or ideation.   Patient denies other safety concerns.   Patient reports there has been no change in risk factors since their last session.     Patientreports there has been no change in protective factors since their last session.     Recommended that patient call 911 or go to the local ED should there be a change in any of these risk factors.     Appearance:   Appropriate    Eye Contact:   Fair    Psychomotor Behavior: Normal    Attitude:   Cooperative  Interested Pleasant   Orientation:   All   Speech    Rate / Production: Normal     Volume:  Normal    Mood:    Normal   Affect:    Appropriate     Thought Content:  Clear    Thought Form:  Coherent  Goal Directed  Logical    Insight:    Good      Medication Review:   Changes to psychiatric medications, see updated Medication List in EPIC.      Medication Compliance:   Yes     Changes in Health Issues:   None reported     Chemical Use Review:   Substance Use: Chemical use reviewed, no active concerns identified      Tobacco Use: No current tobacco use.      Diagnosis:    Adjustment Disorders  309.28 (F43.23) With mixed anxiety and depressed mood    Major Depressive Disorder, Provisional    Collateral Reports Completed:   With pcp as deemed necessary    PLAN: (Patient Tasks / Therapist Tasks / Other)  Begin to read \"How to Improve Your Marriage Without Talking About It\".  Validate 2 " strengths in partner, ex provider  / protector  Past hw  Client is aware to call Mallika Servin LP to schedule future individual therapy sessions.  Encouraged client to increase engagement in self-care.    Mallika Servin LP 7/16/2020                                                    ______________________________________________________________________    Treatment Plan    Patient's Name: Natalee Maya  YOB: 1970    Date:  7/2/2020    DSM5 Diagnoses: Adjustment Disorders  309.28 (F43.23) With mixed anxiety and depressed mood  Psychosocial / Contextual Factors: marital stress  WHODAS: 17    Referral / Collaboration:  Referral to another professional/service is not indicated at this time..    Anticipated number of session or this episode of care: 12    Adopted on 8- by MARA Servin.    MeasurableTreatment Goal(s) related to diagnosis / functional impairment(s)  Goal 1: Patient will develop techniques for reducing symptoms that result from marital stress and conflict by reporting MADINA-7 and PHQ-9 scores below a 5, where symptoms where symptoms occur fewer than half the days for a minimum of 4 weeks.    Objective #A (Patient Action)    Patient will learn and utilize 3 techniques to manage emotions related to marital stress/conflict.  Status: Continued - Date(s):7/2/2020     Intervention(s)  Therapist will teach relaxation techniques and ways to engage in self-care.    Objective #B  Patient will compile a list of boundaries that they would like to set with others. Utilize the boundaries .  Status: Continued - Date(s):7/2/2020     Intervention(s)  Therapist will teach health boundaries and encourage client to identify their boundaries and implement them.    Objective #C  Patient will learn & utilize at least 1 assertive communication skills weekly.  Status: Continued - Date(s):7/2/2020     Intervention(s)  Therapist will teach assertive communication skills.    Patient has reviewed and agreed to the  above plan.      Mallika Servin LP  7/2/2020

## 2020-08-12 ASSESSMENT — COLUMBIA-SUICIDE SEVERITY RATING SCALE - C-SSRS
2. HAVE YOU ACTUALLY HAD ANY THOUGHTS OF KILLING YOURSELF?: NO
1. IN THE PAST MONTH, HAVE YOU WISHED YOU WERE DEAD OR WISHED YOU COULD GO TO SLEEP AND NOT WAKE UP?: NO
1. IN THE PAST MONTH, HAVE YOU WISHED YOU WERE DEAD OR WISHED YOU COULD GO TO SLEEP AND NOT WAKE UP?: NO
2. HAVE YOU ACTUALLY HAD ANY THOUGHTS OF KILLING YOURSELF LIFETIME?: NO

## 2020-08-18 ENCOUNTER — VIRTUAL VISIT (OUTPATIENT)
Dept: PSYCHOLOGY | Facility: CLINIC | Age: 50
End: 2020-08-18
Payer: COMMERCIAL

## 2020-08-18 DIAGNOSIS — F43.23 ADJUSTMENT DISORDER WITH MIXED ANXIETY AND DEPRESSED MOOD: Primary | ICD-10-CM

## 2020-08-18 PROCEDURE — 90834 PSYTX W PT 45 MINUTES: CPT | Mod: GT | Performed by: PSYCHOLOGIST

## 2020-08-18 NOTE — PROGRESS NOTES
Progress Note    Patient Name: Natalee Maya  Date: 8/18/2020         Service Type: Individual- video visit       Session Start Time: 11:35 pm Session End Time:  12:20 pm     Session Length: 38-52 minutes    Session #: 8 overall episode of care. Transfer from MB.   (2 with DB)    Attendees: Client with spouse for 15 minutes       Telemedicine Visit: The patient's condition can be safely assessed and treated via synchronous audio and visual telemedicine encounter.      Reason for Telemedicine Visit: Services only offered telehealth    Originating Site (Patient Location): Patient's home    Distant Site (Provider Location): Provider Remote Setting    Consent:  The patient/guardian has verbally consented to: the potential risks and benefits of telemedicine (video visit) versus in person care; bill my insurance or make self-payment for services provided; and responsibility for payment of non-covered services.        Mode of Communication: Video via Doxy     Treatment Plan Last Reviewed: today, adopted existing [place] plan  PHQ-9 / MADINA-7 : see emr  cgi = 4/4   On  8-    [place] completed on 8-        DATA    DSM5 Diagnoses: Adjustment Disorders  309.28 (F43.23) With mixed anxiety and depressed mood  Interactive Complexity: No  Crisis: No       Progress Since Last Session (Related to Symptoms / Goals / Homework):   Symptoms: stable    Homework: Achieved / completed to satisfaction       Episode of Care Goals: Minimal progress - ACTION (Actively working towards change); Intervened by reinforcing change plan / affirming steps taken     Current / Ongoing Stressors and Concerns:       Interpersonal identity    Marital stress, trust,  Loss of respect,  invalidation     Treatment Objective(s) Addressed in This Session:   Review session notes and tx plan   Develop rapport  Introduce resources     Intervention:  Client reports that she has had 3 good conversations with her  spouse.  Share that he able to say I see where you are coming from  Client able to feel heard and safe. Did move back into the family home.    She did buy 2 of the book and invited spouse to read it.  Has started a note book with strengths/ division of duties / motivation.  Did invite to say hello to this writer and discuss possibility for service deliver.        ASSESSMENT: Current Emotional / Mental Status (status of significant symptoms):   Risk status (Self / Other harm or suicidal ideation)   Patient denies current fears or concerns for personal safety.   Patient denies current or recent suicidal ideation or behaviors.   Patientdenies current or recent homicidal ideation or behaviors.   Patient denies current or recent self injurious behavior or ideation.   Patient denies other safety concerns.   Patient reports there has been no change in risk factors since their last session.     Patientreports there has been no change in protective factors since their last session.     Recommended that patient call 911 or go to the local ED should there be a change in any of these risk factors.     Appearance:   Appropriate    Eye Contact:   Fair    Psychomotor Behavior: Normal    Attitude:   Cooperative  Interested Pleasant   Orientation:   All   Speech    Rate / Production: Normal     Volume:  Normal    Mood:    Normal   Affect:    Appropriate     Thought Content:  Clear    Thought Form:  Coherent  Goal Directed  Logical    Insight:    Good      Medication Review:   Changes to psychiatric medications, see updated Medication List in EPIC.      Medication Compliance:   Yes     Changes in Health Issues:   None reported     Chemical Use Review:   Substance Use: Chemical use reviewed, no active concerns identified      Tobacco Use: No current tobacco use.      Diagnosis:    Adjustment Disorders  309.28 (F43.23) With mixed anxiety and depressed mood    Major Depressive Disorder, Provisional    Collateral Reports  "Completed:   With pcp as deemed necessary    PLAN: (Patient Tasks / Therapist Tasks / Other)  Make entries in note book for  motivation , gender roles.  increase awareness of under and over performing scipts  Past hw  Begin to read \"How to Improve Your Marriage Without Talking About It\".  Validate 2 strengths in partner, ex provider  / protector  Past hw  Client is aware to call [facility] Servin [facility] to schedule future individual therapy sessions.  Encouraged client to increase engagement in self-care.    Mallika Servin LP 7/16/2020                                                    ______________________________________________________________________    Treatment Plan    Patient's Name: Natalee Maya  YOB: 1970    Date:  7/2/2020    DSM5 Diagnoses: Adjustment Disorders  309.28 (F43.23) With mixed anxiety and depressed mood  Psychosocial / Contextual Factors: marital stress  WHODAS: 17    Referral / Collaboration:  Referral to another professional/service is not indicated at this time..    Anticipated number of session or this episode of care: 12    Adopted on 8- by MARA Servin.    MeasurableTreatment Goal(s) related to diagnosis / functional impairment(s)  Goal 1: Patient will develop techniques for reducing symptoms that result from marital stress and conflict by reporting MADINA-7 and PHQ-9 scores below a 5, where symptoms where symptoms occur fewer than half the days for a minimum of 4 weeks.    Objective #A (Patient Action)    Patient will learn and utilize 3 techniques to manage emotions related to marital stress/conflict.  Status: Continued - Date(s):7/2/2020     Intervention(s)  Therapist will teach relaxation techniques and ways to engage in self-care.    Objective #B  Patient will compile a list of boundaries that they would like to set with others. Utilize the boundaries .  Status: Continued - Date(s):7/2/2020     Intervention(s)  Therapist will teach health boundaries and encourage client to " identify their boundaries and implement them.    Objective #C  Patient will learn & utilize at least 1 assertive communication skills weekly.  Status: Continued - Date(s):7/2/2020     Intervention(s)  Therapist will teach assertive communication skills.    Patient has reviewed and agreed to the above plan.      Mallika Servin LP  7/2/2020

## 2020-08-25 ENCOUNTER — VIRTUAL VISIT (OUTPATIENT)
Dept: PSYCHOLOGY | Facility: CLINIC | Age: 50
End: 2020-08-25
Payer: COMMERCIAL

## 2020-08-25 DIAGNOSIS — F43.23 ADJUSTMENT DISORDER WITH MIXED ANXIETY AND DEPRESSED MOOD: Primary | ICD-10-CM

## 2020-08-25 PROCEDURE — 90834 PSYTX W PT 45 MINUTES: CPT | Mod: GT | Performed by: PSYCHOLOGIST

## 2020-08-25 NOTE — PROGRESS NOTES
Progress Note    Patient Name: Natalee Maya  Date: 8/25/2020         Service Type: Individual- video visit       Session Start Time: 10:20 pm Session End Time:  11:05 pm     Session Length: 38-52 minutes    Session #: 9 overall episode of care. Transfer from MB.   (3 with DB)    Attendees: Client with spouse for 15 minutes       Telemedicine Visit: The patient's condition can be safely assessed and treated via synchronous audio and visual telemedicine encounter.      Reason for Telemedicine Visit: Services only offered telehealth    Originating Site (Patient Location): Patient's home    Distant Site (Provider Location): Provider Remote Setting    Consent:  The patient/guardian has verbally consented to: the potential risks and benefits of telemedicine (video visit) versus in person care; bill my insurance or make self-payment for services provided; and responsibility for payment of non-covered services.        Mode of Communication: Video via Doxy     Treatment Plan Last Reviewed: today, adopted existing [place] plan ( 90 day =    )  PHQ-9 / MADINA-7 : see emr    cgi = 4/4   On  8-  [place] completed on 8-        DATA    DSM5 Diagnoses: Adjustment Disorders  309.28 (F43.23) With mixed anxiety and depressed mood  Interactive Complexity: No  Crisis: No       Progress Since Last Session (Related to Symptoms / Goals / Homework):   Symptoms: stable    Homework: Achieved / completed to satisfaction       Episode of Care Goals: Minimal progress - ACTION (Actively working towards change); Intervened by reinforcing change plan / affirming steps taken     Current / Ongoing Stressors and Concerns:       Interpersonal identity, regulation   Marital stress, trust,  Loss of respect,  invalidation     Treatment Objective(s) Addressed in This Session:   Review homework  Identify stressors and symptoms     Intervention:  Client reports that she feels that there has been  "  good progress with communication with spouse.  Homework: Client did put an accountabiltiy page in the 3 ring binder.  Has insights about her own responses and is beginning to change behaviors.  Is reading the book.     Client now can see - where she communicates that he is not valued.  Explored the concept of being dismissed.    Own when she dismisses things her spouse values    Begin values grid also  \"behavioral fines to re-balance the account\".  Discussed intersection of accountability, gilbert, and gifts.        ASSESSMENT: Current Emotional / Mental Status (status of significant symptoms):   Risk status (Self / Other harm or suicidal ideation)   Patient denies current fears or concerns for personal safety.   Patient denies current or recent suicidal ideation or behaviors.   Patientdenies current or recent homicidal ideation or behaviors.   Patient denies current or recent self injurious behavior or ideation.   Patient denies other safety concerns.   Patient reports there has been no change in risk factors since their last session.     Patientreports there has been no change in protective factors since their last session.     Recommended that patient call 911 or go to the local ED should there be a change in any of these risk factors.     Appearance:   Appropriate    Eye Contact:   Fair    Psychomotor Behavior: Normal    Attitude:   Cooperative  Interested Pleasant   Orientation:   All   Speech    Rate / Production: Normal     Volume:  Normal    Mood:    Normal   Affect:    Appropriate     Thought Content:  Clear    Thought Form:  Coherent  Goal Directed  Logical    Insight:    Good      Medication Review:   Changes to psychiatric medications, see updated Medication List in EPIC.      Medication Compliance:   Yes     Changes in Health Issues:   None reported     Chemical Use Review:   Substance Use: Chemical use reviewed, no active concerns identified      Tobacco Use: No current tobacco use.  " "    Diagnosis:    Adjustment Disorders  309.28 (F43.23) With mixed anxiety and depressed mood    Major Depressive Disorder, Provisional    Collateral Reports Completed:   With pcp as deemed necessary    PLAN: (Patient Tasks / Therapist Tasks / Other)  New page in 3 ring binder- gifts to give, gifts that we posses.  Own when she dismisses things her spouse values.  Read next chapter.  Past hw  Make entries in note book for  motivation , gender roles.  increase awareness of under and over performing scipts  Past hw  Begin to read \"How to Improve Your Marriage Without Talking About It\".  Validate 2 strengths in partner, ex provider  / protector  Past hw  Client is aware to call [facility] Servin [facility] to schedule future individual therapy sessions.  Encouraged client to increase engagement in self-care.    Mallika Servin LP 7/16/2020                                                    ______________________________________________________________________    Treatment Plan    Patient's Name: Natalee Maya  YOB: 1970    Date:  7/2/2020    DSM5 Diagnoses: Adjustment Disorders  309.28 (F43.23) With mixed anxiety and depressed mood  Psychosocial / Contextual Factors: marital stress  WHODAS: 17    Referral / Collaboration:  Referral to another professional/service is not indicated at this time..    Anticipated number of session or this episode of care: 12    Adopted on 8- by MARA Servin.    MeasurableTreatment Goal(s) related to diagnosis / functional impairment(s)  Goal 1: Patient will develop techniques for reducing symptoms that result from marital stress and conflict by reporting MADINA-7 and PHQ-9 scores below a 5, where symptoms where symptoms occur fewer than half the days for a minimum of 4 weeks.    Objective #A (Patient Action)    Patient will learn and utilize 3 techniques to manage emotions related to marital stress/conflict.  Status: Continued - Date(s):7/2/2020     Intervention(s)  Therapist will teach " relaxation techniques and ways to engage in self-care.    Objective #B  Patient will compile a list of boundaries that they would like to set with others. Utilize the boundaries .  Status: Continued - Date(s):7/2/2020     Intervention(s)  Therapist will teach health boundaries and encourage client to identify their boundaries and implement them.    Objective #C  Patient will learn & utilize at least 1 assertive communication skills weekly.  Status: Continued - Date(s):7/2/2020     Intervention(s)  Therapist will teach assertive communication skills.    Patient has reviewed and agreed to the above plan.      Mallika Servin LP  7/2/2020

## 2020-09-10 ENCOUNTER — HOSPITAL ENCOUNTER (OUTPATIENT)
Facility: CLINIC | Age: 50
Setting detail: SPECIMEN
Discharge: HOME | End: 2020-09-10
Attending: INTERNAL MEDICINE | Admitting: INTERNAL MEDICINE
Payer: COMMERCIAL

## 2020-09-10 ENCOUNTER — HOSPITAL ENCOUNTER (OUTPATIENT)
Dept: ULTRASOUND IMAGING | Facility: CLINIC | Age: 50
Discharge: HOME | End: 2020-09-10
Attending: INTERNAL MEDICINE | Admitting: INTERNAL MEDICINE
Payer: COMMERCIAL

## 2020-09-10 DIAGNOSIS — D70.9 NEUTROPENIA, UNSPECIFIED TYPE (H): ICD-10-CM

## 2020-09-10 LAB
ALBUMIN SERPL-MCNC: 3.7 G/DL (ref 3.4–5)
ALP SERPL-CCNC: 64 U/L (ref 40–150)
ALT SERPL W P-5'-P-CCNC: 23 U/L (ref 0–50)
ANION GAP SERPL CALCULATED.3IONS-SCNC: 2 MMOL/L (ref 3–14)
AST SERPL W P-5'-P-CCNC: 25 U/L (ref 0–45)
BASOPHILS # BLD AUTO: 0 10E9/L (ref 0–0.2)
BASOPHILS NFR BLD AUTO: 0.7 %
BILIRUB SERPL-MCNC: 0.6 MG/DL (ref 0.2–1.3)
BUN SERPL-MCNC: 11 MG/DL (ref 7–30)
CALCIUM SERPL-MCNC: 9 MG/DL (ref 8.5–10.1)
CHLORIDE SERPL-SCNC: 106 MMOL/L (ref 94–109)
CO2 SERPL-SCNC: 31 MMOL/L (ref 20–32)
CREAT SERPL-MCNC: 0.6 MG/DL (ref 0.52–1.04)
DIFFERENTIAL METHOD BLD: ABNORMAL
EOSINOPHIL # BLD AUTO: 0.1 10E9/L (ref 0–0.7)
EOSINOPHIL NFR BLD AUTO: 3.6 %
ERYTHROCYTE [DISTWIDTH] IN BLOOD BY AUTOMATED COUNT: 13.5 % (ref 10–15)
GFR SERPL CREATININE-BSD FRML MDRD: >90 ML/MIN/{1.73_M2}
GLUCOSE SERPL-MCNC: 88 MG/DL (ref 70–99)
HBV CORE AB SERPL QL IA: NONREACTIVE
HBV SURFACE AB SERPL IA-ACNC: 0 M[IU]/ML
HBV SURFACE AG SERPL QL IA: NONREACTIVE
HCT VFR BLD AUTO: 39.8 % (ref 35–47)
HCV AB SERPL QL IA: NONREACTIVE
HGB BLD-MCNC: 12.9 G/DL (ref 11.7–15.7)
HIV 1+2 AB+HIV1 P24 AG SERPL QL IA: NONREACTIVE
IMM GRANULOCYTES # BLD: 0 10E9/L (ref 0–0.4)
IMM GRANULOCYTES NFR BLD: 0.4 %
LYMPHOCYTES # BLD AUTO: 1 10E9/L (ref 0.8–5.3)
LYMPHOCYTES NFR BLD AUTO: 35.5 %
MCH RBC QN AUTO: 30.8 PG (ref 26.5–33)
MCHC RBC AUTO-ENTMCNC: 32.4 G/DL (ref 31.5–36.5)
MCV RBC AUTO: 95 FL (ref 78–100)
MONOCYTES # BLD AUTO: 0.4 10E9/L (ref 0–1.3)
MONOCYTES NFR BLD AUTO: 12.9 %
NEUTROPHILS # BLD AUTO: 1.3 10E9/L (ref 1.6–8.3)
NEUTROPHILS NFR BLD AUTO: 46.9 %
NRBC # BLD AUTO: 0 10*3/UL
NRBC BLD AUTO-RTO: 0 /100
PLATELET # BLD AUTO: 209 10E9/L (ref 150–450)
POTASSIUM SERPL-SCNC: 4.4 MMOL/L (ref 3.4–5.3)
PROT SERPL-MCNC: 6.6 G/DL (ref 6.8–8.8)
RBC # BLD AUTO: 4.19 10E12/L (ref 3.8–5.2)
SODIUM SERPL-SCNC: 139 MMOL/L (ref 133–144)
WBC # BLD AUTO: 2.8 10E9/L (ref 4–11)

## 2020-09-10 PROCEDURE — 76700 US EXAM ABDOM COMPLETE: CPT

## 2020-09-10 PROCEDURE — 86803 HEPATITIS C AB TEST: CPT | Performed by: INTERNAL MEDICINE

## 2020-09-10 PROCEDURE — 88184 FLOWCYTOMETRY/ TC 1 MARKER: CPT | Performed by: INTERNAL MEDICINE

## 2020-09-10 PROCEDURE — 36415 COLL VENOUS BLD VENIPUNCTURE: CPT

## 2020-09-10 PROCEDURE — 88185 FLOWCYTOMETRY/TC ADD-ON: CPT | Performed by: INTERNAL MEDICINE

## 2020-09-10 PROCEDURE — 86704 HEP B CORE ANTIBODY TOTAL: CPT | Performed by: INTERNAL MEDICINE

## 2020-09-10 PROCEDURE — 40001005 ZZHCL STATISTIC FLOW >15 ABY TC 88189: Performed by: INTERNAL MEDICINE

## 2020-09-10 PROCEDURE — 80053 COMPREHEN METABOLIC PANEL: CPT | Performed by: INTERNAL MEDICINE

## 2020-09-10 PROCEDURE — 87389 HIV-1 AG W/HIV-1&-2 AB AG IA: CPT | Performed by: INTERNAL MEDICINE

## 2020-09-10 PROCEDURE — 87340 HEPATITIS B SURFACE AG IA: CPT | Performed by: INTERNAL MEDICINE

## 2020-09-10 PROCEDURE — 85025 COMPLETE CBC W/AUTO DIFF WBC: CPT | Performed by: INTERNAL MEDICINE

## 2020-09-10 PROCEDURE — 86706 HEP B SURFACE ANTIBODY: CPT | Performed by: INTERNAL MEDICINE

## 2020-09-10 NOTE — PROGRESS NOTES
Medical Assistant Note:  Natalee Maya presents today for blood draw.    Patient seen by provider today: No.   present during visit today: Not Applicable.    Concerns: No Concerns.    Procedure:  Labs drawn    Post Assessment:  Labs drawn without difficulty: Yes.    Discharge Plan:  Departure Mode: Ambulatory.    Face to Face Time: 10 min.    Carolyn Varner CMA

## 2020-09-11 LAB — COPATH REPORT: NORMAL

## 2020-09-17 ENCOUNTER — VIRTUAL VISIT (OUTPATIENT)
Dept: ONCOLOGY | Facility: CLINIC | Age: 50
End: 2020-09-17
Attending: INTERNAL MEDICINE
Payer: COMMERCIAL

## 2020-09-17 DIAGNOSIS — D70.9 NEUTROPENIA, UNSPECIFIED TYPE (H): Primary | ICD-10-CM

## 2020-09-17 PROCEDURE — 40001009 ZZH VIDEO/TELEPHONE VISIT; NO CHARGE

## 2020-09-17 PROCEDURE — 99212 OFFICE O/P EST SF 10 MIN: CPT | Mod: GT | Performed by: INTERNAL MEDICINE

## 2020-09-17 NOTE — PROGRESS NOTES
"Natalee Maya is a 50 year old female who is being evaluated via a billable video visit.      The patient has been notified of following:     \"This video visit will be conducted via a call between you and your physician/provider. We have found that certain health care needs can be provided without the need for an in-person physical exam.  This service lets us provide the care you need with a video conversation.  If a prescription is necessary we can send it directly to your pharmacy.  If lab work is needed we can place an order for that and you can then stop by our lab to have the test done at a later time.    Video visits are billed at different rates depending on your insurance coverage.  Please reach out to your insurance provider with any questions.    If during the course of the call the physician/provider feels a video visit is not appropriate, you will not be charged for this service.\"    Patient has given verbal consent for Video visit? Yes  How would you like to obtain your AVS? MyChart  If you are dropped from the video visit, the video invite should be resent to: Text to cell phone: [phone]  Will anyone else be joining your video visit? No      Video-Visit Details    Type of service:  Video Visit    Video Start Time: 10:20 am  Video End Time: 10:28 AM    Originating Location (pt. Location): Home    Distant Location (provider location):  [facility]     Platform used for Video Visit: Well        [facility]    Hematology/Oncology Established Patient Note      Today's Date: 07/23/20    Reason for Follow-up: leukopenia      HISTORY OF PRESENT ILLNESS: Natalee Maya is a 50 year old female with PMHx of hypothyroidism who presents with leukopenia.  She was found on labs in June 2020 to have low WBC of 1.9K.  Repeat checks have been in the 2K range.  Hemoglobin and platelet count were normal.  Differential shows a moderate to mild neutropenia.  On further chart " review, she had a mild leukopenia in the 3K range back in 2018, but did recover back to normal a few months later.  There are not many labs prior to that.  Iron, ferritin, vitamin 12, and folate were normal.  Peripheral smear shows low WBC, but non-specific.      She denies recent illness, infections, fever.  Her Prozac dose increased a few months ago.  Otherwise, she has not started any new medications recently.      She does not smoke.  She drinks a glass of wine maybe once or twice a month.  She denies illicit drug use.      INTERIM HISTORY: Natalee says that she feels fine.  She denies any new symptoms.  No recent fevers or infections.        REVIEW OF SYSTEMS:   14 point ROS was reviewed and is negative other than as noted above in HPI.       HOME MEDICATIONS:  Current Outpatient Medications   Medication Sig Dispense Refill     Ferrous Sulfate (IRON) 325 (65 Fe) MG tablet TAKE ONE TABLET BY MOUTH EVERY DAY, AVOID AROUND SAME TIME AS LEVOTHYROXINE 90 tablet 3     folic acid (FOLVITE) 1 MG tablet TAKE ONE TABLET BY MOUTH EVERY DAY 90 tablet 1     levothyroxine (SYNTHROID/LEVOTHROID) 50 MCG tablet Take 1 tablet (50 mcg) by mouth daily 90 tablet 2     phentermine (ADIPEX-P) 37.5 MG tablet Take 1 tablet (37.5 mg) by mouth every morning (before breakfast) 30 tablet 3     FLUoxetine (PROZAC) 20 MG capsule Take 2 capsules (40 mg) by mouth daily (Patient not taking: Reported on 2020) 180 capsule 3         ALLERGIES:  Allergies   Allergen Reactions     No Known Drug Allergies          PAST MEDICAL HISTORY:  Past Medical History:   Diagnosis Date     Calculus of kidney     History of kidney stone -- Abstracted 02     Lymphedema of lower extremity          PAST SURGICAL HISTORY:  Past Surgical History:   Procedure Laterality Date     C NONSPECIFIC PROCEDURE       -- Abstracted 02     FOOT SURGERY      2017         SOCIAL HISTORY:  Social History     Socioeconomic History     Marital status:       Spouse name: Not on file     Number of children: Not on file     Years of education: Not on file     Highest education level: Not on file   Occupational History     Not on file   Social Needs     Financial resource strain: Not on file     Food insecurity     Worry: Not on file     Inability: Not on file     Transportation needs     Medical: Not on file     Non-medical: Not on file   Tobacco Use     Smoking status: Never Smoker     Smokeless tobacco: Never Used   Substance and Sexual Activity     Alcohol use: No     Drug use: No     Sexual activity: Yes     Partners: Male   Lifestyle     Physical activity     Days per week: Not on file     Minutes per session: Not on file     Stress: Not on file   Relationships     Social connections     Talks on phone: Not on file     Gets together: Not on file     Attends Jewish service: Not on file     Active member of club or organization: Not on file     Attends meetings of clubs or organizations: Not on file     Relationship status: Not on file     Intimate partner violence     Fear of current or ex partner: Not on file     Emotionally abused: Not on file     Physically abused: Not on file     Forced sexual activity: Not on file   Other Topics Concern     Parent/sibling w/ CABG, MI or angioplasty before 65F 55M? Not Asked   Social History Narrative     Not on file         FAMILY HISTORY:  Family History   Problem Relation Age of Onset     Diabetes Father      C.A.D. Father         hyperlipidemia     Bleeding Disorder Mother         Factor 5     Breast Cancer No family hx of          PHYSICAL EXAM:  Vital signs:  There were no vitals taken for this visit.   ECO  GENERAL/CONSTITUTIONAL: No acute distress. Healthy, alert.  EYES: No scleral icterus.  No redness or discharge.    RESPIRATORY: No audible wheeze, cough, or visible cyanosis.  No visible retractions or increased work of breathing.  Able to speak fully in complete sentences.  MUSCULOSKELETAL: Normal  range of motion.  NEUROLOGIC: Alert, oriented, answers questions appropriately. No tremor. Mentation intact and speech normal  INTEGUMENTARY: No jaundice.  No obvious rash or skin lesions.  PSYCHIATRIC:  Mentation appears normal, affect normal/bright, judgement and insight intact, normal speech and appearance well-groomed.    The rest of a comprehensive physical exam is deferred due to public [facility] emergency video visit restrictions.      LABS:  CBC RESULTS:   Recent Labs   Lab Test 09/10/20  0808   WBC 2.8*   RBC 4.19   HGB 12.9   HCT 39.8   MCV 95   MCH 30.8   MCHC 32.4   RDW 13.5          Recent Labs   Lab Test 09/10/20  0808 06/29/20  1107 12/18/19  1532     --  140   POTASSIUM 4.4  --  3.7   CHLORIDE 106  --  107   CO2 31  --  25   ANIONGAP 2*  --  8   GLC 88 87 85   BUN 11  --  13   CR 0.60  --  0.60   SAGE 9.0  --  8.8     Lab Results   Component Value Date    AST 25 09/10/2020     Lab Results   Component Value Date    ALT 23 09/10/2020     No results found for: BILICONJ   Lab Results   Component Value Date    BILITOTAL 0.6 09/10/2020     Lab Results   Component Value Date    ALBUMIN 3.7 09/10/2020     Lab Results   Component Value Date    PROTTOTAL 6.6 09/10/2020      Lab Results   Component Value Date    ALKPHOS 64 09/10/2020     Component      Latest Ref Rng & Units 9/10/2020   HIV Antigen Antibody Combo      NR:Nonreactive     Nonreactive   Hepatitis C Antibody      NR:Nonreactive Nonreactive   Hep B Surface Agn      NR:Nonreactive Nonreactive   Hepatitis B Surface Antibody      <8.00 m[IU]/mL 0.00       Peripheral flow:  INTERPRETATION:   Blood:        Polytypic B cells        No aberrant immunophenotype on T cells        See comment     COMMENT:   There is no immunophenotypic evidence of non-Hodgkin lymphoma or lymphoid   leukemia.  Final interpretation   requires correlation with morphologic and clinical features.       PATHOLOGY:  Peripheral smear 6/30/20:  FINAL DIAGNOSIS:   Peripheral  Blood Morphology-   -Moderate leukopenia with moderate absolute neutropenia.   -See comment.     COMMENT:   The patient has history of intermittent leukopenia (see prior peripheral   blood morphology; [id number], 8/2018.   Absolute neutropenia appears to be a new finding. Possible causes of   neutropenia include drugs, infection,   autoimmune/immune-mediated disorders, hypersplenism, and bone marrow   disorder. At present, there is   insufficient morphologic evidence to suggest a clonal bone marrow   disorder. Serum vitamin B12/folate   deficiency appears as an unlikely cause, given normal reported levels in   this patient. Serum ferritin level is   additionally within normal limits. If absolute neutropenia persists   without clinical explanation, TCR-V beta   flow cytometric analysis of peripheral blood may be considered for further    evaluation.     IMAGING:  Abdomen ultrasound 9/10/20:  Unremarkable complete abdominal ultrasound.      ASSESSMENT/PLAN:  Natalee Maya is a 50 year old female with:    Leukopenia: Differential with moderate to mild neutropenia.  Other two cell lines are normal.  This seems to be a fairly new finding, although it happened to a milder and short extent in 2018.  She has not had recent infections.  She has not started new medications.  She has no history of hepatitis or HIV.  She does not drink alcohol frequently.  We discussed other potential causes including autoimmune or primary bone marrow disorder.  Abdomen ultrasound was normal; no hepatosplenomegaly.  Her WBC remains low, but improved to 2.8K.  She remains asymptomatic.  We discussed options of continued observation or proceeding with bone marrow biopsy.  She would rather not do a bone marrow biopsy now.  Since her WBC is stable, we decided to continue observation with CBC every 2 months.  If there is persistent downward trend of WBC, then we will do bone marrow biopsy.  Natalee is happy with this plan.    -CBC/diff in 2  months  -RTC in 4 months with CBC/diff          Lissa Gibson MD  Hematology/Oncology  [facility]

## 2020-09-17 NOTE — LETTER
"    9/17/2020         RE: Natalee Maya  [address] [facility] [address]        Dear Colleague,    Thank you for referring your patient, Natalee Maya, to the [facility]. Please see a copy of my visit note below.    Natalee Maya is a 50 year old female who is being evaluated via a billable video visit.      The patient has been notified of following:     \"This video visit will be conducted via a call between you and your physician/provider. We have found that certain health care needs can be provided without the need for an in-person physical exam.  This service lets us provide the care you need with a video conversation.  If a prescription is necessary we can send it directly to your pharmacy.  If lab work is needed we can place an order for that and you can then stop by our lab to have the test done at a later time.    Video visits are billed at different rates depending on your insurance coverage.  Please reach out to your insurance provider with any questions.    If during the course of the call the physician/provider feels a video visit is not appropriate, you will not be charged for this service.\"    Patient has given verbal consent for Video visit? Yes  How would you like to obtain your AVS? MyChart  If you are dropped from the video visit, the video invite should be resent to: Text to cell phone: [phone]  Will anyone else be joining your video visit? No      Video-Visit Details    Type of service:  Video Visit    Video Start Time: 10:20 am  Video End Time: 10:28 AM    Originating Location (pt. Location): Home    Distant Location (provider location):  [facility]     Platform used for Video Visit: Well        [facility]    Hematology/Oncology Established Patient Note      Today's Date: 07/23/20    Reason for Follow-up: leukopenia      HISTORY OF PRESENT ILLNESS: Natalee Maya is a 50 year old female with PMHx of hypothyroidism " who presents with leukopenia.  She was found on labs in June 2020 to have low WBC of 1.9K.  Repeat checks have been in the 2K range.  Hemoglobin and platelet count were normal.  Differential shows a moderate to mild neutropenia.  On further chart review, she had a mild leukopenia in the 3K range back in 2018, but did recover back to normal a few months later.  There are not many labs prior to that.  Iron, ferritin, vitamin 12, and folate were normal.  Peripheral smear shows low WBC, but non-specific.      She denies recent illness, infections, fever.  Her Prozac dose increased a few months ago.  Otherwise, she has not started any new medications recently.      She does not smoke.  She drinks a glass of wine maybe once or twice a month.  She denies illicit drug use.      INTERIM HISTORY: Natalee says that she feels fine.  She denies any new symptoms.  No recent fevers or infections.        REVIEW OF SYSTEMS:   14 point ROS was reviewed and is negative other than as noted above in HPI.       HOME MEDICATIONS:  Current Outpatient Medications   Medication Sig Dispense Refill     Ferrous Sulfate (IRON) 325 (65 Fe) MG tablet TAKE ONE TABLET BY MOUTH EVERY DAY, AVOID AROUND SAME TIME AS LEVOTHYROXINE 90 tablet 3     folic acid (FOLVITE) 1 MG tablet TAKE ONE TABLET BY MOUTH EVERY DAY 90 tablet 1     levothyroxine (SYNTHROID/LEVOTHROID) 50 MCG tablet Take 1 tablet (50 mcg) by mouth daily 90 tablet 2     phentermine (ADIPEX-P) 37.5 MG tablet Take 1 tablet (37.5 mg) by mouth every morning (before breakfast) 30 tablet 3     FLUoxetine (PROZAC) 20 MG capsule Take 2 capsules (40 mg) by mouth daily (Patient not taking: Reported on 9/17/2020) 180 capsule 3         ALLERGIES:  Allergies   Allergen Reactions     No Known Drug Allergies          PAST MEDICAL HISTORY:  Past Medical History:   Diagnosis Date     Calculus of kidney     History of kidney stone -- Abstracted 7/22/02     Lymphedema of lower extremity          PAST SURGICAL  HISTORY:  Past Surgical History:   Procedure Laterality Date     C NONSPECIFIC PROCEDURE       -- Abstracted 02     FOOT SURGERY      2017         SOCIAL HISTORY:  Social History     Socioeconomic History     Marital status:      Spouse name: Not on file     Number of children: Not on file     Years of education: Not on file     Highest education level: Not on file   Occupational History     Not on file   Social Needs     Financial resource strain: Not on file     Food insecurity     Worry: Not on file     Inability: Not on file     Transportation needs     Medical: Not on file     Non-medical: Not on file   Tobacco Use     Smoking status: Never Smoker     Smokeless tobacco: Never Used   Substance and Sexual Activity     Alcohol use: No     Drug use: No     Sexual activity: Yes     Partners: Male   Lifestyle     Physical activity     Days per week: Not on file     Minutes per session: Not on file     Stress: Not on file   Relationships     Social connections     Talks on phone: Not on file     Gets together: Not on file     Attends Uats[employer] service: Not on file     Active member of club or organization: Not on file     Attends meetings of clubs or organizations: Not on file     Relationship status: Not on file     Intimate partner violence     Fear of current or ex partner: Not on file     Emotionally abused: Not on file     Physically abused: Not on file     Forced sexual activity: Not on file   Other Topics Concern     Parent/sibling w/ CABG, MI or angioplasty before 65F 55M? Not Asked   Social History Narrative     Not on file         FAMILY HISTORY:  Family History   Problem Relation Age of Onset     Diabetes Father      C.A.D. Father         hyperlipidemia     Bleeding Disorder Mother         Factor 5     Breast Cancer No family hx of          PHYSICAL EXAM:  Vital signs:  There were no vitals taken for this visit.   ECO  GENERAL/CONSTITUTIONAL: No acute distress. Healthy,  alert.  EYES: No scleral icterus.  No redness or discharge.    RESPIRATORY: No audible wheeze, cough, or visible cyanosis.  No visible retractions or increased work of breathing.  Able to speak fully in complete sentences.  MUSCULOSKELETAL: Normal range of motion.  NEUROLOGIC: Alert, oriented, answers questions appropriately. No tremor. Mentation intact and speech normal  INTEGUMENTARY: No jaundice.  No obvious rash or skin lesions.  PSYCHIATRIC:  Mentation appears normal, affect normal/bright, judgement and insight intact, normal speech and appearance well-groomed.    The rest of a comprehensive physical exam is deferred due to public [facility] emergency video visit restrictions.      LABS:  CBC RESULTS:   Recent Labs   Lab Test 09/10/20  0808   WBC 2.8*   RBC 4.19   HGB 12.9   HCT 39.8   MCV 95   MCH 30.8   MCHC 32.4   RDW 13.5          Recent Labs   Lab Test 09/10/20  0808 06/29/20  1107 12/18/19  1532     --  140   POTASSIUM 4.4  --  3.7   CHLORIDE 106  --  107   CO2 31  --  25   ANIONGAP 2*  --  8   GLC 88 87 85   BUN 11  --  13   CR 0.60  --  0.60   SAGE 9.0  --  8.8     Lab Results   Component Value Date    AST 25 09/10/2020     Lab Results   Component Value Date    ALT 23 09/10/2020     No results found for: BILICONJ   Lab Results   Component Value Date    BILITOTAL 0.6 09/10/2020     Lab Results   Component Value Date    ALBUMIN 3.7 09/10/2020     Lab Results   Component Value Date    PROTTOTAL 6.6 09/10/2020      Lab Results   Component Value Date    ALKPHOS 64 09/10/2020     Component      Latest Ref Rng & Units 9/10/2020   HIV Antigen Antibody Combo      NR:Nonreactive     Nonreactive   Hepatitis C Antibody      NR:Nonreactive Nonreactive   Hep B Surface Agn      NR:Nonreactive Nonreactive   Hepatitis B Surface Antibody      <8.00 m[IU]/mL 0.00       Peripheral flow:  INTERPRETATION:   Blood:        Polytypic B cells        No aberrant immunophenotype on T cells        See comment     COMMENT:    There is no immunophenotypic evidence of non-Hodgkin lymphoma or lymphoid   leukemia.  Final interpretation   requires correlation with morphologic and clinical features.       PATHOLOGY:  Peripheral smear 6/30/20:  FINAL DIAGNOSIS:   Peripheral Blood Morphology-   -Moderate leukopenia with moderate absolute neutropenia.   -See comment.     COMMENT:   The patient has history of intermittent leukopenia (see prior peripheral   blood morphology; [id number], 8/2018.   Absolute neutropenia appears to be a new finding. Possible causes of   neutropenia include drugs, infection,   autoimmune/immune-mediated disorders, hypersplenism, and bone marrow   disorder. At present, there is   insufficient morphologic evidence to suggest a clonal bone marrow   disorder. Serum vitamin B12/folate   deficiency appears as an unlikely cause, given normal reported levels in   this patient. Serum ferritin level is   additionally within normal limits. If absolute neutropenia persists   without clinical explanation, TCR-V beta   flow cytometric analysis of peripheral blood may be considered for further    evaluation.     IMAGING:  Abdomen ultrasound 9/10/20:  Unremarkable complete abdominal ultrasound.      ASSESSMENT/PLAN:  Natalee Maya is a 50 year old female with:    Leukopenia: Differential with moderate to mild neutropenia.  Other two cell lines are normal.  This seems to be a fairly new finding, although it happened to a milder and short extent in 2018.  She has not had recent infections.  She has not started new medications.  She has no history of hepatitis or HIV.  She does not drink alcohol frequently.  We discussed other potential causes including autoimmune or primary bone marrow disorder.  Abdomen ultrasound was normal; no hepatosplenomegaly.  Her WBC remains low, but improved to 2.8K.  She remains asymptomatic.  We discussed options of continued observation or proceeding with bone marrow biopsy.  She would rather not do a  bone marrow biopsy now.  Since her WBC is stable, we decided to continue observation with CBC every 2 months.  If there is persistent downward trend of WBC, then we will do bone marrow biopsy.  Natalee is happy with this plan.    -CBC/diff in 2 months  -RTC in 4 months with CBC/diff          Lissa Gibson MD  Hematology/Oncology  [facility]          Again, thank you for allowing me to participate in the care of your patient.        Sincerely,        Lissa Gibson MD

## 2020-09-17 NOTE — LETTER
"    9/17/2020         RE: Natalee Maya  [address] [facility] [address]        Dear Colleague,    Thank you for referring your patient, Natalee Maya, to the [facility]. Please see a copy of my visit note below.    Natalee Maya is a 50 year old female who is being evaluated via a billable video visit.      The patient has been notified of following:     \"This video visit will be conducted via a call between you and your physician/provider. We have found that certain health care needs can be provided without the need for an in-person physical exam.  This service lets us provide the care you need with a video conversation.  If a prescription is necessary we can send it directly to your pharmacy.  If lab work is needed we can place an order for that and you can then stop by our lab to have the test done at a later time.    Video visits are billed at different rates depending on your insurance coverage.  Please reach out to your insurance provider with any questions.    If during the course of the call the physician/provider feels a video visit is not appropriate, you will not be charged for this service.\"    Patient has given verbal consent for Video visit? Yes  How would you like to obtain your AVS? MyChart  If you are dropped from the video visit, the video invite should be resent to: Text to cell phone: [phone]  Will anyone else be joining your video visit? No      Video-Visit Details    Type of service:  Video Visit    Video Start Time: 10:20 am  Video End Time: 10:28 AM    Originating Location (pt. Location): Home    Distant Location (provider location):  [facility]     Platform used for Video Visit: Well        [facility]    Hematology/Oncology Established Patient Note      Today's Date: 07/23/20    Reason for Follow-up: leukopenia      HISTORY OF PRESENT ILLNESS: Natalee Maya is a 50 year old female with PMHx of hypothyroidism " who presents with leukopenia.  She was found on labs in June 2020 to have low WBC of 1.9K.  Repeat checks have been in the 2K range.  Hemoglobin and platelet count were normal.  Differential shows a moderate to mild neutropenia.  On further chart review, she had a mild leukopenia in the 3K range back in 2018, but did recover back to normal a few months later.  There are not many labs prior to that.  Iron, ferritin, vitamin 12, and folate were normal.  Peripheral smear shows low WBC, but non-specific.      She denies recent illness, infections, fever.  Her Prozac dose increased a few months ago.  Otherwise, she has not started any new medications recently.      She does not smoke.  She drinks a glass of wine maybe once or twice a month.  She denies illicit drug use.      INTERIM HISTORY: Natalee says that she feels fine.  She denies any new symptoms.  No recent fevers or infections.        REVIEW OF SYSTEMS:   14 point ROS was reviewed and is negative other than as noted above in HPI.       HOME MEDICATIONS:  Current Outpatient Medications   Medication Sig Dispense Refill     Ferrous Sulfate (IRON) 325 (65 Fe) MG tablet TAKE ONE TABLET BY MOUTH EVERY DAY, AVOID AROUND SAME TIME AS LEVOTHYROXINE 90 tablet 3     folic acid (FOLVITE) 1 MG tablet TAKE ONE TABLET BY MOUTH EVERY DAY 90 tablet 1     levothyroxine (SYNTHROID/LEVOTHROID) 50 MCG tablet Take 1 tablet (50 mcg) by mouth daily 90 tablet 2     phentermine (ADIPEX-P) 37.5 MG tablet Take 1 tablet (37.5 mg) by mouth every morning (before breakfast) 30 tablet 3     FLUoxetine (PROZAC) 20 MG capsule Take 2 capsules (40 mg) by mouth daily (Patient not taking: Reported on 9/17/2020) 180 capsule 3         ALLERGIES:  Allergies   Allergen Reactions     No Known Drug Allergies          PAST MEDICAL HISTORY:  Past Medical History:   Diagnosis Date     Calculus of kidney     History of kidney stone -- Abstracted 7/22/02     Lymphedema of lower extremity          PAST SURGICAL  HISTORY:  Past Surgical History:   Procedure Laterality Date     C NONSPECIFIC PROCEDURE       -- Abstracted 02     FOOT SURGERY      2017         SOCIAL HISTORY:  Social History     Socioeconomic History     Marital status:      Spouse name: Not on file     Number of children: Not on file     Years of education: Not on file     Highest education level: Not on file   Occupational History     Not on file   Social Needs     Financial resource strain: Not on file     Food insecurity     Worry: Not on file     Inability: Not on file     Transportation needs     Medical: Not on file     Non-medical: Not on file   Tobacco Use     Smoking status: Never Smoker     Smokeless tobacco: Never Used   Substance and Sexual Activity     Alcohol use: No     Drug use: No     Sexual activity: Yes     Partners: Male   Lifestyle     Physical activity     Days per week: Not on file     Minutes per session: Not on file     Stress: Not on file   Relationships     Social connections     Talks on phone: Not on file     Gets together: Not on file     Attends Druze service: Not on file     Active member of club or organization: Not on file     Attends meetings of clubs or organizations: Not on file     Relationship status: Not on file     Intimate partner violence     Fear of current or ex partner: Not on file     Emotionally abused: Not on file     Physically abused: Not on file     Forced sexual activity: Not on file   Other Topics Concern     Parent/sibling w/ CABG, MI or angioplasty before 65F 55M? Not Asked   Social History Narrative     Not on file         FAMILY HISTORY:  Family History   Problem Relation Age of Onset     Diabetes Father      C.A.D. Father         hyperlipidemia     Bleeding Disorder Mother         Factor 5     Breast Cancer No family hx of          PHYSICAL EXAM:  Vital signs:  There were no vitals taken for this visit.   ECO  GENERAL/CONSTITUTIONAL: No acute distress. Healthy,  alert.  EYES: No scleral icterus.  No redness or discharge.    RESPIRATORY: No audible wheeze, cough, or visible cyanosis.  No visible retractions or increased work of breathing.  Able to speak fully in complete sentences.  MUSCULOSKELETAL: Normal range of motion.  NEUROLOGIC: Alert, oriented, answers questions appropriately. No tremor. Mentation intact and speech normal  INTEGUMENTARY: No jaundice.  No obvious rash or skin lesions.  PSYCHIATRIC:  Mentation appears normal, affect normal/bright, judgement and insight intact, normal speech and appearance well-groomed.    The rest of a comprehensive physical exam is deferred due to public [facility] emergency video visit restrictions.      LABS:  CBC RESULTS:   Recent Labs   Lab Test 09/10/20  0808   WBC 2.8*   RBC 4.19   HGB 12.9   HCT 39.8   MCV 95   MCH 30.8   MCHC 32.4   RDW 13.5          Recent Labs   Lab Test 09/10/20  0808 06/29/20  1107 12/18/19  1532     --  140   POTASSIUM 4.4  --  3.7   CHLORIDE 106  --  107   CO2 31  --  25   ANIONGAP 2*  --  8   GLC 88 87 85   BUN 11  --  13   CR 0.60  --  0.60   SAGE 9.0  --  8.8     Lab Results   Component Value Date    AST 25 09/10/2020     Lab Results   Component Value Date    ALT 23 09/10/2020     No results found for: BILICONJ   Lab Results   Component Value Date    BILITOTAL 0.6 09/10/2020     Lab Results   Component Value Date    ALBUMIN 3.7 09/10/2020     Lab Results   Component Value Date    PROTTOTAL 6.6 09/10/2020      Lab Results   Component Value Date    ALKPHOS 64 09/10/2020     Component      Latest Ref Rng & Units 9/10/2020   HIV Antigen Antibody Combo      NR:Nonreactive     Nonreactive   Hepatitis C Antibody      NR:Nonreactive Nonreactive   Hep B Surface Agn      NR:Nonreactive Nonreactive   Hepatitis B Surface Antibody      <8.00 m[IU]/mL 0.00       Peripheral flow:  INTERPRETATION:   Blood:        Polytypic B cells        No aberrant immunophenotype on T cells        See comment     COMMENT:    There is no immunophenotypic evidence of non-Hodgkin lymphoma or lymphoid   leukemia.  Final interpretation   requires correlation with morphologic and clinical features.       PATHOLOGY:  Peripheral smear 6/30/20:  FINAL DIAGNOSIS:   Peripheral Blood Morphology-   -Moderate leukopenia with moderate absolute neutropenia.   -See comment.     COMMENT:   The patient has history of intermittent leukopenia (see prior peripheral   blood morphology; [id number], 8/2018.   Absolute neutropenia appears to be a new finding. Possible causes of   neutropenia include drugs, infection,   autoimmune/immune-mediated disorders, hypersplenism, and bone marrow   disorder. At present, there is   insufficient morphologic evidence to suggest a clonal bone marrow   disorder. Serum vitamin B12/folate   deficiency appears as an unlikely cause, given normal reported levels in   this patient. Serum ferritin level is   additionally within normal limits. If absolute neutropenia persists   without clinical explanation, TCR-V beta   flow cytometric analysis of peripheral blood may be considered for further    evaluation.     IMAGING:  Abdomen ultrasound 9/10/20:  Unremarkable complete abdominal ultrasound.      ASSESSMENT/PLAN:  Natalee Maya is a 50 year old female with:    Leukopenia: Differential with moderate to mild neutropenia.  Other two cell lines are normal.  This seems to be a fairly new finding, although it happened to a milder and short extent in 2018.  She has not had recent infections.  She has not started new medications.  She has no history of hepatitis or HIV.  She does not drink alcohol frequently.  We discussed other potential causes including autoimmune or primary bone marrow disorder.  Abdomen ultrasound was normal; no hepatosplenomegaly.  Her WBC remains low, but improved to 2.8K.  She remains asymptomatic.  We discussed options of continued observation or proceeding with bone marrow biopsy.  She would rather not do a  bone marrow biopsy now.  Since her WBC is stable, we decided to continue observation with CBC every 2 months.  If there is persistent downward trend of WBC, then we will do bone marrow biopsy.  Natalee is happy with this plan.    -CBC/diff in 2 months  -RTC in 4 months with CBC/diff          Lissa Gibson MD  Hematology/Oncology  [facility]          Again, thank you for allowing me to participate in the care of your patient.        Sincerely,        Lissa Gibson MD

## 2020-09-18 NOTE — PATIENT INSTRUCTIONS
Labs scheduled 11/19/20 and 01/07/21  Appt with Dr. Gibson scheduled 01/14/21  Tia Garcia RN on 9/18/2020 at 12:58 PM

## 2020-10-16 DIAGNOSIS — E66.01 MORBID OBESITY (H): ICD-10-CM

## 2020-10-16 RX ORDER — PHENTERMINE HYDROCHLORIDE 37.5 MG/1
37.5 TABLET ORAL
Qty: 30 TABLET | Refills: 0 | Status: SHIPPED | OUTPATIENT
Start: 2020-10-16 | End: 2020-11-06

## 2020-10-16 NOTE — TELEPHONE ENCOUNTER
Routing refill request to provider for review/approval because:  Drug not on the [facility] refill protocol     Tiara Soriano RN   [facility] -- Triage Nurse

## 2020-10-16 NOTE — TELEPHONE ENCOUNTER
Patient has appointment in November.  One month supply of Phentermine approved.  Please fax the Rx to her pharmacy.  Thanks,  Celestina Gray NP  Endocrinology     [] : No

## 2020-11-05 ENCOUNTER — MYC MEDICAL ADVICE (OUTPATIENT)
Dept: ENDOCRINOLOGY | Facility: CLINIC | Age: 50
End: 2020-11-05

## 2020-11-06 ENCOUNTER — MYC MEDICAL ADVICE (OUTPATIENT)
Dept: ENDOCRINOLOGY | Facility: CLINIC | Age: 50
End: 2020-11-06

## 2020-11-06 ENCOUNTER — VIRTUAL VISIT (OUTPATIENT)
Dept: ENDOCRINOLOGY | Facility: CLINIC | Age: 50
End: 2020-11-06
Payer: COMMERCIAL

## 2020-11-06 DIAGNOSIS — E03.8 SUBCLINICAL HYPOTHYROIDISM: ICD-10-CM

## 2020-11-06 DIAGNOSIS — E66.01 MORBID OBESITY (H): Primary | ICD-10-CM

## 2020-11-06 PROCEDURE — 99213 OFFICE O/P EST LOW 20 MIN: CPT | Mod: TEL | Performed by: CLINICAL NURSE SPECIALIST

## 2020-11-06 RX ORDER — PHENTERMINE HYDROCHLORIDE 37.5 MG/1
37.5 CAPSULE ORAL EVERY MORNING
Qty: 32 CAPSULE | Refills: 3 | Status: SHIPPED | OUTPATIENT
Start: 2020-11-06 | End: 2021-03-26

## 2020-11-06 RX ORDER — LEVOTHYROXINE SODIUM 50 UG/1
50 TABLET ORAL DAILY
Qty: 90 TABLET | Refills: 2 | Status: SHIPPED | OUTPATIENT
Start: 2020-11-06 | End: 2020-11-21

## 2020-11-06 NOTE — LETTER
"    11/6/2020         RE: Natalee Maya  [address]  [facility] [address]        Dear Colleague,    Thank you for referring your patient, Natalee Maya, to the [facility]. Please see a copy of my visit note below.    Natalee Maya is a 50 year old female who is being evaluated via a billable telephone visit.      The patient has been notified of following:     \"This telephone visit will be conducted via a call between you and your physician/provider. We have found that certain health care needs can be provided without the need for a physical exam.  This service lets us provide the care you need with a short phone conversation.  If a prescription is necessary we can send it directly to your pharmacy.  If lab work is needed we can place an order for that and you can then stop by our lab to have the test done at a later time.    Telephone visits are billed at different rates depending on your insurance coverage. During this emergency period, for some insurers they may be billed the same as an in-person visit.  Please reach out to your insurance provider with any questions.    If during the course of the call the physician/provider feels a telephone visit is not appropriate, you will not be charged for this service.\"    Patient has given verbal consent for Telephone visit?  Yes    What phone number would you like to be contacted at? [phone]    How would you like to obtain your AVS? MyChart      Name: Natalee Maya  F/u for obesity (Last visit 7/17/2020).  HPI:  Natalee Maya is a 50 year old female who presents via phone visit for the management of obesity    Gained weight with 3 pregnancies the life got busy and gain more weight.    Was able to lose 20-25 lbs with diet + exercise this past summer.  Then had foot surgery and was non-weight bearing x 6 weeks - gained back all the weight she had lost.  Was unable to lose weight after she resumed diet " efforts + exercise.  Struggled with appetite control.    Currently treated with Phentermine 37.5 mg daily.  Tolerating Phentermine well.  Has lost 61 lbs total, 241 --> 180 lbs.  Her goal is 160 lbs.  Planned to start topamax - she filled the Rx but didn't start it, preferred to hold off for now.    Diet: Does not like to count calories or follow fad diets.  Continued using portion control, healthy food choices, no junk food + intermittent fasting.  Exercise: Yes, plans to resume regular walking  Hypothyroidism.  + FH of mother with hypothyroidism.  Currently treated with levothyroxine 50 mcg/day.  PMH/PSH:  Past Medical History:   Diagnosis Date     Calculus of kidney     History of kidney stone -- Abstracted 02     Lymphedema of lower extremity      Past Surgical History:   Procedure Laterality Date     FOOT SURGERY      2017     [facility] NONSPECIFIC PROCEDURE       -- Abstracted 02     Family Hx:  Family History   Problem Relation Age of Onset     Diabetes Father      C.A.D. Father         hyperlipidemia     Bleeding Disorder Mother         Factor 5     Breast Cancer No family hx of      Thyroid disease: Yes mother and MGM with hypothyroidism      DM2: Yes, father        Autoimmune: DM1, SLE, RA, Vitiligo     Social Hx:  Social History     Socioeconomic History     Marital status:      Spouse name: Not on file     Number of children: Not on file     Years of education: Not on file     Highest education level: Not on file   Occupational History     Not on file   Social Needs     Financial resource strain: Not on file     Food insecurity     Worry: Not on file     Inability: Not on file     Transportation needs     Medical: Not on file     Non-medical: Not on file   Tobacco Use     Smoking status: Never Smoker     Smokeless tobacco: Never Used   Substance and Sexual Activity     Alcohol use: No     Drug use: No     Sexual activity: Yes     Partners: Male   Lifestyle     Physical activity      Days per week: Not on file     Minutes per session: Not on file     Stress: Not on file   Relationships     Social connections     Talks on phone: Not on file     Gets together: Not on file     Attends Yarsanism service: Not on file     Active member of club or organization: Not on file     Attends meetings of clubs or organizations: Not on file     Relationship status: Not on file     Intimate partner violence     Fear of current or ex partner: Not on file     Emotionally abused: Not on file     Physically abused: Not on file     Forced sexual activity: Not on file   Other Topics Concern     Parent/sibling w/ CABG, MI or angioplasty before 65F 55M? Not Asked   Social History Narrative     Not on file          MEDICATIONS:  has a current medication list which includes the following prescription(s): iron, fluoxetine, folic acid, levothyroxine, and phentermine.    ROS   ROS: 10 point ROS neg other than the symptoms noted above in the HPI.      LABS:  !COMPREHENSIVE Latest Ref Rng & Units 9/10/2020   SODIUM 133 - 144 mmol/L 139   POTASSIUM 3.4 - 5.3 mmol/L 4.4   CHLORIDE 94 - 109 mmol/L 106   BUN 7 - 30 mg/dL 11   Creatinine 0.52 - 1.04 mg/dL 0.60   Glucose 70 - 99 mg/dL 88   ANION GAP 3 - 14 mmol/L 2 (L)   CALCIUM 8.5 - 10.1 mg/dL 9.0   ALBUMIN 3.4 - 5.0 g/dL 3.7   PROTEIN, TOTAL 6.8 - 8.8 g/dL 6.6 (L)   AST 0 - 45 U/L 25     !THYROID Latest Ref Rng & Units 12/18/2019   TSH 0.40 - 4.00 mU/L 2.22   T4 FREE 0.76 - 1.46 ng/dL 1.08     ENDO THYROID LABS-UMP Latest Ref Rng & Units 11/21/2018   THYROGLOBULIN ANTIBODY <40 IU/mL <20   THYR PEROXIDASE NICOLE <35 IU/mL <10     ! LIPIDS/MEDS Latest Ref Rng & Units 6/29/2020 9/10/2020   CHOL <200 mg/dL 259 (H)    LDL <100 mg/dL 168 (H)    HDL >49 mg/dL 66    TRIG <150 mg/dL 125    NHDL <130 mg/dL 193 (H)    Chol/HDL Ratio 0.0 - 5.0     LAST BP  124/82    LAST WT  186 lb 3.2 oz    LAST BMI  32.72    ALT 0 - 50 U/L  23   AST 0 - 45 U/L  25     Reports weight on home scale: 180  lbs.    All pertinent notes, labs, and images personally reviewed by me.     A/P  Ms.Karla JUAN Maya is a 50 year old evaluated via phone visit for the management of hypothyroidism and obesity:    1. Morbid obesity-  BMI 42-->36.95-->32.13.  No obesity-associated comorbidy.  Obesity is associated with a significant increase in mortality and risk of many disorders, including diabetes mellitus, hypertension, dyslipidemia, heart disease, stroke, sleep apnea, cancer, and many others. Conversely, weight loss is associated with a reduction in obesity-associated morbidity.    Endocrine evaluation of obesity includes Diabetes/prediabetes, Cushing's and thyroid dysfunction.  The relevant work up for the diagnosis and management of obesity and various treatment options were discussed. Body Mass Index (BMI) has been a standard means for calculating risk for overweight and obesity. The new American Association of Clinical Endocrinology (AACE) algorithm recommends lifestyle modifications as the initial phase of treatment for all patients with the BMI equal or greater than 25 kg/m2. Lifestyle modifications includes use of medical nutrition therapy, exercise, tobacco cessation, adequate quality and quantity of sleep, limited consumption of alcohol and reduced stress through implementation of a structured, multidisciplinary program.    In patients with complications associated with obesity, graded interventions are recommended including pharmacological therapy such as phentermine, orlistat, lorcaserin and phentermine/topiramate ER, contrave ( buproprion/naltreone) and the use of very low calorie meal replacement programs.    If medical intervention is insufficient, surgical therapy may be considered, especially in patients with BMI greater than or equal to 35 kg/m2 with multiple complications. Surgical treatments include lap-band, gastric sleeve or gastric bypass surgery.    I informed the pt that:  1.Weight loss medications can  be taken to assist with weight reduction when combined with appropriate healthy lifestyle changes.  2.I discussed possible s/e, risks and benefits of weight loss medications.  3.All medications are FDA approved, however, some may be used ''off label'' for their weight loss benefits and some ''short term'' medications can be used for longer periods to achieve desired clinical outcomes.  4.All patients taking weight loss medications must be seen in clinic for refill authorization.    Counseling exercise ( V65.41)  Dietary counseling( V65.3)  Calculated BMI above the upper parameter and f/u plan was documented in the medical record.  Discussed indications, risks and benefits of all medications prescribed, and answered questions to patient's satisfaction.  Currently treated with Phentermine 37.5 mg/day.  Prefers phentermine capsules rather than tablets.  Has lost 61 lbs,241--> 180 lbs.  Continue Phentermine 37.5 mg every day.  Hold off on starting Topamax for now.    Hypothyroidism.  Thyroid antibodies were not elevated. Currently treated with levothyroxine 50 mcg/day.  Repeat TFT's, adjust levothyroxine dose if indicated.      Labs ordered today:   No orders of the defined types were placed in this encounter.    Radiology/Consults ordered today: None      Follow-up:  3-4 months with Dr. Genesis Gray NP  Endocrinology  [facility]  CC: Carolina Gomez   ____________________________________________________________    Phone call duration: 19 minutes.  Phone call start time: 11:34 am; call end time: 11:53 am.        Again, thank you for allowing me to participate in the care of your patient.        Sincerely,        SHAQ Virgen CNP

## 2020-11-06 NOTE — PROGRESS NOTES
"Natalee Maya is a 50 year old female who is being evaluated via a billable telephone visit.      The patient has been notified of following:     \"This telephone visit will be conducted via a call between you and your physician/provider. We have found that certain health care needs can be provided without the need for a physical exam.  This service lets us provide the care you need with a short phone conversation.  If a prescription is necessary we can send it directly to your pharmacy.  If lab work is needed we can place an order for that and you can then stop by our lab to have the test done at a later time.    Telephone visits are billed at different rates depending on your insurance coverage. During this emergency period, for some insurers they may be billed the same as an in-person visit.  Please reach out to your insurance provider with any questions.    If during the course of the call the physician/provider feels a telephone visit is not appropriate, you will not be charged for this service.\"    Patient has given verbal consent for Telephone visit?  Yes    What phone number would you like to be contacted at? [phone]    How would you like to obtain your AVS? MyChart      Name: Natalee Maya  F/u for obesity (Last visit 7/17/2020).  HPI:  Natalee Maya is a 50 year old female who presents via phone visit for the management of obesity    Gained weight with 3 pregnancies the life got busy and gain more weight.    Was able to lose 20-25 lbs with diet + exercise this past summer.  Then had foot surgery and was non-weight bearing x 6 weeks - gained back all the weight she had lost.  Was unable to lose weight after she resumed diet efforts + exercise.  Struggled with appetite control.    Currently treated with Phentermine 37.5 mg daily.  Tolerating Phentermine well.  Has lost 61 lbs total, 241 --> 180 lbs.  Her goal is 160 lbs.  Planned to start topamax - she filled the Rx but didn't start it, " preferred to hold off for now.    Diet: Does not like to count calories or follow fad diets.  Continued using portion control, healthy food choices, no junk food + intermittent fasting.  Exercise: Yes, plans to resume regular walking  Hypothyroidism.  + FH of mother with hypothyroidism.  Currently treated with levothyroxine 50 mcg/day.  PMH/PSH:  Past Medical History:   Diagnosis Date     Calculus of kidney     History of kidney stone -- Abstracted 02     Lymphedema of lower extremity      Past Surgical History:   Procedure Laterality Date     FOOT SURGERY      2017     [facility] NONSPECIFIC PROCEDURE       -- Abstracted 02     Family Hx:  Family History   Problem Relation Age of Onset     Diabetes Father      C.A.D. Father         hyperlipidemia     Bleeding Disorder Mother         Factor 5     Breast Cancer No family hx of      Thyroid disease: Yes mother and MGM with hypothyroidism      DM2: Yes, father        Autoimmune: DM1, SLE, RA, Vitiligo     Social Hx:  Social History     Socioeconomic History     Marital status:      Spouse name: Not on file     Number of children: Not on file     Years of education: Not on file     Highest education level: Not on file   Occupational History     Not on file   Social Needs     Financial resource strain: Not on file     Food insecurity     Worry: Not on file     Inability: Not on file     Transportation needs     Medical: Not on file     Non-medical: Not on file   Tobacco Use     Smoking status: Never Smoker     Smokeless tobacco: Never Used   Substance and Sexual Activity     Alcohol use: No     Drug use: No     Sexual activity: Yes     Partners: Male   Lifestyle     Physical activity     Days per week: Not on file     Minutes per session: Not on file     Stress: Not on file   Relationships     Social connections     Talks on phone: Not on file     Gets together: Not on file     Attends Voodoo service: Not on file     Active member of club or  organization: Not on file     Attends meetings of clubs or organizations: Not on file     Relationship status: Not on file     Intimate partner violence     Fear of current or ex partner: Not on file     Emotionally abused: Not on file     Physically abused: Not on file     Forced sexual activity: Not on file   Other Topics Concern     Parent/sibling w/ CABG, MI or angioplasty before 65F 55M? Not Asked   Social History Narrative     Not on file          MEDICATIONS:  has a current medication list which includes the following prescription(s): iron, fluoxetine, folic acid, levothyroxine, and phentermine.    ROS   ROS: 10 point ROS neg other than the symptoms noted above in the HPI.      LABS:  !COMPREHENSIVE Latest Ref Rng & Units 9/10/2020   SODIUM 133 - 144 mmol/L 139   POTASSIUM 3.4 - 5.3 mmol/L 4.4   CHLORIDE 94 - 109 mmol/L 106   BUN 7 - 30 mg/dL 11   Creatinine 0.52 - 1.04 mg/dL 0.60   Glucose 70 - 99 mg/dL 88   ANION GAP 3 - 14 mmol/L 2 (L)   CALCIUM 8.5 - 10.1 mg/dL 9.0   ALBUMIN 3.4 - 5.0 g/dL 3.7   PROTEIN, TOTAL 6.8 - 8.8 g/dL 6.6 (L)   AST 0 - 45 U/L 25     !THYROID Latest Ref Rng & Units 12/18/2019   TSH 0.40 - 4.00 mU/L 2.22   T4 FREE 0.76 - 1.46 ng/dL 1.08     ENDO THYROID LABS-UMP Latest Ref Rng & Units 11/21/2018   THYROGLOBULIN ANTIBODY <40 IU/mL <20   THYR PEROXIDASE NICOLE <35 IU/mL <10     ! LIPIDS/MEDS Latest Ref Rng & Units 6/29/2020 9/10/2020   CHOL <200 mg/dL 259 (H)    LDL <100 mg/dL 168 (H)    HDL >49 mg/dL 66    TRIG <150 mg/dL 125    NHDL <130 mg/dL 193 (H)    Chol/HDL Ratio 0.0 - 5.0     LAST BP  124/82    LAST WT  186 lb 3.2 oz    LAST BMI  32.72    ALT 0 - 50 U/L  23   AST 0 - 45 U/L  25     Reports weight on home scale: 180 lbs.    All pertinent notes, labs, and images personally reviewed by me.     A/P  Ms.Karla JUAN Maya is a 50 year old evaluated via phone visit for the management of hypothyroidism and obesity:    1. Morbid obesity-  BMI 42-->36.95-->32.13.  No obesity-associated  comorbidy.  Obesity is associated with a significant increase in mortality and risk of many disorders, including diabetes mellitus, hypertension, dyslipidemia, heart disease, stroke, sleep apnea, cancer, and many others. Conversely, weight loss is associated with a reduction in obesity-associated morbidity.    Endocrine evaluation of obesity includes Diabetes/prediabetes, Cushing's and thyroid dysfunction.  The relevant work up for the diagnosis and management of obesity and various treatment options were discussed. Body Mass Index (BMI) has been a standard means for calculating risk for overweight and obesity. The new American Association of Clinical Endocrinology (AACE) algorithm recommends lifestyle modifications as the initial phase of treatment for all patients with the BMI equal or greater than 25 kg/m2. Lifestyle modifications includes use of medical nutrition therapy, exercise, tobacco cessation, adequate quality and quantity of sleep, limited consumption of alcohol and reduced stress through implementation of a structured, multidisciplinary program.    In patients with complications associated with obesity, graded interventions are recommended including pharmacological therapy such as phentermine, orlistat, lorcaserin and phentermine/topiramate ER, contrave ( buproprion/naltreone) and the use of very low calorie meal replacement programs.    If medical intervention is insufficient, surgical therapy may be considered, especially in patients with BMI greater than or equal to 35 kg/m2 with multiple complications. Surgical treatments include lap-band, gastric sleeve or gastric bypass surgery.    I informed the pt that:  1.Weight loss medications can be taken to assist with weight reduction when combined with appropriate healthy lifestyle changes.  2.I discussed possible s/e, risks and benefits of weight loss medications.  3.All medications are FDA approved, however, some may be used ''off label'' for their  weight loss benefits and some ''short term'' medications can be used for longer periods to achieve desired clinical outcomes.  4.All patients taking weight loss medications must be seen in clinic for refill authorization.    Counseling exercise ( V65.41)  Dietary counseling( V65.3)  Calculated BMI above the upper parameter and f/u plan was documented in the medical record.  Discussed indications, risks and benefits of all medications prescribed, and answered questions to patient's satisfaction.  Currently treated with Phentermine 37.5 mg/day.  Prefers phentermine capsules rather than tablets.  Has lost 61 lbs,241--> 180 lbs.  Continue Phentermine 37.5 mg every day.  Hold off on starting Topamax for now.    Hypothyroidism.  Thyroid antibodies were not elevated. Currently treated with levothyroxine 50 mcg/day.  Repeat TFT's, adjust levothyroxine dose if indicated.      Labs ordered today:   No orders of the defined types were placed in this encounter.    Radiology/Consults ordered today: None      Follow-up:  3-4 months with Dr. Genesis Gray NP  Endocrinology  [facility]  CC: Carolina Gomez   ____________________________________________________________    Phone call duration: 19 minutes.  Phone call start time: 11:34 am; call end time: 11:53 am.

## 2020-11-19 ENCOUNTER — HOSPITAL ENCOUNTER (OUTPATIENT)
Facility: CLINIC | Age: 50
Setting detail: SPECIMEN
Discharge: HOME | End: 2020-11-19
Attending: INTERNAL MEDICINE | Admitting: INTERNAL MEDICINE
Payer: COMMERCIAL

## 2020-11-19 DIAGNOSIS — E03.8 SUBCLINICAL HYPOTHYROIDISM: ICD-10-CM

## 2020-11-19 DIAGNOSIS — E66.01 MORBID OBESITY (H): ICD-10-CM

## 2020-11-19 DIAGNOSIS — D70.9 NEUTROPENIA, UNSPECIFIED TYPE (H): ICD-10-CM

## 2020-11-19 LAB
BASOPHILS # BLD AUTO: 0 10E9/L (ref 0–0.2)
BASOPHILS NFR BLD AUTO: 1.1 %
DIFFERENTIAL METHOD BLD: ABNORMAL
EOSINOPHIL # BLD AUTO: 0.1 10E9/L (ref 0–0.7)
EOSINOPHIL NFR BLD AUTO: 3.3 %
ERYTHROCYTE [DISTWIDTH] IN BLOOD BY AUTOMATED COUNT: 13 % (ref 10–15)
HCT VFR BLD AUTO: 41 % (ref 35–47)
HGB BLD-MCNC: 13.2 G/DL (ref 11.7–15.7)
IMM GRANULOCYTES # BLD: 0 10E9/L (ref 0–0.4)
IMM GRANULOCYTES NFR BLD: 0.4 %
LYMPHOCYTES # BLD AUTO: 1.2 10E9/L (ref 0.8–5.3)
LYMPHOCYTES NFR BLD AUTO: 42.4 %
MCH RBC QN AUTO: 30.8 PG (ref 26.5–33)
MCHC RBC AUTO-ENTMCNC: 32.2 G/DL (ref 31.5–36.5)
MCV RBC AUTO: 96 FL (ref 78–100)
MONOCYTES # BLD AUTO: 0.3 10E9/L (ref 0–1.3)
MONOCYTES NFR BLD AUTO: 11.8 %
NEUTROPHILS # BLD AUTO: 1.1 10E9/L (ref 1.6–8.3)
NEUTROPHILS NFR BLD AUTO: 41 %
NRBC # BLD AUTO: 0 10*3/UL
NRBC BLD AUTO-RTO: 0 /100
PLATELET # BLD AUTO: 191 10E9/L (ref 150–450)
RBC # BLD AUTO: 4.28 10E12/L (ref 3.8–5.2)
TSH SERPL DL<=0.005 MIU/L-ACNC: 2.95 MU/L (ref 0.4–4)
WBC # BLD AUTO: 2.7 10E9/L (ref 4–11)

## 2020-11-19 PROCEDURE — 84443 ASSAY THYROID STIM HORMONE: CPT | Performed by: INTERNAL MEDICINE

## 2020-11-19 PROCEDURE — 36415 COLL VENOUS BLD VENIPUNCTURE: CPT

## 2020-11-19 PROCEDURE — 85025 COMPLETE CBC W/AUTO DIFF WBC: CPT | Performed by: INTERNAL MEDICINE

## 2020-11-19 NOTE — PROGRESS NOTES
Medical Assistant Note:  Natalee Maya presents today for BLOOD DRAW.    Patient seen by provider today: No.   present during visit today: Not Applicable.    Concerns: Concerns (explain) - Pt requested labs be drawn for Celestina Gray. Upon looking at chart, CMP was drawn in September. She requested I only draw the TSH.    Procedure:  Lab draw site: right antecub, Needle type: butterfly, Gauge: 23.    Post Assessment:  Labs drawn without difficulty: Yes.    Discharge Plan:  Departure Mode: Ambulatory.    Face to Face Time: 10.    Bryanna Carrillo, CMA

## 2020-11-21 RX ORDER — LEVOTHYROXINE SODIUM 50 UG/1
50 TABLET ORAL DAILY
Qty: 90 TABLET | Refills: 2 | Status: SHIPPED | OUTPATIENT
Start: 2020-11-21 | End: 2021-11-16

## 2020-11-21 NOTE — RESULT ENCOUNTER NOTE
Natalee,  Your thyroid levels are normal.  Please continue the current dose of levothyroxine.  I'll renew your prescription.  Celestina Gray NP  Endocrinology

## 2021-01-07 ENCOUNTER — HOSPITAL ENCOUNTER (OUTPATIENT)
Facility: CLINIC | Age: 51
Setting detail: SPECIMEN
Discharge: HOME | End: 2021-01-07
Attending: INTERNAL MEDICINE | Admitting: INTERNAL MEDICINE
Payer: COMMERCIAL

## 2021-01-07 DIAGNOSIS — D70.9 NEUTROPENIA, UNSPECIFIED TYPE (H): ICD-10-CM

## 2021-01-07 LAB
BASOPHILS # BLD AUTO: 0 10E9/L (ref 0–0.2)
BASOPHILS NFR BLD AUTO: 0.9 %
DIFFERENTIAL METHOD BLD: ABNORMAL
EOSINOPHIL # BLD AUTO: 0.1 10E9/L (ref 0–0.7)
EOSINOPHIL NFR BLD AUTO: 4.5 %
ERYTHROCYTE [DISTWIDTH] IN BLOOD BY AUTOMATED COUNT: 13.1 % (ref 10–15)
HCT VFR BLD AUTO: 39.7 % (ref 35–47)
HGB BLD-MCNC: 13 G/DL (ref 11.7–15.7)
IMM GRANULOCYTES # BLD: 0 10E9/L (ref 0–0.4)
IMM GRANULOCYTES NFR BLD: 0 %
LYMPHOCYTES # BLD AUTO: 1.1 10E9/L (ref 0.8–5.3)
LYMPHOCYTES NFR BLD AUTO: 48.2 %
MCH RBC QN AUTO: 31.5 PG (ref 26.5–33)
MCHC RBC AUTO-ENTMCNC: 32.7 G/DL (ref 31.5–36.5)
MCV RBC AUTO: 96 FL (ref 78–100)
MONOCYTES # BLD AUTO: 0.3 10E9/L (ref 0–1.3)
MONOCYTES NFR BLD AUTO: 15.3 %
NEUTROPHILS # BLD AUTO: 0.7 10E9/L (ref 1.6–8.3)
NEUTROPHILS NFR BLD AUTO: 31.1 %
NRBC # BLD AUTO: 0 10*3/UL
NRBC BLD AUTO-RTO: 0 /100
PLATELET # BLD AUTO: 218 10E9/L (ref 150–450)
RBC # BLD AUTO: 4.13 10E12/L (ref 3.8–5.2)
WBC # BLD AUTO: 2.2 10E9/L (ref 4–11)

## 2021-01-07 PROCEDURE — 36415 COLL VENOUS BLD VENIPUNCTURE: CPT

## 2021-01-07 PROCEDURE — 85025 COMPLETE CBC W/AUTO DIFF WBC: CPT | Performed by: INTERNAL MEDICINE

## 2021-01-08 ENCOUNTER — VIRTUAL VISIT (OUTPATIENT)
Dept: PSYCHOLOGY | Facility: CLINIC | Age: 51
End: 2021-01-08
Payer: COMMERCIAL

## 2021-01-08 DIAGNOSIS — F32.1 MAJOR DEPRESSIVE DISORDER, SINGLE EPISODE, MODERATE WITH ANXIOUS DISTRESS (H): Primary | ICD-10-CM

## 2021-01-08 PROCEDURE — 90839 PSYTX CRISIS INITIAL 60 MIN: CPT | Mod: GT | Performed by: SOCIAL WORKER

## 2021-01-08 ASSESSMENT — COLUMBIA-SUICIDE SEVERITY RATING SCALE - C-SSRS
4. HAVE YOU HAD THESE THOUGHTS AND HAD SOME INTENTION OF ACTING ON THEM?: NO
1. IN THE PAST MONTH, HAVE YOU WISHED YOU WERE DEAD OR WISHED YOU COULD GO TO SLEEP AND NOT WAKE UP?: YES
5. HAVE YOU STARTED TO WORK OUT OR WORKED OUT THE DETAILS OF HOW TO KILL YOURSELF? DO YOU INTEND TO CARRY OUT THIS PLAN?: NO
2. HAVE YOU ACTUALLY HAD ANY THOUGHTS OF KILLING YOURSELF LIFETIME?: NO
3. HAVE YOU BEEN THINKING ABOUT HOW YOU MIGHT KILL YOURSELF?: YES
4. HAVE YOU HAD THESE THOUGHTS AND HAD SOME INTENTION OF ACTING ON THEM?: NO
2. HAVE YOU ACTUALLY HAD ANY THOUGHTS OF KILLING YOURSELF?: NO
5. HAVE YOU STARTED TO WORK OUT OR WORKED OUT THE DETAILS OF HOW TO KILL YOURSELF? DO YOU INTEND TO CARRY OUT THIS PLAN?: NO
1. IN THE PAST MONTH, HAVE YOU WISHED YOU WERE DEAD OR WISHED YOU COULD GO TO SLEEP AND NOT WAKE UP?: YES
REASONS FOR IDEATION LIFETIME: COMPLETELY TO END OR STOP THE PAIN (YOU COULDN'T GO ON LIVING WITH THE PAIN OR HOW YOU WERE FEELING)

## 2021-01-08 ASSESSMENT — ANXIETY QUESTIONNAIRES
1. FEELING NERVOUS, ANXIOUS, OR ON EDGE: NEARLY EVERY DAY
4. TROUBLE RELAXING: NOT AT ALL
5. BEING SO RESTLESS THAT IT IS HARD TO SIT STILL: NOT AT ALL
GAD7 TOTAL SCORE: 5
3. WORRYING TOO MUCH ABOUT DIFFERENT THINGS: SEVERAL DAYS
7. FEELING AFRAID AS IF SOMETHING AWFUL MIGHT HAPPEN: NOT AT ALL
6. BECOMING EASILY ANNOYED OR IRRITABLE: NOT AT ALL
2. NOT BEING ABLE TO STOP OR CONTROL WORRYING: SEVERAL DAYS

## 2021-01-08 ASSESSMENT — PATIENT HEALTH QUESTIONNAIRE - PHQ9: SUM OF ALL RESPONSES TO PHQ QUESTIONS 1-9: 11

## 2021-01-08 NOTE — PROGRESS NOTES
Progress Note    Patient Name: Natalee Maya  Date: 1/8/2021         Service Type: Individual- video visit       Session Start Time: 7:12 AM Session End Time: 7:58 AM  (late due to issues with [employer]Well)     Session Length: 38-52 minutes    Session #: 10  (4 with DB)    Attendees: Client       Telemedicine Visit: The patient's condition can be safely assessed and treated via synchronous audio and visual telemedicine encounter.      Reason for Telemedicine Visit: Services only offered telehealth    Originating Site (Patient Location): Patient's home    Distant Site (Provider Location): Provider Remote Setting    Consent:  The patient/guardian has verbally consented to: the potential risks and benefits of telemedicine (video visit) versus in person care; bill my insurance or make self-payment for services provided; and responsibility for payment of non-covered services.        Mode of Communication: Video via Doxy  Attempted [employer]Well, issues with audio      Treatment Plan Last Reviewed: will complete next session  PHQ-9 / MADINA-7 : 11/5 on 1/8/2021      DATA    DSM5 Diagnoses: Major Depressive Disorder, Moderate  Interactive Complexity: No  Crisis: Yes, visit entailed Crisis Management / Stabilization requiring urgent assessment and history of the crisis state, mental status exam and disposition       Progress Since Last Session (Related to Symptoms / Goals / Homework):   Symptoms: increase in depression symptoms    Homework: initial session since July 2020       Episode of Care Goals: Minimal progress - ACTION (Actively working towards change); Intervened by reinforcing change plan / affirming steps taken     Current / Ongoing Stressors and Concerns:   Marital stress   Effort in addressing stress in relationship is not reciprocated; client feeling overwhelmed with uncertainty of what she can do and lack of change   20 lb weight gain in 2020     Treatment Objective(s) Addressed  in This Session:   Decrease suicidal ideation  Client to utilize safety plan  Decrease frequency and intensity of feeling down, depressed, hopeless     Intervention:   Explored level of functioning since previous session   Co-developed a safety plan   Offered validation and support   CBT: guided discovery   Motivational interviewing: expressed empathy/understanding, use of reflective listening and open ended questions    ASSESSMENT: Current Emotional / Mental Status (status of significant symptoms):   Risk status (Self / Other harm or suicidal ideation)   Patient denies current fears or concerns for personal safety.   Patient reports the following current or recent suicidal ideation or behaviors: last occurred about five days ago, denied thoughts of killing herself, intent or plan.  Described as feeling overwhelmed, wanting to give up due to not knowing what else to do to address stress in marriage   Patientdenies current or recent homicidal ideation or behaviors.   Patient denies current or recent self injurious behavior or ideation.   Patient denies other safety concerns.   Patient reports there has been no change in risk factors since their last session.     Patientreports there has been no change in protective factors since their last session.     A safety and risk management plan has been developed including: Patient consented to co-developed safety plan.  Safety and risk management plan was completed.  Patient agreed to use safety plan should any safety concerns arise.  A copy was given to the patient.     Appearance:   Appropriate    Eye Contact:   Fair    Psychomotor Behavior: Normal    Attitude:   Cooperative  Interested Pleasant   Orientation:   All   Speech    Rate / Production: Normal     Volume:  Normal    Mood:    Depressed    Affect:    Appropriate     Thought Content:  Clear    Thought Form:  Coherent  Goal Directed  Logical    Insight:    Good      Medication Review:   No current psychiatric  medications prescribed      Client reported she stopped taking Prozac      Medication Compliance:   NA     Changes in Health Issues:   None reported     Chemical Use Review:   Substance Use: Chemical use reviewed, no active concerns identified      Tobacco Use: No current tobacco use.      Diagnosis:    Major Depressive Disorder, Moderate    Collateral Reports Completed:   N/A    PLAN: (Patient Tasks / Therapist Tasks / Other)  Therapist sent safety plan to client and encouraged sharing with her support system.  Client has an appointment with PCP to discuss medication in early February.  Therapist will review treatment plan and complete WHODAS in next session.    Celeste Chavez, [facility] 1/8/2021                                                      ______________________________________________________________________    Treatment Plan    Patient's Name: Natalee Maya  YOB: 1970    Date:  7/2/2020    DSM5 Diagnoses: Adjustment Disorders  309.28 (F43.23) With mixed anxiety and depressed mood  Psychosocial / Contextual Factors: marital stress  WHODAS: 17    Referral / Collaboration:  Referral to another professional/service is not indicated at this time..    Anticipated number of session or this episode of care: 12    Adopted on 8- by MARA Servin.    MeasurableTreatment Goal(s) related to diagnosis / functional impairment(s)  Goal 1: Patient will develop techniques for reducing symptoms that result from marital stress and conflict by reporting MADINA-7 and PHQ-9 scores below a 5, where symptoms where symptoms occur fewer than half the days for a minimum of 4 weeks.    Objective #A (Patient Action)    Patient will learn and utilize 3 techniques to manage emotions related to marital stress/conflict.  Status: Continued - Date(s):7/2/2020     Intervention(s)  Therapist will teach relaxation techniques and ways to engage in self-care.    Objective #B  Patient will compile a list of boundaries that they  "would like to set with others. Utilize the boundaries .  Status: Continued - Date(s):7/2/2020     Intervention(s)  Therapist will teach health boundaries and encourage client to identify their boundaries and implement them.    Objective #C  Patient will learn & utilize at least 1 assertive communication skills weekly.  Status: Continued - Date(s):7/2/2020     Intervention(s)  Therapist will teach assertive communication skills.    Patient has reviewed and agreed to the above plan.      Celeste Chavez, [facility]  7/2/2020                                                 Natalee Maya     SAFETY PLAN:  Step 1: Warning signs / cues (Thoughts, images, mood, situation, behavior) that a crisis may be developing:    Thoughts: \"I don't matter\", \"I'm a burden\", \"I can't do this anymore\", \"I just want this to end\" and \"Nothing makes it better\"    Images: none    Thinking Processes: ruminations (can't stop thinking about my problems): marital stresss, racing thoughts and highly critical and negative thoughts: about myself    Mood: worsening depression, hopelessness, agitation and mood swings    Behaviors: isolating/withdrawing  and can't stop crying    Situations: relationship problems   Step 2: Coping strategies - Things I can do to take my mind off of my problems without contacting another person (relaxation technique, physical activity):    Distress Tolerance Strategies:  read a book and geneology    Physical Activities: go for a walk    Focus on helpful thoughts:  \"This is temporary\", \"I will get through this\", \"It always passes\",   think about happy memories:   and remind myself of what is important to me:  Step 3: People and social settings that provide distraction:   Name: Isaías   Phone: in phone   Name: Dre   Phone: in phone   Name: Mom   Phone: in phone   Name: Tracy   Phone: in phone    work and go outside   Step 4: Remind myself of people and things that are important to me and worth living for:  Children, , " the job I do is important, I have talents and skills to offer people, and I am a good person  Step 5: When I am in crisis, I can ask these people to help me use my safety plan:   Name: Isaías   Phone: [phone]   Name: Dre   Phone: [phone]   Name: Tracy   Phone: [phone]  Step 6: Making the environment safe:     be around others  Step 7: Professionals or agencies I can contact during a crisis:    [facility] Daytime Number: [phone]    Suicide Prevention Lifeline: 9-141-091-VHAU (8257)    Crisis Text Line Service (available 24 hours a day, 7 days a week): Text [place] to 514249    Local Crisis Services: [facility] [phone]    Call 911 or go to my nearest emergency department.   I helped develop this safety plan and agree to use it when needed.  I have been given a copy of this plan.      Client signature _________________________________________________________________  Today s date:  1/8/2021  Adapted from Safety Plan Template 2008 Valerie Broderick and Alistair Briseno is reprinted with the express permission of the authors.  No portion of the Safety Plan Template may be reproduced without the express, written permission.  You can contact the authors at [email or web address] or [email or web address].

## 2021-01-09 ASSESSMENT — ANXIETY QUESTIONNAIRES: GAD7 TOTAL SCORE: 5

## 2021-01-14 ENCOUNTER — VIRTUAL VISIT (OUTPATIENT)
Dept: ONCOLOGY | Facility: CLINIC | Age: 51
End: 2021-01-14
Attending: INTERNAL MEDICINE
Payer: COMMERCIAL

## 2021-01-14 DIAGNOSIS — D70.9 NEUTROPENIA, UNSPECIFIED TYPE (H): Primary | ICD-10-CM

## 2021-01-14 PROCEDURE — 99213 OFFICE O/P EST LOW 20 MIN: CPT | Mod: GT | Performed by: INTERNAL MEDICINE

## 2021-01-14 PROCEDURE — 999N001193 HC VIDEO/TELEPHONE VISIT; NO CHARGE

## 2021-01-14 NOTE — LETTER
1/14/2021         RE: Natalee Maya  [address] [place] [facility] [place] [facility] [place] [address]        Dear Colleague,    Thank you for referring your patient, Natalee Maya, to the [facility]. Please see a copy of my visit note below.    Natalee is a 50 year old who is being evaluated via a billable video visit.      How would you like to obtain your AVS? MyChart  If the video visit is dropped, the invitation should be resent by: Text to cell phone: [phone]  Will anyone else be joining your video visit? No    Video Start Time: 2:13 PM    Video-Visit Details    Type of service:  Video Visit    Video End Time:2:19 PM    Originating Location (pt. Location): Home    Distant Location (provider location):  [facility]     Platform used for Video Visit: Link_[employer]         [facility]    Hematology/Oncology Established Patient Note      Today's Date: 01/14/21    Reason for Follow-up: leukopenia      HISTORY OF PRESENT ILLNESS: Natalee Maya is a 50 year old female with PMHx of hypothyroidism who presents with leukopenia.  She was found on labs in June 2020 to have low WBC of 1.9K.  Repeat checks have been in the 2K range.  Hemoglobin and platelet count were normal.  Differential shows a moderate to mild neutropenia.  On further chart review, she had a mild leukopenia in the 3K range back in 2018, but did recover back to normal a few months later.  There are not many labs prior to that.  Iron, ferritin, vitamin 12, and folate were normal.  Peripheral smear shows low WBC, but non-specific.      She denies recent illness, infections, fever.  Her Prozac dose increased a few months ago.  Otherwise, she has not started any new medications recently.      She does not smoke.  She drinks a glass of wine maybe once or twice a month.  She denies illicit drug use.      INTERIM HISTORY: Natalee says that she is doing well.  She has stopped taking  Prozac since our last visit.  She does not have frequent infections.  In fact, with the mask wearing due to the pandemic, she hardly gets sick anymore, and she works at a school.        REVIEW OF SYSTEMS:   14 point ROS was reviewed and is negative other than as noted above in HPI.       HOME MEDICATIONS:  Current Outpatient Medications   Medication Sig Dispense Refill     Ferrous Sulfate (IRON) 325 (65 Fe) MG tablet TAKE ONE TABLET BY MOUTH EVERY DAY, AVOID AROUND SAME TIME AS LEVOTHYROXINE 90 tablet 3     folic acid (FOLVITE) 1 MG tablet TAKE ONE TABLET BY MOUTH EVERY DAY 90 tablet 3     levothyroxine (SYNTHROID/LEVOTHROID) 50 MCG tablet Take 1 tablet (50 mcg) by mouth daily 90 tablet 2     phentermine (ADIPEX-P) 37.5 MG capsule Take 1 capsule (37.5 mg) by mouth every morning 32 capsule 3     FLUoxetine (PROZAC) 20 MG capsule Take 2 capsules (40 mg) by mouth daily (Patient not taking: Reported on 2020) 180 capsule 3         ALLERGIES:  Allergies   Allergen Reactions     No Known Drug Allergies          PAST MEDICAL HISTORY:  Past Medical History:   Diagnosis Date     Calculus of kidney     History of kidney stone -- Abstracted 02     Lymphedema of lower extremity          PAST SURGICAL HISTORY:  Past Surgical History:   Procedure Laterality Date     FOOT SURGERY      2017     ZZC NONSPECIFIC PROCEDURE       -- Abstracted 02         SOCIAL HISTORY:  Social History     Socioeconomic History     Marital status:      Spouse name: Not on file     Number of children: Not on file     Years of education: Not on file     Highest education level: Not on file   Occupational History     Not on file   Social Needs     Financial resource strain: Not on file     Food insecurity     Worry: Not on file     Inability: Not on file     Transportation needs     Medical: Not on file     Non-medical: Not on file   Tobacco Use     Smoking status: Never Smoker     Smokeless tobacco: Never Used    Substance and Sexual Activity     Alcohol use: No     Drug use: No     Sexual activity: Yes     Partners: Male   Lifestyle     Physical activity     Days per week: Not on file     Minutes per session: Not on file     Stress: Not on file   Relationships     Social connections     Talks on phone: Not on file     Gets together: Not on file     Attends [employer] service: Not on file     Active member of club or organization: Not on file     Attends meetings of clubs or organizations: Not on file     Relationship status: Not on file     Intimate partner violence     Fear of current or ex partner: Not on file     Emotionally abused: Not on file     Physically abused: Not on file     Forced sexual activity: Not on file   Other Topics Concern     Parent/sibling w/ CABG, MI or angioplasty before 65F 55M? Not Asked   Social History Narrative     Not on file         FAMILY HISTORY:  Family History   Problem Relation Age of Onset     Diabetes Father      C.A.D. Father         hyperlipidemia     Bleeding Disorder Mother         Factor 5     Breast Cancer No family hx of          PHYSICAL EXAM:  ECO  GENERAL/CONSTITUTIONAL: No acute distress. Healthy, alert.  EYES: No scleral icterus.  No redness or discharge.    RESPIRATORY: No audible wheeze, cough, or visible cyanosis.  No visible retractions or increased work of breathing.  Able to speak fully in complete sentences.  NEUROLOGIC: Alert, oriented, answers questions appropriately. No tremor. Mentation intact and speech normal  INTEGUMENTARY: No jaundice.    PSYCHIATRIC:  Mentation appears normal, affect normal/bright, judgement and insight intact, normal speech and appearance well-groomed.    The rest of a comprehensive physical exam is deferred due to public health emergency video visit restrictions.      LABS:  CBC RESULTS:   Recent Labs   Lab Test 21  0759   WBC 2.2*   RBC 4.13   HGB 13.0   HCT 39.7   MCV 96   MCH 31.5   MCHC 32.7   RDW 13.1             Peripheral flow:  INTERPRETATION:   Blood:        Polytypic B cells        No aberrant immunophenotype on T cells        See comment     COMMENT:   There is no immunophenotypic evidence of non-Hodgkin lymphoma or lymphoid   leukemia.  Final interpretation   requires correlation with morphologic and clinical features.       PATHOLOGY:  Peripheral smear 6/30/20:  FINAL DIAGNOSIS:   Peripheral Blood Morphology-   -Moderate leukopenia with moderate absolute neutropenia.   -See comment.     COMMENT:   The patient has history of intermittent leukopenia (see prior peripheral   blood morphology; [id number], 8/2018.   Absolute neutropenia appears to be a new finding. Possible causes of   neutropenia include drugs, infection,   autoimmune/immune-mediated disorders, hypersplenism, and bone marrow   disorder. At present, there is   insufficient morphologic evidence to suggest a clonal bone marrow   disorder. Serum vitamin B12/folate   deficiency appears as an unlikely cause, given normal reported levels in   this patient. Serum ferritin level is   additionally within normal limits. If absolute neutropenia persists   without clinical explanation, TCR-V beta   flow cytometric analysis of peripheral blood may be considered for further    evaluation.     IMAGING:  Abdomen ultrasound 9/10/20:  Unremarkable complete abdominal ultrasound.      ASSESSMENT/PLAN:  Natalee Maya is a 50 year old female with:    Leukopenia: Differential with moderate to mild neutropenia.  Other two cell lines are normal.  This seems to be a fairly new finding, although it happened to a milder and short extent in 2018.  She has not had recent infections.  She has not started new medications.  She has no history of hepatitis or HIV.  She does not drink alcohol frequently.  We discussed other potential causes including autoimmune or primary bone marrow disorder.  Abdomen ultrasound was normal; no hepatosplenomegaly. Her WBC remains low. She remains  asymptomatic.  We discussed options of continued observation or proceeding with bone marrow biopsy.  She would rather not do a bone marrow biopsy now.  If there is persistent downward trend of WBC, then we will do bone marrow biopsy.      WBC did trend down to 2.2 this time.  ANC is 0.7.  We'll likely need to do a bone marrow biopsy at some point to rule out a primary bone marrow disorder.  Patient still feels completely fine and does not have frequent infections.  WBC is still within her baseline range.  We will continue to monitor for now, and may do bone marrow biopsy this summer if no improvement in WBC.  Natalee is agreeable with this.    -CBC/diff in 2 months  -RTC in 4 months with CBC/diff          Lissa Gibson MD  Hematology/Oncology  [facility]      Total time spent on day of visit, including review of tests, obtaining/reviewing separately obtained history, ordering medications/tests/procedures, communicating with PCP/consultants, and documenting in electronic medical record: 20 minutes        Again, thank you for allowing me to participate in the care of your patient.        Sincerely,        Lissa Gibson MD

## 2021-01-14 NOTE — LETTER
1/14/2021         RE: Natalee Maya  [address] [facility] [address]        Dear Colleague,    Thank you for referring your patient, Natalee Maya, to the [facility]. Please see a copy of my visit note below.    Natalee is a 50 year old who is being evaluated via a billable video visit.      How would you like to obtain your AVS? MyChart  If the video visit is dropped, the invitation should be resent by: Text to cell phone: [phone]  Will anyone else be joining your video visit? No    Video Start Time: 2:13 PM    Video-Visit Details    Type of service:  Video Visit    Video End Time:2:19 PM    Originating Location (pt. Location): Home    Distant Location (provider location):  [facility]     Platform used for Video Visit: [employer]         [facility]    Hematology/Oncology Established Patient Note      Today's Date: 01/14/21    Reason for Follow-up: leukopenia      HISTORY OF PRESENT ILLNESS: Natalee Maya is a 50 year old female with PMHx of hypothyroidism who presents with leukopenia.  She was found on labs in June 2020 to have low WBC of 1.9K.  Repeat checks have been in the 2K range.  Hemoglobin and platelet count were normal.  Differential shows a moderate to mild neutropenia.  On further chart review, she had a mild leukopenia in the 3K range back in 2018, but did recover back to normal a few months later.  There are not many labs prior to that.  Iron, ferritin, vitamin 12, and folate were normal.  Peripheral smear shows low WBC, but non-specific.      She denies recent illness, infections, fever.  Her Prozac dose increased a few months ago.  Otherwise, she has not started any new medications recently.      She does not smoke.  She drinks a glass of wine maybe once or twice a month.  She denies illicit drug use.      INTERIM HISTORY: Natalee says that she is doing well.  She has stopped taking  Prozac since our last visit.  She does not have frequent infections.  In fact, with the mask wearing due to the pandemic, she hardly gets sick anymore, and she works at a school.        REVIEW OF SYSTEMS:   14 point ROS was reviewed and is negative other than as noted above in HPI.       HOME MEDICATIONS:  Current Outpatient Medications   Medication Sig Dispense Refill     Ferrous Sulfate (IRON) 325 (65 Fe) MG tablet TAKE ONE TABLET BY MOUTH EVERY DAY, AVOID AROUND SAME TIME AS LEVOTHYROXINE 90 tablet 3     folic acid (FOLVITE) 1 MG tablet TAKE ONE TABLET BY MOUTH EVERY DAY 90 tablet 3     levothyroxine (SYNTHROID/LEVOTHROID) 50 MCG tablet Take 1 tablet (50 mcg) by mouth daily 90 tablet 2     phentermine (ADIPEX-P) 37.5 MG capsule Take 1 capsule (37.5 mg) by mouth every morning 32 capsule 3     FLUoxetine (PROZAC) 20 MG capsule Take 2 capsules (40 mg) by mouth daily (Patient not taking: Reported on 2020) 180 capsule 3         ALLERGIES:  Allergies   Allergen Reactions     No Known Drug Allergies          PAST MEDICAL HISTORY:  Past Medical History:   Diagnosis Date     Calculus of kidney     History of kidney stone -- Abstracted 02     Lymphedema of lower extremity          PAST SURGICAL HISTORY:  Past Surgical History:   Procedure Laterality Date     FOOT SURGERY      2017     ZZC NONSPECIFIC PROCEDURE       -- Abstracted 02         SOCIAL HISTORY:  Social History     Socioeconomic History     Marital status:      Spouse name: Not on file     Number of children: Not on file     Years of education: Not on file     Highest education level: Not on file   Occupational History     Not on file   Social Needs     Financial resource strain: Not on file     Food insecurity     Worry: Not on file     Inability: Not on file     Transportation needs     Medical: Not on file     Non-medical: Not on file   Tobacco Use     Smoking status: Never Smoker     Smokeless tobacco: Never Used    Substance and Sexual Activity     Alcohol use: No     Drug use: No     Sexual activity: Yes     Partners: Male   Lifestyle     Physical activity     Days per week: Not on file     Minutes per session: Not on file     Stress: Not on file   Relationships     Social connections     Talks on phone: Not on file     Gets together: Not on file     Attends Orthodoxy service: Not on file     Active member of club or organization: Not on file     Attends meetings of clubs or organizations: Not on file     Relationship status: Not on file     Intimate partner violence     Fear of current or ex partner: Not on file     Emotionally abused: Not on file     Physically abused: Not on file     Forced sexual activity: Not on file   Other Topics Concern     Parent/sibling w/ CABG, MI or angioplasty before 65F 55M? Not Asked   Social History Narrative     Not on file         FAMILY HISTORY:  Family History   Problem Relation Age of Onset     Diabetes Father      C.A.D. Father         hyperlipidemia     Bleeding Disorder Mother         Factor 5     Breast Cancer No family hx of          PHYSICAL EXAM:  ECO  GENERAL/CONSTITUTIONAL: No acute distress. Healthy, alert.  EYES: No scleral icterus.  No redness or discharge.    RESPIRATORY: No audible wheeze, cough, or visible cyanosis.  No visible retractions or increased work of breathing.  Able to speak fully in complete sentences.  NEUROLOGIC: Alert, oriented, answers questions appropriately. No tremor. Mentation intact and speech normal  INTEGUMENTARY: No jaundice.    PSYCHIATRIC:  Mentation appears normal, affect normal/bright, judgement and insight intact, normal speech and appearance well-groomed.    The rest of a comprehensive physical exam is deferred due to public health emergency video visit restrictions.      LABS:  CBC RESULTS:   Recent Labs   Lab Test 21  0759   WBC 2.2*   RBC 4.13   HGB 13.0   HCT 39.7   MCV 96   MCH 31.5   MCHC 32.7   RDW 13.1             Peripheral flow:  INTERPRETATION:   Blood:        Polytypic B cells        No aberrant immunophenotype on T cells        See comment     COMMENT:   There is no immunophenotypic evidence of non-Hodgkin lymphoma or lymphoid   leukemia.  Final interpretation   requires correlation with morphologic and clinical features.       PATHOLOGY:  Peripheral smear 6/30/20:  FINAL DIAGNOSIS:   Peripheral Blood Morphology-   -Moderate leukopenia with moderate absolute neutropenia.   -See comment.     COMMENT:   The patient has history of intermittent leukopenia (see prior peripheral   blood morphology; [id number], 8/2018.   Absolute neutropenia appears to be a new finding. Possible causes of   neutropenia include drugs, infection,   autoimmune/immune-mediated disorders, hypersplenism, and bone marrow   disorder. At present, there is   insufficient morphologic evidence to suggest a clonal bone marrow   disorder. Serum vitamin B12/folate   deficiency appears as an unlikely cause, given normal reported levels in   this patient. Serum ferritin level is   additionally within normal limits. If absolute neutropenia persists   without clinical explanation, TCR-V beta   flow cytometric analysis of peripheral blood may be considered for further    evaluation.     IMAGING:  Abdomen ultrasound 9/10/20:  Unremarkable complete abdominal ultrasound.      ASSESSMENT/PLAN:  Natalee Maya is a 50 year old female with:    Leukopenia: Differential with moderate to mild neutropenia.  Other two cell lines are normal.  This seems to be a fairly new finding, although it happened to a milder and short extent in 2018.  She has not had recent infections.  She has not started new medications.  She has no history of hepatitis or HIV.  She does not drink alcohol frequently.  We discussed other potential causes including autoimmune or primary bone marrow disorder.  Abdomen ultrasound was normal; no hepatosplenomegaly. Her WBC remains low. She remains  asymptomatic.  We discussed options of continued observation or proceeding with bone marrow biopsy.  She would rather not do a bone marrow biopsy now.  If there is persistent downward trend of WBC, then we will do bone marrow biopsy.      WBC did trend down to 2.2 this time.  ANC is 0.7.  We'll likely need to do a bone marrow biopsy at some point to rule out a primary bone marrow disorder.  Patient still feels completely fine and does not have frequent infections.  WBC is still within her baseline range.  We will continue to monitor for now, and may do bone marrow biopsy this summer if no improvement in WBC.  Natalee is agreeable with this.    -CBC/diff in 2 months  -RTC in 4 months with CBC/diff          Lissa Gibson MD  Hematology/Oncology  [facility]      Total time spent on day of visit, including review of tests, obtaining/reviewing separately obtained history, ordering medications/tests/procedures, communicating with PCP/consultants, and documenting in electronic medical record: 20 minutes        Again, thank you for allowing me to participate in the care of your patient.        Sincerely,        Lissa Gibson MD

## 2021-01-14 NOTE — PROGRESS NOTES
Natalee is a 50 year old who is being evaluated via a billable video visit.      How would you like to obtain your AVS? MyChart  If the video visit is dropped, the invitation should be resent by: Text to cell phone: [phone]  Will anyone else be joining your video visit? No    Video Start Time: 2:13 PM    Video-Visit Details    Type of service:  Video Visit    Video End Time:2:19 PM    Originating Location (pt. Location): Home    Distant Location (provider location):  [facility]     Platform used for Video Visit: [employer]         [facility]    Hematology/Oncology Established Patient Note      Today's Date: 01/14/21    Reason for Follow-up: leukopenia      HISTORY OF PRESENT ILLNESS: Natalee Maya is a 50 year old female with PMHx of hypothyroidism who presents with leukopenia.  She was found on labs in June 2020 to have low WBC of 1.9K.  Repeat checks have been in the 2K range.  Hemoglobin and platelet count were normal.  Differential shows a moderate to mild neutropenia.  On further chart review, she had a mild leukopenia in the 3K range back in 2018, but did recover back to normal a few months later.  There are not many labs prior to that.  Iron, ferritin, vitamin 12, and folate were normal.  Peripheral smear shows low WBC, but non-specific.      She denies recent illness, infections, fever.  Her Prozac dose increased a few months ago.  Otherwise, she has not started any new medications recently.      She does not smoke.  She drinks a glass of wine maybe once or twice a month.  She denies illicit drug use.      INTERIM HISTORY: Natalee says that she is doing well.  She has stopped taking Prozac since our last visit.  She does not have frequent infections.  In fact, with the mask wearing due to the pandemic, she hardly gets sick anymore, and she works at a school.        REVIEW OF SYSTEMS:   14 point ROS was reviewed and is negative other than as noted  above in HPI.       HOME MEDICATIONS:  Current Outpatient Medications   Medication Sig Dispense Refill     Ferrous Sulfate (IRON) 325 (65 Fe) MG tablet TAKE ONE TABLET BY MOUTH EVERY DAY, AVOID AROUND SAME TIME AS LEVOTHYROXINE 90 tablet 3     folic acid (FOLVITE) 1 MG tablet TAKE ONE TABLET BY MOUTH EVERY DAY 90 tablet 3     levothyroxine (SYNTHROID/LEVOTHROID) 50 MCG tablet Take 1 tablet (50 mcg) by mouth daily 90 tablet 2     phentermine (ADIPEX-P) 37.5 MG capsule Take 1 capsule (37.5 mg) by mouth every morning 32 capsule 3     FLUoxetine (PROZAC) 20 MG capsule Take 2 capsules (40 mg) by mouth daily (Patient not taking: Reported on 2020) 180 capsule 3         ALLERGIES:  Allergies   Allergen Reactions     No Known Drug Allergies          PAST MEDICAL HISTORY:  Past Medical History:   Diagnosis Date     Calculus of kidney     History of kidney stone -- Abstracted 02     Lymphedema of lower extremity          PAST SURGICAL HISTORY:  Past Surgical History:   Procedure Laterality Date     FOOT SURGERY      2017     ZZC NONSPECIFIC PROCEDURE       -- Abstracted 02         SOCIAL HISTORY:  Social History     Socioeconomic History     Marital status:      Spouse name: Not on file     Number of children: Not on file     Years of education: Not on file     Highest education level: Not on file   Occupational History     Not on file   Social Needs     Financial resource strain: Not on file     Food insecurity     Worry: Not on file     Inability: Not on file     Transportation needs     Medical: Not on file     Non-medical: Not on file   Tobacco Use     Smoking status: Never Smoker     Smokeless tobacco: Never Used   Substance and Sexual Activity     Alcohol use: No     Drug use: No     Sexual activity: Yes     Partners: Male   Lifestyle     Physical activity     Days per week: Not on file     Minutes per session: Not on file     Stress: Not on file   Relationships     Social connections      Talks on phone: Not on file     Gets together: Not on file     Attends Mormon service: Not on file     Active member of club or organization: Not on file     Attends meetings of clubs or organizations: Not on file     Relationship status: Not on file     Intimate partner violence     Fear of current or ex partner: Not on file     Emotionally abused: Not on file     Physically abused: Not on file     Forced sexual activity: Not on file   Other Topics Concern     Parent/sibling w/ CABG, MI or angioplasty before 65F 55M? Not Asked   Social History Narrative     Not on file         FAMILY HISTORY:  Family History   Problem Relation Age of Onset     Diabetes Father      C.A.D. Father         hyperlipidemia     Bleeding Disorder Mother         Factor 5     Breast Cancer No family hx of          PHYSICAL EXAM:  ECO  GENERAL/CONSTITUTIONAL: No acute distress. Healthy, alert.  EYES: No scleral icterus.  No redness or discharge.    RESPIRATORY: No audible wheeze, cough, or visible cyanosis.  No visible retractions or increased work of breathing.  Able to speak fully in complete sentences.  NEUROLOGIC: Alert, oriented, answers questions appropriately. No tremor. Mentation intact and speech normal  INTEGUMENTARY: No jaundice.    PSYCHIATRIC:  Mentation appears normal, affect normal/bright, judgement and insight intact, normal speech and appearance well-groomed.    The rest of a comprehensive physical exam is deferred due to public health emergency video visit restrictions.      LABS:  CBC RESULTS:   Recent Labs   Lab Test 21  0759   WBC 2.2*   RBC 4.13   HGB 13.0   HCT 39.7   MCV 96   MCH 31.5   MCHC 32.7   RDW 13.1            Peripheral flow:  INTERPRETATION:   Blood:        Polytypic B cells        No aberrant immunophenotype on T cells        See comment     COMMENT:   There is no immunophenotypic evidence of non-Hodgkin lymphoma or lymphoid   leukemia.  Final interpretation   requires correlation  with morphologic and clinical features.       PATHOLOGY:  Peripheral smear 6/30/20:  FINAL DIAGNOSIS:   Peripheral Blood Morphology-   -Moderate leukopenia with moderate absolute neutropenia.   -See comment.     COMMENT:   The patient has history of intermittent leukopenia (see prior peripheral   blood morphology; [id number], 8/2018.   Absolute neutropenia appears to be a new finding. Possible causes of   neutropenia include drugs, infection,   autoimmune/immune-mediated disorders, hypersplenism, and bone marrow   disorder. At present, there is   insufficient morphologic evidence to suggest a clonal bone marrow   disorder. Serum vitamin B12/folate   deficiency appears as an unlikely cause, given normal reported levels in   this patient. Serum ferritin level is   additionally within normal limits. If absolute neutropenia persists   without clinical explanation, TCR-V beta   flow cytometric analysis of peripheral blood may be considered for further    evaluation.     IMAGING:  Abdomen ultrasound 9/10/20:  Unremarkable complete abdominal ultrasound.      ASSESSMENT/PLAN:  Natalee Maya is a 50 year old female with:    Leukopenia: Differential with moderate to mild neutropenia.  Other two cell lines are normal.  This seems to be a fairly new finding, although it happened to a milder and short extent in 2018.  She has not had recent infections.  She has not started new medications.  She has no history of hepatitis or HIV.  She does not drink alcohol frequently.  We discussed other potential causes including autoimmune or primary bone marrow disorder.  Abdomen ultrasound was normal; no hepatosplenomegaly. Her WBC remains low. She remains asymptomatic.  We discussed options of continued observation or proceeding with bone marrow biopsy.  She would rather not do a bone marrow biopsy now.  If there is persistent downward trend of WBC, then we will do bone marrow biopsy.      WBC did trend down to 2.2 this time.  ANC is  0.7.  We'll likely need to do a bone marrow biopsy at some point to rule out a primary bone marrow disorder.  Patient still feels completely fine and does not have frequent infections.  WBC is still within her baseline range.  We will continue to monitor for now, and may do bone marrow biopsy this summer if no improvement in WBC.  Natalee is agreeable with this.    -CBC/diff in 2 months  -RTC in 4 months with CBC/diff          Lissa Gibson MD  Hematology/Oncology  [facility]      Total time spent on day of visit, including review of tests, obtaining/reviewing separately obtained history, ordering medications/tests/procedures, communicating with PCP/consultants, and documenting in electronic medical record: 20 minutes

## 2021-01-15 ENCOUNTER — HEALTH MAINTENANCE LETTER (OUTPATIENT)
Age: 51
End: 2021-01-15

## 2021-01-20 NOTE — PATIENT INSTRUCTIONS
Labs scheduled 3/18, 5/11  Appt with Dr. Gibson scheduled 5/13  Tia Garcia, RN, BSN, JAMIE  RN Care Coordinator  [facility]  [phone]

## 2021-01-22 ENCOUNTER — VIRTUAL VISIT (OUTPATIENT)
Dept: PSYCHOLOGY | Facility: CLINIC | Age: 51
End: 2021-01-22
Payer: COMMERCIAL

## 2021-01-22 DIAGNOSIS — F32.1 MAJOR DEPRESSIVE DISORDER, SINGLE EPISODE, MODERATE WITH ANXIOUS DISTRESS (H): Primary | ICD-10-CM

## 2021-01-22 PROCEDURE — 90834 PSYTX W PT 45 MINUTES: CPT | Mod: GT | Performed by: SOCIAL WORKER

## 2021-01-22 NOTE — PROGRESS NOTES
Progress Note    Patient Name: Natalee Maya  Date: 1/22/2021         Service Type: Individual- video visit       Session Start Time: 7:06 AM Session End Time: 7:52 AM  (late due to issues with Epic)     Session Length: 38-52 minutes    Session #: 11  (4 with DB)    Attendees: Client       Telemedicine Visit: The patient's condition can be safely assessed and treated via synchronous audio and visual telemedicine encounter.      Reason for Telemedicine Visit: Services only offered telehealth    Originating Site (Patient Location): Patient's home    Distant Site (Provider Location): Provider Remote Setting    Consent:  The patient/guardian has verbally consented to: the potential risks and benefits of telemedicine (video visit) versus in person care; bill my insurance or make self-payment for services provided; and responsibility for payment of non-covered services.        Mode of Communication: Video via Doxy  Amwell unavailable     Treatment Plan Last Reviewed: will complete next session (therapist did not have access to chart)  PHQ-9 / MADINA-7 : 11/5 on 1/8/2021      DATA  Interactive Complexity: No  Crisis: No       Progress Since Last Session (Related to Symptoms / Goals / Homework):   Symptoms: stable, sadness, grief, improving anxiety     Homework: Achieved / completed to satisfaction       Episode of Care Goals: Minimal progress - ACTION (Actively working towards change); Intervened by reinforcing change plan / affirming steps taken     Current / Ongoing Stressors and Concerns:   Marital stress   Effort in addressing stress in relationship is not reciprocated; client feeling overwhelmed with uncertainty of what she can do and lack of change   20 lb weight gain in 2020     Treatment Objective(s) Addressed in This Session:   Decrease frequency and intensity of feeling down, depressed, hopeless  Identify negative self-talk and behaviors: challenge core beliefs, myths,  and actions     Negative Belief-I am selfish     Intervention:   Offered validation and support   CBT: guided discovery, identified thoughts, feelings, and behaviors   Motivational interviewing: expressed empathy/understanding, use of reflective listening and open ended questions, supported autonomy    ASSESSMENT: Current Emotional / Mental Status (status of significant symptoms):   Risk status (Self / Other harm or suicidal ideation)   Patient denies current fears or concerns for personal safety.   Patient denies current or recent suicidal ideation or behaviors.     Patientdenies current or recent homicidal ideation or behaviors.   Patient denies current or recent self injurious behavior or ideation.   Patient denies other safety concerns.   Patient reports there has been no change in risk factors since their last session.     Patientreports there has been no change in protective factors since their last session.     A safety and risk management plan was developed and shared with client in previous session.  Client confirmed she shared with her three emergency contacts     Appearance:   Appropriate    Eye Contact:   Good    Psychomotor Behavior: Normal    Attitude:   Cooperative  Interested Pleasant   Orientation:   All   Speech    Rate / Production: Normal     Volume:  Normal    Mood:    Sad minimal   Affect:    Appropriate , overall brighter   Thought Content:  Clear    Thought Form:  Coherent  Goal Directed  Logical    Insight:    Good      Medication Review:   No current psychiatric medications prescribed      Client reported she stopped taking Prozac      Medication Compliance:   NA     Changes in Health Issues:   None reported     Chemical Use Review:   Substance Use: Chemical use reviewed, no active concerns identified      Tobacco Use: No current tobacco use.      Diagnosis:    Major Depressive Disorder, Moderate    Collateral Reports Completed:   N/A    PLAN: (Patient Tasks / Therapist Tasks /  Other)  Therapist encouraged engagement in self-care and continued utilization of her support system.  Client has an appointment with PCP to discuss medication in early February.  Therapist will review treatment plan and complete WHODAS in next session.    Celeste Chavez, [facility] 1/22/2021                                                      ______________________________________________________________________    Treatment Plan    Patient's Name: Natalee Maya  YOB: 1970    Date:  7/2/2020    DSM5 Diagnoses: Adjustment Disorders  309.28 (F43.23) With mixed anxiety and depressed mood  Psychosocial / Contextual Factors: marital stress  WHODAS: 17    Referral / Collaboration:  Referral to another professional/service is not indicated at this time..    Anticipated number of session or this episode of care: 12    Adopted on 8- by MARA Servin.    MeasurableTreatment Goal(s) related to diagnosis / functional impairment(s)  Goal 1: Patient will develop techniques for reducing symptoms that result from marital stress and conflict by reporting MADINA-7 and PHQ-9 scores below a 5, where symptoms where symptoms occur fewer than half the days for a minimum of 4 weeks.    Objective #A (Patient Action)    Patient will learn and utilize 3 techniques to manage emotions related to marital stress/conflict.  Status: Continued - Date(s):7/2/2020     Intervention(s)  Therapist will teach relaxation techniques and ways to engage in self-care.    Objective #B  Patient will compile a list of boundaries that they would like to set with others. Utilize the boundaries .  Status: Continued - Date(s):7/2/2020     Intervention(s)  Therapist will teach health boundaries and encourage client to identify their boundaries and implement them.    Objective #C  Patient will learn & utilize at least 1 assertive communication skills weekly.  Status: Continued - Date(s):7/2/2020     Intervention(s)  Therapist will teach assertive  "communication skills.    Patient has reviewed and agreed to the above plan.      Celeste Chavez, [facility]  7/2/2020                                                 Natalee Maya     SAFETY PLAN:  Step 1: Warning signs / cues (Thoughts, images, mood, situation, behavior) that a crisis may be developing:    Thoughts: \"I don't matter\", \"I'm a burden\", \"I can't do this anymore\", \"I just want this to end\" and \"Nothing makes it better\"    Images: none    Thinking Processes: ruminations (can't stop thinking about my problems): marital stresss, racing thoughts and highly critical and negative thoughts: about myself    Mood: worsening depression, hopelessness, agitation and mood swings    Behaviors: isolating/withdrawing  and can't stop crying    Situations: relationship problems   Step 2: Coping strategies - Things I can do to take my mind off of my problems without contacting another person (relaxation technique, physical activity):    Distress Tolerance Strategies:  read a book and geneology    Physical Activities: go for a walk    Focus on helpful thoughts:  \"This is temporary\", \"I will get through this\", \"It always passes\",   think about happy memories:   and remind myself of what is important to me:  Step 3: People and social settings that provide distraction:   Name: Isaías   Phone: in phone   Name: Dre   Phone: in phone   Name: Mom   Phone: in phone   Name: Tracy   Phone: in phone    work and go outside   Step 4: Remind myself of people and things that are important to me and worth living for:  Children, , the job I do is important, I have talents and skills to offer people, and I am a good person  Step 5: When I am in crisis, I can ask these people to help me use my safety plan:   Name: Isaías   Phone: [phone]   Name: Dre   Phone: [phone]   Name: Tracy   Phone: [phone]  Step 6: Making the environment safe:     be around others  Step 7: Professionals or agencies I can contact during a " crisis:    [facility] Daytime Number: [phone]    Suicide Prevention Lifeline: 9-167-518-TALK (8371)    Crisis Text Line Service (available 24 hours a day, 7 days a week): Text [place] to 391088    Local Crisis Services: [facility] [phone]    Call 911 or go to my nearest emergency department.   I helped develop this safety plan and agree to use it when needed.  I have been given a copy of this plan.      Client signature _________________________________________________________________  Today s date:  1/8/2021  Adapted from Safety Plan Template 2008 Valerie Broderick and Alistair Briseno is reprinted with the express permission of the authors.  No portion of the Safety Plan Template may be reproduced without the express, written permission.  You can contact the authors at [email or web address] or [email or web address].

## 2021-01-28 ENCOUNTER — MYC MEDICAL ADVICE (OUTPATIENT)
Dept: ENDOCRINOLOGY | Facility: CLINIC | Age: 51
End: 2021-01-28

## 2021-02-05 ENCOUNTER — VIRTUAL VISIT (OUTPATIENT)
Dept: PSYCHOLOGY | Facility: CLINIC | Age: 51
End: 2021-02-05
Payer: COMMERCIAL

## 2021-02-05 DIAGNOSIS — F32.1 MAJOR DEPRESSIVE DISORDER, SINGLE EPISODE, MODERATE WITH ANXIOUS DISTRESS (H): Primary | ICD-10-CM

## 2021-02-05 PROCEDURE — 90834 PSYTX W PT 45 MINUTES: CPT | Mod: GT | Performed by: SOCIAL WORKER

## 2021-02-05 NOTE — PROGRESS NOTES
Progress Note    Patient Name: Natalee Maya  Date: 2/5/2021         Service Type: Individual- video visit       Session Start Time: 7:05 AM Session End Time: 7:49 AM    Session Length: 38-52 minutes    Session #: 12  (4 with DB)    Attendees: Client     Telemedicine Visit: The patient's condition can be safely assessed and treated via synchronous audio and visual telemedicine encounter.      Reason for Telemedicine Visit: Services only offered telehealth    Originating Site (Patient Location): Patient's work    Distant Site (Provider Location): Provider Remote Setting    Consent:  The patient/guardian has verbally consented to: the potential risks and benefits of telemedicine (video visit) versus in person care; bill my insurance or make self-payment for services provided; and responsibility for payment of non-covered services.        Mode of Communication: Safehis     Treatment Plan Last Reviewed: 2/5/2021  PHQ-9 / MADINA-7 : 11/5 on 1/8/2021      DATA  Interactive Complexity: No  Crisis: No       Progress Since Last Session (Related to Symptoms / Goals / Homework):   Symptoms: stable, sadness and grief specific to marriage and upcoming separation, improving anxiety    Homework: Achieved / completed to satisfaction       Episode of Care Goals: Satisfactory progress - ACTION (Actively working towards change); Intervened by reinforcing change plan / affirming steps taken     Current / Ongoing Stressors and Concerns:   Marital stress/plan for moving out   20 lb weight gain in 2020     Treatment Objective(s) Addressed in This Session:   Decrease frequency and intensity of feeling down, depressed, hopeless      Intervention:   Offered validation and support   CBT: guided discovery, identified thoughts, feelings, and behaviors   Motivational interviewing: expressed empathy/understanding, use of reflective listening and open ended questions, supported autonomy    ASSESSMENT:  Current Emotional / Mental Status (status of significant symptoms):   Risk status (Self / Other harm or suicidal ideation)   Patient denies current fears or concerns for personal safety.   Patient denies current or recent suicidal ideation or behaviors.     Patientdenies current or recent homicidal ideation or behaviors.   Patient denies current or recent self injurious behavior or ideation.   Patient denies other safety concerns.   Patient reports there has been no change in risk factors since their last session.     Patientreports there has been no change in protective factors since their last session.     A safety and risk management plan was developed and shared with client in previous session.  Client confirmed she shared with her three emergency contacts     Appearance:   Appropriate    Eye Contact:   Fair    Psychomotor Behavior: Normal    Attitude:   Cooperative  Interested Pleasant   Orientation:   All   Speech    Rate / Production: Normal     Volume:  Normal    Mood:    Sad minimal   Affect:    Appropriate, minimally tearful   Thought Content:  Clear  Rumination    Thought Form:  Coherent  Goal Directed  Logical    Insight:    Good      Medication Review:   No current psychiatric medications prescribed      Client reported she stopped taking Prozac      Medication Compliance:   NA     Changes in Health Issues:   None reported     Chemical Use Review:   Substance Use: Chemical use reviewed, no active concerns identified      Tobacco Use: No current tobacco use.      Diagnosis:    Major Depressive Disorder, Moderate    Collateral Reports Completed:   N/A    PLAN: (Patient Tasks / Therapist Tasks / Other)  Therapist encouraged engagement in self-care and continued utilization of her support system.  Client has an appointment with PCP to discuss medication in early February.  Client shared goal to increase engagement in exercise, continue to improve diet.    Celeste Chavez, [facility] 2/5/2021                           ______________________________________________________________________    Treatment Plan    Patient's Name: Natalee Maya  YOB: 1970    Date:  2/5/2021    DSM5 Diagnoses: Adjustment Disorders  309.28 (F43.23) With mixed anxiety and depressed mood  Psychosocial / Contextual Factors: marital stress  WHODAS: 16    Referral / Collaboration:  Referral to another professional/service is not indicated at this time..    Anticipated number of session or this episode of care: will review in 90 days    Adopted on 8- by MARA Servin.    MeasurableTreatment Goal(s) related to diagnosis / functional impairment(s)  Goal 1: Patient will develop techniques for reducing symptoms that result from marital stress and conflict by reporting MADINA-7 and PHQ-9 scores below a 5, where symptoms where symptoms occur fewer than half the days for a minimum of 4 weeks.    Objective #A (Patient Action)    Patient will learn and utilize 3 techniques to manage emotions related to marital stress/conflict.  Status: Continued - Date(s):  2/5/2021     Intervention(s)  Therapist will teach relaxation techniques and ways to engage in self-care.    Objective #B  Patient will compile a list of boundaries that they would like to set with others. Utilize the boundaries .  Status: Continued - Date(s):  2/5/2021    Intervention(s)  Therapist will teach health boundaries and encourage client to identify their boundaries and implement them.    Objective #C  Patient will learn & utilize at least 1 assertive communication skills weekly.  Status: Continued - Date(s):  2/5/2021    Intervention(s)  Therapist will teach assertive communication skills.    Patient has reviewed and agreed to the above plan.      Celeste Chavez, [facility]  7/2/2020                                                 aNtalee MARTINEZ Francesco     SAFETY PLAN:  Step 1: Warning signs / cues (Thoughts, images, mood, situation, behavior) that a crisis may be developing:    Thoughts:  "\"I don't matter\", \"I'm a burden\", \"I can't do this anymore\", \"I just want this to end\" and \"Nothing makes it better\"    Images: none    Thinking Processes: ruminations (can't stop thinking about my problems): marital stresss, racing thoughts and highly critical and negative thoughts: about myself    Mood: worsening depression, hopelessness, agitation and mood swings    Behaviors: isolating/withdrawing  and can't stop crying    Situations: relationship problems   Step 2: Coping strategies - Things I can do to take my mind off of my problems without contacting another person (relaxation technique, physical activity):    Distress Tolerance Strategies:  read a book and geneology    Physical Activities: go for a walk    Focus on helpful thoughts:  \"This is temporary\", \"I will get through this\", \"It always passes\",   think about happy memories:   and remind myself of what is important to me:  Step 3: People and social settings that provide distraction:   Name: Isaías   Phone: in phone   Name: Dre   Phone: in phone   Name: Mom   Phone: in phone   Name: Tracy   Phone: in phone    work and go outside   Step 4: Remind myself of people and things that are important to me and worth living for:  Children, , the job I do is important, I have talents and skills to offer people, and I am a good person  Step 5: When I am in crisis, I can ask these people to help me use my safety plan:   Name: Isaías   Phone: [phone]   Name: Dre   Phone: [phone]   Name: Tracy   Phone: [phone]  Step 6: Making the environment safe:     be around others  Step 7: Professionals or agencies I can contact during a crisis:    [facility] Daytime Number: [phone]    Suicide Prevention Lifeline: 0-746-286-KLQC (3488)    Crisis Text Line Service (available 24 hours a day, 7 days a week): Text [place] to 863932    Local Crisis Services: [facility] [phone]    Call 911 or go to my nearest emergency department.   I helped " develop this safety plan and agree to use it when needed.  I have been given a copy of this plan.      Client signature _________________________________________________________________  Today s date:  1/8/2021  Adapted from Safety Plan Template 2008 Valerie Broderick and Alistair Briseno is reprinted with the express permission of the authors.  No portion of the Safety Plan Template may be reproduced without the express, written permission.  You can contact the authors at [email or web address] or [email or web address].

## 2021-02-11 ENCOUNTER — MYC MEDICAL ADVICE (OUTPATIENT)
Dept: PEDIATRICS | Facility: CLINIC | Age: 51
End: 2021-02-11

## 2021-02-15 RX ORDER — PHENTERMINE HYDROCHLORIDE 37.5 MG/1
37.5 CAPSULE ORAL EVERY MORNING
Qty: 32 CAPSULE | Refills: 3 | Status: CANCELLED | OUTPATIENT
Start: 2021-02-15

## 2021-02-16 ENCOUNTER — OFFICE VISIT (OUTPATIENT)
Dept: PEDIATRICS | Facility: CLINIC | Age: 51
End: 2021-02-16
Payer: COMMERCIAL

## 2021-02-16 VITALS
HEART RATE: 74 BPM | WEIGHT: 208.9 LBS | OXYGEN SATURATION: 100 % | DIASTOLIC BLOOD PRESSURE: 80 MMHG | BODY MASS INDEX: 37.02 KG/M2 | HEIGHT: 63 IN | TEMPERATURE: 97.9 F | SYSTOLIC BLOOD PRESSURE: 150 MMHG

## 2021-02-16 DIAGNOSIS — E66.01 MORBID OBESITY (H): ICD-10-CM

## 2021-02-16 DIAGNOSIS — F41.9 ANXIETY: ICD-10-CM

## 2021-02-16 DIAGNOSIS — R03.0 ELEVATED BLOOD PRESSURE READING WITHOUT DIAGNOSIS OF HYPERTENSION: Primary | ICD-10-CM

## 2021-02-16 DIAGNOSIS — Z12.11 SPECIAL SCREENING FOR MALIGNANT NEOPLASMS, COLON: ICD-10-CM

## 2021-02-16 PROCEDURE — 99214 OFFICE O/P EST MOD 30 MIN: CPT | Performed by: NURSE PRACTITIONER

## 2021-02-16 PROCEDURE — 96127 BRIEF EMOTIONAL/BEHAV ASSMT: CPT | Performed by: NURSE PRACTITIONER

## 2021-02-16 RX ORDER — PHENTERMINE HYDROCHLORIDE 15 MG/1
15 CAPSULE ORAL EVERY MORNING
Qty: 30 CAPSULE | Refills: 0 | Status: SHIPPED | OUTPATIENT
Start: 2021-02-16 | End: 2021-06-03

## 2021-02-16 ASSESSMENT — ANXIETY QUESTIONNAIRES
GAD7 TOTAL SCORE: 4
5. BEING SO RESTLESS THAT IT IS HARD TO SIT STILL: NOT AT ALL
7. FEELING AFRAID AS IF SOMETHING AWFUL MIGHT HAPPEN: NOT AT ALL
6. BECOMING EASILY ANNOYED OR IRRITABLE: SEVERAL DAYS
GAD7 TOTAL SCORE: 4
2. NOT BEING ABLE TO STOP OR CONTROL WORRYING: SEVERAL DAYS
1. FEELING NERVOUS, ANXIOUS, OR ON EDGE: SEVERAL DAYS
4. TROUBLE RELAXING: NOT AT ALL
3. WORRYING TOO MUCH ABOUT DIFFERENT THINGS: SEVERAL DAYS
7. FEELING AFRAID AS IF SOMETHING AWFUL MIGHT HAPPEN: NOT AT ALL
GAD7 TOTAL SCORE: 4

## 2021-02-16 ASSESSMENT — PATIENT HEALTH QUESTIONNAIRE - PHQ9
10. IF YOU CHECKED OFF ANY PROBLEMS, HOW DIFFICULT HAVE THESE PROBLEMS MADE IT FOR YOU TO DO YOUR WORK, TAKE CARE OF THINGS AT HOME, OR GET ALONG WITH OTHER PEOPLE: SOMEWHAT DIFFICULT
SUM OF ALL RESPONSES TO PHQ QUESTIONS 1-9: 6
SUM OF ALL RESPONSES TO PHQ QUESTIONS 1-9: 6

## 2021-02-16 ASSESSMENT — MIFFLIN-ST. JEOR: SCORE: 1536.69

## 2021-02-16 NOTE — PATIENT INSTRUCTIONS
Your blood pressure is up  1. Go down to 15mg on the Phentermine  2. Get a blood pressure cuff and monitor for a few days. Please send me a message with the results. Goal is under 140/90, even when stressed  3. Work on exercise and weight loss.  4. Start Sertraline (Zoloft)  5. Virtual visit in 6-8 weeks.  6. Colonoscopy

## 2021-02-16 NOTE — PROGRESS NOTES
"    Assessment & Plan     Morbid obesity (H)  On phentermine but gaining weight. Hasn't been doing IF or exercise due to stress. BP is up  -Decrease phentermine to 15 and send BP readings in 1-2 weeks  -Discussed reasons to seek care urgently.   -Get back on IF and exercise  -See endo-already scheduled.     Anxiety  Marital stress. No improvement on Prozac  -Start sertraline.   -Follow-up 6-8 weeks.     Elevated blood pressure reading without diagnosis of hypertension  Asymptomatic  -Decrease phentermine  -Work on exercise and weight loss  -Send BPs in 1-2 weeks      BMI:   Estimated body mass index is 37 kg/m  as calculated from the following:    Height as of this encounter: 1.6 m (5' 3\").    Weight as of this encounter: 94.8 kg (208 lb 14.4 oz).   Weight management plan: Patient referred to endocrine and/or weight management specialty    See Patient Instructions    No follow-ups on file.    SHAQ [facility] SHARI Albright is a 50 year old who presents for the following health issues:    History of Present Illness       Mental Health Follow-up:  Patient presents to follow-up on Depression & Anxiety.Patient's depression since last visit has been:  No change  The patient is not having other symptoms associated with depression.  Patient's anxiety since last visit has been:  No change  The patient is not having other symptoms associated with anxiety.  Any significant life events: relationship concerns  Patient is feeling anxious or having panic attacks.  Patient has no concerns about alcohol or drug use.     Social History  Tobacco Use    Smoking status: Never Smoker    Smokeless tobacco: Never Used  Alcohol use: No  Drug use: No      Today's PHQ-9         PHQ-9 Total Score:     (P) 6   PHQ-9 Q9 Thoughts of better off dead/self-harm past 2 weeks :   (P) Not at all   Thoughts of suicide or self harm:      Self-harm Plan:        Self-harm Action:          Safety " concerns for self or others:           She eats 2-3 servings of fruits and vegetables daily.She consumes 0 sweetened beverage(s) daily.She exercises with enough effort to increase her heart rate 20 to 29 minutes per day.  She exercises with enough effort to increase her heart rate 3 or less days per week.   She is taking medications regularly.    Answers for HPI/ROS submitted by the patient on 2/16/2021   Chronic problems general questions HPI Form  If you checked off any problems, how difficult have these problems made it for you to do your work, take care of things at home, or get along with other people?: Somewhat difficult  PHQ9 TOTAL SCORE: 6  MADINA 7 TOTAL SCORE: 4      Medication Followup of Prozac and Phentermine     Taking Medication as prescribed: NO-not currently taking Prozac, no effects.     Side Effects:  None    Medication Helping Symptoms:  Phentermine- had been loosing weight        Review of Systems   Constitutional, HEENT, cardiovascular, pulmonary, GI, , musculoskeletal, neuro, skin, endocrine and psych systems are negative, except as otherwise noted.      Objective    There were no vitals taken for this visit.  There is no height or weight on file to calculate BMI.  Physical Exam   GENERAL: healthy, alert and no distress  CV: regular rate and rhythm, normal S1 S2, no S3 or S4, no murmur, click or rub, no peripheral edema and peripheral pulses strong  NEURO: Normal strength and tone, mentation intact and speech normal  PSYCH: mentation appears normal, affect normal/bright

## 2021-02-17 ASSESSMENT — PATIENT HEALTH QUESTIONNAIRE - PHQ9: SUM OF ALL RESPONSES TO PHQ QUESTIONS 1-9: 6

## 2021-02-17 ASSESSMENT — ANXIETY QUESTIONNAIRES: GAD7 TOTAL SCORE: 4

## 2021-02-19 ENCOUNTER — VIRTUAL VISIT (OUTPATIENT)
Dept: PSYCHOLOGY | Facility: CLINIC | Age: 51
End: 2021-02-19
Payer: COMMERCIAL

## 2021-02-19 DIAGNOSIS — F32.1 MAJOR DEPRESSIVE DISORDER, SINGLE EPISODE, MODERATE WITH ANXIOUS DISTRESS (H): Primary | ICD-10-CM

## 2021-02-19 PROCEDURE — 90834 PSYTX W PT 45 MINUTES: CPT | Mod: GT | Performed by: SOCIAL WORKER

## 2021-02-19 NOTE — PROGRESS NOTES
Progress Note    Patient Name: Natalee Maya  Date: 2/19/2021         Service Type: Individual- video visit       Session Start Time: 7:03 AM Session End Time: 7:47 AM    Session Length: 38-52 minutes    Session #: 13 (4 with DB)    Attendees: Client     Telemedicine Visit: The patient's condition can be safely assessed and treated via synchronous audio and visual telemedicine encounter.      Reason for Telemedicine Visit: Services only offered telehealth    Originating Site (Patient Location): Patient's work    Distant Site (Provider Location): Provider Remote Setting    Consent:  The patient/guardian has verbally consented to: the potential risks and benefits of telemedicine (video visit) versus in person care; bill my insurance or make self-payment for services provided; and responsibility for payment of non-covered services.        Mode of Communication: [email or web address]     Treatment Plan Last Reviewed: 2/5/2021  PHQ-9 / MADINA-7 : 2/16/2021      DATA  Interactive Complexity: No  Crisis: No       Progress Since Last Session (Related to Symptoms / Goals / Homework):   Symptoms: stable    Homework: Achieved / completed to satisfaction       Episode of Care Goals: Satisfactory progress - ACTION (Actively working towards change); Intervened by reinforcing change plan / affirming steps taken     Current / Ongoing Stressors and Concerns:   Marital stress/plan for moving out   20 lb weight gain in 2020   House will go on market in a couple weeks     Treatment Objective(s) Addressed in This Session:   Decrease frequency and intensity of feeling down, depressed, hopeless      Intervention:   Offered validation and support   CBT: guided discovery, identified thoughts, feelings, and behaviors, coping strategies   Motivational interviewing: expressed empathy/understanding, use of reflective listening and open ended questions, supported autonomy   Supported client with processing upcoming  separation    ASSESSMENT: Current Emotional / Mental Status (status of significant symptoms):   Risk status (Self / Other harm or suicidal ideation)   Patient denies current fears or concerns for personal safety.   Patient denies current or recent suicidal ideation or behaviors.     Patientdenies current or recent homicidal ideation or behaviors.   Patient denies current or recent self injurious behavior or ideation.   Patient denies other safety concerns.   Patient reports there has been no change in risk factors since their last session.     Patientreports there has been no change in protective factors since their last session.     A safety and risk management plan was developed and shared with client in previous session.  Client confirmed she shared with her three emergency contacts     Appearance:   Appropriate    Eye Contact:   Good    Psychomotor Behavior: Normal    Attitude:   Cooperative  Interested Pleasant   Orientation:   All   Speech    Rate / Production: Normal     Volume:  Normal    Mood:    Stable   Affect:    Appropriate   Thought Content:  Clear    Thought Form:  Coherent  Goal Directed  Logical    Insight:    Good      Medication Review:   Changes to psychiatric medications, see updated Medication List in EPIC.       Zoloft 50 mg will start today     Medication Compliance:   NA     Changes in Health Issues:   None reported     Chemical Use Review:   Substance Use: Chemical use reviewed, no active concerns identified      Tobacco Use: No current tobacco use.      Diagnosis:    Major Depressive Disorder, Moderate    Collateral Reports Completed:   N/A    PLAN: (Patient Tasks / Therapist Tasks / Other)  Therapist encouraged continued engagement in self-care.    Celeste Chavez, LICSW 2/19/2021                          ______________________________________________________________________    Treatment Plan    Patient's Name: Natalee Maya  YOB: 1970    Date:  2/5/2021    DSM5  "Diagnoses: Adjustment Disorders  309.28 (F43.23) With mixed anxiety and depressed mood  Psychosocial / Contextual Factors: marital stress  WHODAS: 16    Referral / Collaboration:  Referral to another professional/service is not indicated at this time..    Anticipated number of session or this episode of care: will review in 90 days    Adopted on 8- by MARA Servin.    MeasurableTreatment Goal(s) related to diagnosis / functional impairment(s)  Goal 1: Patient will develop techniques for reducing symptoms that result from marital stress and conflict by reporting MADINA-7 and PHQ-9 scores below a 5, where symptoms where symptoms occur fewer than half the days for a minimum of 4 weeks.    Objective #A (Patient Action)    Patient will learn and utilize 3 techniques to manage emotions related to marital stress/conflict.  Status: Continued - Date(s):  2/5/2021     Intervention(s)  Therapist will teach relaxation techniques and ways to engage in self-care.    Objective #B  Patient will compile a list of boundaries that they would like to set with others. Utilize the boundaries .  Status: Continued - Date(s):  2/5/2021    Intervention(s)  Therapist will teach health boundaries and encourage client to identify their boundaries and implement them.    Objective #C  Patient will learn & utilize at least 1 assertive communication skills weekly.  Status: Continued - Date(s):  2/5/2021    Intervention(s)  Therapist will teach assertive communication skills.    Patient has reviewed and agreed to the above plan.      Celeste Chavez, [facility]  7/2/2020                                                 Natalee Maya     SAFETY PLAN:  Step 1: Warning signs / cues (Thoughts, images, mood, situation, behavior) that a crisis may be developing:    Thoughts: \"I don't matter\", \"I'm a burden\", \"I can't do this anymore\", \"I just want this to end\" and \"Nothing makes it better\"    Images: none    Thinking Processes: ruminations (can't stop " "thinking about my problems): marital stresss, racing thoughts and highly critical and negative thoughts: about myself    Mood: worsening depression, hopelessness, agitation and mood swings    Behaviors: isolating/withdrawing  and can't stop crying    Situations: relationship problems   Step 2: Coping strategies - Things I can do to take my mind off of my problems without contacting another person (relaxation technique, physical activity):    Distress Tolerance Strategies:  read a book and geneology    Physical Activities: go for a walk    Focus on helpful thoughts:  \"This is temporary\", \"I will get through this\", \"It always passes\",   think about happy memories:   and remind myself of what is important to me:  Step 3: People and social settings that provide distraction:   Name: Isaías   Phone: in phone   Name: Dre   Phone: in phone   Name: Mom   Phone: in phone   Name: Tracy   Phone: in phone    work and go outside   Step 4: Remind myself of people and things that are important to me and worth living for:  Children, , the job I do is important, I have talents and skills to offer people, and I am a good person  Step 5: When I am in crisis, I can ask these people to help me use my safety plan:   Name: Isaías   Phone: [phone]   Name: Dre   Phone: [phone]   Name: Tracy   Phone: [phone]  Step 6: Making the environment safe:     be around others  Step 7: Professionals or agencies I can contact during a crisis:    [facility] Daytime Number: [phone]    Suicide Prevention Lifeline: 7-419-587-VJTI (7008)    Crisis Text Line Service (available 24 hours a day, 7 days a week): Text [place] to 852937    Local Crisis Services: [facility] [phone]    Call 911 or go to my nearest emergency department.   I helped develop this safety plan and agree to use it when needed.  I have been given a copy of this plan.      Client signature " _________________________________________________________________  Today s date:  1/8/2021  Adapted from Safety Plan Template 2008 Valerie Broderick and Alistair Briseno is reprinted with the express permission of the authors.  No portion of the Safety Plan Template may be reproduced without the express, written permission.  You can contact the authors at [email or web address] or [email or web address].

## 2021-03-05 ENCOUNTER — VIRTUAL VISIT (OUTPATIENT)
Dept: PSYCHOLOGY | Facility: CLINIC | Age: 51
End: 2021-03-05
Payer: COMMERCIAL

## 2021-03-05 DIAGNOSIS — F32.1 MAJOR DEPRESSIVE DISORDER, SINGLE EPISODE, MODERATE WITH ANXIOUS DISTRESS (H): Primary | ICD-10-CM

## 2021-03-05 PROCEDURE — 90834 PSYTX W PT 45 MINUTES: CPT | Mod: GT | Performed by: SOCIAL WORKER

## 2021-03-05 NOTE — PROGRESS NOTES
Progress Note    Patient Name: Natalee Maya  Date: 3/5/2021     Service Type: Individual     Session Start Time: 7:02 AM Session End Time: 7:52 AM    Session Length: 38-52 minutes    Session #: 14 (4 with DB)    Attendees: Client     Telemedicine Visit: The patient's condition can be safely assessed and treated via synchronous audio and visual telemedicine encounter.      Reason for Telemedicine Visit: Services only offered telehealth    Originating Site (Patient Location): Patient's work    Distant Site (Provider Location): Provider Remote Setting    Consent:  The patient/guardian has verbally consented to: the potential risks and benefits of telemedicine (video visit) versus in person care; bill my insurance or make self-payment for services provided; and responsibility for payment of non-covered services.        Mode of Communication: [email or web address]     Treatment Plan Last Reviewed: 2/5/2021  PHQ-9 / MADINA-7 : 2/16/2021      DATA  Interactive Complexity: No  Crisis: No       Progress Since Last Session (Related to Symptoms / Goals / Homework):   Symptoms: stable    Client reported things have been going well.    Homework: Achieved / completed to satisfaction   Therapist encouraged continued engagement in self-care.      Episode of Care Goals: Satisfactory progress - ACTION (Actively working towards change); Intervened by reinforcing change plan / affirming steps taken     Current / Ongoing Stressors and Concerns:   Marital stress/plan for moving out   20 lb weight gain in 2020   House will go on market soon     Treatment Objective(s) Addressed in This Session:   Client will use 2 assertive communication skill in next 2 weeks     Intervention:   Supported client with processing about marital stress and upcoming separation   CBT: guided discovery, identified thoughts, feelings, and behaviors   Motivational interviewing: expressed empathy/understanding, use of reflective  listening and open ended questions, supported autonomy   Offered coaching on assertive communication.  Modeled use of assertive communication      ASSESSMENT: Current Emotional / Mental Status (status of significant symptoms):   Risk status (Self / Other harm or suicidal ideation)   Patient denies current fears or concerns for personal safety.   Patient denies current or recent suicidal ideation or behaviors.     Patientdenies current or recent homicidal ideation or behaviors.   Patient denies current or recent self injurious behavior or ideation.   Patient denies other safety concerns.   Patient reports there has been no change in risk factors since their last session.     Patientreports there has been no change in protective factors since their last session.     A safety and risk management plan was developed and shared with client in previous session.  Client confirmed she shared with her three emergency contacts     Appearance:   Appropriate    Eye Contact:   Good    Psychomotor Behavior: Normal    Attitude:   Cooperative  Friendly Pleasant   Orientation:   All   Speech    Rate / Production: Normal     Volume:  Normal    Mood:    Stable   Affect:    Appropriate, Smiling   Thought Content:  Clear    Thought Form:  Coherent  Goal Directed  Logical    Insight:    Good      Medication Review:   No changes to current psychiatric medication(s)     Zoloft 50 mg      Medication Compliance:   NA     Changes in Health Issues:   None reported     Chemical Use Review:   Substance Use: Chemical use reviewed, no active concerns identified      Tobacco Use: No current tobacco use.      Diagnosis:    Major Depressive Disorder, Moderate    Collateral Reports Completed:   N/A    PLAN: (Patient Tasks / Therapist Tasks / Other)  Client shared plans to increase assertive communication.    Celeste Chavez, LICSW 3/5/2021                          ______________________________________________________________________    Treatment  "Plan    Patient's Name: Natalee Maya  YOB: 1970    Date:  2/5/2021    DSM5 Diagnoses: Adjustment Disorders  309.28 (F43.23) With mixed anxiety and depressed mood  Psychosocial / Contextual Factors: marital stress  WHODAS: 16    Referral / Collaboration:  Referral to another professional/service is not indicated at this time..    Anticipated number of session or this episode of care: will review in 90 days    Adopted on 8- by MARA Servin.    MeasurableTreatment Goal(s) related to diagnosis / functional impairment(s)  Goal 1: Patient will develop techniques for reducing symptoms that result from marital stress and conflict by reporting MADINA-7 and PHQ-9 scores below a 5, where symptoms where symptoms occur fewer than half the days for a minimum of 4 weeks.    Objective #A (Patient Action)    Patient will learn and utilize 3 techniques to manage emotions related to marital stress/conflict.  Status: Continued - Date(s):  2/5/2021     Intervention(s)  Therapist will teach relaxation techniques and ways to engage in self-care.    Objective #B  Patient will compile a list of boundaries that they would like to set with others. Utilize the boundaries .  Status: Continued - Date(s):  2/5/2021    Intervention(s)  Therapist will teach health boundaries and encourage client to identify their boundaries and implement them.    Objective #C  Patient will learn & utilize at least 1 assertive communication skills weekly.  Status: Continued - Date(s):  2/5/2021    Intervention(s)  Therapist will teach assertive communication skills.    Patient has reviewed and agreed to the above plan.      Celeste Chavez, Health system  7/2/2020                                                 Natalee Maya     SAFETY PLAN:  Step 1: Warning signs / cues (Thoughts, images, mood, situation, behavior) that a crisis may be developing:    Thoughts: \"I don't matter\", \"I'm a burden\", \"I can't do this anymore\", \"I just want this to end\" " "and \"Nothing makes it better\"    Images: none    Thinking Processes: ruminations (can't stop thinking about my problems): marital stresss, racing thoughts and highly critical and negative thoughts: about myself    Mood: worsening depression, hopelessness, agitation and mood swings    Behaviors: isolating/withdrawing  and can't stop crying    Situations: relationship problems   Step 2: Coping strategies - Things I can do to take my mind off of my problems without contacting another person (relaxation technique, physical activity):    Distress Tolerance Strategies:  read a book and geneology    Physical Activities: go for a walk    Focus on helpful thoughts:  \"This is temporary\", \"I will get through this\", \"It always passes\",   think about happy memories:   and remind myself of what is important to me:  Step 3: People and social settings that provide distraction:   Name: Isaías   Phone: in phone   Name: Dre   Phone: in phone   Name: Mom   Phone: in phone   Name: Tracy   Phone: in phone    work and go outside   Step 4: Remind myself of people and things that are important to me and worth living for:  Children, , the job I do is important, I have talents and skills to offer people, and I am a good person  Step 5: When I am in crisis, I can ask these people to help me use my safety plan:   Name: Isaías   Phone: [phone]   Name: Dre   Phone: [phone]   Name: Tracy   Phone: [phone]  Step 6: Making the environment safe:     be around others  Step 7: Professionals or agencies I can contact during a crisis:    [facility] Daytime Number: [phone]    Suicide Prevention Lifeline: 0-118-547-TALK (7115)    Crisis Text Line Service (available 24 hours a day, 7 days a week): Text [place] to 991364    Local Crisis Services: [facility] [phone]    Call 911 or go to my nearest emergency department.   I helped develop this safety plan and agree to use it when needed.  I have been given a copy of " this plan.      Client signature _________________________________________________________________  Today s date:  1/8/2021  Adapted from Safety Plan Template 2008 Valerie Broderick and Alistair Briseno is reprinted with the express permission of the authors.  No portion of the Safety Plan Template may be reproduced without the express, written permission.  You can contact the authors at [email or web address] or [email or web address].

## 2021-03-08 NOTE — PROGRESS NOTES
Assessment & Plan     Elevated blood pressure reading without diagnosis of hypertension  BP remains high. Home readings are higher than clinic readings but overall looks like no improvement in readings since decreasing her phentermine dose (currently on lowest dose).   Had lengthy discussion about plan options: recommend stopping phentermine but pt is very hesitant to do this. Will proceed with addtl labs to r/o thyroid problem, liver/kidney problems although suspect not likely contributing. EKG normal. Echo ordered as she also has murmur, if normal then again would recommend she STOP phentermine.  - TSH with free T4 reflex  - Comprehensive metabolic panel  - EKG 12-lead complete w/read - Clinics  - Echocardiogram Complete; Future    Heart murmur  Last echo done 2017.  - Echocardiogram Complete; Future    Morbid obesity (H)  Discuss options: stop phentermine (see above), see endocrine vs weight management clinic. Had previously seen Celestina Gray who managed her phentermine, needs new endocrinologist. She does not want to see weight mngt. Continue with dietary interventions.     Anxiety  Some improvement, only 3 meds since med change. Follow-up with PCP but no immediate concerns.    Neutropenia, unspecified type (H)  Labs per heme.  - CBC with platelets differential       Patient Instructions   PLAN:  -labs today  -EKG  -Schedule Echo    Follow-up in 2 weeks. If your BPs are still high, we may need to get off the phentermine.       Return in about 2 weeks (around 3/25/2021), or if symptoms worsen or fail to improve.    Rachana Mendoza NP  [facility] SHARI Albright is a 50 year old who presents for the following health issues     History of Present Illness       Hypertension: She presents for follow up of hypertension.  She does check blood pressure  regularly outside of the clinic. Outside blood pressures have been over 140/90. She does not follow a low salt diet.     She eats 2-3 servings  "of fruits and vegetables daily.She consumes 0 sweetened beverage(s) daily.She exercises with enough effort to increase her heart rate 30 to 60 minutes per day.  She exercises with enough effort to increase her heart rate 4 days per week.   She is taking medications regularly.     Pt sent in home BP readings in mornin21: 153/102, 79 bpm  21: 162/101, 77 bpm  21: 162/105, 87 bpm  21: 155/93, 76 bpm  21: 152/94, 72 bpm  21: 164/103, 70 bpm  21: 180/107, 68 bpm @ 8:30 AM  21: 180/110, 68 bpm @ 8:38 AM  21: 162/100, 68 bpm  21: 169/100, 69 bpm  3/1/21: 167/103, 76 bpm  3/2/21: 167/99, 72 bpm  3/3/21: 165/98, 66 bpm  3/4/21: 167/103, 70 bpm  3/5/21: 164/105, 65 bpm  3/7/21: 163/98, 75 bpm  3/8/21: 166/106, 65 bpm  3/9/21: 157/97, 69 bpm  3/10/21: 173/110, 68 bpm  3/11/21: 163/101, 76 bpm    Declines flu shot at this time.    Last visit with PCP , BPs were high at that time so decreased phentermine dose to 15 mg.  Since then, she continues to have elevated BP readings ( see above).  Denies chest pain, dyspnea, lightheadedness, dizziness, headaches, vision changes, edema.  Since last visit, has been back to intermittent fasting and has lost weight 8 lbs. Not regularly exercising, working on increasing her steps.  Does have stress/anxiety in her life, feels this is a little better with sertraline (has been 3 weeks since med change, has follow-up to discuss this with PCP but no immediate concerns today).    Has been on phentermine for 2 years.   2-2.5 cups of coffee per day.  Checking BPs in morning.    Review of Systems   Otherwise ROS is negative except as stated above.        Objective    BP (!) 154/100 (BP Location: Right arm, Patient Position: Sitting, Cuff Size: Adult Large)   Pulse 85   Temp 98.3  F (36.8  C) (Tympanic)   Ht 1.6 m (5' 3\")   Wt 88.8 kg (195 lb 12.8 oz)   SpO2 99%   BMI 34.68 kg/m    Body mass index is 34.68 kg/m .  Physical Exam   GENERAL: " healthy, alert and no distress  RESP: lungs clear to auscultation - no rales, rhonchi or wheezes  CV: regular rates and rhythm, normal S1 S2, no S3 or S4 and grade 2/6 systolic murmur heard over all landmarks    Results for orders placed or performed in visit on 03/11/21 (from the past 24 hour(s))   CBC with platelets differential   Result Value Ref Range    WBC 3.1 (L) 4.0 - 11.0 10e9/L    RBC Count 4.06 3.8 - 5.2 10e12/L    Hemoglobin 12.6 11.7 - 15.7 g/dL    Hematocrit 37.8 35.0 - 47.0 %    MCV 93 78 - 100 fl    MCH 31.0 26.5 - 33.0 pg    MCHC 33.3 31.5 - 36.5 g/dL    RDW 12.9 10.0 - 15.0 %    Platelet Count 257 150 - 450 10e9/L    Diff Method Automated Method     % Neutrophils 47.9 %    % Lymphocytes 39.2 %    % Monocytes 10.0 %    % Eosinophils 2.3 %    % Basophils 0.6 %    Absolute Neutrophil 1.5 (L) 1.6 - 8.3 10e9/L    Absolute Lymphocytes 1.2 0.8 - 5.3 10e9/L    Absolute Monocytes 0.3 0.0 - 1.3 10e9/L    Absolute Eosinophils 0.1 0.0 - 0.7 10e9/L    Absolute Basophils 0.0 0.0 - 0.2 10e9/L

## 2021-03-11 ENCOUNTER — MYC MEDICAL ADVICE (OUTPATIENT)
Dept: PEDIATRICS | Facility: CLINIC | Age: 51
End: 2021-03-11

## 2021-03-11 ENCOUNTER — OFFICE VISIT (OUTPATIENT)
Dept: PEDIATRICS | Facility: CLINIC | Age: 51
End: 2021-03-11
Payer: COMMERCIAL

## 2021-03-11 VITALS
SYSTOLIC BLOOD PRESSURE: 154 MMHG | WEIGHT: 195.8 LBS | OXYGEN SATURATION: 99 % | BODY MASS INDEX: 34.69 KG/M2 | DIASTOLIC BLOOD PRESSURE: 100 MMHG | TEMPERATURE: 98.3 F | HEIGHT: 63 IN | HEART RATE: 85 BPM

## 2021-03-11 DIAGNOSIS — R01.1 HEART MURMUR: ICD-10-CM

## 2021-03-11 DIAGNOSIS — F41.9 ANXIETY: ICD-10-CM

## 2021-03-11 DIAGNOSIS — D70.9 NEUTROPENIA, UNSPECIFIED TYPE (H): ICD-10-CM

## 2021-03-11 DIAGNOSIS — R03.0 ELEVATED BLOOD PRESSURE READING WITHOUT DIAGNOSIS OF HYPERTENSION: Primary | ICD-10-CM

## 2021-03-11 DIAGNOSIS — E66.01 MORBID OBESITY (H): ICD-10-CM

## 2021-03-11 LAB
BASOPHILS # BLD AUTO: 0 10E9/L (ref 0–0.2)
BASOPHILS NFR BLD AUTO: 0.6 %
DIFFERENTIAL METHOD BLD: ABNORMAL
EOSINOPHIL # BLD AUTO: 0.1 10E9/L (ref 0–0.7)
EOSINOPHIL NFR BLD AUTO: 2.3 %
ERYTHROCYTE [DISTWIDTH] IN BLOOD BY AUTOMATED COUNT: 12.9 % (ref 10–15)
HCT VFR BLD AUTO: 37.8 % (ref 35–47)
HGB BLD-MCNC: 12.6 G/DL (ref 11.7–15.7)
LYMPHOCYTES # BLD AUTO: 1.2 10E9/L (ref 0.8–5.3)
LYMPHOCYTES NFR BLD AUTO: 39.2 %
MCH RBC QN AUTO: 31 PG (ref 26.5–33)
MCHC RBC AUTO-ENTMCNC: 33.3 G/DL (ref 31.5–36.5)
MCV RBC AUTO: 93 FL (ref 78–100)
MONOCYTES # BLD AUTO: 0.3 10E9/L (ref 0–1.3)
MONOCYTES NFR BLD AUTO: 10 %
NEUTROPHILS # BLD AUTO: 1.5 10E9/L (ref 1.6–8.3)
NEUTROPHILS NFR BLD AUTO: 47.9 %
PLATELET # BLD AUTO: 257 10E9/L (ref 150–450)
RBC # BLD AUTO: 4.06 10E12/L (ref 3.8–5.2)
WBC # BLD AUTO: 3.1 10E9/L (ref 4–11)

## 2021-03-11 PROCEDURE — 36415 COLL VENOUS BLD VENIPUNCTURE: CPT | Performed by: NURSE PRACTITIONER

## 2021-03-11 PROCEDURE — 99214 OFFICE O/P EST MOD 30 MIN: CPT | Performed by: NURSE PRACTITIONER

## 2021-03-11 PROCEDURE — 93000 ELECTROCARDIOGRAM COMPLETE: CPT | Performed by: NURSE PRACTITIONER

## 2021-03-11 PROCEDURE — 80050 GENERAL HEALTH PANEL: CPT | Performed by: NURSE PRACTITIONER

## 2021-03-11 RX ORDER — PHENTERMINE HYDROCHLORIDE 15 MG/1
15 CAPSULE ORAL EVERY MORNING
Qty: 30 CAPSULE | Refills: 0 | Status: CANCELLED | OUTPATIENT
Start: 2021-03-11

## 2021-03-11 ASSESSMENT — MIFFLIN-ST. JEOR: SCORE: 1477.27

## 2021-03-11 NOTE — PATIENT INSTRUCTIONS
PLAN:  -labs today  -EKG  -Schedule Echo    Follow-up in 2 weeks. If your BPs are still high, we may need to get off the phentermine.

## 2021-03-11 NOTE — LETTER
My Depression Action Plan  Name: Natalee Maya   Date of Birth 1970  Date: 3/11/2021    My doctor: Carolina Gomez   My clinic: [facility]  [address]  [address]  [place] [address] [phone]          GREEN    ZONE   Good Control    What it looks like:     Things are going generally well. You have normal ups and downs. You may even feel depressed from time to time, but bad moods usually last less than a day.   What you need to do:  1. Continue to care for yourself (see self care plan)  2. Check your depression survival kit and update it as needed  3. Follow your physician s recommendations including any medication.  4. Do not stop taking medication unless you consult with your physician first.           YELLOW         ZONE Getting Worse    What it looks like:     Depression is starting to interfere with your life.     It may be hard to get out of bed; you may be starting to isolate yourself from others.    Symptoms of depression are starting to last most all day and this has happened for several days.     You may have suicidal thoughts but they are not constant.   What you need to do:     1. Call your care team. Your response to treatment will improve if you keep your care team informed of your progress. Yellow periods are signs an adjustment may need to be made.     2. Continue your self-care.  Just get dressed and ready for the day.  Don't give yourself time to talk yourself out of it.    3. Talk to someone in your support network.    4. Open up your Depression Self-Care Plan/Wellness Kit.           RED    ZONE Medical Alert - Get Help    What it looks like:     Depression is seriously interfering with your life.     You may experience these or other symptoms: You can t get out of bed most days, can t work or engage in other necessary activities, you have trouble taking care of basic hygiene, or basic responsibilities, thoughts of suicide  or death that will not go away, self-injurious behavior.     What you need to do:  1. Call your care team and request a same-day appointment. If they are not available (weekends or after hours) call your local crisis line, emergency room or 911.          Depression Self-Care Plan / Wellness Kit    Many people find that medication and therapy are helpful treatments for managing depression. In addition, making small changes to your everyday life can help to boost your mood and improve your wellbeing. Below are some tips for you to consider. Be sure to talk with your medical provider and/or behavioral health consultant if your symptoms are worsening or not improving.     Sleep   Sleep hygiene  means all of the habits that support good, restful sleep. It includes maintaining a consistent bedtime and wake time, using your bedroom only for sleeping or sex, and keeping the bedroom dark and free of distractions like a computer, smartphone, or television.     Develop a Healthy Routine  Maintain good hygiene. Get out of bed in the morning, make your bed, brush your teeth, take a shower, and get dressed. Don t spend too much time viewing media that makes you feel stressed. Find time to relax each day.    Exercise  Get some form of exercise every day. This will help reduce pain and release endorphins, the  feel good  chemicals in your brain. It can be as simple as just going for a walk or doing some gardening, anything that will get you moving.      Diet  Strive to eat healthy foods, including fruits and vegetables. Drink plenty of water. Avoid excessive sugar, caffeine, alcohol, and other mood-altering substances.     Stay Connected with Others  Stay in touch with friends and family members.    Manage Your Mood  Try deep breathing, massage therapy, biofeedback, or meditation. Take part in fun activities when you can. Try to find something to smile about each day.     Psychotherapy  Be open to working with a therapist if your  provider recommends it.     Medication  Be sure to take your medication as prescribed. Most anti-depressants need to be taken every day. It usually takes several weeks for medications to work. Not all medicines work for all people. It is important to follow-up with your provider to make sure you have a treatment plan that is working for you. Do not stop your medication abruptly without first discussing it with your provider.    Crisis Resources   These hotlines are for both adults and children. They and are open 24 hours a day, 7 days a week unless noted otherwise.      National Suicide Prevention Lifeline   6-191-268-DTNE (8865)      Crisis Text Line    www.crisistextline.org  Text HOME to 517969 from anywhere in the [place], anytime, about any type of crisis. A live, trained crisis counselor will receive the text and respond quickly.      [place] Lifeline for LGBTQ Youth  A national crisis intervention and suicide lifeline for LGBTQ youth under 25. Provides a safe place to talk without judgement. Call 1-296.421.2446; text START to 200858 or visit www.thetrevorproject.org to talk to a trained counselor.      For [facility] crisis numbers, visit the [facility] website at:  https://[place].gov/dhs/people-we-serve/adults/health-care/mental-health/resources/crisis-contacts.jsp

## 2021-03-12 LAB
ALBUMIN SERPL-MCNC: 4.1 G/DL (ref 3.4–5)
ALP SERPL-CCNC: 55 U/L (ref 40–150)
ALT SERPL W P-5'-P-CCNC: 27 U/L (ref 0–50)
ANION GAP SERPL CALCULATED.3IONS-SCNC: 4 MMOL/L (ref 3–14)
AST SERPL W P-5'-P-CCNC: 22 U/L (ref 0–45)
BILIRUB SERPL-MCNC: 0.7 MG/DL (ref 0.2–1.3)
BUN SERPL-MCNC: 13 MG/DL (ref 7–30)
CALCIUM SERPL-MCNC: 8.8 MG/DL (ref 8.5–10.1)
CHLORIDE SERPL-SCNC: 107 MMOL/L (ref 94–109)
CO2 SERPL-SCNC: 26 MMOL/L (ref 20–32)
CREAT SERPL-MCNC: 0.59 MG/DL (ref 0.52–1.04)
GFR SERPL CREATININE-BSD FRML MDRD: >90 ML/MIN/{1.73_M2}
GLUCOSE SERPL-MCNC: 81 MG/DL (ref 70–99)
POTASSIUM SERPL-SCNC: 3.8 MMOL/L (ref 3.4–5.3)
PROT SERPL-MCNC: 7.2 G/DL (ref 6.8–8.8)
SODIUM SERPL-SCNC: 137 MMOL/L (ref 133–144)
TSH SERPL DL<=0.005 MIU/L-ACNC: 1.65 MU/L (ref 0.4–4)

## 2021-03-19 ENCOUNTER — VIRTUAL VISIT (OUTPATIENT)
Dept: PSYCHOLOGY | Facility: CLINIC | Age: 51
End: 2021-03-19
Payer: COMMERCIAL

## 2021-03-19 DIAGNOSIS — F33.41 MAJOR DEPRESSIVE DISORDER, RECURRENT EPISODE, IN PARTIAL REMISSION WITH ANXIOUS DISTRESS (H): Primary | ICD-10-CM

## 2021-03-19 PROCEDURE — 90834 PSYTX W PT 45 MINUTES: CPT | Mod: GT | Performed by: SOCIAL WORKER

## 2021-03-19 NOTE — PROGRESS NOTES
Progress Note    Patient Name: Natalee Maya  Date: 3/19/2021     Service Type: Individual     Session Start Time: 7:00 AM Session End Time: 7:40 AM    Session Length: 38-52 minutes    Session #: 15 (4 with DB)    Attendees: Client     Telemedicine Visit: The patient's condition can be safely assessed and treated via synchronous audio and visual telemedicine encounter.      Reason for Telemedicine Visit: Services only offered telehealth    Originating Site (Patient Location): Patient's work    Distant Site (Provider Location): Provider Remote Setting    Consent:  The patient/guardian has verbally consented to: the potential risks and benefits of telemedicine (video visit) versus in person care; bill my insurance or make self-payment for services provided; and responsibility for payment of non-covered services.        Mode of Communication: [email or web address]     Treatment Plan Last Reviewed: 2/5/2021  PHQ-9 / MADINA-7 : 2/16/2021      DATA  Interactive Complexity: No  Crisis: No       Progress Since Last Session (Related to Symptoms / Goals / Homework):   Symptoms: stable; minimal symptoms    Homework: Achieved / completed to satisfaction       Episode of Care Goals: Satisfactory progress - ACTION (Actively working towards change); Intervened by reinforcing change plan / affirming steps taken     Current / Ongoing Stressors and Concerns:   Marital stress/plan for moving out   20 lb weight gain in 2020   House going on market today     Treatment Objective(s) Addressed in This Session:   Client will increase engagement in areas of interest, exercise     Intervention:   Supported client with processing about marital stress and upcoming separation   CBT: guided discovery, identified thoughts, feelings, and behaviors, reinforced behavior changes   Motivational interviewing: expressed empathy/understanding, use of reflective listening and open ended questions, supported autonomy   Offered  validation and support  Reflected on patient progress    ASSESSMENT: Current Emotional / Mental Status (status of significant symptoms):   Risk status (Self / Other harm or suicidal ideation)   Patient denies current fears or concerns for personal safety.   Patient denies current or recent suicidal ideation or behaviors.     Patientdenies current or recent homicidal ideation or behaviors.   Patient denies current or recent self injurious behavior or ideation.   Patient denies other safety concerns.   Patient reports there has been no change in risk factors since their last session.     Patientreports there has been no change in protective factors since their last session.     A safety and risk management plan was developed and shared with client in previous session.  Client confirmed she shared with her three emergency contacts     Appearance:   Appropriate    Eye Contact:   Good    Psychomotor Behavior: Normal    Attitude:   Cooperative  Pleasant   Orientation:   All   Speech    Rate / Production: Normal     Volume:  Normal    Mood:    Stable   Affect:    Appropriate   Thought Content:  Clear    Thought Form:  Coherent  Goal Directed  Logical    Insight:    Good      Medication Review:   No changes to current psychiatric medication(s)     Zoloft 50 mg      Medication Compliance:   NA     Changes in Health Issues:   Yes: Low white blood count   High blood pressure     Chemical Use Review:   Substance Use: Chemical use reviewed, no active concerns identified      Tobacco Use: No current tobacco use.      Diagnosis:    Major Depressive Disorder, Moderate, in partial remission    Collateral Reports Completed:   N/A    PLAN: (Patient Tasks / Therapist Tasks / Other)  Client is planning to increase engagement in self-care and areas of interest, including exercise.    Celeste Chavez, LICSW 3/19/2021                          ______________________________________________________________________    Treatment  "Plan    Patient's Name: Natalee Maya  YOB: 1970    Date:  2/5/2021    DSM5 Diagnoses: Adjustment Disorders  309.28 (F43.23) With mixed anxiety and depressed mood  Psychosocial / Contextual Factors: marital stress  WHODAS: 16    Referral / Collaboration:  Referral to another professional/service is not indicated at this time..    Anticipated number of session or this episode of care: will review in 90 days    Adopted on 8- by MARA Servin.    MeasurableTreatment Goal(s) related to diagnosis / functional impairment(s)  Goal 1: Patient will develop techniques for reducing symptoms that result from marital stress and conflict by reporting MADINA-7 and PHQ-9 scores below a 5, where symptoms where symptoms occur fewer than half the days for a minimum of 4 weeks.    Objective #A (Patient Action)    Patient will learn and utilize 3 techniques to manage emotions related to marital stress/conflict.  Status: Continued - Date(s):  2/5/2021     Intervention(s)  Therapist will teach relaxation techniques and ways to engage in self-care.    Objective #B  Patient will compile a list of boundaries that they would like to set with others. Utilize the boundaries .  Status: Continued - Date(s):  2/5/2021    Intervention(s)  Therapist will teach health boundaries and encourage client to identify their boundaries and implement them.    Objective #C  Patient will learn & utilize at least 1 assertive communication skills weekly.  Status: Continued - Date(s):  2/5/2021    Intervention(s)  Therapist will teach assertive communication skills.    Patient has reviewed and agreed to the above plan.      Celeste Chavez, [facility]  7/2/2020                                                 Natalee Maya     SAFETY PLAN:  Step 1: Warning signs / cues (Thoughts, images, mood, situation, behavior) that a crisis may be developing:    Thoughts: \"I don't matter\", \"I'm a burden\", \"I can't do this anymore\", \"I just want this to end\" " "and \"Nothing makes it better\"    Images: none    Thinking Processes: ruminations (can't stop thinking about my problems): marital stresss, racing thoughts and highly critical and negative thoughts: about myself    Mood: worsening depression, hopelessness, agitation and mood swings    Behaviors: isolating/withdrawing  and can't stop crying    Situations: relationship problems   Step 2: Coping strategies - Things I can do to take my mind off of my problems without contacting another person (relaxation technique, physical activity):    Distress Tolerance Strategies:  read a book and geneology    Physical Activities: go for a walk    Focus on helpful thoughts:  \"This is temporary\", \"I will get through this\", \"It always passes\",   think about happy memories:   and remind myself of what is important to me:  Step 3: People and social settings that provide distraction:   Name: Isaías   Phone: in phone   Name: Dre   Phone: in phone   Name: Mom   Phone: in phone   Name: Tracy   Phone: in phone    work and go outside   Step 4: Remind myself of people and things that are important to me and worth living for:  Children, , the job I do is important, I have talents and skills to offer people, and I am a good person  Step 5: When I am in crisis, I can ask these people to help me use my safety plan:   Name: Isaísa   Phone: [phone]   Name: Dre   Phone: [phone]   Name: Tracy   Phone: [phone]  Step 6: Making the environment safe:     be around others  Step 7: Professionals or agencies I can contact during a crisis:    [facility] Daytime Number: [phone]    Suicide Prevention Lifeline: 7-353-980-TALK (9628)    Crisis Text Line Service (available 24 hours a day, 7 days a week): Text [place] to 011858    Local Crisis Services: [facility] [phone]    Call 911 or go to my nearest emergency department.   I helped develop this safety plan and agree to use it when needed.  I have been given a copy of " this plan.      Client signature _________________________________________________________________  Today s date:  1/8/2021  Adapted from Safety Plan Template 2008 Valerie Broderick and Alistair Briseno is reprinted with the express permission of the authors.  No portion of the Safety Plan Template may be reproduced without the express, written permission.  You can contact the authors at [email or web address] or [email or web address].

## 2021-03-25 ENCOUNTER — OFFICE VISIT (OUTPATIENT)
Dept: PEDIATRICS | Facility: CLINIC | Age: 51
End: 2021-03-25
Payer: COMMERCIAL

## 2021-03-25 VITALS
HEIGHT: 63 IN | TEMPERATURE: 97.9 F | BODY MASS INDEX: 35.97 KG/M2 | HEART RATE: 83 BPM | SYSTOLIC BLOOD PRESSURE: 152 MMHG | OXYGEN SATURATION: 99 % | WEIGHT: 203 LBS | DIASTOLIC BLOOD PRESSURE: 88 MMHG

## 2021-03-25 DIAGNOSIS — F41.9 ANXIETY: ICD-10-CM

## 2021-03-25 DIAGNOSIS — R03.0 ELEVATED BLOOD PRESSURE READING WITHOUT DIAGNOSIS OF HYPERTENSION: Primary | ICD-10-CM

## 2021-03-25 DIAGNOSIS — E66.01 MORBID OBESITY (H): ICD-10-CM

## 2021-03-25 DIAGNOSIS — Z63.0 MARITAL STRESS: ICD-10-CM

## 2021-03-25 DIAGNOSIS — R01.1 HEART MURMUR: ICD-10-CM

## 2021-03-25 PROCEDURE — 99214 OFFICE O/P EST MOD 30 MIN: CPT | Mod: GT | Performed by: NURSE PRACTITIONER

## 2021-03-25 SDOH — SOCIAL STABILITY - SOCIAL INSECURITY: PROBLEMS IN RELATIONSHIP WITH SPOUSE OR PARTNER: Z63.0

## 2021-03-25 ASSESSMENT — MIFFLIN-ST. JEOR: SCORE: 1509.93

## 2021-03-25 NOTE — PROGRESS NOTES
Assessment & Plan     Elevated blood pressure reading without diagnosis of hypertension  Pt continues to have high BP readings, although these are very inconsistent. Today manual check SBP in the 150s. Pt stopped phentermine 2 weeks ago. Plan to check manual BPs (she has access to school nurse) over the next week and update with [facility] in 1-2 weeks with her readings. Do wonder if her home cuff is inaccurate as the readings at times were higher than manual check. Asymptomatic. If BPs remain high then would discuss initiate meds.  Continue with low sodium diet, work on cutting back caffeine and stress reduction.    Heart murmur  Has echo scheduled.    Morbid obesity (H)  Pt has stopped phentermine since last OV. Working on healthy diet. Plans to schedule with endocrinology (had previously been managing her phentermine and thyroid problem)    Marital stress  Anxiety  Continue to work on stress reduction. Discussed relationship between stress and BPs.                   Return in about 2 weeks (around 4/8/2021).    Rachana Mendoza NP  [facility] [place] is a 50 year old who presents for the following health issues    HPI     Hypertension Follow-up      Do you check your blood pressure regularly outside of the clinic? Yes     Are you following a low salt diet? Yes    Are your blood pressures ever more than 140 on the top number (systolic) OR more   than 90 on the bottom number (diastolic), for example 140/90? Yes    Pt sent [employer] message today with last blood pressures. Pt is always taking blood pressures in the morning when she gets up.    Pt took /95 with home monitor at clinic. When taken with automatic cuff in clinic got 134/84. Taken manually bp is at 128/80      How many servings of fruits and vegetables do you eat daily?  3-4    On average, how many sweetened beverages do you drink each day (Examples: soda, juice, sweet tea, etc.  Do NOT count diet or artificially  "sweetened beverages)?   0    How many days per week do you exercise enough to make your heart beat faster? 3-4 days    How many minutes a day do you exercise enough to make your heart beat faster? 20 - 29    How many days per week do you miss taking your medication? 0    Pt seen 2 weeks ago with elevated BPs. Had cut back on phentermine dose before that and BPs remained high.  Pt was hesitant to stop phentermine but ultimately DCd this 2 weeks ago. Notes she is having some more food cravings but overall has maintained her self-control/diet.     Here today as she continues to have high BP readings.   Checking daily at home- readings up to 180 SBP.  Denies chest pain, palpitations, dyspnea, lightheadedness, dizziness, vision changes, edema, or headaches. Overall feels well.  Admits to 1-2 cups of coffee per day.  Has echo scheduled for tomorrow (ordered at last visit) due to hx of murmur and new onset HTN.  She admits to a lot of stress.      Review of Systems   Constitutional, HEENT, cardiovascular, pulmonary, gi and gu systems are negative, except as otherwise noted.      Objective    /80 (BP Location: Right arm, Patient Position: Sitting, Cuff Size: Adult Large)   Pulse 83   Temp 97.9  F (36.6  C) (Tympanic)   Ht 1.6 m (5' 3\")   Wt 92.1 kg (203 lb)   SpO2 99%   BMI 35.96 kg/m    Body mass index is 35.96 kg/m .  Physical Exam   GENERAL: healthy, alert and no distress    Office Visit on 03/11/2021   Component Date Value Ref Range Status     TSH 03/11/2021 1.65  0.40 - 4.00 mU/L Final     Sodium 03/11/2021 137  133 - 144 mmol/L Final     Potassium 03/11/2021 3.8  3.4 - 5.3 mmol/L Final     Chloride 03/11/2021 107  94 - 109 mmol/L Final     Carbon Dioxide 03/11/2021 26  20 - 32 mmol/L Final     Anion Gap 03/11/2021 4  3 - 14 mmol/L Final     Glucose 03/11/2021 81  70 - 99 mg/dL Final     Urea Nitrogen 03/11/2021 13  7 - 30 mg/dL Final     Creatinine 03/11/2021 0.59  0.52 - 1.04 mg/dL Final     GFR Estimate " 03/11/2021 >90  >60 mL/min/[1.73_m2] Final    Comment: Non  GFR Calc  Starting 12/18/2018, serum creatinine based estimated GFR (eGFR) will be   calculated using the Chronic Kidney Disease Epidemiology Collaboration   (CKD-EPI) equation.       GFR Estimate If Black 03/11/2021 >90  >60 mL/min/[1.73_m2] Final    Comment:  GFR Calc  Starting 12/18/2018, serum creatinine based estimated GFR (eGFR) will be   calculated using the Chronic Kidney Disease Epidemiology Collaboration   (CKD-EPI) equation.       Calcium 03/11/2021 8.8  8.5 - 10.1 mg/dL Final     Bilirubin Total 03/11/2021 0.7  0.2 - 1.3 mg/dL Final     Albumin 03/11/2021 4.1  3.4 - 5.0 g/dL Final     Protein Total 03/11/2021 7.2  6.8 - 8.8 g/dL Final     Alkaline Phosphatase 03/11/2021 55  40 - 150 U/L Final     ALT 03/11/2021 27  0 - 50 U/L Final     AST 03/11/2021 22  0 - 45 U/L Final     WBC 03/11/2021 3.1* 4.0 - 11.0 10e9/L Final     RBC Count 03/11/2021 4.06  3.8 - 5.2 10e12/L Final     Hemoglobin 03/11/2021 12.6  11.7 - 15.7 g/dL Final     Hematocrit 03/11/2021 37.8  35.0 - 47.0 % Final     MCV 03/11/2021 93  78 - 100 fl Final     MCH 03/11/2021 31.0  26.5 - 33.0 pg Final     MCHC 03/11/2021 33.3  31.5 - 36.5 g/dL Final     RDW 03/11/2021 12.9  10.0 - 15.0 % Final     Platelet Count 03/11/2021 257  150 - 450 10e9/L Final     Diff Method 03/11/2021 Automated Method   Final     % Neutrophils 03/11/2021 47.9  % Final     % Lymphocytes 03/11/2021 39.2  % Final     % Monocytes 03/11/2021 10.0  % Final     % Eosinophils 03/11/2021 2.3  % Final     % Basophils 03/11/2021 0.6  % Final     Absolute Neutrophil 03/11/2021 1.5* 1.6 - 8.3 10e9/L Final     Absolute Lymphocytes 03/11/2021 1.2  0.8 - 5.3 10e9/L Final     Absolute Monocytes 03/11/2021 0.3  0.0 - 1.3 10e9/L Final     Absolute Eosinophils 03/11/2021 0.1  0.0 - 0.7 10e9/L Final     Absolute Basophils 03/11/2021 0.0  0.0 - 0.2 10e9/L Final

## 2021-03-25 NOTE — PATIENT INSTRUCTIONS
-Continue with the healthy diet.  -Work on cutting back on the coffee and stress.  -Have the school nurse check your blood pressure ( I want some manual readings) - if this is not an option then come back to clinic to have the pharmacy or can do a nurse visit to recheck your blood pressure in 1-2 weeks.  -Submit an Evisit to Carolina or myself in 2 weeks to check in on the BP.

## 2021-03-26 ENCOUNTER — HOSPITAL ENCOUNTER (OUTPATIENT)
Dept: CARDIOLOGY | Facility: CLINIC | Age: 51
Discharge: HOME | End: 2021-03-26
Attending: NURSE PRACTITIONER | Admitting: NURSE PRACTITIONER
Payer: COMMERCIAL

## 2021-03-26 DIAGNOSIS — R01.1 HEART MURMUR: ICD-10-CM

## 2021-03-26 DIAGNOSIS — I35.8 AORTIC VALVE SCLEROSIS: ICD-10-CM

## 2021-03-26 DIAGNOSIS — R03.0 ELEVATED BLOOD PRESSURE READING WITHOUT DIAGNOSIS OF HYPERTENSION: Primary | ICD-10-CM

## 2021-03-26 DIAGNOSIS — R03.0 ELEVATED BLOOD PRESSURE READING WITHOUT DIAGNOSIS OF HYPERTENSION: ICD-10-CM

## 2021-03-26 PROCEDURE — 93306 TTE W/DOPPLER COMPLETE: CPT | Mod: 26 | Performed by: INTERNAL MEDICINE

## 2021-03-26 PROCEDURE — 93306 TTE W/DOPPLER COMPLETE: CPT

## 2021-04-16 ENCOUNTER — VIRTUAL VISIT (OUTPATIENT)
Dept: PSYCHOLOGY | Facility: CLINIC | Age: 51
End: 2021-04-16
Payer: COMMERCIAL

## 2021-04-16 DIAGNOSIS — F33.41 MAJOR DEPRESSIVE DISORDER, RECURRENT EPISODE, IN PARTIAL REMISSION WITH ANXIOUS DISTRESS (H): Primary | ICD-10-CM

## 2021-04-16 PROCEDURE — 90834 PSYTX W PT 45 MINUTES: CPT | Mod: GT | Performed by: SOCIAL WORKER

## 2021-04-16 NOTE — PROGRESS NOTES
Progress Note    Patient Name: Natalee Maya  Date: 4/16/2021     Service Type: Individual     Session Start Time: 7:04 AM Session End Time: 7:53 AM    Session Length: 38-52 minutes    Session #: 16 (4 with DB)    Attendees: Client     Telemedicine Visit: The patient's condition can be safely assessed and treated via synchronous audio and visual telemedicine encounter.      Reason for Telemedicine Visit: Services only offered telehealth    Originating Site (Patient Location): Patient's home    Distant Site (Provider Location): Provider Remote Setting    Consent:  The patient/guardian has verbally consented to: the potential risks and benefits of telemedicine (video visit) versus in person care; bill my insurance or make self-payment for services provided; and responsibility for payment of non-covered services.      Mode of Communication: [email or web address]     Treatment Plan Last Reviewed: 2/5/2021  PHQ-9 / MADINA-7 : 4/16/2021      DATA  Interactive Complexity: No  Crisis: No       Progress Since Last Session (Related to Symptoms / Goals / Homework):   Symptoms: stable; reported anxiety due to current stressors   Appetite increase since stopping phentermine    Homework: Achieved / completed to satisfaction       Episode of Care Goals: Satisfactory progress - ACTION (Actively working towards change); Intervened by reinforcing change plan / affirming steps taken     Current / Ongoing Stressors and Concerns:   Marital stress/moving out   20 lb weight gain in 2020   [employer] [place]     Treatment Objective(s) Addressed in This Session:   Client will increase engagement in areas of interest, exercise     Intervention:   Supported client with processing about relationship stressors   CBT: guided discovery, identified thoughts, feelings, and behaviors   Motivational interviewing: expressed empathy/understanding, use of reflective listening and open ended questions, supported  autonomy   Offered validation and support    ASSESSMENT: Current Emotional / Mental Status (status of significant symptoms):   Risk status (Self / Other harm or suicidal ideation)   Patient denies current fears or concerns for personal safety.   Patient denies current or recent suicidal ideation or behaviors.     Patientdenies current or recent homicidal ideation or behaviors.   Patient denies current or recent self injurious behavior or ideation.   Patient denies other safety concerns.   Patient reports there has been no change in risk factors since their last session.     Patientreports there has been no change in protective factors since their last session.     A safety and risk management plan was developed and shared with client in previous session.  Client confirmed she shared with her three emergency contacts     Appearance:   Appropriate    Eye Contact:   Good    Psychomotor Behavior: Normal    Attitude:   Cooperative  Friendly Pleasant   Orientation:   All   Speech    Rate / Production: Normal     Volume:  Normal    Mood:    Stable   Affect:    Appropriate   Thought Content:  Clear    Thought Form:  Coherent  Goal Directed  Logical    Insight:    Good      Medication Review:   No changes to current psychiatric medication(s)     Zoloft 50 mg      Medication Compliance:   NA     Changes in Health Issues:   None reported      Chemical Use Review:   Substance Use: no active concerns identified      Tobacco Use: No current tobacco use.      Diagnosis:    Major Depressive Disorder, Moderate, in partial remission    Collateral Reports Completed:   N/A    PLAN: (Patient Tasks / Therapist Tasks / Other)  Client will continue couples therapy  Client shared goal to focus on areas within her control and increase engagement in areas of interest to stay busy.    Celeste Chavez, [facility] 4/16/2021                          ______________________________________________________________________    Treatment Plan    Patient's  "Name: Natalee Maya  YOB: 1970    Date:  2/5/2021    DSM5 Diagnoses: Adjustment Disorders  309.28 (F43.23) With mixed anxiety and depressed mood  Psychosocial / Contextual Factors: marital stress  WHODAS: 16    Referral / Collaboration:  Referral to another professional/service is not indicated at this time..    Anticipated number of session or this episode of care: will review in 90 days    Adopted on 8- by MARA Servin.    MeasurableTreatment Goal(s) related to diagnosis / functional impairment(s)  Goal 1: Patient will develop techniques for reducing symptoms that result from marital stress and conflict by reporting MADINA-7 and PHQ-9 scores below a 5, where symptoms where symptoms occur fewer than half the days for a minimum of 4 weeks.    Objective #A (Patient Action)    Patient will learn and utilize 3 techniques to manage emotions related to marital stress/conflict.  Status: Continued - Date(s):  2/5/2021     Intervention(s)  Therapist will teach relaxation techniques and ways to engage in self-care.    Objective #B  Patient will compile a list of boundaries that they would like to set with others. Utilize the boundaries .  Status: Continued - Date(s):  2/5/2021    Intervention(s)  Therapist will teach health boundaries and encourage client to identify their boundaries and implement them.    Objective #C  Patient will learn & utilize at least 1 assertive communication skills weekly.  Status: Continued - Date(s):  2/5/2021    Intervention(s)  Therapist will teach assertive communication skills.    Patient has reviewed and agreed to the above plan.      Celeste Chavez, [facility]  7/2/2020                                                 Natalee MARTINEZ Francesco     SAFETY PLAN:  Step 1: Warning signs / cues (Thoughts, images, mood, situation, behavior) that a crisis may be developing:    Thoughts: \"I don't matter\", \"I'm a burden\", \"I can't do this anymore\", \"I just want this to end\" and \"Nothing makes " "it better\"    Images: none    Thinking Processes: ruminations (can't stop thinking about my problems): marital stresss, racing thoughts and highly critical and negative thoughts: about myself    Mood: worsening depression, hopelessness, agitation and mood swings    Behaviors: isolating/withdrawing  and can't stop crying    Situations: relationship problems   Step 2: Coping strategies - Things I can do to take my mind off of my problems without contacting another person (relaxation technique, physical activity):    Distress Tolerance Strategies:  read a book and geneology    Physical Activities: go for a walk    Focus on helpful thoughts:  \"This is temporary\", \"I will get through this\", \"It always passes\",   think about happy memories:   and remind myself of what is important to me:  Step 3: People and social settings that provide distraction:   Name: Isaías   Phone: in phone   Name: Dre   Phone: in phone   Name: Mom   Phone: in phone   Name: Tracy   Phone: in phone    work and go outside   Step 4: Remind myself of people and things that are important to me and worth living for:  Children, , the job I do is important, I have talents and skills to offer people, and I am a good person  Step 5: When I am in crisis, I can ask these people to help me use my safety plan:   Name: Isaías   Phone: [phone]   Name: Dre   Phone: [phone]   Name: Tracy   Phone: [phone]  Step 6: Making the environment safe:     be around others  Step 7: Professionals or agencies I can contact during a crisis:    [facility] Daytime Number: [phone]    Suicide Prevention Lifeline: 2-143-228-TALK (0767)    Crisis Text Line Service (available 24 hours a day, 7 days a week): Text [place] to 608301    Local Crisis Services: [facility] [phone]    Call 911 or go to my nearest emergency department.   I helped develop this safety plan and agree to use it when needed.  I have been given a copy of this plan.  "     Client signature _________________________________________________________________  Today s date:  1/8/2021  Adapted from Safety Plan Template 2008 Valerie Broderick and Alistair Briseno is reprinted with the express permission of the authors.  No portion of the Safety Plan Template may be reproduced without the express, written permission.  You can contact the authors at bhs@[email or web address] or [email or web address].

## 2021-04-21 ENCOUNTER — PATIENT OUTREACH (OUTPATIENT)
Dept: UROLOGY | Facility: CLINIC | Age: 51
End: 2021-04-21

## 2021-05-10 ENCOUNTER — HOSPITAL ENCOUNTER (OUTPATIENT)
Facility: CLINIC | Age: 51
Setting detail: SPECIMEN
Discharge: HOME | End: 2021-05-10
Attending: INTERNAL MEDICINE | Admitting: INTERNAL MEDICINE
Payer: COMMERCIAL

## 2021-05-10 DIAGNOSIS — D70.9 NEUTROPENIA, UNSPECIFIED TYPE (H): ICD-10-CM

## 2021-05-10 LAB
BASOPHILS # BLD AUTO: 0 10E9/L (ref 0–0.2)
BASOPHILS NFR BLD AUTO: 0.9 %
DIFFERENTIAL METHOD BLD: ABNORMAL
EOSINOPHIL # BLD AUTO: 0.1 10E9/L (ref 0–0.7)
EOSINOPHIL NFR BLD AUTO: 3.1 %
ERYTHROCYTE [DISTWIDTH] IN BLOOD BY AUTOMATED COUNT: 13.4 % (ref 10–15)
HCT VFR BLD AUTO: 40.7 % (ref 35–47)
HGB BLD-MCNC: 13 G/DL (ref 11.7–15.7)
IMM GRANULOCYTES # BLD: 0 10E9/L (ref 0–0.4)
IMM GRANULOCYTES NFR BLD: 0.6 %
LYMPHOCYTES # BLD AUTO: 1.1 10E9/L (ref 0.8–5.3)
LYMPHOCYTES NFR BLD AUTO: 33.5 %
MCH RBC QN AUTO: 30.1 PG (ref 26.5–33)
MCHC RBC AUTO-ENTMCNC: 31.9 G/DL (ref 31.5–36.5)
MCV RBC AUTO: 94 FL (ref 78–100)
MONOCYTES # BLD AUTO: 0.3 10E9/L (ref 0–1.3)
MONOCYTES NFR BLD AUTO: 8.4 %
NEUTROPHILS # BLD AUTO: 1.7 10E9/L (ref 1.6–8.3)
NEUTROPHILS NFR BLD AUTO: 53.5 %
NRBC # BLD AUTO: 0 10*3/UL
NRBC BLD AUTO-RTO: 0 /100
PLATELET # BLD AUTO: 207 10E9/L (ref 150–450)
RBC # BLD AUTO: 4.32 10E12/L (ref 3.8–5.2)
WBC # BLD AUTO: 3.2 10E9/L (ref 4–11)

## 2021-05-10 PROCEDURE — 36415 COLL VENOUS BLD VENIPUNCTURE: CPT

## 2021-05-10 PROCEDURE — 85025 COMPLETE CBC W/AUTO DIFF WBC: CPT | Performed by: INTERNAL MEDICINE

## 2021-05-13 ENCOUNTER — VIRTUAL VISIT (OUTPATIENT)
Dept: ONCOLOGY | Facility: CLINIC | Age: 51
End: 2021-05-13
Attending: INTERNAL MEDICINE
Payer: COMMERCIAL

## 2021-05-13 DIAGNOSIS — D72.819 LEUKOPENIA, UNSPECIFIED TYPE: Primary | ICD-10-CM

## 2021-05-13 PROCEDURE — 99212 OFFICE O/P EST SF 10 MIN: CPT | Mod: GT | Performed by: INTERNAL MEDICINE

## 2021-05-13 NOTE — LETTER
5/13/2021         RE: Natalee Maya  [address] [facility] [address]        Dear Colleague,    Thank you for referring your patient, Natalee Maya, to the [facility]. Please see a copy of my visit note below.    Natalee is a 50 year old who is being evaluated via a billable video visit.      How would you like to obtain your AVS? [email or web address]    Please send text to: [phone]      Will anyone else be joining your video visit? No    Video Start Time: 2:40 pm    Video-Visit Details    Type of service:  Video Visit    Video End Time: 2:43 pm    Originating Location (pt. Location): Work    Distant Location (provider location):  [facility]     Platform used for Video Visit: [employer]         [facility]    Hematology/Oncology Established Patient Note      Today's Date: 05/13/21    Reason for Follow-up: leukopenia      HISTORY OF PRESENT ILLNESS: Natalee Maya is a 50 year old female with PMHx of hypothyroidism who presents with leukopenia.  She was found on labs in June 2020 to have low WBC of 1.9K.  Repeat checks have been in the 2K range.  Hemoglobin and platelet count were normal.  Differential shows a moderate to mild neutropenia.  On further chart review, she had a mild leukopenia in the 3K range back in 2018, but did recover back to normal a few months later.  There are not many labs prior to that.  Iron, ferritin, vitamin 12, and folate were normal.  Peripheral smear shows low WBC, but non-specific.      She denies recent illness, infections, fever.  Her Prozac dose increased a few months ago.  Otherwise, she has not started any new medications recently.      She does not smoke.  She drinks a glass of wine maybe once or twice a month.  She denies illicit drug use.      INTERIM HISTORY: Natalee is doing well.  She denies any new complaints today.        REVIEW OF SYSTEMS:   14 point ROS was reviewed and is  negative other than as noted above in HPI.       HOME MEDICATIONS:  Current Outpatient Medications   Medication Sig Dispense Refill     Ferrous Sulfate (IRON) 325 (65 Fe) MG tablet TAKE ONE TABLET BY MOUTH EVERY DAY, AVOID AROUND SAME TIME AS LEVOTHYROXINE 90 tablet 3     folic acid (FOLVITE) 1 MG tablet TAKE ONE TABLET BY MOUTH EVERY DAY 90 tablet 3     levothyroxine (SYNTHROID/LEVOTHROID) 50 MCG tablet Take 1 tablet (50 mcg) by mouth daily 90 tablet 2     sertraline (ZOLOFT) 50 MG tablet Take 1 tablet (50 mg) by mouth daily for 7 days, THEN 1.5 tablets (75 mg) daily. 132 tablet 0         ALLERGIES:  Allergies   Allergen Reactions     No Known Drug Allergies          PAST MEDICAL HISTORY:  Past Medical History:   Diagnosis Date     Calculus of kidney     History of kidney stone -- Abstracted 02     Lymphedema of lower extremity          PAST SURGICAL HISTORY:  Past Surgical History:   Procedure Laterality Date     FOOT SURGERY      2017     [facility] NONSPECIFIC PROCEDURE       -- Abstracted 02         SOCIAL HISTORY:  Social History     Socioeconomic History     Marital status:      Spouse name: Not on file     Number of children: Not on file     Years of education: Not on file     Highest education level: Not on file   Occupational History     Not on file   Social Needs     Financial resource strain: Not on file     Food insecurity     Worry: Not on file     Inability: Not on file     Transportation needs     Medical: Not on file     Non-medical: Not on file   Tobacco Use     Smoking status: Never Smoker     Smokeless tobacco: Never Used   Substance and Sexual Activity     Alcohol use: No     Drug use: No     Sexual activity: Yes     Partners: Male   Lifestyle     Physical activity     Days per week: Not on file     Minutes per session: Not on file     Stress: Not on file   Relationships     Social connections     Talks on phone: Not on file     Gets together: Not on file     Attends  Voodoo service: Not on file     Active member of club or organization: Not on file     Attends meetings of clubs or organizations: Not on file     Relationship status: Not on file     Intimate partner violence     Fear of current or ex partner: Not on file     Emotionally abused: Not on file     Physically abused: Not on file     Forced sexual activity: Not on file   Other Topics Concern     Parent/sibling w/ CABG, MI or angioplasty before 65F 55M? Not Asked   Social History Narrative     Not on file         FAMILY HISTORY:  Family History   Problem Relation Age of Onset     Diabetes Father      C.A.D. Father         hyperlipidemia     Bleeding Disorder Mother         Factor 5     Breast Cancer No family hx of          PHYSICAL EXAM:  ECO  GENERAL/CONSTITUTIONAL: No acute distress. Healthy, alert..    RESPIRATORY: No audible wheeze, cough, or visible cyanosis.  No visible retractions or increased work of breathing.  Able to speak fully in complete sentences.  NEUROLOGIC: Alert, oriented, answers questions appropriately. No tremor. Mentation intact and speech normal  INTEGUMENTARY: No jaundice.    PSYCHIATRIC:  Mentation appears normal, affect normal/bright, judgement and insight intact, normal speech and appearance well-groomed.    The rest of a comprehensive physical exam is deferred due to public health emergency video visit restrictions.      LABS:  CBC RESULTS:   Recent Labs   Lab Test 05/10/21  0848   WBC 3.2*   RBC 4.32   HGB 13.0   HCT 40.7   MCV 94   MCH 30.1   MCHC 31.9   RDW 13.4            Peripheral flow:  INTERPRETATION:   Blood:        Polytypic B cells        No aberrant immunophenotype on T cells        See comment     COMMENT:   There is no immunophenotypic evidence of non-Hodgkin lymphoma or lymphoid   leukemia.  Final interpretation   requires correlation with morphologic and clinical features.       PATHOLOGY:  Peripheral smear 20:  FINAL DIAGNOSIS:   Peripheral Blood  Morphology-   -Moderate leukopenia with moderate absolute neutropenia.   -See comment.     COMMENT:   The patient has history of intermittent leukopenia (see prior peripheral   blood morphology; [id number], 8/2018.   Absolute neutropenia appears to be a new finding. Possible causes of   neutropenia include drugs, infection,   autoimmune/immune-mediated disorders, hypersplenism, and bone marrow   disorder. At present, there is   insufficient morphologic evidence to suggest a clonal bone marrow   disorder. Serum vitamin B12/folate   deficiency appears as an unlikely cause, given normal reported levels in   this patient. Serum ferritin level is   additionally within normal limits. If absolute neutropenia persists   without clinical explanation, TCR-V beta   flow cytometric analysis of peripheral blood may be considered for further    evaluation.     IMAGING:  Abdomen ultrasound 9/10/20:  Unremarkable complete abdominal ultrasound.      ASSESSMENT/PLAN:  Natalee Maya is a 50 year old female with:    Leukopenia: Differential with moderate to mild neutropenia.  Other two cell lines are normal.  This seems to be a fairly new finding, although it happened to a milder and short extent in 2018.  She has not had recent infections.  She has not started new medications.  She has no history of hepatitis or HIV.  She does not drink alcohol frequently.  We discussed other potential causes including autoimmune or primary bone marrow disorder.  Abdomen ultrasound was normal; no hepatosplenomegaly. Her WBC remains low. She remains asymptomatic.  We discussed options of continued observation or proceeding with bone marrow biopsy.  She would rather not do a bone marrow biopsy now.  If there is persistent downward trend of WBC, then we will do bone marrow biopsy.      WBC is stable/improved to 3.2.  Differential is normal this time.  Patient still feels completely fine and does not have frequent infections.  WBC is still within her  baseline range.  We will continue to monitor for now, and may do bone marrow biopsy this summer if there is significant downtrend in WBC or if there develops cytopenias in the other cell lines.  Natalee is agreeable with this.    -RTC in 3 months with CBC/diff      Lissa Gibson MD  Hematology/Oncology  [facility]      Total time spent on day of visit, including review of tests, obtaining/reviewing separately obtained history, ordering medications/tests/procedures, communicating with PCP/consultants, and documenting in electronic medical record: 10 minutes        Again, thank you for allowing me to participate in the care of your patient.        Sincerely,        Lissa Gibson MD

## 2021-05-13 NOTE — LETTER
5/13/2021         RE: Natalee Maya  [address] [facility] [address]        Dear Colleague,    Thank you for referring your patient, Natalee Maya, to the [facility]. Please see a copy of my visit note below.    Natalee is a 50 year old who is being evaluated via a billable video visit.      How would you like to obtain your AVS? [employer]    Please send text to: [phone]      Will anyone else be joining your video visit? No    Video Start Time: 2:40 pm    Video-Visit Details    Type of service:  Video Visit    Video End Time: 2:43 pm    Originating Location (pt. Location): Work    Distant Location (provider location):  [facility]     Platform used for Video Visit: [employer]         [facility]    Hematology/Oncology Established Patient Note      Today's Date: 05/13/21    Reason for Follow-up: leukopenia      HISTORY OF PRESENT ILLNESS: Natalee Maya is a 50 year old female with PMHx of hypothyroidism who presents with leukopenia.  She was found on labs in June 2020 to have low WBC of 1.9K.  Repeat checks have been in the 2K range.  Hemoglobin and platelet count were normal.  Differential shows a moderate to mild neutropenia.  On further chart review, she had a mild leukopenia in the 3K range back in 2018, but did recover back to normal a few months later.  There are not many labs prior to that.  Iron, ferritin, vitamin 12, and folate were normal.  Peripheral smear shows low WBC, but non-specific.      She denies recent illness, infections, fever.  Her Prozac dose increased a few months ago.  Otherwise, she has not started any new medications recently.      She does not smoke.  She drinks a glass of wine maybe once or twice a month.  She denies illicit drug use.      INTERIM HISTORY: Natalee is doing well.  She denies any new complaints today.        REVIEW OF SYSTEMS:   14 point ROS was reviewed and is  negative other than as noted above in HPI.       HOME MEDICATIONS:  Current Outpatient Medications   Medication Sig Dispense Refill     Ferrous Sulfate (IRON) 325 (65 Fe) MG tablet TAKE ONE TABLET BY MOUTH EVERY DAY, AVOID AROUND SAME TIME AS LEVOTHYROXINE 90 tablet 3     folic acid (FOLVITE) 1 MG tablet TAKE ONE TABLET BY MOUTH EVERY DAY 90 tablet 3     levothyroxine (SYNTHROID/LEVOTHROID) 50 MCG tablet Take 1 tablet (50 mcg) by mouth daily 90 tablet 2     sertraline (ZOLOFT) 50 MG tablet Take 1 tablet (50 mg) by mouth daily for 7 days, THEN 1.5 tablets (75 mg) daily. 132 tablet 0         ALLERGIES:  Allergies   Allergen Reactions     No Known Drug Allergies          PAST MEDICAL HISTORY:  Past Medical History:   Diagnosis Date     Calculus of kidney     History of kidney stone -- Abstracted 02     Lymphedema of lower extremity          PAST SURGICAL HISTORY:  Past Surgical History:   Procedure Laterality Date     FOOT SURGERY      2017     [facility] NONSPECIFIC PROCEDURE       -- Abstracted 02         SOCIAL HISTORY:  Social History     Socioeconomic History     Marital status:      Spouse name: Not on file     Number of children: Not on file     Years of education: Not on file     Highest education level: Not on file   Occupational History     Not on file   Social Needs     Financial resource strain: Not on file     Food insecurity     Worry: Not on file     Inability: Not on file     Transportation needs     Medical: Not on file     Non-medical: Not on file   Tobacco Use     Smoking status: Never Smoker     Smokeless tobacco: Never Used   Substance and Sexual Activity     Alcohol use: No     Drug use: No     Sexual activity: Yes     Partners: Male   Lifestyle     Physical activity     Days per week: Not on file     Minutes per session: Not on file     Stress: Not on file   Relationships     Social connections     Talks on phone: Not on file     Gets together: Not on file     Attends  Yarsanism service: Not on file     Active member of club or organization: Not on file     Attends meetings of clubs or organizations: Not on file     Relationship status: Not on file     Intimate partner violence     Fear of current or ex partner: Not on file     Emotionally abused: Not on file     Physically abused: Not on file     Forced sexual activity: Not on file   Other Topics Concern     Parent/sibling w/ CABG, MI or angioplasty before 65F 55M? Not Asked   Social History Narrative     Not on file         FAMILY HISTORY:  Family History   Problem Relation Age of Onset     Diabetes Father      C.A.D. Father         hyperlipidemia     Bleeding Disorder Mother         Factor 5     Breast Cancer No family hx of          PHYSICAL EXAM:  ECO  GENERAL/CONSTITUTIONAL: No acute distress. Healthy, alert..    RESPIRATORY: No audible wheeze, cough, or visible cyanosis.  No visible retractions or increased work of breathing.  Able to speak fully in complete sentences.  NEUROLOGIC: Alert, oriented, answers questions appropriately. No tremor. Mentation intact and speech normal  INTEGUMENTARY: No jaundice.    PSYCHIATRIC:  Mentation appears normal, affect normal/bright, judgement and insight intact, normal speech and appearance well-groomed.    The rest of a comprehensive physical exam is deferred due to public health emergency video visit restrictions.      LABS:  CBC RESULTS:   Recent Labs   Lab Test 05/10/21  0848   WBC 3.2*   RBC 4.32   HGB 13.0   HCT 40.7   MCV 94   MCH 30.1   MCHC 31.9   RDW 13.4            Peripheral flow:  INTERPRETATION:   Blood:        Polytypic B cells        No aberrant immunophenotype on T cells        See comment     COMMENT:   There is no immunophenotypic evidence of non-Hodgkin lymphoma or lymphoid   leukemia.  Final interpretation   requires correlation with morphologic and clinical features.       PATHOLOGY:  Peripheral smear 20:  FINAL DIAGNOSIS:   Peripheral Blood  Morphology-   -Moderate leukopenia with moderate absolute neutropenia.   -See comment.     COMMENT:   The patient has history of intermittent leukopenia (see prior peripheral   blood morphology; [id number], 8/2018.   Absolute neutropenia appears to be a new finding. Possible causes of   neutropenia include drugs, infection,   autoimmune/immune-mediated disorders, hypersplenism, and bone marrow   disorder. At present, there is   insufficient morphologic evidence to suggest a clonal bone marrow   disorder. Serum vitamin B12/folate   deficiency appears as an unlikely cause, given normal reported levels in   this patient. Serum ferritin level is   additionally within normal limits. If absolute neutropenia persists   without clinical explanation, TCR-V beta   flow cytometric analysis of peripheral blood may be considered for further    evaluation.     IMAGING:  Abdomen ultrasound 9/10/20:  Unremarkable complete abdominal ultrasound.      ASSESSMENT/PLAN:  Natalee Maya is a 50 year old female with:    Leukopenia: Differential with moderate to mild neutropenia.  Other two cell lines are normal.  This seems to be a fairly new finding, although it happened to a milder and short extent in 2018.  She has not had recent infections.  She has not started new medications.  She has no history of hepatitis or HIV.  She does not drink alcohol frequently.  We discussed other potential causes including autoimmune or primary bone marrow disorder.  Abdomen ultrasound was normal; no hepatosplenomegaly. Her WBC remains low. She remains asymptomatic.  We discussed options of continued observation or proceeding with bone marrow biopsy.  She would rather not do a bone marrow biopsy now.  If there is persistent downward trend of WBC, then we will do bone marrow biopsy.      WBC is stable/improved to 3.2.  Differential is normal this time.  Patient still feels completely fine and does not have frequent infections.  WBC is still within her  baseline range.  We will continue to monitor for now, and may do bone marrow biopsy this summer if there is significant downtrend in WBC or if there develops cytopenias in the other cell lines.  Natalee is agreeable with this.    -RTC in 3 months with CBC/diff      Lissa Gibson MD  Hematology/Oncology  [facility]      Total time spent on day of visit, including review of tests, obtaining/reviewing separately obtained history, ordering medications/tests/procedures, communicating with PCP/consultants, and documenting in electronic medical record: 10 minutes        Again, thank you for allowing me to participate in the care of your patient.        Sincerely,        Lissa Gibson MD

## 2021-05-13 NOTE — PROGRESS NOTES
Natalee is a 50 year old who is being evaluated via a billable video visit.      How would you like to obtain your AVS? [employer]    Please send text to: [phone]      Will anyone else be joining your video visit? No    Video Start Time: 2:40 pm    Video-Visit Details    Type of service:  Video Visit    Video End Time: 2:43 pm    Originating Location (pt. Location): Work    Distant Location (provider location):  [facility]     Platform used for Video Visit: [employer]         [facility]    Hematology/Oncology Established Patient Note      Today's Date: 05/13/21    Reason for Follow-up: leukopenia      HISTORY OF PRESENT ILLNESS: Natalee Maya is a 50 year old female with PMHx of hypothyroidism who presents with leukopenia.  She was found on labs in June 2020 to have low WBC of 1.9K.  Repeat checks have been in the 2K range.  Hemoglobin and platelet count were normal.  Differential shows a moderate to mild neutropenia.  On further chart review, she had a mild leukopenia in the 3K range back in 2018, but did recover back to normal a few months later.  There are not many labs prior to that.  Iron, ferritin, vitamin 12, and folate were normal.  Peripheral smear shows low WBC, but non-specific.      She denies recent illness, infections, fever.  Her Prozac dose increased a few months ago.  Otherwise, she has not started any new medications recently.      She does not smoke.  She drinks a glass of wine maybe once or twice a month.  She denies illicit drug use.      INTERIM HISTORY: Natalee is doing well.  She denies any new complaints today.        REVIEW OF SYSTEMS:   14 point ROS was reviewed and is negative other than as noted above in HPI.       HOME MEDICATIONS:  Current Outpatient Medications   Medication Sig Dispense Refill     Ferrous Sulfate (IRON) 325 (65 Fe) MG tablet TAKE ONE TABLET BY MOUTH EVERY DAY, AVOID AROUND SAME TIME AS LEVOTHYROXINE 90 tablet 3      folic acid (FOLVITE) 1 MG tablet TAKE ONE TABLET BY MOUTH EVERY DAY 90 tablet 3     levothyroxine (SYNTHROID/LEVOTHROID) 50 MCG tablet Take 1 tablet (50 mcg) by mouth daily 90 tablet 2     sertraline (ZOLOFT) 50 MG tablet Take 1 tablet (50 mg) by mouth daily for 7 days, THEN 1.5 tablets (75 mg) daily. 132 tablet 0         ALLERGIES:  Allergies   Allergen Reactions     No Known Drug Allergies          PAST MEDICAL HISTORY:  Past Medical History:   Diagnosis Date     Calculus of kidney     History of kidney stone -- Abstracted 02     Lymphedema of lower extremity          PAST SURGICAL HISTORY:  Past Surgical History:   Procedure Laterality Date     FOOT SURGERY      2017     [facility] NONSPECIFIC PROCEDURE       -- Abstracted 02         SOCIAL HISTORY:  Social History     Socioeconomic History     Marital status:      Spouse name: Not on file     Number of children: Not on file     Years of education: Not on file     Highest education level: Not on file   Occupational History     Not on file   Social Needs     Financial resource strain: Not on file     Food insecurity     Worry: Not on file     Inability: Not on file     Transportation needs     Medical: Not on file     Non-medical: Not on file   Tobacco Use     Smoking status: Never Smoker     Smokeless tobacco: Never Used   Substance and Sexual Activity     Alcohol use: No     Drug use: No     Sexual activity: Yes     Partners: Male   Lifestyle     Physical activity     Days per week: Not on file     Minutes per session: Not on file     Stress: Not on file   Relationships     Social connections     Talks on phone: Not on file     Gets together: Not on file     Attends [employer] service: Not on file     Active member of club or organization: Not on file     Attends meetings of clubs or organizations: Not on file     Relationship status: Not on file     Intimate partner violence     Fear of current or ex partner: Not on file      Emotionally abused: Not on file     Physically abused: Not on file     Forced sexual activity: Not on file   Other Topics Concern     Parent/sibling w/ CABG, MI or angioplasty before 65F 55M? Not Asked   Social History Narrative     Not on file         FAMILY HISTORY:  Family History   Problem Relation Age of Onset     Diabetes Father      C.A.D. Father         hyperlipidemia     Bleeding Disorder Mother         Factor 5     Breast Cancer No family hx of          PHYSICAL EXAM:  ECO  GENERAL/CONSTITUTIONAL: No acute distress. Healthy, alert..    RESPIRATORY: No audible wheeze, cough, or visible cyanosis.  No visible retractions or increased work of breathing.  Able to speak fully in complete sentences.  NEUROLOGIC: Alert, oriented, answers questions appropriately. No tremor. Mentation intact and speech normal  INTEGUMENTARY: No jaundice.    PSYCHIATRIC:  Mentation appears normal, affect normal/bright, judgement and insight intact, normal speech and appearance well-groomed.    The rest of a comprehensive physical exam is deferred due to [facility] emergency video visit restrictions.      LABS:  CBC RESULTS:   Recent Labs   Lab Test 05/10/21  0848   WBC 3.2*   RBC 4.32   HGB 13.0   HCT 40.7   MCV 94   MCH 30.1   MCHC 31.9   RDW 13.4            Peripheral flow:  INTERPRETATION:   Blood:        Polytypic B cells        No aberrant immunophenotype on T cells        See comment     COMMENT:   There is no immunophenotypic evidence of non-Hodgkin lymphoma or lymphoid   leukemia.  Final interpretation   requires correlation with morphologic and clinical features.       PATHOLOGY:  Peripheral smear 20:  FINAL DIAGNOSIS:   Peripheral Blood Morphology-   -Moderate leukopenia with moderate absolute neutropenia.   -See comment.     COMMENT:   The patient has history of intermittent leukopenia (see prior peripheral   blood morphology; [id number], 2018.   Absolute neutropenia appears to be a new finding. Possible  causes of   neutropenia include drugs, infection,   autoimmune/immune-mediated disorders, hypersplenism, and bone marrow   disorder. At present, there is   insufficient morphologic evidence to suggest a clonal bone marrow   disorder. Serum vitamin B12/folate   deficiency appears as an unlikely cause, given normal reported levels in   this patient. Serum ferritin level is   additionally within normal limits. If absolute neutropenia persists   without clinical explanation, TCR-V beta   flow cytometric analysis of peripheral blood may be considered for further    evaluation.     IMAGING:  Abdomen ultrasound 9/10/20:  Unremarkable complete abdominal ultrasound.      ASSESSMENT/PLAN:  Natalee Maya is a 50 year old female with:    Leukopenia: Differential with moderate to mild neutropenia.  Other two cell lines are normal.  This seems to be a fairly new finding, although it happened to a milder and short extent in 2018.  She has not had recent infections.  She has not started new medications.  She has no history of hepatitis or HIV.  She does not drink alcohol frequently.  We discussed other potential causes including autoimmune or primary bone marrow disorder.  Abdomen ultrasound was normal; no hepatosplenomegaly. Her WBC remains low. She remains asymptomatic.  We discussed options of continued observation or proceeding with bone marrow biopsy.  She would rather not do a bone marrow biopsy now.  If there is persistent downward trend of WBC, then we will do bone marrow biopsy.      WBC is stable/improved to 3.2.  Differential is normal this time.  Patient still feels completely fine and does not have frequent infections.  WBC is still within her baseline range.  We will continue to monitor for now, and may do bone marrow biopsy this summer if there is significant downtrend in WBC or if there develops cytopenias in the other cell lines.  Natalee is agreeable with this.    -RTC in 3 months with CBC/diff      Lissa  MD Arthur  Hematology/Oncology  [facility]      Total time spent on day of visit, including review of tests, obtaining/reviewing separately obtained history, ordering medications/tests/procedures, communicating with PCP/consultants, and documenting in electronic medical record: 10 minutes

## 2021-05-14 ENCOUNTER — VIRTUAL VISIT (OUTPATIENT)
Dept: PSYCHOLOGY | Facility: CLINIC | Age: 51
End: 2021-05-14
Payer: COMMERCIAL

## 2021-05-14 DIAGNOSIS — F33.41 MAJOR DEPRESSIVE DISORDER, RECURRENT EPISODE, IN PARTIAL REMISSION WITH ANXIOUS DISTRESS (H): Primary | ICD-10-CM

## 2021-05-14 PROCEDURE — 90834 PSYTX W PT 45 MINUTES: CPT | Mod: GT | Performed by: SOCIAL WORKER

## 2021-05-14 NOTE — PROGRESS NOTES
Progress Note    Patient Name: Natalee Maya  Date: 5/14/2021     Service Type: Individual     Session Start Time: 7:03 AM Session End Time: 7:50 AM    Session Length: 38-52 minutes    Session #: 17 (4 with DB)    Attendees: Client     Telemedicine Visit: The patient's condition can be safely assessed and treated via synchronous audio and visual telemedicine encounter.      Reason for Telemedicine Visit: Services only offered telehealth    Originating Site (Patient Location): Patient's home    Distant Site (Provider Location): Provider Remote Setting    Consent:  The patient/guardian has verbally consented to: the potential risks and benefits of telemedicine (video visit) versus in person care; bill my insurance or make self-payment for services provided; and responsibility for payment of non-covered services.      Mode of Communication: [email or web address]     Treatment Plan Last Reviewed: 5/14/2021  PHQ-9 / MADINA-7 : 5/14/2021    DATA  Interactive Complexity: No  Crisis: No       Progress Since Last Session (Related to Symptoms / Goals / Homework):   Symptoms: stable; lower energy    Homework: Achieved / completed to satisfaction       Episode of Care Goals: Satisfactory progress - ACTION (Actively working towards change); Intervened by reinforcing change plan / affirming steps taken     Current / Ongoing Stressors and Concerns:   Marital stress/moving out   20 lb weight gain in 2020   Selling house     Treatment Objective(s) Addressed in This Session:   Client will increase engagement in exercise and improve diet  Client will increase engagement in areas of interest     Intervention:   CBT: guided discovery, identified thoughts, feelings, and behaviors, reinforced behavior changes   Motivational interviewing: expressed empathy/understanding, use of reflective listening and open ended questions, supported autonomy   Offered validation and support   Reviewed flowsheets and  treatment plan     ASSESSMENT: Current Emotional / Mental Status (status of significant symptoms):   Risk status (Self / Other harm or suicidal ideation)   Patient denies current fears or concerns for personal safety.   Patient denies current or recent suicidal ideation or behaviors.     Patientdenies current or recent homicidal ideation or behaviors.   Patient denies current or recent self injurious behavior or ideation.   Patient denies other safety concerns.   Patient reports there has been no change in risk factors since their last session.     Patientreports there has been no change in protective factors since their last session.     A safety and risk management plan was developed and shared with client in previous session.  Client confirmed she shared with her three emergency contacts     Appearance:   Appropriate    Eye Contact:   Good    Psychomotor Behavior: Normal    Attitude:   Cooperative  Friendly Pleasant   Orientation:   All   Speech    Rate / Production: Normal     Volume:  Normal    Mood:    Stable   Affect:    Appropriate   Thought Content:  Clear    Thought Form:  Coherent  Goal Directed  Logical    Insight:    Good      Medication Review:   No changes to current psychiatric medication(s)      Zoloft 50 mg      Medication Compliance:   NA     Changes in Health Issues:   None reported      Chemical Use Review:   Substance Use: no active concerns identified      Tobacco Use: No current tobacco use.      Diagnosis:    Major Depressive Disorder, Moderate, in partial remission    Collateral Reports Completed:   N/A    PLAN: (Patient Tasks / Therapist Tasks / Other)  Client will continue couples therapy  Client will increase engagement in exercise, improve diet, and increase engagement in areas of interest.    Celeste Chavez, [facility] 5/14/2021                          ______________________________________________________________________    Treatment Plan    Patient's Name: Natalee Maya  Date Of  Birth: 1970    Date:  5/14/2021    DSM5 Diagnoses: Major Depressive Disorder, Moderate, in partial remission  Psychosocial / Contextual Factors: marital stress, selling house, moving  WHODAS: 16    Referral / Collaboration:  Referral to another professional/service is not indicated at this time..    Anticipated number of session or this episode of care: will review in 90 days    MeasurableTreatment Goal(s) related to diagnosis / functional impairment(s)  Goal 1: Patient will reduce symptoms of anxiety and depression by reporting MADINA-7 and PHQ-9 scores below a 5, where symptoms where symptoms occur fewer than half the days for a minimum of 4 weeks.    Objective #A (Patient Action)    Patient will learn and utilize 3 techniques to manage emotions related to marital stress/conflict.  Status: Completed - Date: 5/14/2021      Intervention(s)  Therapist will teach relaxation techniques and ways to engage in self-care.    Objective #B  Patient will compile a list of boundaries that they would like to set with others. Utilize the boundaries .  Status: Completed - Date: 5/14/2021      Intervention(s)  Therapist will teach health boundaries and encourage client to identify their boundaries and implement them.    Objective #C  Patient will learn & utilize at least 1 assertive communication skills weekly.  Status: Completed - Date: 5/14/2021      Intervention(s)  Therapist will teach assertive communication skills.    Objective #D  Patient will make improvements to diet and increase engagement in exercise by setting weekly goals.   Status: Start Date: 5/14/2021      Intervention(s)   Therapist will encourage client to establish goals to work toward between appointments, assist with identifying any barriers and address them with client.      Objective #E  Patient will increase engagement in areas of interest.     Status: Start Date 5/14/2021     Intervention(s)   Therapist will assist client with identifying negative  "self-talk that impacts engagement in areas of interest.       Patient has reviewed and agreed to the above plan.      Celeste Chavez, [facility]  7/2/2020  Celeste Chavez, [facility]  5/14/2021                                                 Natalee Maya     SAFETY PLAN:  Step 1: Warning signs / cues (Thoughts, images, mood, situation, behavior) that a crisis may be developing:    Thoughts: \"I don't matter\", \"I'm a burden\", \"I can't do this anymore\", \"I just want this to end\" and \"Nothing makes it better\"    Images: none    Thinking Processes: ruminations (can't stop thinking about my problems): marital stresss, racing thoughts and highly critical and negative thoughts: about myself    Mood: worsening depression, hopelessness, agitation and mood swings    Behaviors: isolating/withdrawing  and can't stop crying    Situations: relationship problems   Step 2: Coping strategies - Things I can do to take my mind off of my problems without contacting another person (relaxation technique, physical activity):    Distress Tolerance Strategies:  read a book and geneology    Physical Activities: go for a walk    Focus on helpful thoughts:  \"This is temporary\", \"I will get through this\", \"It always passes\",   think about happy memories:   and remind myself of what is important to me:  Step 3: People and social settings that provide distraction:   Name: Isaías   Phone: in phone   Name: Dre   Phone: in phone   Name: Mom   Phone: in phone   Name: Tracy   Phone: in phone    work and go outside   Step 4: Remind myself of people and things that are important to me and worth living for:  Children, , the job I do is important, I have talents and skills to offer people, and I am a good person  Step 5: When I am in crisis, I can ask these people to help me use my safety plan:   Name: Isaías   Phone: [phone]   Name: Dre   Phone: [phone]   Name: Tracy   Phone: [phone]  Step 6: Making the environment safe:     be around " others  Step 7: Professionals or agencies I can contact during a crisis:    [facility] Daytime Number: [phone]    Suicide Prevention Lifeline: 8-323-791-YWZR (5806)    Crisis Text Line Service (available 24 hours a day, 7 days a week): Text [place] to 091887    Local Crisis Services: [facility] [phone]    Call 911 or go to my nearest emergency department.   I helped develop this safety plan and agree to use it when needed.  I have been given a copy of this plan.      Client signature _________________________________________________________________  Today s date:  1/8/2021  Adapted from Safety Plan Template 2008 Valerie Broderick and Alistair Briseno is reprinted with the express permission of the authors.  No portion of the Safety Plan Template may be reproduced without the express, written permission.  You can contact the authors at [email or web address] or [email or web address].

## 2021-05-18 ENCOUNTER — OFFICE VISIT (OUTPATIENT)
Dept: PEDIATRICS | Facility: CLINIC | Age: 51
End: 2021-05-18
Payer: COMMERCIAL

## 2021-05-18 ENCOUNTER — OFFICE VISIT (OUTPATIENT)
Dept: CARDIOLOGY | Facility: CLINIC | Age: 51
End: 2021-05-18
Payer: COMMERCIAL

## 2021-05-18 VITALS
TEMPERATURE: 97.8 F | RESPIRATION RATE: 16 BRPM | WEIGHT: 209.5 LBS | SYSTOLIC BLOOD PRESSURE: 150 MMHG | DIASTOLIC BLOOD PRESSURE: 93 MMHG | OXYGEN SATURATION: 98 % | BODY MASS INDEX: 37.11 KG/M2 | HEART RATE: 58 BPM

## 2021-05-18 VITALS
SYSTOLIC BLOOD PRESSURE: 147 MMHG | HEART RATE: 69 BPM | HEIGHT: 63 IN | WEIGHT: 209 LBS | BODY MASS INDEX: 37.03 KG/M2 | DIASTOLIC BLOOD PRESSURE: 90 MMHG

## 2021-05-18 DIAGNOSIS — R03.0 ELEVATED BLOOD PRESSURE READING WITHOUT DIAGNOSIS OF HYPERTENSION: ICD-10-CM

## 2021-05-18 DIAGNOSIS — R03.0 ELEVATED BLOOD PRESSURE READING WITHOUT DIAGNOSIS OF HYPERTENSION: Primary | ICD-10-CM

## 2021-05-18 DIAGNOSIS — E66.01 MORBID OBESITY (H): ICD-10-CM

## 2021-05-18 DIAGNOSIS — I35.8 AORTIC VALVE SCLEROSIS: ICD-10-CM

## 2021-05-18 DIAGNOSIS — R01.1 HEART MURMUR: ICD-10-CM

## 2021-05-18 DIAGNOSIS — R07.89 ATYPICAL CHEST PAIN: Primary | ICD-10-CM

## 2021-05-18 PROCEDURE — 99214 OFFICE O/P EST MOD 30 MIN: CPT | Performed by: NURSE PRACTITIONER

## 2021-05-18 PROCEDURE — 99204 OFFICE O/P NEW MOD 45 MIN: CPT | Performed by: INTERNAL MEDICINE

## 2021-05-18 RX ORDER — LISINOPRIL 10 MG/1
10 TABLET ORAL DAILY
Qty: 90 TABLET | Refills: 3 | Status: SHIPPED | OUTPATIENT
Start: 2021-05-18 | End: 2022-02-14

## 2021-05-18 ASSESSMENT — MIFFLIN-ST. JEOR: SCORE: 1537.15

## 2021-05-18 NOTE — PROGRESS NOTES
Assessment & Plan   (R03.0) Elevated blood pressure reading without diagnosis of hypertension  (primary encounter diagnosis)  Comment: Improved slightly based on home readings from 160s-170s/100s to 140s/90s since decreasing caffeine and sodium. Stopping phentermine for 2 weeks did not seem to help.  Plan: lisinopril (ZESTRIL) 10 MG tablet, Basic         metabolic panel  (Ca, Cl, CO2, Creat, Gluc, K,         Na, BUN)  -Stay off phentermine  -Start lisinopril  -Monitor BP  -If under 135/90 will re-start phentermine. If BP stable, ok to continue  -Labs 2 weeks after starting lisinopril    (E66.01) Morbid obesity (H)  Comment: See above.     No follow-ups on file.    SHAQ Peraza CNP  [facility] SHARI Albright is a 50 year old who presents for the following health issues     HPI     Hypertension Follow-up      Do you check your blood pressure regularly outside of the clinic? Yes     Are you following a low salt diet? Yes    Are your blood pressures ever more than 140 on the top number (systolic) OR more   than 90 on the bottom number (diastolic), for example 140/90? Yes      How many servings of fruits and vegetables do you eat daily?  2-3    On average, how many sweetened beverages do you drink each day (Examples: soda, juice, sweet tea, etc.  Do NOT count diet or artificially sweetened beverages)?   0    How many days per week do you exercise enough to make your heart beat faster? 4    How many minutes a day do you exercise enough to make your heart beat faster? 20 - 29    How many days per week do you miss taking your medication? NA    Review of Systems   Constitutional, HEENT, cardiovascular, pulmonary, gi and gu systems are negative, except as otherwise noted.      Objective    Wt 95 kg (209 lb 8 oz)   BMI 37.11 kg/m    Body mass index is 37.11 kg/m .  Physical Exam   GENERAL: healthy, alert and no distress  CV: regular rate and rhythm, normal S1 S2, no S3 or S4,  sight 1/6 murmur heard throughout, click or rub, no peripheral edema and peripheral pulses strong

## 2021-05-18 NOTE — PROGRESS NOTES
HISTORY:    Natalee Maya is a pleasant 50-year-old-year-old female who was asked to see cardiology because of an audible murmur and elevated blood pressure.  There is also mention of cardiomegaly although the patient was unaware of this and I do not find documentation of this potential problem.    Natalee has a history of obesity and recently recognized hypertension but is otherwise healthy.  She is not diabetic and reports that until recent months her blood pressure was normal, but her last 5 blood pressures done within the [facility] system all show an elevation to around 150/90.  She saw her primary care ZIA this morning and and a prescription for lisinopril was written but she has not yet taken her first dose.  She denies significant thickened use of NSAIDs and reports only extremely rare alcohol use.  She states that she sleeps well at night and wakes up refreshed in the morning.  She had been previously using phentermine for weight loss but this was discontinued because it was thought perhaps to be contributing to her hypertension, but her blood pressure has not changed significantly with this med change.    Natalee reports occasional episodes of chest discomfort.  This is a pressure-like sensation in her mid sternal area, not radiating elsewhere.  These episodes are random and can occur with activity or at rest.  There is no associated nausea shortness of breath or diaphoresis and typically the episodes last only a minute or 2.  The pain is nonpleuritic.    Cardiac risk factors include a strong family history of coronary artery disease.  The patient's father underwent bypass surgery at age 37 and eventually  at age 71 of a myocardial infarct.  The patient herself has recently diagnosed hypertension and hyperlipidemia.  Her most recent lipid profile done nearly a year ago shows a total cholesterol of 259 with an LDL of 168, HDL 66, triglyceride 125.  She has never been a smoker.    Natalee reports that she  has been told for the past several years that she has an audible murmur.  She recently had an echocardiogram done which demonstrated that her LV is on the low side of normal in terms of size with normal ejection fraction.  She has aortic sclerosis with a slight gradient across the aortic valve with mild aortic insufficiency and no other significant valvular abnormalities.      ASSESSMENT/PLAN:    1.  Hypertension.  Likely familial.  We discussed nonpharmacologic mechanisms for lowering blood pressure including weight loss, regular exercise, and minimization of sodium intake.  I agree with initiation of lisinopril and would probably add a diuretic as the next agent if necessary.  I will leave this up to her primary care here to manage but would be happy to become involved if requested in the future.  2.  Chest pain.  This is somewhat atypical but a little bit considered concerning since it is described as a midsternal chest pressure.  She does have high cholesterol and a worrisome family history of coronary disease.  Although I believe that her chest pain is probably noncardiac I think it would be wise to have a stress echo done just to be on the safe side.  I explained this to her and she is willing to do so, orders written.    Thank you for asking me to participate in your patient's care.  Please do not hesitate to call if I can be of further assistance.  I spent 45 minutes today reviewing the chart, interviewing and examining the patient, and, and documenting our visit.  2-year follow-up with echocardiogram repeated will be planned.    Chart documentation was completed, in part, with Face to Face Live voice-recognition software. Even though reviewed, some grammatical, spelling, and word errors may remain.       Orders Placed This Encounter   Procedures     Follow-Up with Cardiologist     Exercise Stress Echocardiogram     No orders of the defined types were placed in this encounter.    There are no discontinued  medications.    10 year ASCVD risk: The 10-year ASCVD risk score (Khadijah FLANNERY Jr., et al., 2013) is: 1.8%    Values used to calculate the score:      Age: 50 years      Sex: Female      Is Non- : No      Diabetic: No      Tobacco smoker: No      Systolic Blood Pressure: 147 mmHg      Is BP treated: No      HDL Cholesterol: 66 mg/dL      Total Cholesterol: 259 mg/dL    Encounter Diagnoses   Name Primary?     Elevated blood pressure reading without diagnosis of hypertension      Heart murmur      Aortic valve sclerosis      Atypical chest pain Yes       CURRENT MEDICATIONS:  Current Outpatient Medications   Medication Sig Dispense Refill     Ferrous Sulfate (IRON) 325 (65 Fe) MG tablet TAKE ONE TABLET BY MOUTH EVERY DAY, AVOID AROUND SAME TIME AS LEVOTHYROXINE 90 tablet 3     folic acid (FOLVITE) 1 MG tablet TAKE ONE TABLET BY MOUTH EVERY DAY 90 tablet 3     levothyroxine (SYNTHROID/LEVOTHROID) 50 MCG tablet Take 1 tablet (50 mcg) by mouth daily 90 tablet 2     lisinopril (ZESTRIL) 10 MG tablet Take 1 tablet (10 mg) by mouth daily 90 tablet 3     sertraline (ZOLOFT) 50 MG tablet Take 1 tablet (50 mg) by mouth daily for 7 days, THEN 1.5 tablets (75 mg) daily. 132 tablet 0       ALLERGIES     Allergies   Allergen Reactions     No Known Drug Allergies        PAST MEDICAL HISTORY:  Past Medical History:   Diagnosis Date     Calculus of kidney     History of kidney stone -- Abstracted 02     Lymphedema of lower extremity        PAST SURGICAL HISTORY:  Past Surgical History:   Procedure Laterality Date     FOOT SURGERY      2017     [facility] NONSPECIFIC PROCEDURE       -- Abstracted 02       FAMILY HISTORY:  Family History   Problem Relation Age of Onset     Diabetes Father      C.A.D. Father         hyperlipidemia     Bleeding Disorder Mother         Factor 5     Breast Cancer No family hx of        SOCIAL HISTORY:  Social History     Socioeconomic History     Marital status:   "    Spouse name: None     Number of children: None     Years of education: None     Highest education level: None   Occupational History     None   Social Needs     Financial resource strain: None     Food insecurity     Worry: None     Inability: None     Transportation needs     Medical: None     Non-medical: None   Tobacco Use     Smoking status: Never Smoker     Smokeless tobacco: Never Used   Substance and Sexual Activity     Alcohol use: No     Drug use: No     Sexual activity: Yes     Partners: Male   Lifestyle     Physical activity     Days per week: None     Minutes per session: None     Stress: None   Relationships     Social connections     Talks on phone: None     Gets together: None     Attends Mandaeism service: None     Active member of club or organization: None     Attends meetings of clubs or organizations: None     Relationship status: None     Intimate partner violence     Fear of current or ex partner: None     Emotionally abused: None     Physically abused: None     Forced sexual activity: None   Other Topics Concern     Parent/sibling w/ CABG, MI or angioplasty before 65F 55M? Not Asked   Social History Narrative     None       Review of Systems:  Skin:  Negative     Eyes:  Positive for glasses  ENT:  Negative    Respiratory:  Negative    Cardiovascular:    Positive for  Gastroenterology: Negative    Genitourinary:  not assessed    Musculoskeletal:  Negative    Neurologic:  Negative    Psychiatric:  Negative    Heme/Lymph/Imm:  Negative    Endocrine:  Positive for thyroid disorder    Physical Exam:  Vitals: BP (!) 147/90   Pulse 69   Ht 1.6 m (5' 3\")   Wt 94.8 kg (209 lb)   BMI 37.02 kg/m      Constitutional:  cooperative, alert and oriented, well developed, well nourished, in no acute distress obese      Skin:  warm and dry to the touch        Head:  normocephalic        Eyes:  sclera white;no xanthalasma;no nystagmus        ENT:  no pallor or cyanosis        Neck:  carotid pulses are " full and equal bilaterally, JVP normal, no carotid bruit        Chest:  normal breath sounds, clear to auscultation, normal A-P diameter, normal symmetry, normal respiratory excursion, no use of accessory muscles        Cardiac: regular rhythm;normal S1 and S2;no S3 or S4;apical impulse not displaced       systolic murmur;grade 2;RUSB          Abdomen:  abdomen soft;BS normoactive;non-tender obese      Vascular: pulses full and equal                                      Extremities and Muscular Skeletal:  no edema           Neurological:  no gross motor deficits        Psych:        Recent Lab Results:  LIPID RESULTS:  Lab Results   Component Value Date    CHOL 259 (H) 06/29/2020    HDL 66 06/29/2020     (H) 06/29/2020    TRIG 125 06/29/2020    CHOLHDLRATIO 4.5 10/17/2009       LIVER ENZYME RESULTS:  Lab Results   Component Value Date    AST 22 03/11/2021    ALT 27 03/11/2021       CBC RESULTS:  Lab Results   Component Value Date    WBC 3.2 (L) 05/10/2021    RBC 4.32 05/10/2021    HGB 13.0 05/10/2021    HCT 40.7 05/10/2021    MCV 94 05/10/2021    MCH 30.1 05/10/2021    MCHC 31.9 05/10/2021    RDW 13.4 05/10/2021     05/10/2021       BMP RESULTS:  Lab Results   Component Value Date     03/11/2021    POTASSIUM 3.8 03/11/2021    CHLORIDE 107 03/11/2021    CO2 26 03/11/2021    ANIONGAP 4 03/11/2021    GLC 81 03/11/2021    BUN 13 03/11/2021    CR 0.59 03/11/2021    GFRESTIMATED >90 03/11/2021    GFRESTBLACK >90 03/11/2021    SAGE 8.8 03/11/2021        A1C RESULTS:  No results found for: A1C    INR RESULTS:  Lab Results   Component Value Date    INR 0.85 (L) 10/27/2018         Rio May MD, FACC    CC  Rachana Mendoza, NP  [address] [place] [address]

## 2021-05-18 NOTE — LETTER
2021    Carolina Gomez, APRN CNP  [address] [place] [address]    RE: Natalee Loweerg       Dear Colleague,    I had the pleasure of seeing Natalee Maya in the [facility].    HISTORY:    Natalee Maya is a pleasant 50-year-old-year-old female who was asked to see cardiology because of an audible murmur and elevated blood pressure.  There is also mention of cardiomegaly although the patient was unaware of this and I do not find documentation of this potential problem.    Natalee has a history of obesity and recently recognized hypertension but is otherwise healthy.  She is not diabetic and reports that until recent months her blood pressure was normal, but her last 5 blood pressures done within the [facility] system all show an elevation to around 150/90.  She saw her primary care ZIA this morning and and a prescription for lisinopril was written but she has not yet taken her first dose.  She denies significant thickened use of NSAIDs and reports only extremely rare alcohol use.  She states that she sleeps well at night and wakes up refreshed in the morning.  She had been previously using phentermine for weight loss but this was discontinued because it was thought perhaps to be contributing to her hypertension, but her blood pressure has not changed significantly with this med change.    Natalee reports occasional episodes of chest discomfort.  This is a pressure-like sensation in her mid sternal area, not radiating elsewhere.  These episodes are random and can occur with activity or at rest.  There is no associated nausea shortness of breath or diaphoresis and typically the episodes last only a minute or 2.  The pain is nonpleuritic.    Cardiac risk factors include a strong family history of coronary artery disease.  The patient's father underwent bypass surgery at age 37 and eventually  at age 71 of a myocardial  infarct.  The patient herself has recently diagnosed hypertension and hyperlipidemia.  Her most recent lipid profile done nearly a year ago shows a total cholesterol of 259 with an LDL of 168, HDL 66, triglyceride 125.  She has never been a smoker.    Natalee reports that she has been told for the past several years that she has an audible murmur.  She recently had an echocardiogram done which demonstrated that her LV is on the low side of normal in terms of size with normal ejection fraction.  She has aortic sclerosis with a slight gradient across the aortic valve with mild aortic insufficiency and no other significant valvular abnormalities.      ASSESSMENT/PLAN:    1.  Hypertension.  Likely familial.  We discussed nonpharmacologic mechanisms for lowering blood pressure including weight loss, regular exercise, and minimization of sodium intake.  I agree with initiation of lisinopril and would probably add a diuretic as the next agent if necessary.  I will leave this up to her primary care here to manage but would be happy to become involved if requested in the future.  2.  Chest pain.  This is somewhat atypical but a little bit considered concerning since it is described as a midsternal chest pressure.  She does have high cholesterol and a worrisome family history of coronary disease.  Although I believe that her chest pain is probably noncardiac I think it would be wise to have a stress echo done just to be on the safe side.  I explained this to her and she is willing to do so, orders written.    Thank you for asking me to participate in your patient's care.  Please do not hesitate to call if I can be of further assistance.  I spent 45 minutes today reviewing the chart, interviewing and examining the patient, and, and documenting our visit.  2-year follow-up with echocardiogram repeated will be planned.    Chart documentation was completed, in part, with [employer] voice-recognition software. Even though reviewed, some  grammatical, spelling, and word errors may remain.       Orders Placed This Encounter   Procedures     Follow-Up with Cardiologist     Exercise Stress Echocardiogram     No orders of the defined types were placed in this encounter.    There are no discontinued medications.    10 year ASCVD risk: The 10-year ASCVD risk score (Khadijah FLANNERY Jr., et al., 2013) is: 1.8%    Values used to calculate the score:      Age: 50 years      Sex: Female      Is Non- : No      Diabetic: No      Tobacco smoker: No      Systolic Blood Pressure: 147 mmHg      Is BP treated: No      HDL Cholesterol: 66 mg/dL      Total Cholesterol: 259 mg/dL    Encounter Diagnoses   Name Primary?     Elevated blood pressure reading without diagnosis of hypertension      Heart murmur      Aortic valve sclerosis      Atypical chest pain Yes       CURRENT MEDICATIONS:  Current Outpatient Medications   Medication Sig Dispense Refill     Ferrous Sulfate (IRON) 325 (65 Fe) MG tablet TAKE ONE TABLET BY MOUTH EVERY DAY, AVOID AROUND SAME TIME AS LEVOTHYROXINE 90 tablet 3     folic acid (FOLVITE) 1 MG tablet TAKE ONE TABLET BY MOUTH EVERY DAY 90 tablet 3     levothyroxine (SYNTHROID/LEVOTHROID) 50 MCG tablet Take 1 tablet (50 mcg) by mouth daily 90 tablet 2     lisinopril (ZESTRIL) 10 MG tablet Take 1 tablet (10 mg) by mouth daily 90 tablet 3     sertraline (ZOLOFT) 50 MG tablet Take 1 tablet (50 mg) by mouth daily for 7 days, THEN 1.5 tablets (75 mg) daily. 132 tablet 0       ALLERGIES     Allergies   Allergen Reactions     No Known Drug Allergies        PAST MEDICAL HISTORY:  Past Medical History:   Diagnosis Date     Calculus of kidney     History of kidney stone -- Abstracted 02     Lymphedema of lower extremity        PAST SURGICAL HISTORY:  Past Surgical History:   Procedure Laterality Date     FOOT SURGERY      2017     [facility] NONSPECIFIC PROCEDURE       -- Abstracted 02       FAMILY HISTORY:  Family History  "  Problem Relation Age of Onset     Diabetes Father      C.A.D. Father         hyperlipidemia     Bleeding Disorder Mother         Factor 5     Breast Cancer No family hx of        SOCIAL HISTORY:  Social History     Socioeconomic History     Marital status:      Spouse name: None     Number of children: None     Years of education: None     Highest education level: None   Occupational History     None   Social Needs     Financial resource strain: None     Food insecurity     Worry: None     Inability: None     Transportation needs     Medical: None     Non-medical: None   Tobacco Use     Smoking status: Never Smoker     Smokeless tobacco: Never Used   Substance and Sexual Activity     Alcohol use: No     Drug use: No     Sexual activity: Yes     Partners: Male   Lifestyle     Physical activity     Days per week: None     Minutes per session: None     Stress: None   Relationships     Social connections     Talks on phone: None     Gets together: None     Attends Congregation service: None     Active member of club or organization: None     Attends meetings of clubs or organizations: None     Relationship status: None     Intimate partner violence     Fear of current or ex partner: None     Emotionally abused: None     Physically abused: None     Forced sexual activity: None   Other Topics Concern     Parent/sibling w/ CABG, MI or angioplasty before 65F 55M? Not Asked   Social History Narrative     None       Review of Systems:  Skin:  Negative     Eyes:  Positive for glasses  ENT:  Negative    Respiratory:  Negative    Cardiovascular:    Positive for  Gastroenterology: Negative    Genitourinary:  not assessed    Musculoskeletal:  Negative    Neurologic:  Negative    Psychiatric:  Negative    Heme/Lymph/Imm:  Negative    Endocrine:  Positive for thyroid disorder    Physical Exam:  Vitals: BP (!) 147/90   Pulse 69   Ht 1.6 m (5' 3\")   Wt 94.8 kg (209 lb)   BMI 37.02 kg/m      Constitutional:  cooperative, " alert and oriented, well developed, well nourished, in no acute distress obese      Skin:  warm and dry to the touch        Head:  normocephalic        Eyes:  sclera white;no xanthalasma;no nystagmus        ENT:  no pallor or cyanosis        Neck:  carotid pulses are full and equal bilaterally, JVP normal, no carotid bruit        Chest:  normal breath sounds, clear to auscultation, normal A-P diameter, normal symmetry, normal respiratory excursion, no use of accessory muscles        Cardiac: regular rhythm;normal S1 and S2;no S3 or S4;apical impulse not displaced       systolic murmur;grade 2;RUSB          Abdomen:  abdomen soft;BS normoactive;non-tender obese      Vascular: pulses full and equal                                      Extremities and Muscular Skeletal:  no edema           Neurological:  no gross motor deficits        Psych:        Recent Lab Results:  LIPID RESULTS:  Lab Results   Component Value Date    CHOL 259 (H) 06/29/2020    HDL 66 06/29/2020     (H) 06/29/2020    TRIG 125 06/29/2020    CHOLHDLRATIO 4.5 10/17/2009       LIVER ENZYME RESULTS:  Lab Results   Component Value Date    AST 22 03/11/2021    ALT 27 03/11/2021       CBC RESULTS:  Lab Results   Component Value Date    WBC 3.2 (L) 05/10/2021    RBC 4.32 05/10/2021    HGB 13.0 05/10/2021    HCT 40.7 05/10/2021    MCV 94 05/10/2021    MCH 30.1 05/10/2021    MCHC 31.9 05/10/2021    RDW 13.4 05/10/2021     05/10/2021       BMP RESULTS:  Lab Results   Component Value Date     03/11/2021    POTASSIUM 3.8 03/11/2021    CHLORIDE 107 03/11/2021    CO2 26 03/11/2021    ANIONGAP 4 03/11/2021    GLC 81 03/11/2021    BUN 13 03/11/2021    CR 0.59 03/11/2021    GFRESTIMATED >90 03/11/2021    GFRESTBLACK >90 03/11/2021    SAGE 8.8 03/11/2021        A1C RESULTS:  No results found for: A1C    INR RESULTS:  Lab Results   Component Value Date    INR 0.85 (L) 10/27/2018         Rio May MD, FACC    CC  Rachana Mendoza, NP  [address] [place] [address]

## 2021-05-23 DIAGNOSIS — F41.9 ANXIETY: ICD-10-CM

## 2021-05-26 ENCOUNTER — MYC REFILL (OUTPATIENT)
Dept: PEDIATRICS | Facility: CLINIC | Age: 51
End: 2021-05-26

## 2021-05-26 DIAGNOSIS — F41.9 ANXIETY: ICD-10-CM

## 2021-05-27 NOTE — TELEPHONE ENCOUNTER
1st attempt belgica Garcia on 5/25/2021 at 2:58 PM   
Routing to the station.     Please call the Pt. Is she now taking 1.5 tabs daily now?    Tiara Soriano RN   [facility] -- Triage Nurse      
See mychart refill request.  Sent message to patient to confirm current dose.  Mary Zaidi RN    
to get better

## 2021-05-30 DIAGNOSIS — Z11.59 ENCOUNTER FOR SCREENING FOR OTHER VIRAL DISEASES: ICD-10-CM

## 2021-06-14 ENCOUNTER — HOSPITAL ENCOUNTER (OUTPATIENT)
Dept: CARDIOLOGY | Facility: CLINIC | Age: 51
Discharge: HOME | End: 2021-06-14
Attending: INTERNAL MEDICINE | Admitting: INTERNAL MEDICINE
Payer: COMMERCIAL

## 2021-06-14 ENCOUNTER — ALLIED HEALTH/NURSE VISIT (OUTPATIENT)
Dept: PEDIATRICS | Facility: CLINIC | Age: 51
End: 2021-06-14

## 2021-06-14 VITALS — SYSTOLIC BLOOD PRESSURE: 112 MMHG | DIASTOLIC BLOOD PRESSURE: 72 MMHG

## 2021-06-14 DIAGNOSIS — R07.89 ATYPICAL CHEST PAIN: ICD-10-CM

## 2021-06-14 DIAGNOSIS — R03.0 ELEVATED BLOOD PRESSURE READING WITHOUT DIAGNOSIS OF HYPERTENSION: ICD-10-CM

## 2021-06-14 DIAGNOSIS — Z01.30 BP CHECK: Primary | ICD-10-CM

## 2021-06-14 DIAGNOSIS — D72.819 LEUKOPENIA, UNSPECIFIED TYPE: ICD-10-CM

## 2021-06-14 LAB
ANION GAP SERPL CALCULATED.3IONS-SCNC: 5 MMOL/L (ref 3–14)
BASOPHILS # BLD AUTO: 0 10E9/L (ref 0–0.2)
BASOPHILS NFR BLD AUTO: 0.7 %
BUN SERPL-MCNC: 14 MG/DL (ref 7–30)
CALCIUM SERPL-MCNC: 8.7 MG/DL (ref 8.5–10.1)
CHLORIDE SERPL-SCNC: 106 MMOL/L (ref 94–109)
CO2 SERPL-SCNC: 27 MMOL/L (ref 20–32)
CREAT SERPL-MCNC: 0.65 MG/DL (ref 0.52–1.04)
DIFFERENTIAL METHOD BLD: ABNORMAL
EOSINOPHIL # BLD AUTO: 0.1 10E9/L (ref 0–0.7)
EOSINOPHIL NFR BLD AUTO: 3 %
ERYTHROCYTE [DISTWIDTH] IN BLOOD BY AUTOMATED COUNT: 13.1 % (ref 10–15)
GFR SERPL CREATININE-BSD FRML MDRD: >90 ML/MIN/{1.73_M2}
GLUCOSE SERPL-MCNC: 83 MG/DL (ref 70–99)
HCT VFR BLD AUTO: 38.5 % (ref 35–47)
HGB BLD-MCNC: 12.5 G/DL (ref 11.7–15.7)
LYMPHOCYTES # BLD AUTO: 0.9 10E9/L (ref 0.8–5.3)
LYMPHOCYTES NFR BLD AUTO: 34.6 %
MCH RBC QN AUTO: 30.9 PG (ref 26.5–33)
MCHC RBC AUTO-ENTMCNC: 32.5 G/DL (ref 31.5–36.5)
MCV RBC AUTO: 95 FL (ref 78–100)
MONOCYTES # BLD AUTO: 0.3 10E9/L (ref 0–1.3)
MONOCYTES NFR BLD AUTO: 12.6 %
NEUTROPHILS # BLD AUTO: 1.3 10E9/L (ref 1.6–8.3)
NEUTROPHILS NFR BLD AUTO: 49.1 %
PLATELET # BLD AUTO: 199 10E9/L (ref 150–450)
POTASSIUM SERPL-SCNC: 4.2 MMOL/L (ref 3.4–5.3)
RBC # BLD AUTO: 4.05 10E12/L (ref 3.8–5.2)
SODIUM SERPL-SCNC: 138 MMOL/L (ref 133–144)
WBC # BLD AUTO: 2.7 10E9/L (ref 4–11)

## 2021-06-14 PROCEDURE — 80048 BASIC METABOLIC PNL TOTAL CA: CPT | Performed by: NURSE PRACTITIONER

## 2021-06-14 PROCEDURE — 93016 CV STRESS TEST SUPVJ ONLY: CPT | Performed by: INTERNAL MEDICINE

## 2021-06-14 PROCEDURE — 93321 DOPPLER ECHO F-UP/LMTD STD: CPT | Mod: 26 | Performed by: INTERNAL MEDICINE

## 2021-06-14 PROCEDURE — 93325 DOPPLER ECHO COLOR FLOW MAPG: CPT | Mod: TC

## 2021-06-14 PROCEDURE — 85025 COMPLETE CBC W/AUTO DIFF WBC: CPT | Performed by: NURSE PRACTITIONER

## 2021-06-14 PROCEDURE — 93018 CV STRESS TEST I&R ONLY: CPT | Performed by: INTERNAL MEDICINE

## 2021-06-14 PROCEDURE — 93350 STRESS TTE ONLY: CPT | Mod: 26 | Performed by: INTERNAL MEDICINE

## 2021-06-14 PROCEDURE — 36415 COLL VENOUS BLD VENIPUNCTURE: CPT | Performed by: NURSE PRACTITIONER

## 2021-06-14 PROCEDURE — 93325 DOPPLER ECHO COLOR FLOW MAPG: CPT | Mod: 26 | Performed by: INTERNAL MEDICINE

## 2021-06-14 PROCEDURE — 93350 STRESS TTE ONLY: CPT | Mod: TC

## 2021-06-14 PROCEDURE — 99207 PR NO CHARGE NURSE ONLY: CPT | Performed by: NURSE PRACTITIONER

## 2021-06-14 NOTE — PROGRESS NOTES
Natalee Maya was evaluated at [facility] on June 14, 2021 at which time her blood pressure was:    BP Readings from Last 3 Encounters:   05/18/21 (!) 147/90   05/18/21 (!) 150/93   03/25/21 (!) 152/88     Pulse Readings from Last 3 Encounters:   05/18/21 69   05/18/21 58   03/25/21 83       Reviewed lifestyle modifications for blood pressure control and reduction: including making healthy food choices, managing weight, getting regular exercise, smoking cessation, reducing alcohol consumption, monitoring blood pressure regularly.     Symptoms: None    BP Goal:< 140/90 mmHg    BP Assessment:  BP at goal    Potential Reasons for BP too high: NA - Not applicable    BP Follow-Up Plan: Recheck BP in 6 months at pharmacy    Recommendation to Provider: pt in pharmacy today for BP check per MD recommendation. She was started on Lisinopril about 2 weeks ago. She reports tolerating medication-no side effects or symptoms reported. She does monitor her BP at home using arm cuff.     Note completed by: PHUONG

## 2021-06-18 ENCOUNTER — VIRTUAL VISIT (OUTPATIENT)
Dept: PSYCHOLOGY | Facility: CLINIC | Age: 51
End: 2021-06-18
Payer: COMMERCIAL

## 2021-06-18 DIAGNOSIS — F33.41 MAJOR DEPRESSIVE DISORDER, RECURRENT EPISODE, IN PARTIAL REMISSION WITH ANXIOUS DISTRESS (H): Primary | ICD-10-CM

## 2021-06-18 PROCEDURE — 90834 PSYTX W PT 45 MINUTES: CPT | Mod: GT | Performed by: SOCIAL WORKER

## 2021-06-18 NOTE — PROGRESS NOTES
Progress Note    Patient Name: Natalee Maya  Date: 6/18/2021     Service Type: Individual     Session Start Time: 7:02 AM Session End Time: 7:45 AM    Session Length: 38-52 minutes    Session #: 18 (4 with DB)    Attendees: Client     Telemedicine Visit: The patient's condition can be safely assessed and treated via synchronous audio and visual telemedicine encounter.      Reason for Telemedicine Visit: Services only offered telehealth    Originating Site (Patient Location): Patient's home    Distant Site (Provider Location): Provider Remote Setting    Consent:  The patient/guardian has verbally consented to: the potential risks and benefits of telemedicine (video visit) versus in person care; bill my insurance or make self-payment for services provided; and responsibility for payment of non-covered services.      Mode of Communication: [email or web address]     Treatment Plan Last Reviewed: 5/14/2021  PHQ-9 / MADINA-7 : 6/18/2021    DATA  Interactive Complexity: No  Crisis: No       Progress Since Last Session (Related to Symptoms / Goals / Homework):   Symptoms: see GAD7 and PHQ9; minimal increase in symptoms due to stressors     Homework: Achieved / completed to satisfaction       Episode of Care Goals: Satisfactory progress - ACTION (Actively working towards change); Intervened by reinforcing change plan / affirming steps taken     Current / Ongoing Stressors and Concerns:   Marital stress   20 lb weight gain in 2020      Client is moving in two weeks/ from    Client reported difficulty seeing her  struggle emotionally and knowing change for him is outside her control   Client reported increase in symptoms the past couple of days with summer vacation starting and having more time to think     Treatment Objective(s) Addressed in This Session:   Client will increase engagement in areas of interest  Client will set 1 boundary a week  Client will decrease  frequency of feeling down, depressed or hopeless  Client will use a minimum of 2 coping strategies to manage anxiety     Intervention:   CBT: guided discovery, identified thoughts, feelings, and behaviors, encouraged client to identify coping strategies   Motivational interviewing: expressed empathy/understanding, use of reflective listening and open ended questions, supported autonomy   Offered validation and support   Reviewed flowsheets    Reflected on client progress   Modeled assertive communication     ASSESSMENT: Current Emotional / Mental Status (status of significant symptoms):   Risk status (Self / Other harm or suicidal ideation)   Patient denies current fears or concerns for personal safety.   Patient denies current or recent suicidal ideation or behaviors.     Patientdenies current or recent homicidal ideation or behaviors.   Patient denies current or recent self injurious behavior or ideation.   Patient denies other safety concerns.   Patient reports there has been no change in risk factors since their last session.     Patientreports there has been no change in protective factors since their last session.     A safety and risk management plan was developed and shared with client in previous session.  Client confirmed she shared with her three emergency contacts     Appearance:   Appropriate    Eye Contact:   Good    Psychomotor Behavior: Normal    Attitude:   Cooperative  Interested Pleasant   Orientation:   All   Speech    Rate / Production: Normal/ Responsive Talkative    Volume:  Normal    Mood:    Sad    Affect:    Appropriate, Tearful   Thought Content:  Clear    Thought Form:  Coherent  Goal Directed  Logical    Insight:    Good      Medication Review:   No changes to current psychiatric medication(s)      Zoloft 50 mg      Medication Compliance:   NA     Changes in Health Issues:   None reported      Chemical Use Review:   Substance Use: no active concerns identified      Tobacco Use: No  current tobacco use.      Diagnosis:    Major Depressive Disorder, Moderate, in partial remission    Collateral Reports Completed:   N/A    PLAN: (Patient Tasks / Therapist Tasks / Other)  Client will continue couples therapy  Client shared plans to continue to exercise, engage in areas of interest and engage in distraction to manage symptoms.    Celeste Chavez, [facility] 6/18/2021                          ______________________________________________________________________    Treatment Plan    Patient's Name: Natalee Maya  YOB: 1970    Date:  5/14/2021    DSM5 Diagnoses: Major Depressive Disorder, Moderate, in partial remission  Psychosocial / Contextual Factors: marital stress, selling house, moving  WHODAS: 16    Referral / Collaboration:  Referral to another professional/service is not indicated at this time..    Anticipated number of session or this episode of care: will review in 90 days    MeasurableTreatment Goal(s) related to diagnosis / functional impairment(s)  Goal 1: Patient will reduce symptoms of anxiety and depression by reporting MADINA-7 and PHQ-9 scores below a 5, where symptoms where symptoms occur fewer than half the days for a minimum of 4 weeks.    Objective #A (Patient Action)    Patient will learn and utilize 3 techniques to manage emotions related to marital stress/conflict.  Status: Completed - Date: 5/14/2021      Intervention(s)  Therapist will teach relaxation techniques and ways to engage in self-care.    Objective #B  Patient will compile a list of boundaries that they would like to set with others. Utilize the boundaries .  Status: Completed - Date: 5/14/2021      Intervention(s)  Therapist will teach health boundaries and encourage client to identify their boundaries and implement them.    Objective #C  Patient will learn & utilize at least 1 assertive communication skills weekly.  Status: Completed - Date: 5/14/2021      Intervention(s)  Therapist will teach  "assertive communication skills.    Objective #D  Patient will make improvements to diet and increase engagement in exercise by setting weekly goals.   Status: Start Date: 5/14/2021      Intervention(s)   Therapist will encourage client to establish goals to work toward between appointments, assist with identifying any barriers and address them with client.      Objective #E  Patient will increase engagement in areas of interest.     Status: Start Date 5/14/2021     Intervention(s)   Therapist will assist client with identifying negative self-talk that impacts engagement in areas of interest.       Patient has reviewed and agreed to the above plan.      Celeste Chavez, [facility]  7/2/2020  Celeste Chavez, [facility]  5/14/2021                                                 Natalee Maya     SAFETY PLAN:  Step 1: Warning signs / cues (Thoughts, images, mood, situation, behavior) that a crisis may be developing:    Thoughts: \"I don't matter\", \"I'm a burden\", \"I can't do this anymore\", \"I just want this to end\" and \"Nothing makes it better\"    Images: none    Thinking Processes: ruminations (can't stop thinking about my problems): marital stresss, racing thoughts and highly critical and negative thoughts: about myself    Mood: worsening depression, hopelessness, agitation and mood swings    Behaviors: isolating/withdrawing  and can't stop crying    Situations: relationship problems   Step 2: Coping strategies - Things I can do to take my mind off of my problems without contacting another person (relaxation technique, physical activity):    Distress Tolerance Strategies:  read a book and geneology    Physical Activities: go for a walk    Focus on helpful thoughts:  \"This is temporary\", \"I will get through this\", \"It always passes\",   think about happy memories:   and remind myself of what is important to me:  Step 3: People and social settings that provide distraction:   Name: Isaías   Phone: in phone   Name: Dre   Phone: " in phone   Name: Mom   Phone: in phone   Name: Tracy   Phone: in phone    work and go outside   Step 4: Remind myself of people and things that are important to me and worth living for:  Children, , the job I do is important, I have talents and skills to offer people, and I am a good person  Step 5: When I am in crisis, I can ask these people to help me use my safety plan:   Name: Isaías   Phone: [phone]   Name: Dre   Phone: [phone]   Name: Tracy   Phone: [phone]  Step 6: Making the environment safe:     be around others  Step 7: Professionals or agencies I can contact during a crisis:    [facility] Daytime Number: [phone]    Suicide Prevention Lifeline: 7-109-226-ETII (2665)    Crisis Text Line Service (available 24 hours a day, 7 days a week): Text [place] to 009193    Local Crisis Services: [facility] [phone]    Call 911 or go to my nearest emergency department.   I helped develop this safety plan and agree to use it when needed.  I have been given a copy of this plan.      Client signature _________________________________________________________________  Today s date:  1/8/2021  Adapted from Safety Plan Template 2008 Valerie Broderick and Alistair Briseno is reprinted with the express permission of the authors.  No portion of the Safety Plan Template may be reproduced without the express, written permission.  You can contact the authors at bhs@[facility] or huber@[email or web address].

## 2021-06-19 DIAGNOSIS — Z11.59 ENCOUNTER FOR SCREENING FOR OTHER VIRAL DISEASES: ICD-10-CM

## 2021-06-19 LAB
LABORATORY COMMENT REPORT: NORMAL
SARS-COV-2 RNA RESP QL NAA+PROBE: NEGATIVE
SARS-COV-2 RNA RESP QL NAA+PROBE: NORMAL
SPECIMEN SOURCE: NORMAL
SPECIMEN SOURCE: NORMAL

## 2021-06-19 PROCEDURE — U0005 INFEC AGEN DETEC AMPLI PROBE: HCPCS | Performed by: INTERNAL MEDICINE

## 2021-06-19 PROCEDURE — U0003 INFECTIOUS AGENT DETECTION BY NUCLEIC ACID (DNA OR RNA); SEVERE ACUTE RESPIRATORY SYNDROME CORONAVIRUS 2 (SARS-COV-2) (CORONAVIRUS DISEASE [COVID-19]), AMPLIFIED PROBE TECHNIQUE, MAKING USE OF HIGH THROUGHPUT TECHNOLOGIES AS DESCRIBED BY CMS-2020-01-R: HCPCS | Performed by: INTERNAL MEDICINE

## 2021-06-23 ENCOUNTER — HOSPITAL ENCOUNTER (OUTPATIENT)
Facility: CLINIC | Age: 51
Discharge: HOME | End: 2021-06-23
Attending: INTERNAL MEDICINE | Admitting: INTERNAL MEDICINE
Payer: COMMERCIAL

## 2021-06-23 ENCOUNTER — HOSPITAL ENCOUNTER (OUTPATIENT)
Dept: CT IMAGING | Facility: CLINIC | Age: 51
Discharge: HOME | End: 2021-06-23
Attending: INTERNAL MEDICINE | Admitting: INTERNAL MEDICINE
Payer: COMMERCIAL

## 2021-06-23 VITALS
RESPIRATION RATE: 16 BRPM | OXYGEN SATURATION: 98 % | TEMPERATURE: 98.5 F | HEIGHT: 64 IN | SYSTOLIC BLOOD PRESSURE: 112 MMHG | DIASTOLIC BLOOD PRESSURE: 74 MMHG | BODY MASS INDEX: 34.15 KG/M2 | WEIGHT: 200 LBS | HEART RATE: 56 BPM

## 2021-06-23 DIAGNOSIS — Z12.11 SPECIAL SCREENING FOR MALIGNANT NEOPLASMS, COLON: ICD-10-CM

## 2021-06-23 DIAGNOSIS — Z12.11 SPECIAL SCREENING FOR MALIGNANT NEOPLASMS, COLON: Primary | ICD-10-CM

## 2021-06-23 DIAGNOSIS — Z92.83 PERSONAL HISTORY OF FAILED MODERATE SEDATION: ICD-10-CM

## 2021-06-23 LAB — COLONOSCOPY: NORMAL

## 2021-06-23 PROCEDURE — 74263 CT COLONOGRAPHY SCREENING: CPT

## 2021-06-23 PROCEDURE — G0121 COLON CA SCRN NOT HI RSK IND: HCPCS | Mod: 74 | Performed by: INTERNAL MEDICINE

## 2021-06-23 PROCEDURE — G0500 MOD SEDAT ENDO SERVICE >5YRS: HCPCS | Performed by: INTERNAL MEDICINE

## 2021-06-23 PROCEDURE — 45378 DIAGNOSTIC COLONOSCOPY: CPT | Performed by: INTERNAL MEDICINE

## 2021-06-23 PROCEDURE — 258N000003 HC RX IP 258 OP 636: Performed by: INTERNAL MEDICINE

## 2021-06-23 PROCEDURE — 999N000099 HC STATISTIC MODERATE SEDATION < 10 MIN: Performed by: INTERNAL MEDICINE

## 2021-06-23 PROCEDURE — 250N000011 HC RX IP 250 OP 636: Performed by: INTERNAL MEDICINE

## 2021-06-23 RX ORDER — NALOXONE HYDROCHLORIDE 0.4 MG/ML
0.2 INJECTION, SOLUTION INTRAMUSCULAR; INTRAVENOUS; SUBCUTANEOUS
Status: DISCONTINUED | OUTPATIENT
Start: 2021-06-23 | End: 2021-06-23 | Stop reason: HOSPADM

## 2021-06-23 RX ORDER — ONDANSETRON 2 MG/ML
4 INJECTION INTRAMUSCULAR; INTRAVENOUS
Status: DISCONTINUED | OUTPATIENT
Start: 2021-06-23 | End: 2021-06-23 | Stop reason: HOSPADM

## 2021-06-23 RX ORDER — ONDANSETRON 4 MG/1
4 TABLET, ORALLY DISINTEGRATING ORAL EVERY 6 HOURS PRN
Status: DISCONTINUED | OUTPATIENT
Start: 2021-06-23 | End: 2021-06-23 | Stop reason: HOSPADM

## 2021-06-23 RX ORDER — ONDANSETRON 2 MG/ML
4 INJECTION INTRAMUSCULAR; INTRAVENOUS EVERY 6 HOURS PRN
Status: DISCONTINUED | OUTPATIENT
Start: 2021-06-23 | End: 2021-06-23 | Stop reason: HOSPADM

## 2021-06-23 RX ORDER — NALOXONE HYDROCHLORIDE 0.4 MG/ML
0.4 INJECTION, SOLUTION INTRAMUSCULAR; INTRAVENOUS; SUBCUTANEOUS
Status: DISCONTINUED | OUTPATIENT
Start: 2021-06-23 | End: 2021-06-23 | Stop reason: HOSPADM

## 2021-06-23 RX ORDER — LIDOCAINE 40 MG/G
CREAM TOPICAL
Status: DISCONTINUED | OUTPATIENT
Start: 2021-06-23 | End: 2021-06-23 | Stop reason: HOSPADM

## 2021-06-23 RX ORDER — PROCHLORPERAZINE MALEATE 10 MG
10 TABLET ORAL EVERY 6 HOURS PRN
Status: DISCONTINUED | OUTPATIENT
Start: 2021-06-23 | End: 2021-06-23 | Stop reason: HOSPADM

## 2021-06-23 RX ORDER — FENTANYL CITRATE 50 UG/ML
INJECTION, SOLUTION INTRAMUSCULAR; INTRAVENOUS PRN
Status: COMPLETED | OUTPATIENT
Start: 2021-06-23 | End: 2021-06-23

## 2021-06-23 RX ORDER — FLUMAZENIL 0.1 MG/ML
0.2 INJECTION, SOLUTION INTRAVENOUS
Status: DISCONTINUED | OUTPATIENT
Start: 2021-06-23 | End: 2021-06-23 | Stop reason: HOSPADM

## 2021-06-23 RX ORDER — SODIUM CHLORIDE 9 MG/ML
INJECTION, SOLUTION INTRAVENOUS CONTINUOUS PRN
Status: COMPLETED | OUTPATIENT
Start: 2021-06-23 | End: 2021-06-23

## 2021-06-23 RX ADMIN — FENTANYL CITRATE 50 MCG: 50 INJECTION, SOLUTION INTRAMUSCULAR; INTRAVENOUS at 08:44

## 2021-06-23 RX ADMIN — MIDAZOLAM 2 MG: 1 INJECTION INTRAMUSCULAR; INTRAVENOUS at 08:44

## 2021-06-23 RX ADMIN — FENTANYL CITRATE 100 MCG: 50 INJECTION, SOLUTION INTRAMUSCULAR; INTRAVENOUS at 08:37

## 2021-06-23 RX ADMIN — SODIUM CHLORIDE 500 ML: 900 INJECTION, SOLUTION INTRAVENOUS at 08:44

## 2021-06-23 RX ADMIN — MIDAZOLAM 2 MG: 1 INJECTION INTRAMUSCULAR; INTRAVENOUS at 08:37

## 2021-06-23 ASSESSMENT — MIFFLIN-ST. JEOR: SCORE: 1512.19

## 2021-06-23 NOTE — LETTER
Susan 3, 2021      Natalee Maya  [address] [facility] [address]        Dear Natalee,   Please be aware that coverage of these services is subject to the terms and limitations of your health insurance plan.  Call member services at your health plan with any benefit or coverage questions.    Thank you for choosing [facility]. You are scheduled for the following service(s):    Date: 6/23/21             Procedure:  COLONOSCOPY  Doctor:      Dr Cardoza   Arrival Time: 8 am  *Enter and check in at the Main Hospital Entrance*  Procedure Time: 8:30 am      Location:   [facility]        Endoscopy Department, First Floor         [address] [place], [place] [address]      [phone] or [phone] (Formerly [facility]) to reschedule      MIRALAX -GATORADE  PREP  Colonoscopy is the most accurate test to detect colon polyps and colon cancer; and the only test where polyps can be removed. During this procedure, a doctor examines the lining of your large intestine and rectum through a flexible tube.   Transportation  You must arrange for a ride for the day of your procedure with a responsible adult. A taxi , Uber, etc, is not an option unless you are accompanied by a responsible adult. If you fail to arrange transportation with a responsible adult, your procedure will be cancelled and rescheduled.    Purchase the  following supplies at your local pharmacy:  - 2 (two) bisacodyl tablets: each tablet contains 5 mg.  (Dulcolax  laxative NOT Dulcolax  stool softener)   - 1 (one) 8.3 oz bottle of Polyethylene Glycol (PEG) 3350 Powder   (MiraLAX , Smooth LAX , ClearLAX  or equivalent)  - 64 oz Gatorade    Regular Gatorade, Gatorade G2 , Powerade , Powerade Zero  or Pedialyte  is acceptable. Red colored flavors are not allowed; all other colors (yellow, green, orange, purple and blue) are okay. It is also okay to buy two 2.12 oz packets of powdered Gatorade that can be  mixed with water to a total volume of 64 oz of liquid.  - 1 (one) 10 oz bottle of Magnesium Citrate (Red colored flavors are not allowed)  It is also okay for you to use a 0.5 oz package of powdered magnesium citrate (17 g) mixed with 10 oz of water.      PREPARATION FOR COLONOSCOPY    7 days before:    Discontinue fiber supplements and medications containing iron. This includes Metamucil  and Fibercon ; and multivitamins with iron.    3 days before:    Begin a low-fiber diet. A low-fiber diet helps making the cleanout more effective.     Examples of a low-fiber diet include (but are not limited to): white bread, white rice, pasta, crackers, fish, chicken, eggs, ground beef, creamy peanut butter, cooked/steamed/boiled vegetables, canned fruit, bananas, melons, milk, plain yogurt cheese, salad dressing and other condiments.     The following are not allowed on a low-fiber diet: seeds, nuts, popcorn, bran, whole wheat, corn, quinoa, raw fruits and vegetables, berries and dried fruit, beans and lentils.    For additional details on low-fiber diet, please refer to the table on the last page.    2 days before:    Continue the low-fiber diet.     Drink at least 8 glasses of water throughout the day.     Stop eating solid foods at 11:45 pm.    1 day before:    In the morning: begin a clear liquid diet (liquids you can see through).     Examples of a clear liquid diet include: water, clear broth or bouillon, Gatorade, Pedialyte or Powerade, carbonated and non-carbonated soft drinks (Sprite , 7-Up , ginger ale), strained fruit juices without pulp (apple, white grape, white cranberry), Jell-O  and popsicles.     The following are not allowed on a clear liquid diet: red liquids, alcoholic beverages, dairy products (milk, creamer, and yogurt), protein shakes, creamy broths, juice with pulp and chewing tobacco.    At noon: take 2 (two) bisacodyl tablets     At 4 (and no later than 6pm): start drinking the Miralax-Gatorade  preparation (8.3 oz of Miralax mixed with 64 oz of Gatorade in a large pitcher). Drink 1(one) 8 oz glass every 15 minutes thereafter, until the mixture is gone.    COLON CLEANSING TIPS: drink adequate amounts of fluids before and after your colon cleansing to prevent dehydration. Stay near a toilet because you will have diarrhea. Even if you are sitting on the toilet, continue to drink the cleansing solution every 15 minutes. If you feel nauseous or vomit, rinse your mouth with water, take a 15 to 30-minute-break and then continue drinking the solution. You will be uncomfortable until the stool has flushed from your colon (in about 2 to 4 hours). You may feel chilled.    Day of your procedure  You may take all of your morning medications including blood pressure medications, blood thinners (if you have not been instructed to stop these by our office), methadone, anti-seizure medications with sips of water 3 hours prior to your procedure or earlier. Do not take insulin or vitamins prior to your procedure. Continue the clear liquid diet.       4 hours prior: drink 10 oz of magnesium citrate. It may be easier to drink it with a straw.    STOP consuming all liquids after that.     Do not take anything by mouth during this time.     Allow extra time to travel to your procedure as you may need to stop and use a restroom along the way.    You are ready for the procedure, if you followed all instructions and your stool is no longer formed, but clear or yellow liquid. If you are unsure whether your colon is clean, please call our office at [phone] before you leave for your appointment.    Bring the following to your procedure:  - Insurance Card/Photo ID.   - List of current medications including over-the-counter medications and supplements.   - Your rescue inhaler if you currently use one to control asthma.    Canceling or rescheduling your appointment:   If you must cancel or reschedule your appointment, please call  [phone] as soon as possible.      COLONOSCOPY PRE-PROCEDURE CHECKLIST    If you have diabetes, ask your regular doctor for diet and medication restrictions.  If you take an anticoagulant or anti-platelet medication (such as Coumadin , Lovenox , Pradaxa , Xarelto , Eliquis , etc.), please call your primary doctor for advice on holding this medication.  If you take aspirin you may continue to do so.  If you are or may be pregnant, please discuss the risks and benefits of this procedure with your doctor.        What happens during a colonoscopy?    Plan to spend up to two hours, starting at registration time, at the endoscopy center the day of your procedure. The colonoscopy takes an average of 15 to 30 minutes. Recovery time is about 30 minutes.      Before the exam:    You will change into a gown.    Your medical history and medication list will be reviewed with you, unless that has been done over the phone prior to the procedure.     A nurse will insert an intravenous (IV) line into your hand or arm.    The doctor will meet with you and will give you a consent form to sign.  During the exam:     Medicine will be given through the IV line to help you relax.     Your heart rate and oxygen levels will be monitored. If your blood pressure is low, you may be given fluids through the IV line.     The doctor will insert a flexible hollow tube, called a colonoscope, into your rectum. The scope will be advanced slowly through the large intestine (colon).    You may have a feeling of fullness or pressure.     If an abnormal tissue or a polyp is found, the doctor may remove it through the endoscope for closer examination, or biopsy. Tissue removal is painless    After the exam:           Any tissue samples removed during the exam will be sent to a lab for evaluation. It may take 5-7 working days for you to be notified of the results.     A nurse will provide you with complete discharge instructions before you leave the  endoscopy center. Be sure to ask the nurse for specific instructions if you take blood thinners such as Aspirin, Coumadin or Plavix.     The doctor will prepare a full report for you and for the physician who referred you for the procedure.     Your doctor will talk with you about the initial results of your exam.      Medication given during the exam will prohibit you from driving for the rest of the day.     Following the exam, you may resume your normal diet. Your first meal should be light, no greasy foods. Avoid alcohol until the next day.     You may resume your regular activities the day after the procedure.         LOW-FIBER DIET    Foods RECOMMENDED Foods to AVOID   Breads, Cereal, Rice and Pasta:   White bread, rolls, biscuits, croissant and allison toast.   Waffles, Welsh toast and pancakes.   White rice, noodles, pasta, macaroni and peeled cooked potatoes.   Plain crackers and saltines.   Cooked cereals: farina, cream of rice.   Cold cereals: Puffed Rice , Rice Krispies , Corn Flakes  and Special K    Breads, Cereal, Rice and Pasta:   Breads or rolls with nuts, seeds or fruit.   Whole wheat, pumpernickel, rye breads and cornbread.   Potatoes with skin, brown or wild rice, and kasha (buckwheat).     Vegetables:   Tender cooked and canned vegetables without seeds: carrots, asparagus tips, green or wax beans, pumpkin, spinach, lima beans. Vegetables:   Raw or steamed vegetables.   Vegetables with seeds.   Sauerkraut.   Winter squash, peas, broccoli, Brussel sprouts, cabbage, onions, cauliflower, baked beans, peas and corn.   Fruits:   Strained fruit juice.   Canned fruit, except pineapple.   Ripe bananas and melon. Fruits:   Prunes and prune juice.   Raw fruits.   Dried fruits: figs, dates and raisins.   Milk/Dairy:   Milk: plain or flavored.   Yogurt, custard and ice cream.   Cheese and cottage cheese Milk/Dairy:     Meat and other proteins:   ground, well-cooked tender beef, lamb, ham, veal, pork, fish,  poultry and organ meats.   Eggs.   Peanut butter without nuts. Meat and other proteins:   Tough, fibrous meats with gristle.   Dry beans, peas and lentils.   Peanut butter with nuts.   Tofu.   Fats, Snack, Sweets, Condiments and Beverages:   Margarine, butter, oils, mayonnaise, sour cream and salad dressing, plain gravy.   Sugar, hard candy, clear jelly, honey and syrup.   Spices, cooked herbs, bouillon, broth and soups made with allowed vegetable, ketchup and mustard.   Coffee, tea and carbonated drinks.   Plain cakes, cookies and pretzels.   Gelatin, plain puddings, custard, ice cream, sherbet and popsicles. Fats, Snack, Sweets, Condiments and Beverages:   Nuts, seeds and coconut.   Jam, marmalade and preserves.   Pickles, olives, relish and horseradish.   All desserts containing nuts, seeds, dried fruit and coconut; or made from whole grains or bran.   Candy made with nuts or seeds.   Popcorn.         DIRECTIONS TO THE ENDOSCOPY DEPARTMENT    From the north ([facility])  Take [address] South, exit on [address] [address]. Get into the left hand alivia, turn left (east), go one-half mile to [address] and turn left. Go north to the second stoplight, take a right on [address] and follow it to the Main Hospital entrance.    From the south ([facility])  Take 35N to the 35E split and exit on [address] [address]. On [address] [address], turn left (west) to [address]. Turn right (north) on [address]. Go north to the second stoplight, take a right on [address] and follow it to the Main Hospital entrance.    From the east via 35E ([facility])  Take 35E south to [address] [address] exit. Turn right on [address] [address]. Go west to [address]. Turn right (north) on [address]. Go to the second stoplight, take a right on [address] to the Main Hospital entrance.    From the east via [address] ([facility])  Take [address] to [address]. Turn left  (south) on [address] to [address], turn left (east) on [address] and follow it to the Main Hospital entrance.    From the west via [address] ([place], [place])  Take [address] east to [address]. Turn right (south) on [address] to [address], turn left (east) on [address] and follow it to the Main Hospital entrance.

## 2021-06-23 NOTE — PROGRESS NOTES
[facility]  Pre-Endoscopy History and Physical     Natalee Maya MRN# [id number]   YOB: 1970 Age: 50 year old   Today's Date: 2021    Date of Procedure: 2021  Primary care provider: Carolina Gomez  Type of Endoscopy: colonoscopy  Reason for Procedure: SCreen  Type of Anesthesia Anticipated: Moderate (conscious) sedation    HPI:    Natalee is a 50 year old female who will be undergoing the above procedure.      A history and physical has been performed. The patient's medications and allergies have been reviewed. The risks and benefits of the procedure and the sedation options and risks were discussed with the patient.  All questions were answered and informed consent was obtained.      Allergies   Allergen Reactions     No Known Drug Allergies         Current Facility-Administered Medications   Medication     0.9% sodium chloride BOLUS     lidocaine (LMX4) kit     lidocaine 1 % 0.1-1 mL     ondansetron (ZOFRAN) injection 4 mg     sodium chloride (PF) 0.9% PF flush 3 mL     sodium chloride (PF) 0.9% PF flush 3 mL     sodium chloride (PF) 0.9% PF flush 3 mL       Patient Active Problem List   Diagnosis     Aortic anomaly     Morbid obesity (H)     Subclinical hypothyroidism        Past Medical History:   Diagnosis Date     Calculus of kidney     History of kidney stone -- Abstracted 02     Hypertension      Lymphedema of lower extremity         Past Surgical History:   Procedure Laterality Date     FOOT SURGERY      2017     [facility] NONSPECIFIC PROCEDURE       -- Abstracted 02       Social History     Tobacco Use     Smoking status: Never Smoker     Smokeless tobacco: Never Used   Substance Use Topics     Alcohol use: No       Family History   Problem Relation Age of Onset     Diabetes Father      C.A.D. Father         hyperlipidemia     Bleeding Disorder Mother         Factor 5     Breast Cancer No family hx of      Prostate Cancer No family hx of   "    Colon Cancer No family hx of          PHYSICAL EXAM:   /81   Pulse 60   Temp 98.5  F (36.9  C) (Temporal)   Resp 14   Ht 1.626 m (5' 4\")   Wt 90.7 kg (200 lb)   SpO2 99%   BMI 34.33 kg/m   Estimated body mass index is 34.33 kg/m  as calculated from the following:    Height as of this encounter: 1.626 m (5' 4\").    Weight as of this encounter: 90.7 kg (200 lb).   RESP: lungs clear to auscultation - no rales, rhonchi or wheezes  CV: regular rates and rhythm    IMPRESSION   ASA Class 2 - Mild systemic disease  Mallampati Score: 2      Ceasar Cardoza MD                "

## 2021-07-16 ENCOUNTER — VIRTUAL VISIT (OUTPATIENT)
Dept: PSYCHOLOGY | Facility: CLINIC | Age: 51
End: 2021-07-16
Payer: COMMERCIAL

## 2021-07-16 DIAGNOSIS — F33.41 MAJOR DEPRESSIVE DISORDER, RECURRENT EPISODE, IN PARTIAL REMISSION WITH ANXIOUS DISTRESS (H): Primary | ICD-10-CM

## 2021-07-16 PROCEDURE — 90834 PSYTX W PT 45 MINUTES: CPT | Mod: GT | Performed by: SOCIAL WORKER

## 2021-07-16 NOTE — PROGRESS NOTES
Progress Note    Patient Name: Natalee Maya  Date: 7/16/2021     Service Type: Individual     Session Start Time: 7:03 AM Session End Time: 7:55 AM    Session Length: 38-52 minutes    Session #: 19 (4 with DB)    Attendees: Client     Telemedicine Visit: The patient's condition can be safely assessed and treated via synchronous audio and visual telemedicine encounter.      Reason for Telemedicine Visit: Services only offered telehealth    Originating Site (Patient Location): Patient's home    Distant Site (Provider Location): Provider Remote Setting    Consent:  The patient/guardian has verbally consented to: the potential risks and benefits of telemedicine (video visit) versus in person care; bill my insurance or make self-payment for services provided; and responsibility for payment of non-covered services.      Mode of Communication: [email or web address]     Treatment Plan Last Reviewed: 5/14/2021  PHQ-9 / MADINA-7 : 7/16/2021    DATA  Interactive Complexity: No  Crisis: No       Progress Since Last Session (Related to Symptoms / Goals / Homework):   Symptoms: see GAD7 and PHQ9; client reported overall doing well, reported symptoms due to recent move and separation from her      Homework: Achieved / completed to satisfaction       Episode of Care Goals: Satisfactory progress - ACTION (Actively working towards change); Intervened by reinforcing change plan / affirming steps taken     Current / Ongoing Stressors and Concerns:   Marital stress   20 lb weight gain in 2020      Client recently moved and reported she and her  have been working on their relationship     Treatment Objective(s) Addressed in This Session:   Client will use assertive communication skills     Intervention:   CBT: guided discovery, identified thoughts, feelings, and behaviors   Motivational interviewing: expressed empathy/understanding, use of reflective listening and open ended questions,  supported autonomy   Offered validation and support   Reviewed flowsheets    Modeled assertive communication skills     ASSESSMENT: Current Emotional / Mental Status (status of significant symptoms):   Risk status (Self / Other harm or suicidal ideation)   Patient denies current fears or concerns for personal safety.   Patient denies current or recent suicidal ideation or behaviors.     Patientdenies current or recent homicidal ideation or behaviors.   Patient denies current or recent self injurious behavior or ideation.   Patient denies other safety concerns.   Patient reports there has been no change in risk factors since their last session.     Patientreports there has been no change in protective factors since their last session.     A safety and risk management plan was developed and shared with client in previous session.  Client confirmed she shared with her three emergency contacts     Appearance:   Appropriate    Eye Contact:   Good    Psychomotor Behavior: Normal    Attitude:   Cooperative  Interested   Orientation:   All   Speech    Rate / Production: Normal/ Responsive Talkative    Volume:  Normal    Mood:    Stable   Affect:    Appropriate   Thought Content:  Clear    Thought Form:  Coherent  Goal Directed  Logical    Insight:    Good      Medication Review:   No changes to current psychiatric medication(s)      Zoloft 50 mg      Medication Compliance:   NA     Changes in Health Issues:   None reported      Chemical Use Review:   Substance Use: no active concerns identified      Tobacco Use: No current tobacco use.      Diagnosis:    Major Depressive Disorder, Moderate, in partial remission    Collateral Reports Completed:   N/A    PLAN: (Patient Tasks / Therapist Tasks / Other)  Client will continue couples therapy  Therapist encouraged use of assertive communication.  Client to continue engaging in behavior changes.    Celeste Chavez, [facility] 7/16/2021                           ______________________________________________________________________    Treatment Plan    Patient's Name: Natalee Maya  YOB: 1970    Date:  5/14/2021    DSM5 Diagnoses: Major Depressive Disorder, Moderate, in partial remission  Psychosocial / Contextual Factors: marital stress, selling house, moving  WHODAS: 16    Referral / Collaboration:  Referral to another professional/service is not indicated at this time..    Anticipated number of session or this episode of care: will review in 90 days    MeasurableTreatment Goal(s) related to diagnosis / functional impairment(s)  Goal 1: Patient will reduce symptoms of anxiety and depression by reporting MADINA-7 and PHQ-9 scores below a 5, where symptoms where symptoms occur fewer than half the days for a minimum of 4 weeks.    Objective #A (Patient Action)    Patient will learn and utilize 3 techniques to manage emotions related to marital stress/conflict.  Status: Completed - Date: 5/14/2021      Intervention(s)  Therapist will teach relaxation techniques and ways to engage in self-care.    Objective #B  Patient will compile a list of boundaries that they would like to set with others. Utilize the boundaries .  Status: Completed - Date: 5/14/2021      Intervention(s)  Therapist will teach health boundaries and encourage client to identify their boundaries and implement them.    Objective #C  Patient will learn & utilize at least 1 assertive communication skills weekly.  Status: Completed - Date: 5/14/2021      Intervention(s)  Therapist will teach assertive communication skills.    Objective #D  Patient will make improvements to diet and increase engagement in exercise by setting weekly goals.   Status: Start Date: 5/14/2021      Intervention(s)   Therapist will encourage client to establish goals to work toward between appointments, assist with identifying any barriers and address them with client.      Objective #E  Patient will increase engagement  "in areas of interest.     Status: Start Date 5/14/2021     Intervention(s)   Therapist will assist client with identifying negative self-talk that impacts engagement in areas of interest.       Patient has reviewed and agreed to the above plan.      Celeste Chavez, [facility]  7/2/2020  Celeste Chavez, [facility]  5/14/2021                                                 Natalee Maya     SAFETY PLAN:  Step 1: Warning signs / cues (Thoughts, images, mood, situation, behavior) that a crisis may be developing:    Thoughts: \"I don't matter\", \"I'm a burden\", \"I can't do this anymore\", \"I just want this to end\" and \"Nothing makes it better\"    Images: none    Thinking Processes: ruminations (can't stop thinking about my problems): marital stresss, racing thoughts and highly critical and negative thoughts: about myself    Mood: worsening depression, hopelessness, agitation and mood swings    Behaviors: isolating/withdrawing  and can't stop crying    Situations: relationship problems   Step 2: Coping strategies - Things I can do to take my mind off of my problems without contacting another person (relaxation technique, physical activity):    Distress Tolerance Strategies:  read a book and geneology    Physical Activities: go for a walk    Focus on helpful thoughts:  \"This is temporary\", \"I will get through this\", \"It always passes\",   think about happy memories:   and remind myself of what is important to me:  Step 3: People and social settings that provide distraction:   Name: Isaías   Phone: in phone   Name: Dre   Phone: in phone   Name: Mom   Phone: in phone   Name: Tracy   Phone: in phone    work and go outside   Step 4: Remind myself of people and things that are important to me and worth living for:  Children, , the job I do is important, I have talents and skills to offer people, and I am a good person  Step 5: When I am in crisis, I can ask these people to help me use my safety plan:   Name: Isaías   Phone: " [phone]   Name: Dre   Phone: [phone]   Name: Tracy   Phone: [phone]  Step 6: Making the environment safe:     be around others  Step 7: Professionals or agencies I can contact during a crisis:    [facility] Number: 447-165-9747    Suicide Prevention Lifeline: 8-233-904-ZENQ (1521)    Crisis Text Line Service (available 24 hours a day, 7 days a week): Text [place] to 657746    Local Crisis Services: [facility] [phone]    Call 911 or go to my nearest emergency department.   I helped develop this safety plan and agree to use it when needed.  I have been given a copy of this plan.      Client signature _________________________________________________________________  Today s date:  1/8/2021  Adapted from Safety Plan Template 2008 Valerie Broderick and Alistair Briseno is reprinted with the express permission of the authors.  No portion of the Safety Plan Template may be reproduced without the express, written permission.  You can contact the authors at bhs@[place].[email or web address] or [email or web address].

## 2021-07-31 DIAGNOSIS — E66.01 MORBID OBESITY (H): ICD-10-CM

## 2021-08-02 ENCOUNTER — DOCUMENTATION ONLY (OUTPATIENT)
Dept: ONCOLOGY | Facility: CLINIC | Age: 51
End: 2021-08-02

## 2021-08-02 DIAGNOSIS — D72.819 LEUKOPENIA, UNSPECIFIED TYPE: Primary | ICD-10-CM

## 2021-08-02 RX ORDER — PHENTERMINE HYDROCHLORIDE 15 MG/1
CAPSULE ORAL
Qty: 30 CAPSULE | Refills: 0 | Status: SHIPPED | OUTPATIENT
Start: 2021-08-02 | End: 2021-09-13

## 2021-08-02 NOTE — TELEPHONE ENCOUNTER
Routing refill request to provider for review/approval because:  Drug not on the [facility] refill protocol     Tiara Soriano RN   [facility] -- Triage Nurse

## 2021-08-02 NOTE — NURSING NOTE
Natalee is scheduled on 8/9/21 for labs, then will see Dr. Gibson on 8/12/21.    Fernanda HARRIS CMA is requesting for lab orders to be entered into Epic.    Last office visit with Dr. Gibson on 5/13/21.  Per office note from Dr. Gibson on 5/13/21:  -RTC in 3 months with Cbc/diff.    Writer entered order for Cbc/diff on 8/9/21 into Epic.    Mimi Jenkins RN, BSN, OCN on 8/2/2021 at 4:14 PM

## 2021-08-02 NOTE — CONFIDENTIAL NOTE
BP Readings from Last 3 Encounters:   06/23/21 112/74   06/14/21 112/72   05/18/21 (!) 147/90     BP is well controolled, she should likely do an evisit or vrt visit to update on the phent. Refills done today.

## 2021-08-03 ENCOUNTER — E-VISIT (OUTPATIENT)
Dept: PEDIATRICS | Facility: CLINIC | Age: 51
End: 2021-08-03
Payer: COMMERCIAL

## 2021-08-03 DIAGNOSIS — R03.0 ELEVATED BLOOD PRESSURE READING WITHOUT DIAGNOSIS OF HYPERTENSION: Primary | ICD-10-CM

## 2021-08-03 PROCEDURE — 99421 OL DIG E/M SVC 5-10 MIN: CPT | Performed by: NURSE PRACTITIONER

## 2021-08-03 RX ORDER — LISINOPRIL 20 MG/1
20 TABLET ORAL DAILY
Qty: 90 TABLET | Refills: 3 | Status: SHIPPED | OUTPATIENT
Start: 2021-08-03 | End: 2022-04-14

## 2021-08-03 NOTE — TELEPHONE ENCOUNTER
"Per AMS:  BP Readings from Last 3 Encounters:   06/23/21 112/74   06/14/21 112/72   05/18/21 (!) 147/90     \"BP is well controlled, she should likely do an evisit or vrt visit to update on the phent. Refills done today.\"     Called pt. Informed of AMS message above. Pt reports that they will submit an eVisit at their earliest convenience. Pt has no questions or concerns at this time.    Encouraged pt to reach out with any questions or concerns. Pt agreeable to plan and verbalized understanding.    [place], EMT at 9:54 AM on August 3, 2021   [facility]   [phone]  "

## 2021-08-09 ENCOUNTER — HOSPITAL ENCOUNTER (OUTPATIENT)
Facility: CLINIC | Age: 51
End: 2021-08-09
Attending: INTERNAL MEDICINE
Payer: COMMERCIAL

## 2021-08-09 ENCOUNTER — LAB (OUTPATIENT)
Dept: ONCOLOGY | Facility: CLINIC | Age: 51
End: 2021-08-09
Attending: INTERNAL MEDICINE
Payer: COMMERCIAL

## 2021-08-09 DIAGNOSIS — D72.819 LEUKOPENIA, UNSPECIFIED TYPE: ICD-10-CM

## 2021-08-09 DIAGNOSIS — E53.8 FOLATE DEFICIENCY: ICD-10-CM

## 2021-08-09 LAB
BASOPHILS # BLD AUTO: 0 10E3/UL (ref 0–0.2)
BASOPHILS NFR BLD AUTO: 1 %
EOSINOPHIL # BLD AUTO: 0.1 10E3/UL (ref 0–0.7)
EOSINOPHIL NFR BLD AUTO: 2 %
ERYTHROCYTE [DISTWIDTH] IN BLOOD BY AUTOMATED COUNT: 13.4 % (ref 10–15)
HCT VFR BLD AUTO: 39 % (ref 35–47)
HGB BLD-MCNC: 12.5 G/DL (ref 11.7–15.7)
IMM GRANULOCYTES # BLD: 0 10E3/UL
IMM GRANULOCYTES NFR BLD: 0 %
LYMPHOCYTES # BLD AUTO: 1.2 10E3/UL (ref 0.8–5.3)
LYMPHOCYTES NFR BLD AUTO: 31 %
MCH RBC QN AUTO: 30.3 PG (ref 26.5–33)
MCHC RBC AUTO-ENTMCNC: 32.1 G/DL (ref 31.5–36.5)
MCV RBC AUTO: 94 FL (ref 78–100)
MONOCYTES # BLD AUTO: 0.3 10E3/UL (ref 0–1.3)
MONOCYTES NFR BLD AUTO: 8 %
NEUTROPHILS # BLD AUTO: 2.2 10E3/UL (ref 1.6–8.3)
NEUTROPHILS NFR BLD AUTO: 58 %
NRBC # BLD AUTO: 0 10E3/UL
NRBC BLD AUTO-RTO: 0 /100
PLATELET # BLD AUTO: 229 10E3/UL (ref 150–450)
RBC # BLD AUTO: 4.13 10E6/UL (ref 3.8–5.2)
WBC # BLD AUTO: 3.8 10E3/UL (ref 4–11)

## 2021-08-09 PROCEDURE — 85025 COMPLETE CBC W/AUTO DIFF WBC: CPT | Performed by: INTERNAL MEDICINE

## 2021-08-09 PROCEDURE — 36415 COLL VENOUS BLD VENIPUNCTURE: CPT

## 2021-08-11 RX ORDER — FOLIC ACID 1 MG/1
TABLET ORAL
Qty: 90 TABLET | Refills: 1 | OUTPATIENT
Start: 2021-08-11

## 2021-08-12 ENCOUNTER — VIRTUAL VISIT (OUTPATIENT)
Dept: ONCOLOGY | Facility: CLINIC | Age: 51
End: 2021-08-12
Attending: INTERNAL MEDICINE
Payer: COMMERCIAL

## 2021-08-12 DIAGNOSIS — D72.819 LEUKOPENIA, UNSPECIFIED TYPE: Primary | ICD-10-CM

## 2021-08-12 PROCEDURE — 99212 OFFICE O/P EST SF 10 MIN: CPT | Mod: GT | Performed by: INTERNAL MEDICINE

## 2021-08-12 NOTE — LETTER
8/12/2021         RE: Natalee Maya  [address]  [facility] [address]        Dear Colleague,    Thank you for referring your patient, Natalee Maya, to the [facility]. Please see a copy of my visit note below.    Natalee is a 50 year old who is being evaluated via a billable video visit.      How would you like to obtain your AVS? MyChart  If the video visit is dropped, the invitation should be resent by: Text to cell phone: [phone]  VIDEO  Will anyone else be joining your video visit? Rhonda Lora CMA    Video Start Time: 8:00 AM  Video-Visit Details    Type of service:  Video Visit    Video End Time:8:02 AM    Originating Location (pt. Location): Home    Distant Location (provider location):  [facility]     Platform used for Video Visit: [employer]           [facility]    Hematology/Oncology Established Patient Note      Today's Date: 8/12/21    Reason for Follow-up: leukopenia      HISTORY OF PRESENT ILLNESS: Natalee Maya is a 50 year old female with PMHx of hypothyroidism who presents with leukopenia.  She was found on labs in June 2020 to have low WBC of 1.9K.  Repeat checks have been in the 2K range.  Hemoglobin and platelet count were normal.  Differential shows a moderate to mild neutropenia.  On further chart review, she had a mild leukopenia in the 3K range back in 2018, but did recover back to normal a few months later.  There are not many labs prior to that.  Iron, ferritin, vitamin 12, and folate were normal.  Peripheral smear shows low WBC, but non-specific.      She denies recent illness, infections, fever.  Her Prozac dose increased a few months ago.  Otherwise, she has not started any new medications recently.      She does not smoke.  She drinks a glass of wine maybe once or twice a month.  She denies illicit drug use.      INTERIM HISTORY: Natalee is doing well.  She denies  any new health changes since our last visit.        REVIEW OF SYSTEMS:   14 point ROS was reviewed and is negative other than as noted above in HPI.       HOME MEDICATIONS:  Current Outpatient Medications   Medication Sig Dispense Refill     Ferrous Sulfate (IRON) 325 (65 Fe) MG tablet TAKE ONE TABLET BY MOUTH EVERY DAY, AVOID AROUND SAME TIME AS LEVOTHYROXINE 90 tablet 3     folic acid (FOLVITE) 1 MG tablet TAKE ONE TABLET BY MOUTH EVERY DAY 90 tablet 3     levothyroxine (SYNTHROID/LEVOTHROID) 50 MCG tablet Take 1 tablet (50 mcg) by mouth daily 90 tablet 2     lisinopril (ZESTRIL) 10 MG tablet Take 1 tablet (10 mg) by mouth daily 90 tablet 3     lisinopril (ZESTRIL) 20 MG tablet Take 1 tablet (20 mg) by mouth daily 90 tablet 3     phentermine (ADIPEX-P) 15 MG capsule TAKE ONE CAPSULE BY MOUTH EVERY MORNING 30 capsule 0     sertraline (ZOLOFT) 50 MG tablet Take 1.5 tablets (75 mg) by mouth daily 135 tablet 3         ALLERGIES:  Allergies   Allergen Reactions     No Known Drug Allergies          PAST MEDICAL HISTORY:  Past Medical History:   Diagnosis Date     Calculus of kidney     History of kidney stone -- Abstracted 02     Hypertension      Lymphedema of lower extremity          PAST SURGICAL HISTORY:  Past Surgical History:   Procedure Laterality Date     COLONOSCOPY Left 2021    Procedure: COLONOSCOPY; incomplete secondary to pt discomfort. will schedule CT colonography today if possible.;  Surgeon: Ceasar Cardoza MD;  Location:  GI     FOOT SURGERY      2017     [facility] NONSPECIFIC PROCEDURE       -- Abstracted 02         SOCIAL HISTORY:  Social History     Socioeconomic History     Marital status:      Spouse name: Not on file     Number of children: Not on file     Years of education: Not on file     Highest education level: Not on file   Occupational History     Not on file   Tobacco Use     Smoking status: Never Smoker     Smokeless tobacco: Never Used    Substance and Sexual Activity     Alcohol use: No     Drug use: No     Sexual activity: Yes     Partners: Male   Other Topics Concern     Parent/sibling w/ CABG, MI or angioplasty before 65F 55M? Not Asked   Social History Narrative     Not on file     Social Determinants of Health     Financial Resource Strain:      Difficulty of Paying Living Expenses:    Food Insecurity:      Worried About Running Out of Food in the Last Year:      Ran Out of Food in the Last Year:    Transportation Needs:      Lack of Transportation (Medical):      Lack of Transportation (Non-Medical):    Physical Activity:      Days of Exercise per Week:      Minutes of Exercise per Session:    Stress:      Feeling of Stress :    Social Connections:      Frequency of Communication with Friends and Family:      Frequency of Social Gatherings with Friends and Family:      Attends Zoro[employer] Services:      Active Member of Clubs or Organizations:      Attends Club or Organization Meetings:      Marital Status:    Intimate Partner Violence:      Fear of Current or Ex-Partner:      Emotionally Abused:      Physically Abused:      Sexually Abused:          FAMILY HISTORY:  Family History   Problem Relation Age of Onset     Diabetes Father      C.A.D. Father         hyperlipidemia     Bleeding Disorder Mother         Factor 5     Breast Cancer No family hx of      Prostate Cancer No family hx of      Colon Cancer No family hx of          PHYSICAL EXAM:  ECO  GENERAL/CONSTITUTIONAL: No acute distress. Healthy, alert..    RESPIRATORY: No audible wheeze, cough, or visible cyanosis.  No visible retractions or increased work of breathing.  Able to speak fully in complete sentences.  NEUROLOGIC: Alert, oriented, answers questions appropriately. No tremor. Mentation intact and speech normal  PSYCHIATRIC:  Mentation appears normal, affect normal/bright, judgement and insight intact, normal speech and appearance well-groomed.    The rest of a  comprehensive physical exam is deferred due to public [facility] emergency video visit restrictions.      LABS:  CBC RESULTS: Recent Labs   Lab Test 08/09/21  1227   WBC 3.8*   RBC 4.13   HGB 12.5   HCT 39.0   MCV 94   MCH 30.3   MCHC 32.1   RDW 13.4            Peripheral flow:  INTERPRETATION:   Blood:        Polytypic B cells        No aberrant immunophenotype on T cells        See comment     COMMENT:   There is no immunophenotypic evidence of non-Hodgkin lymphoma or lymphoid   leukemia.  Final interpretation   requires correlation with morphologic and clinical features.       PATHOLOGY:  Peripheral smear 6/30/20:  FINAL DIAGNOSIS:   Peripheral Blood Morphology-   -Moderate leukopenia with moderate absolute neutropenia.   -See comment.     COMMENT:   The patient has history of intermittent leukopenia (see prior peripheral   blood morphology; [id number], 8/2018.   Absolute neutropenia appears to be a new finding. Possible causes of   neutropenia include drugs, infection,   autoimmune/immune-mediated disorders, hypersplenism, and bone marrow   disorder. At present, there is   insufficient morphologic evidence to suggest a clonal bone marrow   disorder. Serum vitamin B12/folate   deficiency appears as an unlikely cause, given normal reported levels in   this patient. Serum ferritin level is   additionally within normal limits. If absolute neutropenia persists   without clinical explanation, TCR-V beta   flow cytometric analysis of peripheral blood may be considered for further    evaluation.     IMAGING:  Abdomen ultrasound 9/10/20:  Unremarkable complete abdominal ultrasound.      ASSESSMENT/PLAN:  Natalee Maya is a 50 year old female with:    Leukopenia: Differential with moderate to mild neutropenia.  Other two cell lines are normal.  This seems to be a fairly new finding, although it happened to a milder and short extent in 2018.  She has not had recent infections.  She has not started new medications.   She has no history of hepatitis or HIV.  She does not drink alcohol frequently.  We discussed other potential causes including autoimmune or primary bone marrow disorder.  Abdomen ultrasound was normal; no hepatosplenomegaly. Her WBC remains low. She remains asymptomatic.  We discussed options of continued observation or proceeding with bone marrow biopsy.  She would rather not do a bone marrow biopsy now.  If there is persistent downward trend of WBC, then we will do bone marrow biopsy.      WBC is stable/improved to 3.8.  Differential is normal this time.  Patient still feels completely fine and does not have frequent infections.  WBC is still within her baseline range.  We will continue to monitor for now, and may do bone marrow biopsy this summer if there is significant downtrend in WBC or if there develops cytopenias in the other cell lines.  Natalee is agreeable with this.    -RTC in 6 months with CBC/diff      Lissa Gibson MD  Hematology/Oncology  [facility]      Total time spent on day of visit, including review of tests, obtaining/reviewing separately obtained history, ordering medications/tests/procedures, communicating with PCP/consultants, and documenting in electronic medical record: 10 minutes        Again, thank you for allowing me to participate in the care of your patient.        Sincerely,        Lissa Gibson MD

## 2021-08-12 NOTE — LETTER
8/12/2021         RE: Natalee Maya  [address]  [facility] [address]        Dear Colleague,    Thank you for referring your patient, Natalee Maya, to the [facility]. Please see a copy of my visit note below.    Natalee is a 50 year old who is being evaluated via a billable video visit.      How would you like to obtain your AVS? MyChart  If the video visit is dropped, the invitation should be resent by: Text to cell phone: [phone]  VIDEO  Will anyone else be joining your video visit? Rhonda Lora CMA    Video Start Time: 8:00 AM  Video-Visit Details    Type of service:  Video Visit    Video End Time:8:02 AM    Originating Location (pt. Location): Home    Distant Location (provider location):  [facility]     Platform used for Video Visit: [employer]           [facility]    Hematology/Oncology Established Patient Note      Today's Date: 8/12/21    Reason for Follow-up: leukopenia      HISTORY OF PRESENT ILLNESS: Natalee Maya is a 50 year old female with PMHx of hypothyroidism who presents with leukopenia.  She was found on labs in June 2020 to have low WBC of 1.9K.  Repeat checks have been in the 2K range.  Hemoglobin and platelet count were normal.  Differential shows a moderate to mild neutropenia.  On further chart review, she had a mild leukopenia in the 3K range back in 2018, but did recover back to normal a few months later.  There are not many labs prior to that.  Iron, ferritin, vitamin 12, and folate were normal.  Peripheral smear shows low WBC, but non-specific.      She denies recent illness, infections, fever.  Her Prozac dose increased a few months ago.  Otherwise, she has not started any new medications recently.      She does not smoke.  She drinks a glass of wine maybe once or twice a month.  She denies illicit drug use.      INTERIM HISTORY: Natalee is doing well.  She denies  any new health changes since our last visit.        REVIEW OF SYSTEMS:   14 point ROS was reviewed and is negative other than as noted above in HPI.       HOME MEDICATIONS:  Current Outpatient Medications   Medication Sig Dispense Refill     Ferrous Sulfate (IRON) 325 (65 Fe) MG tablet TAKE ONE TABLET BY MOUTH EVERY DAY, AVOID AROUND SAME TIME AS LEVOTHYROXINE 90 tablet 3     folic acid (FOLVITE) 1 MG tablet TAKE ONE TABLET BY MOUTH EVERY DAY 90 tablet 3     levothyroxine (SYNTHROID/LEVOTHROID) 50 MCG tablet Take 1 tablet (50 mcg) by mouth daily 90 tablet 2     lisinopril (ZESTRIL) 10 MG tablet Take 1 tablet (10 mg) by mouth daily 90 tablet 3     lisinopril (ZESTRIL) 20 MG tablet Take 1 tablet (20 mg) by mouth daily 90 tablet 3     phentermine (ADIPEX-P) 15 MG capsule TAKE ONE CAPSULE BY MOUTH EVERY MORNING 30 capsule 0     sertraline (ZOLOFT) 50 MG tablet Take 1.5 tablets (75 mg) by mouth daily 135 tablet 3         ALLERGIES:  Allergies   Allergen Reactions     No Known Drug Allergies          PAST MEDICAL HISTORY:  Past Medical History:   Diagnosis Date     Calculus of kidney     History of kidney stone -- Abstracted 02     Hypertension      Lymphedema of lower extremity          PAST SURGICAL HISTORY:  Past Surgical History:   Procedure Laterality Date     COLONOSCOPY Left 2021    Procedure: COLONOSCOPY; incomplete secondary to pt discomfort. will schedule CT colonography today if possible.;  Surgeon: Ceasar Cardoza MD;  Location:  GI     FOOT SURGERY      2017     [facility] NONSPECIFIC PROCEDURE       -- Abstracted 02         SOCIAL HISTORY:  Social History     Socioeconomic History     Marital status:      Spouse name: Not on file     Number of children: Not on file     Years of education: Not on file     Highest education level: Not on file   Occupational History     Not on file   Tobacco Use     Smoking status: Never Smoker     Smokeless tobacco: Never Used    Substance and Sexual Activity     Alcohol use: No     Drug use: No     Sexual activity: Yes     Partners: Male   Other Topics Concern     Parent/sibling w/ CABG, MI or angioplasty before 65F 55M? Not Asked   Social History Narrative     Not on file     Social Determinants of Health     Financial Resource Strain:      Difficulty of Paying Living Expenses:    Food Insecurity:      Worried About Running Out of Food in the Last Year:      Ran Out of Food in the Last Year:    Transportation Needs:      Lack of Transportation (Medical):      Lack of Transportation (Non-Medical):    Physical Activity:      Days of Exercise per Week:      Minutes of Exercise per Session:    Stress:      Feeling of Stress :    Social Connections:      Frequency of Communication with Friends and Family:      Frequency of Social Gatherings with Friends and Family:      Attends [employer] Services:      Active Member of Clubs or Organizations:      Attends Club or Organization Meetings:      Marital Status:    Intimate Partner Violence:      Fear of Current or Ex-Partner:      Emotionally Abused:      Physically Abused:      Sexually Abused:          FAMILY HISTORY:  Family History   Problem Relation Age of Onset     Diabetes Father      C.A.D. Father         hyperlipidemia     Bleeding Disorder Mother         Factor 5     Breast Cancer No family hx of      Prostate Cancer No family hx of      Colon Cancer No family hx of          PHYSICAL EXAM:  ECO  GENERAL/CONSTITUTIONAL: No acute distress. Healthy, alert..    RESPIRATORY: No audible wheeze, cough, or visible cyanosis.  No visible retractions or increased work of breathing.  Able to speak fully in complete sentences.  NEUROLOGIC: Alert, oriented, answers questions appropriately. No tremor. Mentation intact and speech normal  PSYCHIATRIC:  Mentation appears normal, affect normal/bright, judgement and insight intact, normal speech and appearance well-groomed.    The rest of a  comprehensive physical exam is deferred due to public [facility] emergency video visit restrictions.      LABS:  CBC RESULTS: Recent Labs   Lab Test 08/09/21  1227   WBC 3.8*   RBC 4.13   HGB 12.5   HCT 39.0   MCV 94   MCH 30.3   MCHC 32.1   RDW 13.4            Peripheral flow:  INTERPRETATION:   Blood:        Polytypic B cells        No aberrant immunophenotype on T cells        See comment     COMMENT:   There is no immunophenotypic evidence of non-Hodgkin lymphoma or lymphoid   leukemia.  Final interpretation   requires correlation with morphologic and clinical features.       PATHOLOGY:  Peripheral smear 6/30/20:  FINAL DIAGNOSIS:   Peripheral Blood Morphology-   -Moderate leukopenia with moderate absolute neutropenia.   -See comment.     COMMENT:   The patient has history of intermittent leukopenia (see prior peripheral   blood morphology; [id number], 8/2018.   Absolute neutropenia appears to be a new finding. Possible causes of   neutropenia include drugs, infection,   autoimmune/immune-mediated disorders, hypersplenism, and bone marrow   disorder. At present, there is   insufficient morphologic evidence to suggest a clonal bone marrow   disorder. Serum vitamin B12/folate   deficiency appears as an unlikely cause, given normal reported levels in   this patient. Serum ferritin level is   additionally within normal limits. If absolute neutropenia persists   without clinical explanation, TCR-V beta   flow cytometric analysis of peripheral blood may be considered for further    evaluation.     IMAGING:  Abdomen ultrasound 9/10/20:  Unremarkable complete abdominal ultrasound.      ASSESSMENT/PLAN:  Natalee Maya is a 50 year old female with:    Leukopenia: Differential with moderate to mild neutropenia.  Other two cell lines are normal.  This seems to be a fairly new finding, although it happened to a milder and short extent in 2018.  She has not had recent infections.  She has not started new medications.   She has no history of hepatitis or HIV.  She does not drink alcohol frequently.  We discussed other potential causes including autoimmune or primary bone marrow disorder.  Abdomen ultrasound was normal; no hepatosplenomegaly. Her WBC remains low. She remains asymptomatic.  We discussed options of continued observation or proceeding with bone marrow biopsy.  She would rather not do a bone marrow biopsy now.  If there is persistent downward trend of WBC, then we will do bone marrow biopsy.      WBC is stable/improved to 3.8.  Differential is normal this time.  Patient still feels completely fine and does not have frequent infections.  WBC is still within her baseline range.  We will continue to monitor for now, and may do bone marrow biopsy this summer if there is significant downtrend in WBC or if there develops cytopenias in the other cell lines.  Natalee is agreeable with this.    -RTC in 6 months with CBC/diff      Lissa Gibson MD  Hematology/Oncology  [facility]      Total time spent on day of visit, including review of tests, obtaining/reviewing separately obtained history, ordering medications/tests/procedures, communicating with PCP/consultants, and documenting in electronic medical record: 10 minutes        Again, thank you for allowing me to participate in the care of your patient.        Sincerely,        Lissa Gibson MD

## 2021-08-12 NOTE — PROGRESS NOTES
Natalee is a 50 year old who is being evaluated via a billable video visit.      How would you like to obtain your AVS? MyChart  If the video visit is dropped, the invitation should be resent by: Text to cell phone: [phone]  VIDEO  Will anyone else be joining your video visit? No   Johnna Lora CMA    Video Start Time: 8:00 AM  Video-Visit Details    Type of service:  Video Visit    Video End Time:8:02 AM    Originating Location (pt. Location): Home    Distant Location (provider location):  [facility]     Platform used for Video Visit: BluelightApp           [facility]    Hematology/Oncology Established Patient Note      Today's Date: 8/12/21    Reason for Follow-up: leukopenia      HISTORY OF PRESENT ILLNESS: Natalee Maya is a 50 year old female with PMHx of hypothyroidism who presents with leukopenia.  She was found on labs in June 2020 to have low WBC of 1.9K.  Repeat checks have been in the 2K range.  Hemoglobin and platelet count were normal.  Differential shows a moderate to mild neutropenia.  On further chart review, she had a mild leukopenia in the 3K range back in 2018, but did recover back to normal a few months later.  There are not many labs prior to that.  Iron, ferritin, vitamin 12, and folate were normal.  Peripheral smear shows low WBC, but non-specific.      She denies recent illness, infections, fever.  Her Prozac dose increased a few months ago.  Otherwise, she has not started any new medications recently.      She does not smoke.  She drinks a glass of wine maybe once or twice a month.  She denies illicit drug use.      INTERIM HISTORY: Natalee is doing well.  She denies any new health changes since our last visit.        REVIEW OF SYSTEMS:   14 point ROS was reviewed and is negative other than as noted above in HPI.       HOME MEDICATIONS:  Current Outpatient Medications   Medication Sig Dispense Refill     Ferrous Sulfate (IRON) 325 (65  Fe) MG tablet TAKE ONE TABLET BY MOUTH EVERY DAY, AVOID AROUND SAME TIME AS LEVOTHYROXINE 90 tablet 3     folic acid (FOLVITE) 1 MG tablet TAKE ONE TABLET BY MOUTH EVERY DAY 90 tablet 3     levothyroxine (SYNTHROID/LEVOTHROID) 50 MCG tablet Take 1 tablet (50 mcg) by mouth daily 90 tablet 2     lisinopril (ZESTRIL) 10 MG tablet Take 1 tablet (10 mg) by mouth daily 90 tablet 3     lisinopril (ZESTRIL) 20 MG tablet Take 1 tablet (20 mg) by mouth daily 90 tablet 3     phentermine (ADIPEX-P) 15 MG capsule TAKE ONE CAPSULE BY MOUTH EVERY MORNING 30 capsule 0     sertraline (ZOLOFT) 50 MG tablet Take 1.5 tablets (75 mg) by mouth daily 135 tablet 3         ALLERGIES:  Allergies   Allergen Reactions     No Known Drug Allergies          PAST MEDICAL HISTORY:  Past Medical History:   Diagnosis Date     Calculus of kidney     History of kidney stone -- Abstracted 02     Hypertension      Lymphedema of lower extremity          PAST SURGICAL HISTORY:  Past Surgical History:   Procedure Laterality Date     COLONOSCOPY Left 2021    Procedure: COLONOSCOPY; incomplete secondary to pt discomfort. will schedule CT colonography today if possible.;  Surgeon: Ceasar Cardoza MD;  Location:  GI     FOOT SURGERY      2017     RUST NONSPECIFIC PROCEDURE       -- Abstracted 02         SOCIAL HISTORY:  Social History     Socioeconomic History     Marital status:      Spouse name: Not on file     Number of children: Not on file     Years of education: Not on file     Highest education level: Not on file   Occupational History     Not on file   Tobacco Use     Smoking status: Never Smoker     Smokeless tobacco: Never Used   Substance and Sexual Activity     Alcohol use: No     Drug use: No     Sexual activity: Yes     Partners: Male   Other Topics Concern     Parent/sibling w/ CABG, MI or angioplasty before 65F 55M? Not Asked   Social History Narrative     Not on file     Social Determinants of  Health     Financial Resource Strain:      Difficulty of Paying Living Expenses:    Food Insecurity:      Worried About Running Out of Food in the Last Year:      Ran Out of Food in the Last Year:    Transportation Needs:      Lack of Transportation (Medical):      Lack of Transportation (Non-Medical):    Physical Activity:      Days of Exercise per Week:      Minutes of Exercise per Session:    Stress:      Feeling of Stress :    Social Connections:      Frequency of Communication with Friends and Family:      Frequency of Social Gatherings with Friends and Family:      Attends Congregation Services:      Active Member of Clubs or Organizations:      Attends Club or Organization Meetings:      Marital Status:    Intimate Partner Violence:      Fear of Current or Ex-Partner:      Emotionally Abused:      Physically Abused:      Sexually Abused:          FAMILY HISTORY:  Family History   Problem Relation Age of Onset     Diabetes Father      C.A.D. Father         hyperlipidemia     Bleeding Disorder Mother         Factor 5     Breast Cancer No family hx of      Prostate Cancer No family hx of      Colon Cancer No family hx of          PHYSICAL EXAM:  ECO  GENERAL/CONSTITUTIONAL: No acute distress. Healthy, alert..    RESPIRATORY: No audible wheeze, cough, or visible cyanosis.  No visible retractions or increased work of breathing.  Able to speak fully in complete sentences.  NEUROLOGIC: Alert, oriented, answers questions appropriately. No tremor. Mentation intact and speech normal  PSYCHIATRIC:  Mentation appears normal, affect normal/bright, judgement and insight intact, normal speech and appearance well-groomed.    The rest of a comprehensive physical exam is deferred due to public health emergency video visit restrictions.      LABS:  CBC RESULTS: Recent Labs   Lab Test 21  1227   WBC 3.8*   RBC 4.13   HGB 12.5   HCT 39.0   MCV 94   MCH 30.3   MCHC 32.1   RDW 13.4            Peripheral  flow:  INTERPRETATION:   Blood:        Polytypic B cells        No aberrant immunophenotype on T cells        See comment     COMMENT:   There is no immunophenotypic evidence of non-Hodgkin lymphoma or lymphoid   leukemia.  Final interpretation   requires correlation with morphologic and clinical features.       PATHOLOGY:  Peripheral smear 6/30/20:  FINAL DIAGNOSIS:   Peripheral Blood Morphology-   -Moderate leukopenia with moderate absolute neutropenia.   -See comment.     COMMENT:   The patient has history of intermittent leukopenia (see prior peripheral   blood morphology; [id number], 8/2018.   Absolute neutropenia appears to be a new finding. Possible causes of   neutropenia include drugs, infection,   autoimmune/immune-mediated disorders, hypersplenism, and bone marrow   disorder. At present, there is   insufficient morphologic evidence to suggest a clonal bone marrow   disorder. Serum vitamin B12/folate   deficiency appears as an unlikely cause, given normal reported levels in   this patient. Serum ferritin level is   additionally within normal limits. If absolute neutropenia persists   without clinical explanation, TCR-V beta   flow cytometric analysis of peripheral blood may be considered for further    evaluation.     IMAGING:  Abdomen ultrasound 9/10/20:  Unremarkable complete abdominal ultrasound.      ASSESSMENT/PLAN:  Natalee Maya is a 50 year old female with:    Leukopenia: Differential with moderate to mild neutropenia.  Other two cell lines are normal.  This seems to be a fairly new finding, although it happened to a milder and short extent in 2018.  She has not had recent infections.  She has not started new medications.  She has no history of hepatitis or HIV.  She does not drink alcohol frequently.  We discussed other potential causes including autoimmune or primary bone marrow disorder.  Abdomen ultrasound was normal; no hepatosplenomegaly. Her WBC remains low. She remains asymptomatic.  We  discussed options of continued observation or proceeding with bone marrow biopsy.  She would rather not do a bone marrow biopsy now.  If there is persistent downward trend of WBC, then we will do bone marrow biopsy.      WBC is stable/improved to 3.8.  Differential is normal this time.  Patient still feels completely fine and does not have frequent infections.  WBC is still within her baseline range.  We will continue to monitor for now, and may do bone marrow biopsy this summer if there is significant downtrend in WBC or if there develops cytopenias in the other cell lines.  Natalee is agreeable with this.    -RTC in 6 months with CBC/diff, patient would like video visit for next appointment as well.      Lissa Gibson MD  Hematology/Oncology  [facility]      Total time spent on day of visit, including review of tests, obtaining/reviewing separately obtained history, ordering medications/tests/procedures, communicating with PCP/consultants, and documenting in electronic medical record: 10 minutes

## 2021-08-13 ENCOUNTER — VIRTUAL VISIT (OUTPATIENT)
Dept: PSYCHOLOGY | Facility: CLINIC | Age: 51
End: 2021-08-13
Payer: COMMERCIAL

## 2021-08-13 DIAGNOSIS — F33.41 MAJOR DEPRESSIVE DISORDER, RECURRENT EPISODE, IN PARTIAL REMISSION WITH ANXIOUS DISTRESS (H): Primary | ICD-10-CM

## 2021-08-13 PROCEDURE — 90834 PSYTX W PT 45 MINUTES: CPT | Mod: GT | Performed by: SOCIAL WORKER

## 2021-08-13 NOTE — PATIENT INSTRUCTIONS
Per chart review, appt request order has been deferred until 8/19/21 by CONSTANZA Garcia RN on 8/13/2021 at 9:05 AM

## 2021-08-13 NOTE — PROGRESS NOTES
"                                           Progress Note    Patient Name: Natalee Maya  Date: 8/13/2021     Service Type: Individual     Session Start Time: 7:02 AM Session End Time:  7:46 AM    Session Length: 38-52 minutes    Session #: 20 (4 with DB)    Attendees: Client     Telemedicine Visit: The patient's condition can be safely assessed and treated via synchronous audio and visual telemedicine encounter.      Reason for Telemedicine Visit: Services only offered telehealth    Originating Site (Patient Location): Patient's home    Distant Site (Provider Location): Provider Remote Setting    Consent:  The patient/guardian has verbally consented to: the potential risks and benefits of telemedicine (video visit) versus in person care; bill my insurance or make self-payment for services provided; and responsibility for payment of non-covered services.      Mode of Communication: [email or web address]     Treatment Plan Last Reviewed: 5/14/2021  PHQ-9 / MADINA-7 : 8/13/2021    DATA  Interactive Complexity: No  Crisis: No       Progress Since Last Session (Related to Symptoms / Goals / Homework):   Symptoms: see GAD7 and PHQ9; client reported minimal symptoms related to feeling \"unsettled\" due to uncertainty of her marriage     Homework: Achieved / completed to satisfaction   Engaged in areas of interest      Episode of Care Goals: Satisfactory progress - ACTION (Actively working towards change); Intervened by reinforcing change plan / affirming steps taken     Current / Ongoing Stressors and Concerns:   Marital stress   20 lb weight gain in 2020      Client feels unsettled due to uncertainty of future of her marriage   Client reported difficulty with knowing how to help her  repair relationships, while understanding areas within her control and difficulty of not being able to do more to aid with changes she sees would benefit him     Treatment Objective(s) Addressed in This Session:   Client will use assertive " communication skills  Setting healthy boundaries     Intervention:   CBT: guided discovery, identified thoughts, feelings, and behaviors   Motivational interviewing: expressed empathy/understanding, use of reflective listening and open ended questions, supported autonomy   Offered validation and support   Reviewed flowsheets    Engaged client in identifying areas within her control     ASSESSMENT: Current Emotional / Mental Status (status of significant symptoms):   Risk status (Self / Other harm or suicidal ideation)   Patient denies current fears or concerns for personal safety.   Patient denies current or recent suicidal ideation or behaviors.     Patientdenies current or recent homicidal ideation or behaviors.   Patient denies current or recent self injurious behavior or ideation.   Patient denies other safety concerns.   Patient reports there has been no change in risk factors since their last session.     Patientreports there has been no change in protective factors since their last session.     A safety and risk management plan was developed and shared with client in previous session.  Client confirmed she shared with her three emergency contacts     Appearance:   Appropriate    Eye Contact:   Good    Psychomotor Behavior: Normal    Attitude:   Cooperative  Interested Pleasant   Orientation:   All   Speech    Rate / Production: Normal/ Responsive    Volume:  Normal    Mood:    Stable, Sad   Affect:    Appropriate, minimally tearful   Thought Content:  Clear    Thought Form:  Coherent  Goal Directed  Logical    Insight:    Good  and Intellectual Insight     Medication Review:   No changes to current psychiatric medication(s)      Zoloft 50 mg      Medication Compliance:   missed one week due to packing medication in a box Client reported she is currently taking medication as prescribed     Changes in Health Issues:   None reported      Chemical Use Review:   Substance Use: no active concerns identified       Tobacco Use: No current tobacco use.      Diagnosis:    Major Depressive Disorder, Moderate, in partial remission    Collateral Reports Completed:   N/A    PLAN: (Patient Tasks / Therapist Tasks / Other)  Client will continue couples therapy  Therapist will review treatment plan in next session.    Celeste Chavez, [facility] 8/13/2021                            ______________________________________________________________________    Treatment Plan    Patient's Name: Natalee Maya  YOB: 1970    Date:  5/14/2021    DSM5 Diagnoses: Major Depressive Disorder, Moderate, in partial remission  Psychosocial / Contextual Factors: marital stress, selling house, moving  WHODAS: 16    Referral / Collaboration:  Referral to another professional/service is not indicated at this time..    Anticipated number of session or this episode of care: will review in 90 days    MeasurableTreatment Goal(s) related to diagnosis / functional impairment(s)  Goal 1: Patient will reduce symptoms of anxiety and depression by reporting MADINA-7 and PHQ-9 scores below a 5, where symptoms where symptoms occur fewer than half the days for a minimum of 4 weeks.    Objective #A (Patient Action)    Patient will learn and utilize 3 techniques to manage emotions related to marital stress/conflict.  Status: Completed - Date: 5/14/2021      Intervention(s)  Therapist will teach relaxation techniques and ways to engage in self-care.    Objective #B  Patient will compile a list of boundaries that they would like to set with others. Utilize the boundaries .  Status: Completed - Date: 5/14/2021      Intervention(s)  Therapist will teach health boundaries and encourage client to identify their boundaries and implement them.    Objective #C  Patient will learn & utilize at least 1 assertive communication skills weekly.  Status: Completed - Date: 5/14/2021      Intervention(s)  Therapist will teach assertive communication skills.    Objective  "#D  Patient will make improvements to diet and increase engagement in exercise by setting weekly goals.   Status: Start Date: 5/14/2021      Intervention(s)   Therapist will encourage client to establish goals to work toward between appointments, assist with identifying any barriers and address them with client.      Objective #E  Patient will increase engagement in areas of interest.     Status: Start Date 5/14/2021     Intervention(s)   Therapist will assist client with identifying negative self-talk that impacts engagement in areas of interest.       Patient has reviewed and agreed to the above plan.      Celeste Chavez, [employer]  7/2/2020  Celeste Chavez, [employer]  5/14/2021                                                 Natalee Maya     SAFETY PLAN:  Step 1: Warning signs / cues (Thoughts, images, mood, situation, behavior) that a crisis may be developing:    Thoughts: \"I don't matter\", \"I'm a burden\", \"I can't do this anymore\", \"I just want this to end\" and \"Nothing makes it better\"    Images: none    Thinking Processes: ruminations (can't stop thinking about my problems): marital stresss, racing thoughts and highly critical and negative thoughts: about myself    Mood: worsening depression, hopelessness, agitation and mood swings    Behaviors: isolating/withdrawing  and can't stop crying    Situations: relationship problems   Step 2: Coping strategies - Things I can do to take my mind off of my problems without contacting another person (relaxation technique, physical activity):    Distress Tolerance Strategies:  read a book and geneology    Physical Activities: go for a walk    Focus on helpful thoughts:  \"This is temporary\", \"I will get through this\", \"It always passes\",   think about happy memories:   and remind myself of what is important to me:  Step 3: People and social settings that provide distraction:   Name: Isaías   Phone: in phone   Name: Dre   Phone: in phone   Name: Mom   Phone: in " phone   Name: Tracy   Phone: in phone    work and go outside   Step 4: Remind myself of people and things that are important to me and worth living for:  Children, , the job I do is important, I have talents and skills to offer people, and I am a good person  Step 5: When I am in crisis, I can ask these people to help me use my safety plan:   Name: Isaías   Phone: [phone]   Name: Dre   Phone: [phone]   Name: Tracy   Phone: [phone]  Step 6: Making the environment safe:     be around others  Step 7: Professionals or agencies I can contact during a crisis:    [facility] Daytime Number: [phone]    Suicide Prevention Lifeline: 6-439-458-NZHR (4284)    Crisis Text Line Service (available 24 hours a day, 7 days a week): Text [place] to 069047    Local Crisis Services: [facility] [phone]    Call 911 or go to my nearest emergency department.   I helped develop this safety plan and agree to use it when needed.  I have been given a copy of this plan.      Client signature _________________________________________________________________  Today s date:  1/8/2021  Adapted from Safety Plan Template 2008 Valerie Broderick and Alistair Briseno is reprinted with the express permission of the authors.  No portion of the Safety Plan Template may be reproduced without the express, written permission.  You can contact the authors at bhs@[place].[facility] or [email or web address].

## 2021-09-04 ENCOUNTER — HEALTH MAINTENANCE LETTER (OUTPATIENT)
Age: 51
End: 2021-09-04

## 2021-09-24 ENCOUNTER — VIRTUAL VISIT (OUTPATIENT)
Dept: PSYCHOLOGY | Facility: CLINIC | Age: 51
End: 2021-09-24
Payer: COMMERCIAL

## 2021-09-24 DIAGNOSIS — F33.41 MAJOR DEPRESSIVE DISORDER, RECURRENT EPISODE, IN PARTIAL REMISSION WITH ANXIOUS DISTRESS (H): Primary | ICD-10-CM

## 2021-09-24 PROCEDURE — 90834 PSYTX W PT 45 MINUTES: CPT | Mod: GT | Performed by: SOCIAL WORKER

## 2021-09-24 NOTE — PROGRESS NOTES
Progress Note    Patient Name: Natalee Maya  Date: 9/24/2021     Service Type: Individual     Session Start Time: 7:03 AM Session End Time: 7:55 AM    Session Length: 38-52 minutes    Session #: 21 (4 with DB)    Attendees: Client     Telemedicine Visit: The patient's condition can be safely assessed and treated via synchronous audio and visual telemedicine encounter.      Reason for Telemedicine Visit: Services only offered telehealth    Originating Site (Patient Location): Patient's home    Distant Site (Provider Location): Provider Remote Setting    Consent:  The patient/guardian has verbally consented to: the potential risks and benefits of telemedicine (video visit) versus in person care; bill my insurance or make self-payment for services provided; and responsibility for payment of non-covered services.      Mode of Communication: [email or web address]     Treatment Plan Last Reviewed: 9/24/2021  PHQ-9 / MADINA-7 : 3/4 on 9/23/2021    DATA  Interactive Complexity: No  Crisis: No       Progress Since Last Session (Related to Symptoms / Goals / Homework):   Symptoms: see GAD7 and PHQ9     Homework: Achieved / completed to satisfaction       Episode of Care Goals: Satisfactory progress - ACTION (Actively working towards change); Intervened by reinforcing change plan / affirming steps taken     Current / Ongoing Stressors and Concerns:   Marital stress; recent separation from    20 lb weight gain in 2020   Client feels unsettled due to uncertainty of future of her marriage   Client reported difficulty with knowing how to help her  repair relationships, while understanding areas within her control and difficulty of not being able to do more to aid with changes she sees would benefit him     Treatment Objective(s) Addressed in This Session:   Client will use assertive communication skills  Set 1 healthy boundary     Intervention:   CBT: guided discovery, identified  thoughts, feelings, and behaviors, reinforced behavior changes   Motivational interviewing: expressed empathy/understanding, use of reflective listening and open ended questions, supported autonomy   Offered validation and support about marital relationship   Reviewed flowsheets and treatment plan     ASSESSMENT: Current Emotional / Mental Status (status of significant symptoms):   Risk status (Self / Other harm or suicidal ideation)   Patient denies current fears or concerns for personal safety.   Patient denies current or recent suicidal ideation or behaviors.     Patientdenies current or recent homicidal ideation or behaviors.   Patient denies current or recent self injurious behavior or ideation.   Patient denies other safety concerns.   Patient reports there has been no change in risk factors since their last session.     Patientreports there has been no change in protective factors since their last session.     A safety and risk management plan was developed and shared with client in previous session.  Client confirmed she shared with her three emergency contacts     Appearance:   Appropriate    Eye Contact:   Good    Psychomotor Behavior: Normal    Attitude:   Cooperative  Interested   Orientation:   All   Speech    Rate / Production: Normal/ Responsive    Volume:  Normal    Mood:    Down   Affect:    Tearful   Thought Content:  Clear    Thought Form:  Coherent  Goal Directed  Logical    Insight:    Good  and Intellectual Insight     Medication Review:   No changes to current psychiatric medication(s)      Zoloft 50 mg      Medication Compliance:   Yes     Changes in Health Issues:   None reported      Chemical Use Review:   Substance Use: no active concerns identified      Tobacco Use: No current tobacco use.      Diagnosis:    Major Depressive Disorder, Moderate, in partial remission    Collateral Reports Completed:   N/A    PLAN: (Patient Tasks / Therapist Tasks / Other)  Client will continue couples  therapy  Client shared plans to continue setting healthy boundaries with her  and connect with her children.    Celestebryan Chavez, [facility] 9/24/2021                            ______________________________________________________________________    Treatment Plan    Patient's Name: Natalee Maya  YOB: 1970    Date:  5/14/2021, 9/24/2021    DSM5 Diagnoses: Major Depressive Disorder, Moderate, in partial remission  Psychosocial / Contextual Factors: separation   WHODAS: 12    Referral / Collaboration:  Referral to another professional/service is not indicated at this time..    Anticipated number of session or this episode of care: will review in 90 days    MeasurableTreatment Goal(s) related to diagnosis / functional impairment(s)  Goal 1: Patient will reduce symptoms of anxiety and depression by reporting MADINA-7 and PHQ-9 scores below a 5, where symptoms where symptoms occur fewer than half the days for a minimum of 4 weeks.    Objective #A (Patient Action)    Patient will learn and utilize 3 techniques to manage emotions related to marital stress/conflict.  Status: Completed - Date: 5/14/2021      Intervention(s)  Therapist will teach relaxation techniques and ways to engage in self-care.    Objective #B  Patient will compile a list of boundaries that they would like to set with others. Utilize the boundaries .  Status: Completed - Date: 5/14/2021      Intervention(s)  Therapist will teach health boundaries and encourage client to identify their boundaries and implement them.    Objective #C  Patient will learn & utilize at least 1 assertive communication skills weekly.  Status: Completed - Date: 5/14/2021      Intervention(s)  Therapist will teach assertive communication skills.    Objective #D  Patient will make improvements to diet and increase engagement in exercise by setting weekly goals.   Status: Continued Date: 9/24/2021      Intervention(s)   Therapist will encourage client to  "establish goals to work toward between appointments, assist with identifying any barriers and address them with  client.      Objective #E  Patient will increase engagement in areas of interest.     Status: Continued Date: 9/24/2021     Intervention(s)   Therapist will assist client with identifying negative self-talk that impacts engagement in areas of interest.       Patient has reviewed and agreed to the above plan.      Celeste Chavez, [facility]  7/2/2020  Celeste Chavez, [facility]  5/14/2021  Celeste Chavez, [facility]  9/24/2021                                                 Natalee Maya     SAFETY PLAN:  Step 1: Warning signs / cues (Thoughts, images, mood, situation, behavior) that a crisis may be developing:    Thoughts: \"I don't matter\", \"I'm a burden\", \"I can't do this anymore\", \"I just want this to end\" and \"Nothing makes it better\"    Images: none    Thinking Processes: ruminations (can't stop thinking about my problems): marital stresss, racing thoughts and highly critical and negative thoughts: about myself    Mood: worsening depression, hopelessness, agitation and mood swings    Behaviors: isolating/withdrawing  and can't stop crying    Situations: relationship problems   Step 2: Coping strategies - Things I can do to take my mind off of my problems without contacting another person (relaxation technique, physical activity):    Distress Tolerance Strategies:  read a book and geneology    Physical Activities: go for a walk    Focus on helpful thoughts:  \"This is temporary\", \"I will get through this\", \"It always passes\",   think about happy memories:   and remind myself of what is important to me:  Step 3: People and social settings that provide distraction:   Name: Isaías   Phone: in phone   Name: Dre   Phone: in phone   Name: Mom   Phone: in phone   Name: Tracy   Phone: in phone    work and go outside   Step 4: Remind myself of people and things that are important to me and worth living for:  Children, " , the job I do is important, I have talents and skills to offer people, and I am a good person  Step 5: When I am in crisis, I can ask these people to help me use my safety plan:   Name: Isaías   Phone: [phone]   Name: Dre   Phone: [phone]   Name: Tracy   Phone: [phone]  Step 6: Making the environment safe:     be around others  Step 7: Professionals or agencies I can contact during a crisis:    [facility] Daytime Number: [phone]    Suicide Prevention Lifeline: 5-471-437-TALK (8275)    Crisis Text Line Service (available 24 hours a day, 7 days a week): Text [place] to 867856    Local Crisis Services: [facility] [phone]    Call 911 or go to my nearest emergency department.   I helped develop this safety plan and agree to use it when needed.  I have been given a copy of this plan.      Client signature _________________________________________________________________  Today s date:  1/8/2021  Adapted from Safety Plan Template 2008 Valerie Broderick and Alistair Briseno is reprinted with the express permission of the authors.  No portion of the Safety Plan Template may be reproduced without the express, written permission.  You can contact the authors at [email or web address] or [email or web address].

## 2021-10-05 DIAGNOSIS — E66.01 MORBID OBESITY (H): ICD-10-CM

## 2021-10-10 NOTE — TELEPHONE ENCOUNTER
Please see rorolaron from early August. She was going to increase BP meds (based on high home readings) then get a BP check, then we were going to re-start the phentermine. I may be missing it, but I don't see documentation of an updated (normal) BP reading. Please inquire with pt    Pls also offer to schedule her mammo, or [facility]. Thanks!

## 2021-10-11 RX ORDER — PHENTERMINE HYDROCHLORIDE 15 MG/1
CAPSULE ORAL
Qty: 30 CAPSULE | Refills: 0 | Status: SHIPPED | OUTPATIENT
Start: 2021-10-11 | End: 2021-11-09

## 2021-10-11 NOTE — TELEPHONE ENCOUNTER
Called pt. Informed of PCP message verbatim. She notes she is taking Lisinopril 20 mg and her BPs are down to 135s/80s on average. Her most recent reading was on 10/9/21, 2 days ago and was measured at 134/78.    Pt notes she needs to consult with her calendar to schedule her mammo and will do so online. Encouraged pt to reach out to clinic to schedule if unable to schedule online.    Pt has no questions or concerns at this time. Encouraged pt to reach out with further questions or concerns. Pt agreeable to plan and verbalized understanding.    R[place], EMT at 10:08 AM on October 11, 2021   Clinic Health Guide   [phone]

## 2021-10-22 ENCOUNTER — VIRTUAL VISIT (OUTPATIENT)
Dept: PSYCHOLOGY | Facility: CLINIC | Age: 51
End: 2021-10-22
Payer: COMMERCIAL

## 2021-10-22 DIAGNOSIS — F33.41 MAJOR DEPRESSIVE DISORDER, RECURRENT EPISODE, IN PARTIAL REMISSION WITH ANXIOUS DISTRESS (H): Primary | ICD-10-CM

## 2021-10-22 PROCEDURE — 90834 PSYTX W PT 45 MINUTES: CPT | Mod: GT | Performed by: SOCIAL WORKER

## 2021-10-22 NOTE — PROGRESS NOTES
Progress Note    Patient Name: Natalee Maya  Date: 10/22/2021     Service Type: Individual     Session Start Time: 7:03 AM Session End Time: 7:49 AM    Session Length: 38-52 minutes    Session #: 21 (4 with DB)    Attendees: Client     Telemedicine Visit: The patient's condition can be safely assessed and treated via synchronous audio and visual telemedicine encounter.      Reason for Telemedicine Visit: Services only offered telehealth    Originating Site (Patient Location): Patient's home    Distant Site (Provider Location): Provider Remote Setting    Consent:  The patient/guardian has verbally consented to: the potential risks and benefits of telemedicine (video visit) versus in person care; bill my insurance or make self-payment for services provided; and responsibility for payment of non-covered services.      Mode of Communication: [email or web address]     Treatment Plan Last Reviewed: 9/24/2021  PHQ-9 / MADINA-7 : 6/3 on 10/22/2021    DATA  Interactive Complexity: No  Crisis: No       Progress Since Last Session (Related to Symptoms / Goals / Homework):   Symptoms: see GAD7 and PHQ9     Homework: Achieved / completed to satisfaction       Episode of Care Goals: Satisfactory progress - ACTION (Actively working towards change); Intervened by reinforcing change plan / affirming steps taken     Current / Ongoing Stressors and Concerns:   Marital stress; recent separation from    20 lb weight gain in 2020   Client feels unsettled due to uncertainty of future of her marriage   Client reported difficulty with knowing how to help her  repair relationships, while understanding areas within her control and difficulty of not being able to do more to aid with changes she sees would benefit him     Work related stressors     Treatment Objective(s) Addressed in This Session:   Increase engagement in areas of interest  Identify 1 fear/thought that contributes to  anxiety  Establishing boundaries     Intervention:   CBT: guided discovery, identified thoughts, feelings, and behaviors, reinforced behavior changes   Motivational interviewing: expressed empathy/understanding, use of reflective listening and open ended questions, supported autonomy   Offered validation and support about marriage and work related stressors   Reviewed flowsheets    Offered coaching on setting boundaries and assertive communication     ASSESSMENT: Current Emotional / Mental Status (status of significant symptoms):   Risk status (Self / Other harm or suicidal ideation)   Patient denies current fears or concerns for personal safety.   Patient denies current or recent suicidal ideation or behaviors.     Patientdenies current or recent homicidal ideation or behaviors.   Patient denies current or recent self injurious behavior or ideation.   Patient denies other safety concerns.   Patient reports there has been no change in risk factors since their last session.     Patientreports there has been no change in protective factors since their last session.     A safety and risk management plan was developed and shared with client in previous session.  Client confirmed she shared with her three emergency contacts     Appearance:   Appropriate    Eye Contact:   Good    Psychomotor Behavior: Normal    Attitude:   Cooperative  Interested Friendly   Orientation:   All   Speech    Rate / Production: Normal/ Responsive    Volume:  Normal    Mood:    Down Stable   Affect:    Minimally Tearful   Thought Content:  Clear    Thought Form:  Coherent  Goal Directed  Logical    Insight:    Good  and Intellectual Insight     Medication Review:   No changes to current psychiatric medication(s)      Zoloft 50 mg      Medication Compliance:   Yes     Changes in Health Issues:   None reported      Chemical Use Review:   Substance Use: no active concerns identified      Tobacco Use: No current tobacco use.       Diagnosis:    Major Depressive Disorder, Moderate, in partial remission    Collateral Reports Completed:   N/A    PLAN: (Patient Tasks / Therapist Tasks / Other)  Client shared plan to increase engagement in walking.    Celeste Chavez, [facility] 10/22/2021                            ______________________________________________________________________    Treatment Plan    Patient's Name: Natalee Maya  YOB: 1970    Date:  5/14/2021, 9/24/2021    DSM5 Diagnoses: Major Depressive Disorder, Moderate, in partial remission  Psychosocial / Contextual Factors: separation   WHODAS: 12    Referral / Collaboration:  Referral to another professional/service is not indicated at this time..    Anticipated number of session or this episode of care: will review in 90 days    MeasurableTreatment Goal(s) related to diagnosis / functional impairment(s)  Goal 1: Patient will reduce symptoms of anxiety and depression by reporting MADINA-7 and PHQ-9 scores below a 5, where symptoms where symptoms occur fewer than half the days for a minimum of 4 weeks.    Objective #A (Patient Action)    Patient will learn and utilize 3 techniques to manage emotions related to marital stress/conflict.  Status: Completed - Date: 5/14/2021      Intervention(s)  Therapist will teach relaxation techniques and ways to engage in self-care.    Objective #B  Patient will compile a list of boundaries that they would like to set with others. Utilize the boundaries .  Status: Completed - Date: 5/14/2021      Intervention(s)  Therapist will teach health boundaries and encourage client to identify their boundaries and implement them.    Objective #C  Patient will learn & utilize at least 1 assertive communication skills weekly.  Status: Completed - Date: 5/14/2021      Intervention(s)  Therapist will teach assertive communication skills.    Objective #D  Patient will make improvements to diet and increase engagement in exercise by setting weekly  "goals.   Status: Continued Date: 9/24/2021      Intervention(s)   Therapist will encourage client to establish goals to work toward between appointments, assist with identifying any barriers and address them with  client.      Objective #E  Patient will increase engagement in areas of interest.     Status: Continued Date: 9/24/2021     Intervention(s)   Therapist will assist client with identifying negative self-talk that impacts engagement in areas of interest.       Patient has reviewed and agreed to the above plan.      Celeste Chavez, [facility]  7/2/2020  Celeste Chavez, [facility]  5/14/2021  Celeste Chavez, [facility]  9/24/2021                                                 Natalee Maya     SAFETY PLAN:  Step 1: Warning signs / cues (Thoughts, images, mood, situation, behavior) that a crisis may be developing:    Thoughts: \"I don't matter\", \"I'm a burden\", \"I can't do this anymore\", \"I just want this to end\" and \"Nothing makes it better\"    Images: none    Thinking Processes: ruminations (can't stop thinking about my problems): marital stresss, racing thoughts and highly critical and negative thoughts: about myself    Mood: worsening depression, hopelessness, agitation and mood swings    Behaviors: isolating/withdrawing  and can't stop crying    Situations: relationship problems   Step 2: Coping strategies - Things I can do to take my mind off of my problems without contacting another person (relaxation technique, physical activity):    Distress Tolerance Strategies:  read a book and geneology    Physical Activities: go for a walk    Focus on helpful thoughts:  \"This is temporary\", \"I will get through this\", \"It always passes\",   think about happy memories:   and remind myself of what is important to me:  Step 3: People and social settings that provide distraction:   Name: Isaías   Phone: in phone   Name: Dre   Phone: in phone   Name: Mom   Phone: in phone   Name: Tracy   Phone: in phone    work and go " outside   Step 4: Remind myself of people and things that are important to me and worth living for:  Children, , the job I do is important, I have talents and skills to offer people, and I am a good person  Step 5: When I am in crisis, I can ask these people to help me use my safety plan:   Name: Isaías   Phone: [phone]   Name: Dre   Phone: [phone]   Name: Tracy   Phone: [phone]  Step 6: Making the environment safe:     be around others  Step 7: Professionals or agencies I can contact during a crisis:    [facility] Daytime Number: [phone]    Suicide Prevention Lifeline: 5-222-027-WYAE (5990)    Crisis Text Line Service (available 24 hours a day, 7 days a week): Text [place] to 709327    Local Crisis Services: [facility] [phone]    Call 911 or go to my nearest emergency department.   I helped develop this safety plan and agree to use it when needed.  I have been given a copy of this plan.      Client signature _________________________________________________________________  Today s date:  1/8/2021  Adapted from Safety Plan Template 2008 Valerie Broderick and Alistair Briseno is reprinted with the express permission of the authors.  No portion of the Safety Plan Template may be reproduced without the express, written permission.  You can contact the authors at [email or web address] or [email or web address].

## 2021-10-30 ENCOUNTER — HEALTH MAINTENANCE LETTER (OUTPATIENT)
Age: 51
End: 2021-10-30

## 2021-11-09 ENCOUNTER — MYC MEDICAL ADVICE (OUTPATIENT)
Dept: PEDIATRICS | Facility: CLINIC | Age: 51
End: 2021-11-09
Payer: COMMERCIAL

## 2021-11-10 NOTE — TELEPHONE ENCOUNTER
Pt reported BP: 131/83 mmHg.  Closing encounter.    [place], EMT at 1:11 PM on November 10, 2021   [facility] Health Guide   [phone]

## 2021-11-15 DIAGNOSIS — E03.8 SUBCLINICAL HYPOTHYROIDISM: ICD-10-CM

## 2021-11-16 RX ORDER — LEVOTHYROXINE SODIUM 50 UG/1
TABLET ORAL
Qty: 90 TABLET | Refills: 2 | Status: SHIPPED | OUTPATIENT
Start: 2021-11-16 | End: 2022-07-22

## 2021-11-16 NOTE — TELEPHONE ENCOUNTER
Routing refill request to provider for review/approval because:  A break in medication  Patient needs to be seen because it has been more than 1 year since last office visit.    Keisha Conway RN on 11/16/2021 at 12:06 PM

## 2021-11-19 ENCOUNTER — VIRTUAL VISIT (OUTPATIENT)
Dept: PSYCHOLOGY | Facility: CLINIC | Age: 51
End: 2021-11-19
Payer: COMMERCIAL

## 2021-11-19 DIAGNOSIS — F33.41 MAJOR DEPRESSIVE DISORDER, RECURRENT EPISODE, IN PARTIAL REMISSION WITH ANXIOUS DISTRESS (H): Primary | ICD-10-CM

## 2021-11-19 PROCEDURE — 90834 PSYTX W PT 45 MINUTES: CPT | Mod: GT | Performed by: SOCIAL WORKER

## 2021-11-19 NOTE — PROGRESS NOTES
Progress Note    Patient Name: Natalee Maya  Date: 11/19/2021     Service Type: Individual     Session Start Time: 7:02 AM Session End Time: 7:48 AM    Session Length: 38-52 minutes    Session #: 22 (4 with DB)    Attendees: Client     Telemedicine Visit: The patient's condition can be safely assessed and treated via synchronous audio and visual telemedicine encounter.      Reason for Telemedicine Visit: Services only offered telehealth    Originating Site (Patient Location): Patient's home    Distant Site (Provider Location): Provider Remote Setting    Consent:  The patient/guardian has verbally consented to: the potential risks and benefits of telemedicine (video visit) versus in person care; bill my insurance or make self-payment for services provided; and responsibility for payment of non-covered services.      Mode of Communication: [email or web address]     Treatment Plan Last Reviewed: 9/24/2021  PHQ-9 / MADINA-7 : 4/4 on 11/19/2021    DATA  Interactive Complexity: No  Crisis: No       Progress Since Last Session (Related to Symptoms / Goals / Homework):   Symptoms: see GAD7 and PHQ9     Homework: Achieved / completed to satisfaction       Episode of Care Goals: Satisfactory progress - ACTION (Actively working towards change); Intervened by reinforcing change plan / affirming steps taken     Current / Ongoing Stressors and Concerns:   Marital stress; recent separation from    Client feels unsettled due to uncertainty of future of her marriage   Client reported difficulty with knowing how to help her  repair relationships, while understanding areas within her control and difficulty of not being able to do more to aid with changes she sees would benefit him     Differences in communication styles and responses to stress in her relationship with her      Treatment Objective(s) Addressed in This Session:   Use assertive communication  skills     Intervention:   CBT: guided discovery, identified thoughts, feelings, and behaviors, reinforced behavior changes   Motivational interviewing: expressed empathy/understanding, use of reflective listening and open ended questions, supported autonomy   Reviewed flowsheets    Modeled assertive communication     ASSESSMENT: Current Emotional / Mental Status (status of significant symptoms):   Risk status (Self / Other harm or suicidal ideation)   Patient denies current fears or concerns for personal safety.   Patient denies current or recent suicidal ideation or behaviors.     Patientdenies current or recent homicidal ideation or behaviors.   Patient denies current or recent self injurious behavior or ideation.   Patient denies other safety concerns.   Patient reports there has been no change in risk factors since their last session.     Patientreports there has been no change in protective factors since their last session.     A safety and risk management plan was developed and shared with client in previous session.  Client confirmed she shared with her three emergency contacts     Appearance:   Appropriate    Eye Contact:   Good    Psychomotor Behavior: Normal    Attitude:   Cooperative  Interested Pleasant   Orientation:   All   Speech    Rate / Production: Talkative    Volume:  Normal    Mood:    Stable   Affect:    Appropriate   Thought Content:  Clear    Thought Form:  Coherent  Goal Directed  Logical    Insight:    Good  and Intellectual Insight     Medication Review:   No changes to current psychiatric medication(s)      Zoloft 50 mg      Medication Compliance:   Yes     Changes in Health Issues:   None reported      Chemical Use Review:   Substance Use: no active concerns identified      Tobacco Use: No current tobacco use.      Diagnosis:    Major Depressive Disorder, Moderate, in partial remission    Collateral Reports Completed:   N/A    PLAN: (Patient Tasks / Therapist Tasks / Other)  Therapist  sent information on window of tolerance and encouraged client to review.    Celeste Chavez, [facility] 11/19/2021                        ______________________________________________________________________    Treatment Plan    Patient's Name: Natalee Maya  YOB: 1970    Date:  5/14/2021, 9/24/2021    DSM5 Diagnoses: Major Depressive Disorder, Moderate, in partial remission  Psychosocial / Contextual Factors: separation   WHODAS: 12    Referral / Collaboration:  Referral to another professional/service is not indicated at this time..    Anticipated number of session or this episode of care: will review in 90 days    MeasurableTreatment Goal(s) related to diagnosis / functional impairment(s)  Goal 1: Patient will reduce symptoms of anxiety and depression by reporting MADINA-7 and PHQ-9 scores below a 5, where symptoms where symptoms occur fewer than half the days for a minimum of 4 weeks.    Objective #A (Patient Action)    Patient will learn and utilize 3 techniques to manage emotions related to marital stress/conflict.  Status: Completed - Date: 5/14/2021      Intervention(s)  Therapist will teach relaxation techniques and ways to engage in self-care.    Objective #B  Patient will compile a list of boundaries that they would like to set with others. Utilize the boundaries .  Status: Completed - Date: 5/14/2021      Intervention(s)  Therapist will teach health boundaries and encourage client to identify their boundaries and implement them.    Objective #C  Patient will learn & utilize at least 1 assertive communication skills weekly.  Status: Completed - Date: 5/14/2021      Intervention(s)  Therapist will teach assertive communication skills.    Objective #D  Patient will make improvements to diet and increase engagement in exercise by setting weekly goals.   Status: Continued Date: 9/24/2021      Intervention(s)   Therapist will encourage client to establish goals to work toward between appointments,  "assist with identifying any barriers and address them with  client.      Objective #E  Patient will increase engagement in areas of interest.     Status: Continued Date: 9/24/2021     Intervention(s)   Therapist will assist client with identifying negative self-talk that impacts engagement in areas of interest.       Patient has reviewed and agreed to the above plan.      Celeste Chavez, [facility]  7/2/2020  Celeste Chavez, [facility]  5/14/2021  Celeste Chavez, [facility]  9/24/2021                                                 Natalee Maya     SAFETY PLAN:  Step 1: Warning signs / cues (Thoughts, images, mood, situation, behavior) that a crisis may be developing:    Thoughts: \"I don't matter\", \"I'm a burden\", \"I can't do this anymore\", \"I just want this to end\" and \"Nothing makes it better\"    Images: none    Thinking Processes: ruminations (can't stop thinking about my problems): marital stresss, racing thoughts and highly critical and negative thoughts: about myself    Mood: worsening depression, hopelessness, agitation and mood swings    Behaviors: isolating/withdrawing  and can't stop crying    Situations: relationship problems   Step 2: Coping strategies - Things I can do to take my mind off of my problems without contacting another person (relaxation technique, physical activity):    Distress Tolerance Strategies:  read a book and geneology    Physical Activities: go for a walk    Focus on helpful thoughts:  \"This is temporary\", \"I will get through this\", \"It always passes\",   think about happy memories:   and remind myself of what is important to me:  Step 3: People and social settings that provide distraction:   Name: Isaías   Phone: in phone   Name: Dre   Phone: in phone   Name: Mom   Phone: in phone   Name: Tracy   Phone: in phone    work and go outside   Step 4: Remind myself of people and things that are important to me and worth living for:  Children, , the job I do is important, I have talents and " skills to offer people, and I am a good person  Step 5: When I am in crisis, I can ask these people to help me use my safety plan:   Name: Isaías   Phone: [phone]   Name: Dre   Phone: [phone]   Name: Tracy   Phone: [phone]  Step 6: Making the environment safe:     be around others  Step 7: Professionals or agencies I can contact during a crisis:    [facility] Daytime Number: [phone]    Suicide Prevention Lifeline: 5-615-333-FRIW (1431)    Crisis Text Line Service (available 24 hours a day, 7 days a week): Text [place] to 915200    Local Crisis Services: [facility] [phone]    Call 911 or go to my nearest emergency department.   I helped develop this safety plan and agree to use it when needed.  I have been given a copy of this plan.      Client signature _________________________________________________________________  Today s date:  1/8/2021  Adapted from Safety Plan Template 2008 Valerie Broderick and Alistair Briseno is reprinted with the express permission of the authors.  No portion of the Safety Plan Template may be reproduced without the express, written permission.  You can contact the authors at [email or web address] or [email or web address].

## 2021-12-05 DIAGNOSIS — E66.01 MORBID OBESITY (H): ICD-10-CM

## 2021-12-06 RX ORDER — PHENTERMINE HYDROCHLORIDE 15 MG/1
CAPSULE ORAL
Qty: 30 CAPSULE | Refills: 0 | Status: SHIPPED | OUTPATIENT
Start: 2021-12-08 | End: 2022-04-14

## 2021-12-16 ENCOUNTER — VIRTUAL VISIT (OUTPATIENT)
Dept: PSYCHOLOGY | Facility: CLINIC | Age: 51
End: 2021-12-16
Payer: COMMERCIAL

## 2021-12-16 DIAGNOSIS — F33.41 MAJOR DEPRESSIVE DISORDER, RECURRENT EPISODE, IN PARTIAL REMISSION WITH ANXIOUS DISTRESS (H): Primary | ICD-10-CM

## 2021-12-16 PROCEDURE — 90834 PSYTX W PT 45 MINUTES: CPT | Mod: GT | Performed by: SOCIAL WORKER

## 2021-12-16 NOTE — PROGRESS NOTES
Progress Note    Patient Name: Natalee Maya  Date: 12/16/2021     Service Type: Individual     Session Start Time: 7:02 AM Session End Time: 7:50 AM    Session Length: 38-52 minutes    Session #: 23 (4 with DB)    Attendees: Client     Telemedicine Visit: The patient's condition can be safely assessed and treated via synchronous audio and visual telemedicine encounter.      Reason for Telemedicine Visit: Services only offered telehealth    Originating Site (Patient Location): Patient's home    Distant Site (Provider Location): Provider Remote Setting    Consent:  The patient/guardian has verbally consented to: the potential risks and benefits of telemedicine (video visit) versus in person care; bill my insurance or make self-payment for services provided; and responsibility for payment of non-covered services.      Mode of Communication: [email or web address]     Treatment Plan Last Reviewed: 9/24/2021  PHQ-9 / MADINA-7 : 2/2 on 12/16/2021    DATA  Interactive Complexity: No  Crisis: No       Progress Since Last Session (Related to Symptoms / Goals / Homework):   Symptoms: see GAD7 and PHQ9 ; client reported improvements    Homework: Achieved / completed to satisfaction       Episode of Care Goals: Satisfactory progress - ACTION (Actively working towards change); Intervened by reinforcing change plan / affirming steps taken     Current / Ongoing Stressors and Concerns:   Marital stress; recent separation from    Client feels unsettled due to uncertainty of future of her marriage   Client reported difficulty with knowing how to help her  repair relationships, while understanding areas within her control and difficulty of not being able to do more to aid with changes she sees would benefit him   Differences in communication styles and responses to stress in her relationship with her      Treatment Objective(s) Addressed in This Session:   Use assertive communication  Patient alert and oriented to person. Disoriented to place, time and situation. Decreased command following. Decreased attention. All hourly rounds performed. Call light within reach. No complaints at this time. Will continue with plan of care and give report to oncoming nurse. skills  Set boundaries  Focus on areas within her control     Intervention:   CBT: guided discovery, identified thoughts, feelings, and behaviors, reinforced behavior changes   Motivational interviewing: expressed empathy/understanding, use of reflective listening and open ended questions, supported autonomy   Reviewed flowsheets      ASSESSMENT: Current Emotional / Mental Status (status of significant symptoms):   Risk status (Self / Other harm or suicidal ideation)   Patient denies current fears or concerns for personal safety.   Patient denies current or recent suicidal ideation or behaviors.     Patientdenies current or recent homicidal ideation or behaviors.   Patient denies current or recent self injurious behavior or ideation.   Patient denies other safety concerns.   Patient reports there has been no change in risk factors since their last session.     Patientreports there has been no change in protective factors since their last session.     A safety and risk management plan was developed and shared with client in previous session.  Client confirmed she shared with her three emergency contacts     Appearance:   Appropriate    Eye Contact:   Good    Psychomotor Behavior: Normal    Attitude:   Cooperative  Interested Pleasant   Orientation:   All   Speech    Rate / Production: Talkative    Volume:  Normal    Mood:    Stable, Sad   Affect:    Appropriate, minimally tearful   Thought Content:  Clear    Thought Form:  Coherent  Goal Directed  Logical    Insight:    Good      Medication Review:   No changes to current psychiatric medication(s)      Zoloft 50 mg      Medication Compliance:   Yes     Changes in Health Issues:   None reported      Chemical Use Review:   Substance Use: no active concerns identified      Tobacco Use: No current tobacco use.      Diagnosis:    Major Depressive Disorder, Moderate, in partial remission    Collateral Reports Completed:   N/A    PLAN: (Patient Tasks / Therapist Tasks /  Other)  Client will continue to set boundaries and engage in areas of interest.    Celeste Chavez, [facility] 12/16/2021                        ______________________________________________________________________    Treatment Plan    Patient's Name: Natalee Maya  YOB: 1970    Date:  5/14/2021, 9/24/2021    DSM5 Diagnoses: Major Depressive Disorder, Moderate, in partial remission  Psychosocial / Contextual Factors: separation   WHODAS: 12    Referral / Collaboration:  Referral to another professional/service is not indicated at this time..    Anticipated number of session or this episode of care: will review in 90 days    MeasurableTreatment Goal(s) related to diagnosis / functional impairment(s)  Goal 1: Patient will reduce symptoms of anxiety and depression by reporting MADINA-7 and PHQ-9 scores below a 5, where symptoms where symptoms occur fewer than half the days for a minimum of 4 weeks.    Objective #A (Patient Action)    Patient will learn and utilize 3 techniques to manage emotions related to marital stress/conflict.  Status: Completed - Date: 5/14/2021      Intervention(s)  Therapist will teach relaxation techniques and ways to engage in self-care.    Objective #B  Patient will compile a list of boundaries that they would like to set with others. Utilize the boundaries .  Status: Completed - Date: 5/14/2021      Intervention(s)  Therapist will teach health boundaries and encourage client to identify their boundaries and implement them.    Objective #C  Patient will learn & utilize at least 1 assertive communication skills weekly.  Status: Completed - Date: 5/14/2021      Intervention(s)  Therapist will teach assertive communication skills.    Objective #D  Patient will make improvements to diet and increase engagement in exercise by setting weekly goals.   Status: Continued Date: 9/24/2021      Intervention(s)   Therapist will encourage client to establish goals to work toward between  "appointments, assist with identifying any barriers and address them with  client.      Objective #E  Patient will increase engagement in areas of interest.     Status: Continued Date: 9/24/2021     Intervention(s)   Therapist will assist client with identifying negative self-talk that impacts engagement in areas of interest.       Patient has reviewed and agreed to the above plan.      Celeste Chavez, [facility]  7/2/2020  Celeste Chavez, [facility]  5/14/2021  Celeste Chavez, [facility]  9/24/2021                                                 Natalee Maya     SAFETY PLAN:  Step 1: Warning signs / cues (Thoughts, images, mood, situation, behavior) that a crisis may be developing:    Thoughts: \"I don't matter\", \"I'm a burden\", \"I can't do this anymore\", \"I just want this to end\" and \"Nothing makes it better\"    Images: none    Thinking Processes: ruminations (can't stop thinking about my problems): marital stresss, racing thoughts and highly critical and negative thoughts: about myself    Mood: worsening depression, hopelessness, agitation and mood swings    Behaviors: isolating/withdrawing  and can't stop crying    Situations: relationship problems   Step 2: Coping strategies - Things I can do to take my mind off of my problems without contacting another person (relaxation technique, physical activity):    Distress Tolerance Strategies:  read a book and geneology    Physical Activities: go for a walk    Focus on helpful thoughts:  \"This is temporary\", \"I will get through this\", \"It always passes\",   think about happy memories:   and remind myself of what is important to me:  Step 3: People and social settings that provide distraction:   Name: Isaías   Phone: in phone   Name: Dre   Phone: in phone   Name: Mom   Phone: in phone   Name: Tracy   Phone: in phone    work and go outside   Step 4: Remind myself of people and things that are important to me and worth living for:  Children, , the job I do is important, I " have talents and skills to offer people, and I am a good person  Step 5: When I am in crisis, I can ask these people to help me use my safety plan:   Name: Isaías   Phone: [phone]   Name: Dre   Phone: [phone]   Name: Tracy   Phone: [phone]  Step 6: Making the environment safe:     be around others  Step 7: Professionals or agencies I can contact during a crisis:    [facility] Daytime Number: [phone]    Suicide Prevention Lifeline: 4-562-176-CXQM (4763)    Crisis Text Line Service (available 24 hours a day, 7 days a week): Text [place] to 901680    Local Crisis Services: [facility] [phone]    Call 911 or go to my nearest emergency department.   I helped develop this safety plan and agree to use it when needed.  I have been given a copy of this plan.      Client signature _________________________________________________________________  Today s date:  1/8/2021  Adapted from Safety Plan Template 2008 Valerie Broderick and Alistair Briseno is reprinted with the express permission of the authors.  No portion of the Safety Plan Template may be reproduced without the express, written permission.  You can contact the authors at [email or web address] or [email or web address].

## 2022-01-21 ENCOUNTER — VIRTUAL VISIT (OUTPATIENT)
Dept: PSYCHOLOGY | Facility: CLINIC | Age: 52
End: 2022-01-21
Payer: COMMERCIAL

## 2022-01-21 DIAGNOSIS — F33.41 MAJOR DEPRESSIVE DISORDER, RECURRENT EPISODE, IN PARTIAL REMISSION WITH ANXIOUS DISTRESS (H): Primary | ICD-10-CM

## 2022-01-21 PROCEDURE — 90834 PSYTX W PT 45 MINUTES: CPT | Mod: GT | Performed by: SOCIAL WORKER

## 2022-01-21 NOTE — PROGRESS NOTES
Progress Note    Patient Name: Natalee Maya  Date: 1/21/2022     Service Type: Individual     Session Start Time: 7:02 AM Session End Time: 7:52 AM    Session Length: 38-52 minutes    Session #: 24 (4 with DB)    Attendees: Client     Telemedicine Visit: The patient's condition can be safely assessed and treated via synchronous audio and visual telemedicine encounter.      Reason for Telemedicine Visit: Services only offered telehealth    Originating Site (Patient Location): Patient's home    Distant Site (Provider Location): Provider Remote Setting    Consent:  The patient/guardian has verbally consented to: the potential risks and benefits of telemedicine (video visit) versus in person care; bill my insurance or make self-payment for services provided; and responsibility for payment of non-covered services.      Mode of Communication: [email or web address]     Treatment Plan Last Reviewed: 9/24/2021  PHQ-9 / MADINA-7 : 3/3 on 1/20/2022    DATA  Interactive Complexity: No  Crisis: No       Progress Since Last Session (Related to Symptoms / Goals / Homework):   Symptoms: see GAD7 and PHQ9; client reported overall doing well    Homework: Completed in session       Episode of Care Goals: Satisfactory progress - ACTION (Actively working towards change); Intervened by reinforcing change plan / affirming steps taken     Current / Ongoing Stressors and Concerns:   Marital stress;  from    Differences in communication styles and responses to stress in her relationship with her    Work related stressors     Treatment Objective(s) Addressed in This Session:   Use assertive communication skills  Increase engagement in exercise     Intervention:   CBT: guided discovery, identified thoughts, feelings, and behaviors, reinforced behavior changes   Motivational interviewing: expressed empathy/understanding, use of reflective listening and open ended questions, supported  autonomy   Reviewed flowsheets    Explored barriers to engaging in exercise     ASSESSMENT: Current Emotional / Mental Status (status of significant symptoms):   Risk status (Self / Other harm or suicidal ideation)   Patient denies current fears or concerns for personal safety.   Patient denies current or recent suicidal ideation or behaviors.     Patientdenies current or recent homicidal ideation or behaviors.   Patient denies current or recent self injurious behavior or ideation.   Patient denies other safety concerns.   Patient reports there has been no change in risk factors since their last session.     Patientreports there has been no change in protective factors since their last session.     A safety and risk management plan was developed and shared with client in previous session.  Client confirmed she shared with her three emergency contacts     Appearance:   Appropriate    Eye Contact:   Good    Psychomotor Behavior: Normal    Attitude:   Cooperative  Interested Friendly   Orientation:   All   Speech    Rate / Production: Talkative    Volume:  Normal    Mood:    Stable, Sad- minimal   Affect:    Appropriate, minimally tearful   Thought Content:  Clear    Thought Form:  Coherent  Goal Directed  Logical    Insight:    Good      Medication Review:   No changes to current psychiatric medication(s)      Zoloft 50 mg      Medication Compliance:   Yes     Changes in Health Issues:   None reported      Chemical Use Review:   Substance Use: no active concerns identified      Tobacco Use: No current tobacco use.      Diagnosis:    Major Depressive Disorder, Moderate, in partial remission    Collateral Reports Completed:   N/A    PLAN: (Patient Tasks / Therapist Tasks / Other)  Client shared desire to increase engagement in exercise.     Celeste Chavez, [facility] 1/21/2022       ______________________________________________________________________    Treatment Plan    Patient's Name: Natalee Maya  Date Of  Birth: 1970    Date:  5/14/2021, 9/24/2021    DSM5 Diagnoses: Major Depressive Disorder, Moderate, in partial remission  Psychosocial / Contextual Factors: separation   WHODAS: 12    Referral / Collaboration:  Referral to another professional/service is not indicated at this time..    Anticipated number of session or this episode of care: will review in 90 days    MeasurableTreatment Goal(s) related to diagnosis / functional impairment(s)  Goal 1: Patient will reduce symptoms of anxiety and depression by reporting MADINA-7 and PHQ-9 scores below a 5, where symptoms where symptoms occur fewer than half the days for a minimum of 4 weeks.    Objective #A (Patient Action)    Patient will learn and utilize 3 techniques to manage emotions related to marital stress/conflict.  Status: Completed - Date: 5/14/2021      Intervention(s)  Therapist will teach relaxation techniques and ways to engage in self-care.    Objective #B  Patient will compile a list of boundaries that they would like to set with others. Utilize the boundaries .  Status: Completed - Date: 5/14/2021      Intervention(s)  Therapist will teach health boundaries and encourage client to identify their boundaries and implement them.    Objective #C  Patient will learn & utilize at least 1 assertive communication skills weekly.  Status: Completed - Date: 5/14/2021      Intervention(s)  Therapist will teach assertive communication skills.    Objective #D  Patient will make improvements to diet and increase engagement in exercise by setting weekly goals.   Status: Continued Date: 9/24/2021      Intervention(s)   Therapist will encourage client to establish goals to work toward between appointments, assist with identifying any barriers and address them with  client.      Objective #E  Patient will increase engagement in areas of interest.     Status: Continued Date: 9/24/2021     Intervention(s)   Therapist will assist client with identifying negative self-talk  "that impacts engagement in areas of interest.       Patient has reviewed and agreed to the above plan.      Celeste Chavez, [facility]  7/2/2020  Celeste Chavez, [facility]  5/14/2021  Celeste Chavez, [facility]  9/24/2021                                                 Natalee Maya     SAFETY PLAN:  Step 1: Warning signs / cues (Thoughts, images, mood, situation, behavior) that a crisis may be developing:    Thoughts: \"I don't matter\", \"I'm a burden\", \"I can't do this anymore\", \"I just want this to end\" and \"Nothing makes it better\"    Images: none    Thinking Processes: ruminations (can't stop thinking about my problems): marital stresss, racing thoughts and highly critical and negative thoughts: about myself    Mood: worsening depression, hopelessness, agitation and mood swings    Behaviors: isolating/withdrawing  and can't stop crying    Situations: relationship problems   Step 2: Coping strategies - Things I can do to take my mind off of my problems without contacting another person (relaxation technique, physical activity):    Distress Tolerance Strategies:  read a book and geneology    Physical Activities: go for a walk    Focus on helpful thoughts:  \"This is temporary\", \"I will get through this\", \"It always passes\",   think about happy memories:   and remind myself of what is important to me:  Step 3: People and social settings that provide distraction:   Name: Isaías   Phone: in phone   Name: Dre   Phone: in phone   Name: Mom   Phone: in phone   Name: Tracy   Phone: in phone    work and go outside   Step 4: Remind myself of people and things that are important to me and worth living for:  Children, , the job I do is important, I have talents and skills to offer people, and I am a good person  Step 5: When I am in crisis, I can ask these people to help me use my safety plan:   Name: Isaías   Phone: [phone]   Name: Dre   Phone: [phone]   Name: Tracy   Phone: [phone]  Step 6: Making the " environment safe:     be around others  Step 7: Professionals or agencies I can contact during a crisis:    [facility] Daytime Number: [phone]    Suicide Prevention Lifeline: 1-971-961-RYVE (2250)    Crisis Text Line Service (available 24 hours a day, 7 days a week): Text [place] to 777767    Local Crisis Services: [facility] [phone]    Call 911 or go to my nearest emergency department.   I helped develop this safety plan and agree to use it when needed.  I have been given a copy of this plan.      Client signature _________________________________________________________________  Today s date:  1/8/2021  Adapted from Safety Plan Template 2008 Valerie Broderick and Alistair Briseno is reprinted with the express permission of the authors.  No portion of the Safety Plan Template may be reproduced without the express, written permission.  You can contact the authors at bhs@[email or web address] or [email or web address].

## 2022-02-10 DIAGNOSIS — E53.8 FOLATE DEFICIENCY: ICD-10-CM

## 2022-02-11 ENCOUNTER — ANCILLARY PROCEDURE (OUTPATIENT)
Dept: MAMMOGRAPHY | Facility: CLINIC | Age: 52
End: 2022-02-11
Attending: NURSE PRACTITIONER
Payer: COMMERCIAL

## 2022-02-11 ENCOUNTER — ALLIED HEALTH/NURSE VISIT (OUTPATIENT)
Dept: PEDIATRICS | Facility: CLINIC | Age: 52
End: 2022-02-11
Payer: COMMERCIAL

## 2022-02-11 ENCOUNTER — LAB (OUTPATIENT)
Dept: LAB | Facility: CLINIC | Age: 52
End: 2022-02-11
Payer: COMMERCIAL

## 2022-02-11 VITALS — DIASTOLIC BLOOD PRESSURE: 82 MMHG | SYSTOLIC BLOOD PRESSURE: 128 MMHG

## 2022-02-11 DIAGNOSIS — Z01.30 BP CHECK: Primary | ICD-10-CM

## 2022-02-11 DIAGNOSIS — D72.819 LEUKOPENIA, UNSPECIFIED TYPE: ICD-10-CM

## 2022-02-11 DIAGNOSIS — Z12.31 VISIT FOR SCREENING MAMMOGRAM: ICD-10-CM

## 2022-02-11 LAB
BASOPHILS # BLD AUTO: 0 10E3/UL (ref 0–0.2)
BASOPHILS NFR BLD AUTO: 1 %
EOSINOPHIL # BLD AUTO: 0.1 10E3/UL (ref 0–0.7)
EOSINOPHIL NFR BLD AUTO: 4 %
ERYTHROCYTE [DISTWIDTH] IN BLOOD BY AUTOMATED COUNT: 13.4 % (ref 10–15)
HCT VFR BLD AUTO: 36.3 % (ref 35–47)
HGB BLD-MCNC: 11.2 G/DL (ref 11.7–15.7)
IMM GRANULOCYTES # BLD: 0 10E3/UL
IMM GRANULOCYTES NFR BLD: 0 %
LYMPHOCYTES # BLD AUTO: 1 10E3/UL (ref 0.8–5.3)
LYMPHOCYTES NFR BLD AUTO: 41 %
MCH RBC QN AUTO: 28.8 PG (ref 26.5–33)
MCHC RBC AUTO-ENTMCNC: 30.9 G/DL (ref 31.5–36.5)
MCV RBC AUTO: 93 FL (ref 78–100)
MONOCYTES # BLD AUTO: 0.4 10E3/UL (ref 0–1.3)
MONOCYTES NFR BLD AUTO: 15 %
NEUTROPHILS # BLD AUTO: 0.9 10E3/UL (ref 1.6–8.3)
NEUTROPHILS NFR BLD AUTO: 39 %
NRBC # BLD AUTO: 0 10E3/UL
NRBC BLD AUTO-RTO: 0 /100
PLATELET # BLD AUTO: 236 10E3/UL (ref 150–450)
RBC # BLD AUTO: 3.89 10E6/UL (ref 3.8–5.2)
WBC # BLD AUTO: 2.4 10E3/UL (ref 4–11)

## 2022-02-11 PROCEDURE — 77067 SCR MAMMO BI INCL CAD: CPT | Mod: TC | Performed by: RADIOLOGY

## 2022-02-11 PROCEDURE — 99207 PR NO CHARGE NURSE ONLY: CPT | Performed by: NURSE PRACTITIONER

## 2022-02-11 PROCEDURE — 36415 COLL VENOUS BLD VENIPUNCTURE: CPT

## 2022-02-11 PROCEDURE — 85025 COMPLETE CBC W/AUTO DIFF WBC: CPT

## 2022-02-11 RX ORDER — FOLIC ACID 1 MG/1
TABLET ORAL
Qty: 90 TABLET | Refills: 0 | Status: SHIPPED | OUTPATIENT
Start: 2022-02-11 | End: 2022-05-20

## 2022-02-11 NOTE — TELEPHONE ENCOUNTER
Prescription approved per [facility] Refill Protocol.    Keisha Conway RN on 2/11/2022 at 11:58 AM

## 2022-02-11 NOTE — PROGRESS NOTES
Natalee Maya was evaluated at [facility] on February 11, 2022 at which time her blood pressure was:    BP Readings from Last 3 Encounters:   02/11/22 128/82   06/23/21 112/74   06/14/21 112/72     Pulse Readings from Last 3 Encounters:   06/23/21 56   05/18/21 69   05/18/21 58       Reviewed lifestyle modifications for blood pressure control and reduction: including making healthy food choices, managing weight, getting regular exercise, smoking cessation, reducing alcohol consumption, monitoring blood pressure regularly.     Symptoms: None    BP Goal:< 140/90 mmHg    BP Assessment:  BP at goal    Potential Reasons for BP too high: NA - Not applicable    BP Follow-Up Plan: Recheck BP in 6 months at pharmacy    Recommendation to Provider: Patient reports occasionally at home readings creep into the 140s/80s.  I discussed with the patient keeping a record of home readings and checking at various times of the day.    Note completed by: Ryan Cornejo, PharmD, BCACP  [facility]  [phone]

## 2022-02-17 ENCOUNTER — VIRTUAL VISIT (OUTPATIENT)
Dept: ONCOLOGY | Facility: CLINIC | Age: 52
End: 2022-02-17
Attending: INTERNAL MEDICINE
Payer: COMMERCIAL

## 2022-02-17 DIAGNOSIS — D72.819 LEUKOPENIA, UNSPECIFIED TYPE: Primary | ICD-10-CM

## 2022-02-17 PROCEDURE — 99212 OFFICE O/P EST SF 10 MIN: CPT | Mod: GT | Performed by: INTERNAL MEDICINE

## 2022-02-17 NOTE — LETTER
2/17/2022         RE: Natalee Maya  [address]  [place] [address]        Dear Colleague,    Thank you for referring your patient, Natalee Maya, to the [facility]. Please see a copy of my visit note below.    Natalee is a 51 year old who is being evaluated via a billable video visit.      How would you like to obtain your AVS? MyChart  If the video visit is dropped, the invitation should be resent by: Text to cell phone: [phone]  Will anyone else be joining your video visit? No     Janinesydnie Mccain VF    Video Start Time: 3:25 PM  Video-Visit Details    Type of service:  Video Visit    Video End Time:3:29 PM    Originating Location (pt. Location): Home    Distant Location (provider location):  [facility]     Platform used for Video Visit: Chick[employer]         [facility]    Hematology/Oncology Established Patient Note      Today's Date: 2/17/22    Reason for Follow-up: leukopenia      HISTORY OF PRESENT ILLNESS: Natalee Maya is a 51 year old female with PMHx of hypothyroidism who presents with leukopenia.  She was found on labs in June 2020 to have low WBC of 1.9K.  Repeat checks have been in the 2K range.  Hemoglobin and platelet count were normal.  Differential shows a moderate to mild neutropenia.  On further chart review, she had a mild leukopenia in the 3K range back in 2018, but did recover back to normal a few months later.  There are not many labs prior to that.  Iron, ferritin, vitamin 12, and folate were normal.  Peripheral smear shows low WBC, but non-specific.      She denies recent illness, infections, fever.  Her Prozac dose increased a few months ago.  Otherwise, she has not started any new medications recently.      She does not smoke.  She drinks a glass of wine maybe once or twice a month.  She denies illicit drug use.      INTERIM HISTORY: Natalee is doing well.  She feels very  well.        REVIEW OF SYSTEMS:   14 point ROS was reviewed and is negative other than as noted above in HPI.       HOME MEDICATIONS:  Current Outpatient Medications   Medication Sig Dispense Refill     folic acid (FOLVITE) 1 MG tablet TAKE ONE TABLET BY MOUTH EVERY DAY 90 tablet 0     levothyroxine (SYNTHROID/LEVOTHROID) 50 MCG tablet TAKE ONE TABLET BY MOUTH EVERY DAY 90 tablet 2     lisinopril (ZESTRIL) 20 MG tablet Take 1 tablet (20 mg) by mouth daily 90 tablet 3     sertraline (ZOLOFT) 50 MG tablet Take 1.5 tablets (75 mg) by mouth daily 135 tablet 3     Ferrous Sulfate (IRON) 325 (65 Fe) MG tablet TAKE ONE TABLET BY MOUTH EVERY DAY, AVOID AROUND SAME TIME AS LEVOTHYROXINE (Patient not taking: Reported on 2022) 90 tablet 3     phentermine (ADIPEX-P) 15 MG capsule TAKE ONE CAPSULE BY MOUTH EVERY MORNING (Patient not taking: Reported on 2022) 30 capsule 0         ALLERGIES:  Allergies   Allergen Reactions     No Known Drug Allergies          PAST MEDICAL HISTORY:  Past Medical History:   Diagnosis Date     Calculus of kidney     History of kidney stone -- Abstracted 02     Hypertension      Lymphedema of lower extremity          PAST SURGICAL HISTORY:  Past Surgical History:   Procedure Laterality Date     COLONOSCOPY Left 2021    Procedure: COLONOSCOPY; incomplete secondary to pt discomfort. will schedule CT colonography today if possible.;  Surgeon: Ceasar Cardoza MD;  Location:  GI     FOOT SURGERY      2017     [facility] NONSPECIFIC PROCEDURE       -- Abstracted 02         SOCIAL HISTORY:  Social History     Socioeconomic History     Marital status:      Spouse name: Not on file     Number of children: Not on file     Years of education: Not on file     Highest education level: Not on file   Occupational History     Not on file   Tobacco Use     Smoking status: Never Smoker     Smokeless tobacco: Never Used   Substance and Sexual Activity     Alcohol use:  No     Drug use: No     Sexual activity: Yes     Partners: Male   Other Topics Concern     Parent/sibling w/ CABG, MI or angioplasty before 65F 55M? Not Asked   Social History Narrative     Not on file     Social Determinants of Health     Financial Resource Strain: Not on file   Food Insecurity: Not on file   Transportation Needs: Not on file   Physical Activity: Not on file   Stress: Not on file   Social Connections: Not on file   Intimate Partner Violence: Not on file   Housing Stability: Not on file         FAMILY HISTORY:  Family History   Problem Relation Age of Onset     Diabetes Father      C.A.D. Father         hyperlipidemia     Bleeding Disorder Mother         Factor 5     Breast Cancer No family hx of      Prostate Cancer No family hx of      Colon Cancer No family hx of          PHYSICAL EXAM:  ECO  GENERAL/CONSTITUTIONAL: No acute distress. Healthy, alert..    RESPIRATORY: No audible wheeze, cough, or visible cyanosis.  No visible retractions or increased work of breathing.  Able to speak fully in complete sentences.  NEUROLOGIC: Alert, oriented, answers questions appropriately. No tremor. Mentation intact and speech normal  PSYCHIATRIC:  Mentation appears normal, affect normal/bright, judgement and insight intact, normal speech and appearance well-groomed.    The rest of a comprehensive physical exam is deferred due to public health emergency video visit restrictions.      LABS:  CBC RESULTS: Recent Labs   Lab Test 21  1227   WBC 3.8*   RBC 4.13   HGB 12.5   HCT 39.0   MCV 94   MCH 30.3   MCHC 32.1   RDW 13.4            Peripheral flow:  INTERPRETATION:   Blood:        Polytypic B cells        No aberrant immunophenotype on T cells        See comment     COMMENT:   There is no immunophenotypic evidence of non-Hodgkin lymphoma or lymphoid   leukemia.  Final interpretation   requires correlation with morphologic and clinical features.       PATHOLOGY:  Peripheral smear 20:  FINAL  DIAGNOSIS:   Peripheral Blood Morphology-   -Moderate leukopenia with moderate absolute neutropenia.   -See comment.     COMMENT:   The patient has history of intermittent leukopenia (see prior peripheral   blood morphology; [id number], 8/2018.   Absolute neutropenia appears to be a new finding. Possible causes of   neutropenia include drugs, infection,   autoimmune/immune-mediated disorders, hypersplenism, and bone marrow   disorder. At present, there is   insufficient morphologic evidence to suggest a clonal bone marrow   disorder. Serum vitamin B12/folate   deficiency appears as an unlikely cause, given normal reported levels in   this patient. Serum ferritin level is   additionally within normal limits. If absolute neutropenia persists   without clinical explanation, TCR-V beta   flow cytometric analysis of peripheral blood may be considered for further    evaluation.     IMAGING:  Abdomen ultrasound 9/10/20:  Unremarkable complete abdominal ultrasound.      ASSESSMENT/PLAN:  Natalee Maya is a 51 year old female with:    Leukopenia: Differential with moderate to mild neutropenia.  Other two cell lines are normal.  This seems to be a fairly new finding, although it happened to a milder and short extent in 2018.  She has not had recent infections.  She has not started new medications.  She has no history of hepatitis or HIV.  She does not drink alcohol frequently.  We discussed other potential causes including autoimmune or primary bone marrow disorder.  Abdomen ultrasound was normal; no hepatosplenomegaly. Her WBC remains low. She remains asymptomatic.  We discussed options of continued observation or proceeding with bone marrow biopsy.  She would rather not do a bone marrow biopsy now.  If there is persistent downward trend of WBC, then we will do bone marrow biopsy.      WBC is decreased this time to 2.4 with ANC of 0.9.  She is slightly anemic this time.  It's still not out of her baseline range of counts.   We discussed options again of continued observation or bone marrow biopsy.  Natalee says that she feels very good currently and is comfortable with continued observation.  If there is persistent downtrending of counts, then we will proceed with bone biopsy.  She is agreeable with this.  For the anemia, we will check iron and ferritin, as well as vitamin B12 and folate.  She says that she stopped taking her iron pills 3 weeks ago, but will resume them.  She did get colonoscopy and CT colonography done in June 2021.    -RTC in 6 months with CBC/diff, ferritin, iron, vitamin B12, folate; patient would like video visit for next appointment as well.      Lissa Gibson MD  Hematology/Oncology  [facility]      Total time spent on day of visit, including review of tests, obtaining/reviewing separately obtained history, ordering medications/tests/procedures, communicating with PCP/consultants, and documenting in electronic medical record: 10 minutes        Again, thank you for allowing me to participate in the care of your patient.        Sincerely,        Lissa Gibson MD

## 2022-02-17 NOTE — PROGRESS NOTES
Natalee is a 51 year old who is being evaluated via a billable video visit.      How would you like to obtain your AVS? MyChart  If the video visit is dropped, the invitation should be resent by: Text to cell phone: [phone]  Will anyone else be joining your video visit? Rhonda     Janine KRAUSE    Video Start Time: 3:25 PM  Video-Visit Details    Type of service:  Video Visit    Video End Time:3:29 PM    Originating Location (pt. Location): Home    Distant Location (provider location):  [facility]     Platform used for Video Visit: [facility]    Hematology/Oncology Established Patient Note      Today's Date: 2/17/22    Reason for Follow-up: leukopenia      HISTORY OF PRESENT ILLNESS: Natalee Maya is a 51 year old female with PMHx of hypothyroidism who presents with leukopenia.  She was found on labs in June 2020 to have low WBC of 1.9K.  Repeat checks have been in the 2K range.  Hemoglobin and platelet count were normal.  Differential shows a moderate to mild neutropenia.  On further chart review, she had a mild leukopenia in the 3K range back in 2018, but did recover back to normal a few months later.  There are not many labs prior to that.  Iron, ferritin, vitamin 12, and folate were normal.  Peripheral smear shows low WBC, but non-specific.      She denies recent illness, infections, fever.  Her Prozac dose increased a few months ago.  Otherwise, she has not started any new medications recently.      She does not smoke.  She drinks a glass of wine maybe once or twice a month.  She denies illicit drug use.      INTERIM HISTORY: Natalee is doing well.  She feels very well.        REVIEW OF SYSTEMS:   14 point ROS was reviewed and is negative other than as noted above in HPI.       HOME MEDICATIONS:  Current Outpatient Medications   Medication Sig Dispense Refill     folic acid (FOLVITE) 1 MG tablet TAKE ONE TABLET BY MOUTH EVERY DAY 90  tablet 0     levothyroxine (SYNTHROID/LEVOTHROID) 50 MCG tablet TAKE ONE TABLET BY MOUTH EVERY DAY 90 tablet 2     lisinopril (ZESTRIL) 20 MG tablet Take 1 tablet (20 mg) by mouth daily 90 tablet 3     sertraline (ZOLOFT) 50 MG tablet Take 1.5 tablets (75 mg) by mouth daily 135 tablet 3     Ferrous Sulfate (IRON) 325 (65 Fe) MG tablet TAKE ONE TABLET BY MOUTH EVERY DAY, AVOID AROUND SAME TIME AS LEVOTHYROXINE (Patient not taking: Reported on 2022) 90 tablet 3     phentermine (ADIPEX-P) 15 MG capsule TAKE ONE CAPSULE BY MOUTH EVERY MORNING (Patient not taking: Reported on 2022) 30 capsule 0         ALLERGIES:  Allergies   Allergen Reactions     No Known Drug Allergies          PAST MEDICAL HISTORY:  Past Medical History:   Diagnosis Date     Calculus of kidney     History of kidney stone -- Abstracted 02     Hypertension      Lymphedema of lower extremity          PAST SURGICAL HISTORY:  Past Surgical History:   Procedure Laterality Date     COLONOSCOPY Left 2021    Procedure: COLONOSCOPY; incomplete secondary to pt discomfort. will schedule CT colonography today if possible.;  Surgeon: Ceasar Cardoza MD;  Location:  GI     FOOT SURGERY      2017     [facility] NONSPECIFIC PROCEDURE       -- Abstracted 02         SOCIAL HISTORY:  Social History     Socioeconomic History     Marital status:      Spouse name: Not on file     Number of children: Not on file     Years of education: Not on file     Highest education level: Not on file   Occupational History     Not on file   Tobacco Use     Smoking status: Never Smoker     Smokeless tobacco: Never Used   Substance and Sexual Activity     Alcohol use: No     Drug use: No     Sexual activity: Yes     Partners: Male   Other Topics Concern     Parent/sibling w/ CABG, MI or angioplasty before 65F 55M? Not Asked   Social History Narrative     Not on file     Social Determinants of Health     Financial Resource Strain: Not on  file   Food Insecurity: Not on file   Transportation Needs: Not on file   Physical Activity: Not on file   Stress: Not on file   Social Connections: Not on file   Intimate Partner Violence: Not on file   Housing Stability: Not on file         FAMILY HISTORY:  Family History   Problem Relation Age of Onset     Diabetes Father      C.A.D. Father         hyperlipidemia     Bleeding Disorder Mother         Factor 5     Breast Cancer No family hx of      Prostate Cancer No family hx of      Colon Cancer No family hx of          PHYSICAL EXAM:  ECO  GENERAL/CONSTITUTIONAL: No acute distress. Healthy, alert..    RESPIRATORY: No audible wheeze, cough, or visible cyanosis.  No visible retractions or increased work of breathing.  Able to speak fully in complete sentences.  NEUROLOGIC: Alert, oriented, answers questions appropriately. No tremor. Mentation intact and speech normal  PSYCHIATRIC:  Mentation appears normal, affect normal/bright, judgement and insight intact, normal speech and appearance well-groomed.    The rest of a comprehensive physical exam is deferred due to [facility] emergency video visit restrictions.      LABS:  CBC RESULTS: Recent Labs   Lab Test 21  1227   WBC 3.8*   RBC 4.13   HGB 12.5   HCT 39.0   MCV 94   MCH 30.3   MCHC 32.1   RDW 13.4            Peripheral flow:  INTERPRETATION:   Blood:        Polytypic B cells        No aberrant immunophenotype on T cells        See comment     COMMENT:   There is no immunophenotypic evidence of non-Hodgkin lymphoma or lymphoid   leukemia.  Final interpretation   requires correlation with morphologic and clinical features.       PATHOLOGY:  Peripheral smear 20:  FINAL DIAGNOSIS:   Peripheral Blood Morphology-   -Moderate leukopenia with moderate absolute neutropenia.   -See comment.     COMMENT:   The patient has history of intermittent leukopenia (see prior peripheral   blood morphology; [id number], 2018.   Absolute neutropenia appears  to be a new finding. Possible causes of   neutropenia include drugs, infection,   autoimmune/immune-mediated disorders, hypersplenism, and bone marrow   disorder. At present, there is   insufficient morphologic evidence to suggest a clonal bone marrow   disorder. Serum vitamin B12/folate   deficiency appears as an unlikely cause, given normal reported levels in   this patient. Serum ferritin level is   additionally within normal limits. If absolute neutropenia persists   without clinical explanation, TCR-V beta   flow cytometric analysis of peripheral blood may be considered for further    evaluation.     IMAGING:  Abdomen ultrasound 9/10/20:  Unremarkable complete abdominal ultrasound.      ASSESSMENT/PLAN:  Natalee Maya is a 51 year old female with:    Leukopenia: Differential with moderate to mild neutropenia.  Other two cell lines are normal.  This seems to be a fairly new finding, although it happened to a milder and short extent in 2018.  She has not had recent infections.  She has not started new medications.  She has no history of hepatitis or HIV.  She does not drink alcohol frequently.  We discussed other potential causes including autoimmune or primary bone marrow disorder.  Abdomen ultrasound was normal; no hepatosplenomegaly. Her WBC remains low. She remains asymptomatic.  We discussed options of continued observation or proceeding with bone marrow biopsy.  She would rather not do a bone marrow biopsy now.  If there is persistent downward trend of WBC, then we will do bone marrow biopsy.      WBC is decreased this time to 2.4 with ANC of 0.9.  She is slightly anemic this time.  It's still not out of her baseline range of counts.  We discussed options again of continued observation or bone marrow biopsy.  Natalee says that she feels very good currently and is comfortable with continued observation.  If there is persistent downtrending of counts, then we will proceed with bone biopsy.  She is  agreeable with this.  For the anemia, we will check iron and ferritin, as well as vitamin B12 and folate.  She says that she stopped taking her iron pills 3 weeks ago, but will resume them.  She did get colonoscopy and CT colonography done in June 2021.    -RTC in 6 months with CBC/diff, ferritin, iron, vitamin B12, folate; patient would like video visit for next appointment as well.      Lissa Gibson MD  Hematology/Oncology  [facility]      Total time spent on day of visit, including review of tests, obtaining/reviewing separately obtained history, ordering medications/tests/procedures, communicating with PCP/consultants, and documenting in electronic medical record: 10 minutes

## 2022-02-17 NOTE — LETTER
2/17/2022         RE: Natalee Maya  [address]  [place] [address]        Dear Colleague,    Thank you for referring your patient, Natalee Maya, to the [facility]. Please see a copy of my visit note below.    Natalee is a 51 year old who is being evaluated via a billable video visit.      How would you like to obtain your AVS? MyChart  If the video visit is dropped, the invitation should be resent by: Text to cell phone: [phone]  Will anyone else be joining your video visit? No     Janinesydnie Mccain VF    Video Start Time: 3:25 PM  Video-Visit Details    Type of service:  Video Visit    Video End Time:3:29 PM    Originating Location (pt. Location): Home    Distant Location (provider location):  [facility]     Platform used for Video Visit: [employer]         [facility]    Hematology/Oncology Established Patient Note      Today's Date: 2/17/22    Reason for Follow-up: leukopenia      HISTORY OF PRESENT ILLNESS: Natalee Maya is a 51 year old female with PMHx of hypothyroidism who presents with leukopenia.  She was found on labs in June 2020 to have low WBC of 1.9K.  Repeat checks have been in the 2K range.  Hemoglobin and platelet count were normal.  Differential shows a moderate to mild neutropenia.  On further chart review, she had a mild leukopenia in the 3K range back in 2018, but did recover back to normal a few months later.  There are not many labs prior to that.  Iron, ferritin, vitamin 12, and folate were normal.  Peripheral smear shows low WBC, but non-specific.      She denies recent illness, infections, fever.  Her Prozac dose increased a few months ago.  Otherwise, she has not started any new medications recently.      She does not smoke.  She drinks a glass of wine maybe once or twice a month.  She denies illicit drug use.      INTERIM HISTORY: Natalee is doing well.  She feels very  well.        REVIEW OF SYSTEMS:   14 point ROS was reviewed and is negative other than as noted above in HPI.       HOME MEDICATIONS:  Current Outpatient Medications   Medication Sig Dispense Refill     folic acid (FOLVITE) 1 MG tablet TAKE ONE TABLET BY MOUTH EVERY DAY 90 tablet 0     levothyroxine (SYNTHROID/LEVOTHROID) 50 MCG tablet TAKE ONE TABLET BY MOUTH EVERY DAY 90 tablet 2     lisinopril (ZESTRIL) 20 MG tablet Take 1 tablet (20 mg) by mouth daily 90 tablet 3     sertraline (ZOLOFT) 50 MG tablet Take 1.5 tablets (75 mg) by mouth daily 135 tablet 3     Ferrous Sulfate (IRON) 325 (65 Fe) MG tablet TAKE ONE TABLET BY MOUTH EVERY DAY, AVOID AROUND SAME TIME AS LEVOTHYROXINE (Patient not taking: Reported on 2022) 90 tablet 3     phentermine (ADIPEX-P) 15 MG capsule TAKE ONE CAPSULE BY MOUTH EVERY MORNING (Patient not taking: Reported on 2022) 30 capsule 0         ALLERGIES:  Allergies   Allergen Reactions     No Known Drug Allergies          PAST MEDICAL HISTORY:  Past Medical History:   Diagnosis Date     Calculus of kidney     History of kidney stone -- Abstracted 02     Hypertension      Lymphedema of lower extremity          PAST SURGICAL HISTORY:  Past Surgical History:   Procedure Laterality Date     COLONOSCOPY Left 2021    Procedure: COLONOSCOPY; incomplete secondary to pt discomfort. will schedule CT colonography today if possible.;  Surgeon: Ceasar Cardoza MD;  Location:  GI     FOOT SURGERY      2017     [facility] NONSPECIFIC PROCEDURE       -- Abstracted 02         SOCIAL HISTORY:  Social History     Socioeconomic History     Marital status:      Spouse name: Not on file     Number of children: Not on file     Years of education: Not on file     Highest education level: Not on file   Occupational History     Not on file   Tobacco Use     Smoking status: Never Smoker     Smokeless tobacco: Never Used   Substance and Sexual Activity     Alcohol use:  No     Drug use: No     Sexual activity: Yes     Partners: Male   Other Topics Concern     Parent/sibling w/ CABG, MI or angioplasty before 65F 55M? Not Asked   Social History Narrative     Not on file     Social Determinants of Health     Financial Resource Strain: Not on file   Food Insecurity: Not on file   Transportation Needs: Not on file   Physical Activity: Not on file   Stress: Not on file   Social Connections: Not on file   Intimate Partner Violence: Not on file   Housing Stability: Not on file         FAMILY HISTORY:  Family History   Problem Relation Age of Onset     Diabetes Father      C.A.D. Father         hyperlipidemia     Bleeding Disorder Mother         Factor 5     Breast Cancer No family hx of      Prostate Cancer No family hx of      Colon Cancer No family hx of          PHYSICAL EXAM:  ECO  GENERAL/CONSTITUTIONAL: No acute distress. Healthy, alert..    RESPIRATORY: No audible wheeze, cough, or visible cyanosis.  No visible retractions or increased work of breathing.  Able to speak fully in complete sentences.  NEUROLOGIC: Alert, oriented, answers questions appropriately. No tremor. Mentation intact and speech normal  PSYCHIATRIC:  Mentation appears normal, affect normal/bright, judgement and insight intact, normal speech and appearance well-groomed.    The rest of a comprehensive physical exam is deferred due to public health emergency video visit restrictions.      LABS:  CBC RESULTS: Recent Labs   Lab Test 21  1227   WBC 3.8*   RBC 4.13   HGB 12.5   HCT 39.0   MCV 94   MCH 30.3   MCHC 32.1   RDW 13.4            Peripheral flow:  INTERPRETATION:   Blood:        Polytypic B cells        No aberrant immunophenotype on T cells        See comment     COMMENT:   There is no immunophenotypic evidence of non-Hodgkin lymphoma or lymphoid   leukemia.  Final interpretation   requires correlation with morphologic and clinical features.       PATHOLOGY:  Peripheral smear 20:  FINAL  DIAGNOSIS:   Peripheral Blood Morphology-   -Moderate leukopenia with moderate absolute neutropenia.   -See comment.     COMMENT:   The patient has history of intermittent leukopenia (see prior peripheral   blood morphology; [id number], 8/2018.   Absolute neutropenia appears to be a new finding. Possible causes of   neutropenia include drugs, infection,   autoimmune/immune-mediated disorders, hypersplenism, and bone marrow   disorder. At present, there is   insufficient morphologic evidence to suggest a clonal bone marrow   disorder. Serum vitamin B12/folate   deficiency appears as an unlikely cause, given normal reported levels in   this patient. Serum ferritin level is   additionally within normal limits. If absolute neutropenia persists   without clinical explanation, TCR-V beta   flow cytometric analysis of peripheral blood may be considered for further    evaluation.     IMAGING:  Abdomen ultrasound 9/10/20:  Unremarkable complete abdominal ultrasound.      ASSESSMENT/PLAN:  Natalee Maya is a 51 year old female with:    Leukopenia: Differential with moderate to mild neutropenia.  Other two cell lines are normal.  This seems to be a fairly new finding, although it happened to a milder and short extent in 2018.  She has not had recent infections.  She has not started new medications.  She has no history of hepatitis or HIV.  She does not drink alcohol frequently.  We discussed other potential causes including autoimmune or primary bone marrow disorder.  Abdomen ultrasound was normal; no hepatosplenomegaly. Her WBC remains low. She remains asymptomatic.  We discussed options of continued observation or proceeding with bone marrow biopsy.  She would rather not do a bone marrow biopsy now.  If there is persistent downward trend of WBC, then we will do bone marrow biopsy.      WBC is decreased this time to 2.4 with ANC of 0.9.  She is slightly anemic this time.  It's still not out of her baseline range of counts.   We discussed options again of continued observation or bone marrow biopsy.  Natalee says that she feels very good currently and is comfortable with continued observation.  If there is persistent downtrending of counts, then we will proceed with bone biopsy.  She is agreeable with this.  For the anemia, we will check iron and ferritin, as well as vitamin B12 and folate.  She says that she stopped taking her iron pills 3 weeks ago, but will resume them.  She did get colonoscopy and CT colonography done in June 2021.    -RTC in 6 months with CBC/diff, ferritin, iron, vitamin B12, folate; patient would like video visit for next appointment as well.      Lissa Gibson MD  Hematology/Oncology  [facility]      Total time spent on day of visit, including review of tests, obtaining/reviewing separately obtained history, ordering medications/tests/procedures, communicating with PCP/consultants, and documenting in electronic medical record: 10 minutes        Again, thank you for allowing me to participate in the care of your patient.        Sincerely,        Lissa Gibson MD

## 2022-02-18 ENCOUNTER — VIRTUAL VISIT (OUTPATIENT)
Dept: PSYCHOLOGY | Facility: CLINIC | Age: 52
End: 2022-02-18
Payer: COMMERCIAL

## 2022-02-18 DIAGNOSIS — F33.41 MAJOR DEPRESSIVE DISORDER, RECURRENT EPISODE, IN PARTIAL REMISSION WITH ANXIOUS DISTRESS (H): Primary | ICD-10-CM

## 2022-02-18 PROCEDURE — 90834 PSYTX W PT 45 MINUTES: CPT | Mod: GT | Performed by: SOCIAL WORKER

## 2022-02-18 NOTE — PROGRESS NOTES
Progress Note    Patient Name: Natalee Maya  Date: 2/18/2022     Service Type: Individual     Session Start Time: 7:02 AM Session End Time: 7:44 AM    Session Length: 38-52 minutes    Session #: 25 (4 with DB)    Attendees: Client     Telemedicine Visit: The patient's condition can be safely assessed and treated via synchronous audio and visual telemedicine encounter.      Reason for Telemedicine Visit: Services only offered telehealth    Originating Site (Patient Location): Patient's home    Distant Site (Provider Location): Provider Remote Setting    Consent:  The patient/guardian has verbally consented to: the potential risks and benefits of telemedicine (video visit) versus in person care; bill my insurance or make self-payment for services provided; and responsibility for payment of non-covered services.      Mode of Communication: [email or web address]     Treatment Plan Last Reviewed: 9/24/2021  PHQ-9 / MADINA-7 : 3/3 on 1/20/2022    DATA  Interactive Complexity: No  Crisis: No       Progress Since Last Session (Related to Symptoms / Goals / Homework):   Symptoms: worsening depression, client reported feeling overwhelmed   Called in sick from work today   Considered cancelling appointment     Homework: Completed in session       Episode of Care Goals: Satisfactory progress - ACTION (Actively working towards change); Intervened by reinforcing change plan / affirming steps taken     Current / Ongoing Stressors and Concerns:   Marital stress;  from    Differences in communication styles and responses to stress in her relationship with her    Work related stressors/recent interaction with a co-worker has led her to question whether she is the problem in relationships     Treatment Objective(s) Addressed in This Session:   Use assertive communication skills  Decrease frequency and intensity of feeling down, depressed or hopeless  Identify negative cognitions  and use cognitive restructuring techniques   Increase engagement in self-care and areas of interest  Identify 3 warnings of worsening symptoms     Intervention:   CBT: guided discovery, identified thoughts, feelings, and behaviors, assisted in identifying negative beliefs and modeled cognitive restructuring   Engaged client in identifying warning signs to worsening mood   Motivational interviewing: expressed empathy/understanding, use of reflective listening and open ended questions, supported autonomy   Modeled assertive communication     ASSESSMENT: Current Emotional / Mental Status (status of significant symptoms):   Risk status (Self / Other harm or suicidal ideation)   Patient denies current fears or concerns for personal safety.   Patient denies current or recent suicidal ideation or behaviors.     Patientdenies current or recent homicidal ideation or behaviors.   Patient denies current or recent self injurious behavior or ideation.   Patient denies other safety concerns.   Patient reports there has been no change in risk factors since their last session.     Patientreports there has been no change in protective factors since their last session.     A safety and risk management plan was developed and shared with client in previous session.  Client confirmed she shared with her three emergency contacts     Appearance:   Appropriate    Eye Contact:   Fair    Psychomotor Behavior: Normal    Attitude:   Cooperative  Interested   Orientation:   All   Speech    Rate / Production: Emotional    Volume:  Normal    Mood:    Depressed    Affect:    Tearful, Flat   Thought Content:  Clear    Thought Form:  Coherent    Insight:    Good      Medication Review:   No changes to current psychiatric medication(s)      Zoloft 50 mg      Medication Compliance:   Yes     Changes in Health Issues:   None reported      Chemical Use Review:   Substance Use: no active concerns identified      Tobacco Use: No current tobacco use.       Diagnosis:    Major Depressive Disorder, Moderate, in partial remission    Collateral Reports Completed:   N/A    PLAN: (Patient Tasks / Therapist Tasks / Other)  Client shared plan to communicate with co-worker, engage in self-care and engage in cognitive restructuring.    Celeste Chavez, [facility] 2/18/2022  ______________________________________________________________________    Treatment Plan    Patient's Name: Natalee Maya  YOB: 1970    Date:  5/14/2021, 9/24/2021    DSM5 Diagnoses: Major Depressive Disorder, Moderate, in partial remission  Psychosocial / Contextual Factors: separation   WHODAS: 12    Referral / Collaboration:  Referral to another professional/service is not indicated at this time..    Anticipated number of session or this episode of care: will review in 90 days    MeasurableTreatment Goal(s) related to diagnosis / functional impairment(s)  Goal 1: Patient will reduce symptoms of anxiety and depression by reporting MADINA-7 and PHQ-9 scores below a 5, where symptoms where symptoms occur fewer than half the days for a minimum of 4 weeks.    Objective #A (Patient Action)    Patient will learn and utilize 3 techniques to manage emotions related to marital stress/conflict.  Status: Completed - Date: 5/14/2021      Intervention(s)  Therapist will teach relaxation techniques and ways to engage in self-care.    Objective #B  Patient will compile a list of boundaries that they would like to set with others. Utilize the boundaries .  Status: Completed - Date: 5/14/2021      Intervention(s)  Therapist will teach health boundaries and encourage client to identify their boundaries and implement them.    Objective #C  Patient will learn & utilize at least 1 assertive communication skills weekly.  Status: Completed - Date: 5/14/2021      Intervention(s)  Therapist will teach assertive communication skills.    Objective #D  Patient will make improvements to diet and increase engagement in  "exercise by setting weekly goals.   Status: Continued Date: 9/24/2021      Intervention(s)   Therapist will encourage client to establish goals to work toward between appointments, assist with identifying any barriers and address them with  client.      Objective #E  Patient will increase engagement in areas of interest.     Status: Continued Date: 9/24/2021     Intervention(s)   Therapist will assist client with identifying negative self-talk that impacts engagement in areas of interest.       Patient has reviewed and agreed to the above plan.      Celeste Chavez, [facility]  7/2/2020  Celeste Chavez, [facility]  5/14/2021  Celeste Chavez, [facility]  9/24/2021                                                 Natalee Maya     SAFETY PLAN:  Step 1: Warning signs / cues (Thoughts, images, mood, situation, behavior) that a crisis may be developing:    Thoughts: \"I don't matter\", \"I'm a burden\", \"I can't do this anymore\", \"I just want this to end\" and \"Nothing makes it better\"    Images: none    Thinking Processes: ruminations (can't stop thinking about my problems): marital stresss, racing thoughts and highly critical and negative thoughts: about myself    Mood: worsening depression, hopelessness, agitation and mood swings    Behaviors: isolating/withdrawing  and can't stop crying    Situations: relationship problems   Step 2: Coping strategies - Things I can do to take my mind off of my problems without contacting another person (relaxation technique, physical activity):    Distress Tolerance Strategies:  read a book and geneology    Physical Activities: go for a walk    Focus on helpful thoughts:  \"This is temporary\", \"I will get through this\", \"It always passes\",   think about happy memories:   and remind myself of what is important to me:  Step 3: People and social settings that provide distraction:   Name: Isaías   Phone: in phone   Name: Dre   Phone: in phone   Name: Mom   Phone: in phone   Name: Tracy   Phone: in " phone    work and go outside   Step 4: Remind myself of people and things that are important to me and worth living for:  Children, , the job I do is important, I have talents and skills to offer people, and I am a good person  Step 5: When I am in crisis, I can ask these people to help me use my safety plan:   Name: Isaías   Phone: [phone]   Name: Dre   Phone: [phone]   Name: Tracy   Phone: [phone]  Step 6: Making the environment safe:     be around others  Step 7: Professionals or agencies I can contact during a crisis:    [facility] Daytime Number: [phone]    Suicide Prevention Lifeline: 8-608-098-TAMA (2452)    Crisis Text Line Service (available 24 hours a day, 7 days a week): Text [place] to 352322    Local Crisis Services: [facility] [phone]    Call 911 or go to my nearest emergency department.   I helped develop this safety plan and agree to use it when needed.  I have been given a copy of this plan.      Client signature _________________________________________________________________  Today s date:  1/8/2021  Adapted from Safety Plan Template 2008 Valerie Broderick and Alistair Briseno is reprinted with the express permission of the authors.  No portion of the Safety Plan Template may be reproduced without the express, written permission.  You can contact the authors at [email or web address] or [email or web address].

## 2022-02-24 NOTE — PATIENT INSTRUCTIONS
Labs scheduled 8/15  Appt with Dr. Gibson scheduled 8/18  Tia Garcia, RN, BSN, JAMIE  RN Care Coordinator  [facility]  [phone]

## 2022-03-11 ENCOUNTER — VIRTUAL VISIT (OUTPATIENT)
Dept: PSYCHOLOGY | Facility: CLINIC | Age: 52
End: 2022-03-11
Payer: COMMERCIAL

## 2022-03-11 DIAGNOSIS — F33.41 MAJOR DEPRESSIVE DISORDER, RECURRENT EPISODE, IN PARTIAL REMISSION WITH ANXIOUS DISTRESS (H): Primary | ICD-10-CM

## 2022-03-11 PROCEDURE — 90834 PSYTX W PT 45 MINUTES: CPT | Mod: GT | Performed by: SOCIAL WORKER

## 2022-03-11 ASSESSMENT — ANXIETY QUESTIONNAIRES
2. NOT BEING ABLE TO STOP OR CONTROL WORRYING: NOT AT ALL
7. FEELING AFRAID AS IF SOMETHING AWFUL MIGHT HAPPEN: NOT AT ALL
GAD7 TOTAL SCORE: 1
4. TROUBLE RELAXING: NOT AT ALL
1. FEELING NERVOUS, ANXIOUS, OR ON EDGE: SEVERAL DAYS
7. FEELING AFRAID AS IF SOMETHING AWFUL MIGHT HAPPEN: NOT AT ALL
3. WORRYING TOO MUCH ABOUT DIFFERENT THINGS: NOT AT ALL
GAD7 TOTAL SCORE: 1
6. BECOMING EASILY ANNOYED OR IRRITABLE: NOT AT ALL
5. BEING SO RESTLESS THAT IT IS HARD TO SIT STILL: NOT AT ALL
GAD7 TOTAL SCORE: 1

## 2022-03-11 ASSESSMENT — PATIENT HEALTH QUESTIONNAIRE - PHQ9
SUM OF ALL RESPONSES TO PHQ QUESTIONS 1-9: 2
SUM OF ALL RESPONSES TO PHQ QUESTIONS 1-9: 2
10. IF YOU CHECKED OFF ANY PROBLEMS, HOW DIFFICULT HAVE THESE PROBLEMS MADE IT FOR YOU TO DO YOUR WORK, TAKE CARE OF THINGS AT HOME, OR GET ALONG WITH OTHER PEOPLE: SOMEWHAT DIFFICULT

## 2022-03-11 NOTE — PROGRESS NOTES
[facility]                                     Progress Note    Patient Name: Natalee Maya  Date: 3/11/2022         Service Type: Individual      Session Start Time: 7:02 AM  Session End Time: 7:54 AM     Session Length: 38-52 minutes    Session #: 26 (4 with DB)    Attendees: Client attended alone    Service Modality:  Video Visit:      Provider verified identity through the following two step process.  Patient provided:  Patient is known previously to provider    Telemedicine Visit: The patient's condition can be safely assessed and treated via synchronous audio and visual telemedicine encounter.      Reason for Telemedicine Visit: Services only offered telehealth    Originating Site (Patient Location): Patient's home    Distant Site (Provider Location): Provider Remote Setting- Home Office    Consent:  The patient/guardian has verbally consented to: the potential risks and benefits of telemedicine (video visit) versus in person care; bill my insurance or make self-payment for services provided; and responsibility for payment of non-covered services.     Patient would like the video invitation sent by:  [email or web address]    Mode of Communication:  Video Conference via [email or web address]    As the provider I attest to compliance with applicable laws and regulations related to telemedicine.    DATA  Interactive Complexity: No  Crisis: No        Progress Since Last Session (Related to Symptoms / Goals / Homework):   Symptoms: Improving see GAD7 and PHQ9    Homework: Achieved / completed to satisfaction      Episode of Care Goals: Satisfactory progress - ACTION (Actively working towards change); Intervened by reinforcing change plan / affirming steps taken      There has been demonstrated improvement in functioning while patient has been engaged in psychotherapy/psychological service- if withdrawn the patient would deteriorate and/or relapse.      Current / Ongoing Stressors and Concerns:   Marital stress;   from               Differences in communication styles and responses to stress in her relationship with her      Treatment Objective(s) Addressed in This Session:   identify 1 fears / thoughts that contribute to feeling anxious  Identify areas within her control  Set healthy boundaries     Intervention:   Reviewed flowsheets    CBT: identified feelings, behaviors, and cognitions, reinforced behavior changes   Engaged client in perspective taking   Modeled boundary setting    Assessments completed prior to visit:  The following assessments were completed by patient for this visit:  PHQ9:   PHQ-9 SCORE 8/13/2021 9/23/2021 10/22/2021 11/19/2021 12/16/2021 1/20/2022 3/11/2022   PHQ-9 Total Score MyChart 3 (Minimal depression) 3 (Minimal depression) 6 (Mild depression) 4 (Minimal depression) 2 (Minimal depression) 3 (Minimal depression) 2 (Minimal depression)   PHQ-9 Total Score 3 3 6 4 2 3 2     GAD7:   MADINA-7 SCORE 8/13/2021 9/23/2021 10/22/2021 11/19/2021 12/16/2021 1/20/2022 3/11/2022   Total Score 3 (minimal anxiety) 4 (minimal anxiety) 3 (minimal anxiety) 4 (minimal anxiety) 2 (minimal anxiety) 3 (minimal anxiety) 1 (minimal anxiety)   Total Score 3 4 3 4 2 3 1         ASSESSMENT: Current Emotional / Mental Status (status of significant symptoms):   Risk status (Self / Other harm or suicidal ideation)   Patient denies current fears or concerns for personal safety.   Patient denies current or recent suicidal ideation or behaviors.   Patient denies current or recent homicidal ideation or behaviors.   Patient denies current or recent self injurious behavior or ideation.   Patient denies other safety concerns.   Patient reports there has been no change in risk factors since their last session.     Patient reports there has been no change in protective factors since their last session.     A safety and risk management plan has been previously developed.  Patient consented to co-developed safety  plan.  Safety and risk management plan was completed.  Patient agreed to use safety plan should any safety concerns arise.  A copy was given to the patient.     Appearance:   Appropriate    Eye Contact:   Fair    Psychomotor Behavior: Normal    Attitude:   Cooperative  Interested Pleasant   Orientation:   All   Speech    Rate / Production: Talkative    Volume:  Normal    Mood:    Anxious Stable   Affect:    Appropriate    Thought Content:  Clear    Thought Form:  Coherent  Goal Directed  Logical    Insight:    Good      Medication Review:   No changes to current psychiatric medication(s)     Zoloft 50 mg      Medication Compliance:   Yes     Changes in Health Issues:   None reported     Chemical Use Review:   Substance Use: no active concerns     Tobacco Use: No current tobacco use.      Diagnosis:  Major Depressive Disorder, Moderate, in partial remission    Collateral Reports Completed:     Not Applicable    PLAN: (Patient Tasks / Therapist Tasks / Other)  Therapist will review treatment plan  Client will continue to work on setting boundaries for herself and recognizing that dynamics of a relationship are not entirely within her control to change.    Celeste Chavez, [facility] 3/11/2022    ______________________________________________________________________    Individual Treatment Plan    Patient's Name: Natalee Maya  YOB: 1970    Date of Creation: 7/2/2020  Date Treatment Plan Last Reviewed/Revised: 9/24/2021    DSM5 Diagnoses: Major Depressive Disorder, Moderate, in partial remission  Psychosocial / Contextual Factors:  from   PROMIS (reviewed every 90 days): 25    Referral / Collaboration:  Referral to another professional/service is not indicated at this time..    Anticipated number of session for this episode of care: will review in 90 days  Anticipation frequency of session: Monthly  Anticipated Duration of each session: 38-52 minutes  Treatment plan will be reviewed in 90  days or when goals have been changed.       MeasurableTreatment Goal(s) related to diagnosis / functional impairment(s)  Goal 1: Patient will reduce symptoms of anxiety and depression by reporting MADINA-7 and PHQ-9 scores below a 5, where symptoms where symptoms occur fewer than half the days for a minimum of 4 weeks.     Objective #A (Patient Action)                          Patient will learn and utilize 3 techniques to manage emotions related to marital stress/conflict.  Status: Completed - Date: 5/14/2021       Intervention(s)  Therapist will teach relaxation techniques and ways to engage in self-care.     Objective #B  Patient will compile a list of boundaries that they would like to set with others. Utilize the boundaries .  Status: Completed - Date: 5/14/2021       Intervention(s)  Therapist will teach health boundaries and encourage client to identify their boundaries and implement them.     Objective #C  Patient will learn & utilize at least 1 assertive communication skills weekly.  Status: Completed - Date: 5/14/2021       Intervention(s)  Therapist will teach assertive communication skills.     Objective #D  Patient will make improvements to diet and increase engagement in exercise by setting weekly goals.              Status: Continued Date: 9/24/2021                            Intervention(s)              Therapist will encourage client to establish goals to work toward between appointments, assist with identifying any barriers and address them with   client.       Objective #E  Patient will increase engagement in areas of interest.                Status: Continued Date: 9/24/2021                 Intervention(s)              Therapist will assist client with identifying negative self-talk that impacts engagement in areas of interest.                   Patient has reviewed and agreed to the above plan.        NATIVIDAD Worthy                  7/2/2020  NATIVIDAD Worthy                   "5/14/2021  Celeste Chavez, [facility]                  9/24/2021                                                    Natalee Maya         SAFETY PLAN:  Step 1: Warning signs / cues (Thoughts, images, mood, situation, behavior) that a crisis may be developing:  ? Thoughts: \"I don't matter\", \"I'm a burden\", \"I can't do this anymore\", \"I just want this to end\" and \"Nothing makes it better\"  ? Images: none  ? Thinking Processes: ruminations (can't stop thinking about my problems): marital stresss, racing thoughts and highly critical and negative thoughts: about myself  ? Mood: worsening depression, hopelessness, agitation and mood swings  ? Behaviors: isolating/withdrawing  and can't stop crying  ? Situations: relationship problems   Step 2: Coping strategies - Things I can do to take my mind off of my problems without contacting another person (relaxation technique, physical activity):  ? Distress Tolerance Strategies:  read a book and geneology  ? Physical Activities: go for a walk  ? Focus on helpful thoughts:  \"This is temporary\", \"I will get through this\", \"It always passes\",   think about happy memories:   and remind myself of what is important to me:  Step 3: People and social settings that provide distraction:                 Name: Isaías                                      Phone: in phone                 Name: Dre                                        Phone: in phone                 Name: Polina                                     Phone: in phone                 Name: Tracy                                       Phone: in phone  ? work and go outside                 Step 4: Remind myself of people and things that are important to me and worth living for:  Children, , the job I do is important, I have talents and skills to offer people, and I am a good person  Step 5: When I am in crisis, I can ask these people to help me use my safety plan:                 Name: Isaías" Phone: [phone]                 Name: Dre                                        Phone: [phone]                 Name: Tracy                                       Phone: [phone]  Step 6: Making the environment safe:   ? be around others  Step 7: Professionals or agencies I can contact during a crisis:  ? [facility] Number: [phone]  ? Suicide Prevention Lifeline: 4-820-569-LHDZ (6596)  ? Crisis Text Line Service (available 24 hours a day, 7 days a week): Text [place] to 279874  ? Local Crisis Services: [facility] [phone]     Call 911 or go to my nearest emergency department.       I helped develop this safety plan and agree to use it when needed.  I have been given a copy of this plan.       Client signature _________________________________________________________________  Today s date:  1/8/2021  Adapted from Safety Plan Template 2008 Valerie Broderick and Alistair Briseno is reprinted with the express permission of the authors.  No portion of the Safety Plan Template may be reproduced without the express, written permission.  You can contact the authors at [email or web address] or [email or web address].

## 2022-03-12 ASSESSMENT — PATIENT HEALTH QUESTIONNAIRE - PHQ9: SUM OF ALL RESPONSES TO PHQ QUESTIONS 1-9: 2

## 2022-03-12 ASSESSMENT — ANXIETY QUESTIONNAIRES: GAD7 TOTAL SCORE: 1

## 2022-03-22 DIAGNOSIS — D50.9 IRON DEFICIENCY ANEMIA, UNSPECIFIED IRON DEFICIENCY ANEMIA TYPE: ICD-10-CM

## 2022-03-22 RX ORDER — PNV NO.95/FERROUS FUM/FOLIC AC 28MG-0.8MG
TABLET ORAL
Qty: 90 TABLET | Refills: 3 | Status: SHIPPED | OUTPATIENT
Start: 2022-03-22 | End: 2023-08-14

## 2022-03-25 ENCOUNTER — VIRTUAL VISIT (OUTPATIENT)
Dept: PSYCHOLOGY | Facility: CLINIC | Age: 52
End: 2022-03-25
Payer: COMMERCIAL

## 2022-03-25 DIAGNOSIS — F33.41 MAJOR DEPRESSIVE DISORDER, RECURRENT EPISODE, IN PARTIAL REMISSION WITH ANXIOUS DISTRESS (H): Primary | ICD-10-CM

## 2022-03-25 PROCEDURE — 90834 PSYTX W PT 45 MINUTES: CPT | Mod: GT | Performed by: SOCIAL WORKER

## 2022-03-25 NOTE — PROGRESS NOTES
[facility]                                     Progress Note    Patient Name: Natalee Maya  Date: 3/25/2022         Service Type: Individual      Session Start Time: 7:04 AM  Session End Time: 7:48 AM     Session Length: 38-52 minutes    Session #: 27 (4 with DB)    Attendees: Client attended alone    Service Modality:  Video Visit:      Provider verified identity through the following two step process.  Patient provided:  Patient is known previously to provider    Telemedicine Visit: The patient's condition can be safely assessed and treated via synchronous audio and visual telemedicine encounter.      Reason for Telemedicine Visit: Services only offered telehealth    Originating Site (Patient Location): Patient's home    Distant Site (Provider Location): Provider Remote Setting- Home Office    Consent:  The patient/guardian has verbally consented to: the potential risks and benefits of telemedicine (video visit) versus in person care; bill my insurance or make self-payment for services provided; and responsibility for payment of non-covered services.     Patient would like the video invitation sent by:  [email or web address]    Mode of Communication:  Video Conference via [email or web address]    As the provider I attest to compliance with applicable laws and regulations related to telemedicine.    DATA  Interactive Complexity: No  Crisis: No        Progress Since Last Session (Related to Symptoms / Goals / Homework):   Symptoms: stable    Homework: Achieved / completed to satisfaction   Increase engagement in walking      Episode of Care Goals: Satisfactory progress - ACTION (Actively working towards change); Intervened by reinforcing change plan / affirming steps taken      There has been demonstrated improvement in functioning while patient has been engaged in psychotherapy/psychological service- if withdrawn the patient would deteriorate and/or relapse.      Current / Ongoing Stressors and Concerns:   Marital  stress;  from               Differences in communication styles and responses to stress in her relationship with her      Treatment Objective(s) Addressed in This Session:   Improve diet, appetite, mindful eating, and / or meal planning  Identify areas within her control     Intervention:   Reviewed treatment plan    CBT: identified feelings, behaviors, and cognitions, reinforced behavior changes, guided discovery   Motivational interviewing: expressed empathy/understanding, use of reflective listening and open ended questions   Reflected on patient progress     Assessments completed prior to visit:  The following assessments were completed by patient for this visit: None    ASSESSMENT: Current Emotional / Mental Status (status of significant symptoms):   Risk status (Self / Other harm or suicidal ideation)   Patient denies current fears or concerns for personal safety.   Patient denies current or recent suicidal ideation or behaviors.   Patient denies current or recent homicidal ideation or behaviors.   Patient denies current or recent self injurious behavior or ideation.   Patient denies other safety concerns.   Patient reports there has been no change in risk factors since their last session.     Patient reports there has been no change in protective factors since their last session.     A safety and risk management plan has been previously developed.  Patient consented to co-developed safety plan.  Safety and risk management plan was completed.  Patient agreed to use safety plan should any safety concerns arise.  A copy was given to the patient.     Appearance:   Appropriate    Eye Contact:   Good    Psychomotor Behavior: Normal    Attitude:   Cooperative  Interested Friendly   Orientation:   All   Speech    Rate / Production: Talkative    Volume:  Normal    Mood:    Stable   Affect:    Appropriate    Thought Content:  Clear    Thought Form:  Coherent  Goal Directed  Logical     Insight:    Good      Medication Review:   No changes to current psychiatric medication(s)     Zoloft 50 mg      Medication Compliance:   Yes     Changes in Health Issues:   None reported     Chemical Use Review:   Substance Use: no active concerns     Tobacco Use: No current tobacco use.      Diagnosis:  Major Depressive Disorder, Moderate, in partial remission    Collateral Reports Completed:     Not Applicable    PLAN: (Patient Tasks / Therapist Tasks / Other)  Client shared goal to continue engagement in exercise and make improvements to diet.  Client will continue to maintain behavior changes.    Celeste Chavez, [facility] 3/25/2022    ______________________________________________________________________    Individual Treatment Plan    Patient's Name: Natalee Maya  YOB: 1970    Date of Creation: 7/2/2020  Date Treatment Plan Last Reviewed/Revised: 3/25/2022    DSM5 Diagnoses: Major Depressive Disorder, Moderate, in partial remission  Psychosocial / Contextual Factors:  from   PROMIS (reviewed every 90 days): 25    Referral / Collaboration:  Referral to another professional/service is not indicated at this time..    Anticipated number of session for this episode of care: will review in 90 days  Anticipation frequency of session: Monthly  Anticipated Duration of each session: 38-52 minutes  Treatment plan will be reviewed in 90 days or when goals have been changed.       MeasurableTreatment Goal(s) related to diagnosis / functional impairment(s)  Goal 1: Patient will reduce symptoms of anxiety and depression by reporting MADINA-7 and PHQ-9 scores below a 5, where symptoms where symptoms occur fewer than half the days for a minimum of 4 weeks.     Objective #A (Patient Action)                          Patient will learn and utilize 3 techniques to manage emotions related to marital stress/conflict.  Status: Completed - Date: 5/14/2021       Intervention(s)  Therapist will teach  "relaxation techniques and ways to engage in self-care.     Objective #B  Patient will compile a list of boundaries that they would like to set with others. Utilize the boundaries .  Status: Completed - Date: 5/14/2021       Intervention(s)  Therapist will teach health boundaries and encourage client to identify their boundaries and implement them.     Objective #C  Patient will learn & utilize at least 1 assertive communication skills weekly.  Status: Completed - Date: 5/14/2021       Intervention(s)  Therapist will teach assertive communication skills.     Objective #D  Patient will make improvements to diet and increase engagement in exercise by setting weekly goals.              Status: Continued Date: 9/24/2021, 3/25/2022                            Intervention(s)              Therapist will encourage client to establish goals to work toward between appointments, assist with identifying any barriers and address them with client.       Objective #E  Patient will increase engagement in areas of interest.                Status: Completed Date: 3/25/2022                 Intervention(s)              Therapist will assist client with identifying negative self-talk that impacts engagement in areas of interest.                   Patient has reviewed and agreed to the above plan.        Celeste Chavez, [facility]                  7/2/2020  Cleeste Chavez, [facility]                  5/14/2021  Celeste Chavez, [facility]                  9/24/2021  Celeste Chavez, [facility]                  3/25/2022                                                 Natalee Maya         SAFETY PLAN:  Step 1: Warning signs / cues (Thoughts, images, mood, situation, behavior) that a crisis may be developing:  ? Thoughts: \"I don't matter\", \"I'm a burden\", \"I can't do this anymore\", \"I just want this to end\" and \"Nothing makes it better\"  ? Images: none  ? Thinking Processes: ruminations (can't stop thinking about my problems): marital stresss, racing " "thoughts and highly critical and negative thoughts: about myself  ? Mood: worsening depression, hopelessness, agitation and mood swings  ? Behaviors: isolating/withdrawing  and can't stop crying  ? Situations: relationship problems   Step 2: Coping strategies - Things I can do to take my mind off of my problems without contacting another person (relaxation technique, physical activity):  ? Distress Tolerance Strategies:  read a book and geneology  ? Physical Activities: go for a walk  ? Focus on helpful thoughts:  \"This is temporary\", \"I will get through this\", \"It always passes\",   think about happy memories:   and remind myself of what is important to me:  Step 3: People and social settings that provide distraction:                 Name: Isaías                                      Phone: in phone                 Name: Dre                                        Phone: in phone                 Name: Polina                                     Phone: in phone                 Name: Tracy                                       Phone: in phone  ? work and go outside                 Step 4: Remind myself of people and things that are important to me and worth living for:  Children, , the job I do is important, I have talents and skills to offer people, and I am a good person  Step 5: When I am in crisis, I can ask these people to help me use my safety plan:                 Name: Isaías                                      Phone: [phone]                 Name: Dre                                        Phone: [phone]                 Name: Tracy                                       Phone: [phone]  Step 6: Making the environment safe:   ? be around others  Step 7: Professionals or agencies I can contact during a crisis:  ? [facility] Daytime Number: 186-933-4434  ? Suicide Prevention Lifeline: 1-132-045-IPXA (1280)  ? Crisis Text Line Service (available 24 hours a day, 7 days a week): Text [place] " to 863860  ? Local Crisis Services: [facility] [phone]     Call 911 or go to my nearest emergency department.       I helped develop this safety plan and agree to use it when needed.  I have been given a copy of this plan.       Client signature _________________________________________________________________  Today s date:  1/8/2021  Adapted from Safety Plan Template 2008 Valerie Broderick and Alistair Briseno is reprinted with the express permission of the authors.  No portion of the Safety Plan Template may be reproduced without the express, written permission.  You can contact the authors at davids@[email or web address] or [email or web address].

## 2022-04-13 ENCOUNTER — E-VISIT (OUTPATIENT)
Dept: PEDIATRICS | Facility: CLINIC | Age: 52
End: 2022-04-13
Payer: COMMERCIAL

## 2022-04-13 DIAGNOSIS — I10 BENIGN ESSENTIAL HYPERTENSION: ICD-10-CM

## 2022-04-13 DIAGNOSIS — R03.0 ELEVATED BLOOD PRESSURE READING WITHOUT DIAGNOSIS OF HYPERTENSION: Primary | ICD-10-CM

## 2022-04-13 PROCEDURE — 99421 OL DIG E/M SVC 5-10 MIN: CPT | Performed by: NURSE PRACTITIONER

## 2022-04-14 RX ORDER — LOSARTAN POTASSIUM 100 MG/1
100 TABLET ORAL DAILY
Qty: 90 TABLET | Refills: 3 | Status: SHIPPED | OUTPATIENT
Start: 2022-04-14 | End: 2023-01-31

## 2022-04-14 NOTE — TELEPHONE ENCOUNTER
Pls schedule in person in 2-3 weeks: Follow-up BP, cough, sleep    Provider E-Visit time total (minutes): 5

## 2022-04-18 NOTE — TELEPHONE ENCOUNTER
The pt is aware and scheduled for her upcoming appointment.   Ilene Garcia on 4/18/2022 at 9:23 AM

## 2022-04-29 ENCOUNTER — VIRTUAL VISIT (OUTPATIENT)
Dept: PSYCHOLOGY | Facility: CLINIC | Age: 52
End: 2022-04-29
Payer: COMMERCIAL

## 2022-04-29 DIAGNOSIS — F33.41 MAJOR DEPRESSIVE DISORDER, RECURRENT EPISODE, IN PARTIAL REMISSION WITH ANXIOUS DISTRESS (H): Primary | ICD-10-CM

## 2022-04-29 PROCEDURE — 90834 PSYTX W PT 45 MINUTES: CPT | Mod: GT | Performed by: SOCIAL WORKER

## 2022-04-29 NOTE — PROGRESS NOTES
[facility]                                     Progress Note    Patient Name: Natalee Maya  Date: 4/29/2022         Service Type: Individual      Session Start Time: 7:05 AM  Session End Time: 7:46 AM  (started late due to issues with technology)   Session Length: 38-52 minutes    Session #: 28 (4 with DB)    Attendees: Client attended alone    Service Modality:  Video Visit:      Provider verified identity through the following two step process.  Patient provided:  Patient is known previously to provider    Telemedicine Visit: The patient's condition can be safely assessed and treated via synchronous audio and visual telemedicine encounter.      Reason for Telemedicine Visit: Services only offered telehealth    Originating Site (Patient Location): Patient's home    Distant Site (Provider Location): Provider Remote Setting- Home Office    Consent:  The patient/guardian has verbally consented to: the potential risks and benefits of telemedicine (video visit) versus in person care; bill my insurance or make self-payment for services provided; and responsibility for payment of non-covered services.     Patient would like the video invitation sent by:  [email or web address]    Mode of Communication:  Video Conference via [email or web address]    As the provider I attest to compliance with applicable laws and regulations related to telemedicine.    DATA  Interactive Complexity: No  Crisis: No        Progress Since Last Session (Related to Symptoms / Goals / Homework):   Symptoms: stable    Homework: Achieved / completed to satisfaction       Episode of Care Goals: Satisfactory progress - ACTION (Actively working towards change); Intervened by reinforcing change plan / affirming steps taken      There has been demonstrated improvement in functioning while patient has been engaged in psychotherapy/psychological service- if withdrawn the patient would deteriorate and/or relapse.      Current / Ongoing Stressors and  Concerns:   Marital stress;  from ;  will be moving into her home within the next couple of months              work related stressors     Treatment Objective(s) Addressed in This Session:   identify 1 fears / thoughts that contribute to feeling anxious  use assertive communication skills     Intervention:   Engaged in perspective taking    CBT: identified feelings, behaviors, and cognitions, guided discovery   Motivational interviewing: expressed empathy/understanding, use of reflective listening and open ended questions    Assessments completed prior to visit:  The following assessments were completed by patient for this visit: None    ASSESSMENT: Current Emotional / Mental Status (status of significant symptoms):   Risk status (Self / Other harm or suicidal ideation)   Patient denies current fears or concerns for personal safety.   Patient denies current or recent suicidal ideation or behaviors.   Patient denies current or recent homicidal ideation or behaviors.   Patient denies current or recent self injurious behavior or ideation.   Patient denies other safety concerns.   Patient reports there has been no change in risk factors since their last session.     Patient reports there has been no change in protective factors since their last session.     A safety and risk management plan has been previously developed.  Patient consented to co-developed safety plan.  Safety and risk management plan was completed.  Patient agreed to use safety plan should any safety concerns arise.  A copy was given to the patient.     Appearance:   Appropriate    Eye Contact:   Good    Psychomotor Behavior: Normal    Attitude:   Cooperative  Interested Friendly Open to feedback and support   Orientation:   All   Speech    Rate / Production: Talkative    Volume:  Normal    Mood:    Stable   Affect:    Appropriate    Thought Content:  Clear    Thought Form:  Coherent  Goal Directed  Logical    Insight:    Good       Medication Review:   No changes to current psychiatric medication(s)     Zoloft 50 mg      Medication Compliance:   Yes     Changes in Health Issues:   None reported     Chemical Use Review:   Substance Use: no active concerns     Tobacco Use: No current tobacco use.      Diagnosis:  Major Depressive Disorder, Moderate, in partial remission    Collateral Reports Completed:     Not Applicable    PLAN: (Patient Tasks / Therapist Tasks / Other)  Client shared goal to increase engagement in exercise and make improvements to diet.  Therapist encouraged client to recognize positive changes in relationship with .    Celeste Chavez, [facility] 4/29/2022    ______________________________________________________________________    Individual Treatment Plan    Patient's Name: Natalee Maya  YOB: 1970    Date of Creation: 7/2/2020  Date Treatment Plan Last Reviewed/Revised: 3/25/2022    DSM5 Diagnoses: Major Depressive Disorder, Moderate, in partial remission  Psychosocial / Contextual Factors:  from   PROMIS (reviewed every 90 days): 25    Referral / Collaboration:  Referral to another professional/service is not indicated at this time..    Anticipated number of session for this episode of care: will review in 90 days  Anticipation frequency of session: Monthly  Anticipated Duration of each session: 38-52 minutes  Treatment plan will be reviewed in 90 days or when goals have been changed.       MeasurableTreatment Goal(s) related to diagnosis / functional impairment(s)  Goal 1: Patient will reduce symptoms of anxiety and depression by reporting MADINA-7 and PHQ-9 scores below a 5, where symptoms where symptoms occur fewer than half the days for a minimum of 4 weeks.     Objective #A (Patient Action)                          Patient will learn and utilize 3 techniques to manage emotions related to marital stress/conflict.  Status: Completed - Date: 5/14/2021   "     Intervention(s)  Therapist will teach relaxation techniques and ways to engage in self-care.     Objective #B  Patient will compile a list of boundaries that they would like to set with others. Utilize the boundaries .  Status: Completed - Date: 5/14/2021       Intervention(s)  Therapist will teach health boundaries and encourage client to identify their boundaries and implement them.     Objective #C  Patient will learn & utilize at least 1 assertive communication skills weekly.  Status: Completed - Date: 5/14/2021       Intervention(s)  Therapist will teach assertive communication skills.     Objective #D  Patient will make improvements to diet and increase engagement in exercise by setting weekly goals.              Status: Continued Date: 9/24/2021, 3/25/2022                            Intervention(s)              Therapist will encourage client to establish goals to work toward between appointments, assist with identifying any barriers and address them with client.       Objective #E  Patient will increase engagement in areas of interest.                Status: Completed Date: 3/25/2022                 Intervention(s)              Therapist will assist client with identifying negative self-talk that impacts engagement in areas of interest.                   Patient has reviewed and agreed to the above plan.        Celeste Chavez, [facility]                  7/2/2020  Celeste Chavez, [facility]                  5/14/2021  Celeste Chavez, [facility]                  9/24/2021  Celeste Chavez, [facility]                  3/25/2022                                                 Natalee Maya         SAFETY PLAN:  Step 1: Warning signs / cues (Thoughts, images, mood, situation, behavior) that a crisis may be developing:  ? Thoughts: \"I don't matter\", \"I'm a burden\", \"I can't do this anymore\", \"I just want this to end\" and \"Nothing makes it better\"  ? Images: none  ? Thinking Processes: ruminations (can't stop thinking " "about my problems): marital stresss, racing thoughts and highly critical and negative thoughts: about myself  ? Mood: worsening depression, hopelessness, agitation and mood swings  ? Behaviors: isolating/withdrawing  and can't stop crying  ? Situations: relationship problems   Step 2: Coping strategies - Things I can do to take my mind off of my problems without contacting another person (relaxation technique, physical activity):  ? Distress Tolerance Strategies:  read a book and geneology  ? Physical Activities: go for a walk  ? Focus on helpful thoughts:  \"This is temporary\", \"I will get through this\", \"It always passes\",   think about happy memories:   and remind myself of what is important to me:  Step 3: People and social settings that provide distraction:                 Name: Isaías                                      Phone: in phone                 Name: Dre                                        Phone: in phone                 Name: Polina                                     Phone: in phone                 Name: Tracy                                       Phone: in phone  ? work and go outside                 Step 4: Remind myself of people and things that are important to me and worth living for:  Children, , the job I do is important, I have talents and skills to offer people, and I am a good person  Step 5: When I am in crisis, I can ask these people to help me use my safety plan:                 Name: Isaías                                      Phone: [phone]                 Name: Dre                                        Phone: [phone]                 Name: Tracy                                       Phone: [phone]  Step 6: Making the environment safe:   ? be around others  Step 7: Professionals or agencies I can contact during a crisis:  ? [facility] Daytime Number: [phone]  ? Suicide Prevention Lifeline: 3-277-567-HXVO (3482)  ? Crisis Text Line Service " (available 24 hours a day, 7 days a week): Text [place] to 870169  ? Local Crisis Services: [facility] [phone]     Call 911 or go to my nearest emergency department.       I helped develop this safety plan and agree to use it when needed.  I have been given a copy of this plan.       Client signature _________________________________________________________________  Today s date:  1/8/2021  Adapted from Safety Plan Template 2008 Valerie Broderick and Alistair Briseno is reprinted with the express permission of the authors.  No portion of the Safety Plan Template may be reproduced without the express, written permission.  You can contact the authors at [email or web address] or [email or web address].

## 2022-05-12 ENCOUNTER — OFFICE VISIT (OUTPATIENT)
Dept: PEDIATRICS | Facility: CLINIC | Age: 52
End: 2022-05-12
Payer: COMMERCIAL

## 2022-05-12 ENCOUNTER — TELEPHONE (OUTPATIENT)
Dept: PEDIATRICS | Facility: CLINIC | Age: 52
End: 2022-05-12

## 2022-05-12 VITALS
OXYGEN SATURATION: 98 % | BODY MASS INDEX: 38.93 KG/M2 | HEART RATE: 74 BPM | DIASTOLIC BLOOD PRESSURE: 74 MMHG | RESPIRATION RATE: 16 BRPM | SYSTOLIC BLOOD PRESSURE: 116 MMHG | HEIGHT: 64 IN | WEIGHT: 228 LBS | TEMPERATURE: 97.3 F

## 2022-05-12 DIAGNOSIS — D70.9 NEUTROPENIA, UNSPECIFIED TYPE (H): Primary | ICD-10-CM

## 2022-05-12 DIAGNOSIS — E66.01 MORBID OBESITY (H): ICD-10-CM

## 2022-05-12 DIAGNOSIS — R03.0 ELEVATED BLOOD PRESSURE READING WITHOUT DIAGNOSIS OF HYPERTENSION: ICD-10-CM

## 2022-05-12 PROCEDURE — 99214 OFFICE O/P EST MOD 30 MIN: CPT | Performed by: NURSE PRACTITIONER

## 2022-05-12 RX ORDER — SEMAGLUTIDE 0.25 MG/.5ML
0.25 INJECTION, SOLUTION SUBCUTANEOUS WEEKLY
Qty: 2 ML | Refills: 0 | Status: SHIPPED | OUTPATIENT
Start: 2022-05-12 | End: 2022-05-24

## 2022-05-12 NOTE — PROGRESS NOTES
"  Assessment & Plan     (D70.9) Neutropenia, unspecified type (H)  (primary encounter diagnosis)  Comment: Follows with heme    (E66.01) Morbid obesity (H)  Comment: Phentermine was effective but stopped due to BP concerns. BP is variable today, as high as 150 but as low as 116.   Plan:   -Hold on phentermine  -Trial of Wegovy. If not covered, consider MTM visit to try to find a GLP-1 that's covered  -Recommended 5 minutes walking daily  -Follow-up 1 month with in person physical    (Z68.39) BMI 39.0-39.9,adult  Comment: See above.   Plan: Semaglutide-Weight Management (WEGOVY) 0.25         MG/0.5ML SOAJ            (R03.0) Elevated blood pressure reading without diagnosis of hypertension  Comment: Variable. See above.   Plan:   -Start daily walking  -Wegovy and weight loss may help with BP  -Recheck BP at home and in person in 1 month    No follow-ups on file.    Carolina Gomez, SHAQ CNP  [facility] SHARI Albright is a 51 year old who presents for the following health issues     HPI     F/u cough / sleep issues:    Review of Systems   Constitutional, HEENT, cardiovascular, pulmonary, gi and gu systems are negative, except as otherwise noted.      Objective    /74   Pulse 74   Temp 97.3  F (36.3  C)   Resp 16   Ht 1.626 m (5' 4\")   Wt 103.4 kg (228 lb)   LMP 05/05/2022   SpO2 98%   BMI 39.14 kg/m    Body mass index is 39.14 kg/m .  Physical Exam   CONSTITUTIONAL: Alert, well-nourished, well-groomed, NAD  HRRR S1 S2 No MRG. No peripheral edema      "

## 2022-05-12 NOTE — TELEPHONE ENCOUNTER
Prior Authorization Retail Medication Request    Medication/Dose: Wegovy auto-injectors  ICD code (if different than what is on RX):    Previously Tried and Failed:    Rationale:  BMI 39.0-39.9,adult [Z68.39]     Insurance Name:  [employer] ACCESS  Insurance ID:  [id number]      Pharmacy Information (if different than what is on RX)  Name:    Phone:

## 2022-05-17 NOTE — TELEPHONE ENCOUNTER
PA Initiation    Medication: Wegovy  Insurance Company: [employer] - Phone [phone] Fax [phone]  Pharmacy Filling the Rx: [id number] - [place], [place] - [address]  Filling Pharmacy Phone: [phone]  Filling Pharmacy Fax:    Start Date: 5/17/2022    Central Prior Authorization Team   Phone: [phone]

## 2022-05-18 DIAGNOSIS — E53.8 FOLATE DEFICIENCY: ICD-10-CM

## 2022-05-20 RX ORDER — FOLIC ACID 1 MG/1
TABLET ORAL
Qty: 90 TABLET | Refills: 2 | Status: SHIPPED | OUTPATIENT
Start: 2022-05-20 | End: 2023-02-20

## 2022-05-20 NOTE — TELEPHONE ENCOUNTER
Prescription approved per [facility], [facility] or [facility] refill protocol.  Gina ARDON - Registered Nurse  [facility]  Acute and Diagnostic Services

## 2022-05-20 NOTE — TELEPHONE ENCOUNTER
Prior Authorization Approval    Authorization Effective Date: 4/20/2022  Authorization Expiration Date: 11/20/2022  Medication: Wegovy  Approved Dose/Quantity: 1 Pen  Reference #:     Insurance Company: [employer] - Phone [phone] Fax [phone]  Expected CoPay:       CoPay Card Available:      Foundation Assistance Needed:    Which Pharmacy is filling the prescription (Not needed for infusion/clinic administered): [id number] [place], [place] - [address]  [facility]  Pharmacy Notified: Yes  Patient Notified: Comment:  Pharmacy will notify patient.

## 2022-05-25 ENCOUNTER — TELEPHONE (OUTPATIENT)
Dept: PEDIATRICS | Facility: CLINIC | Age: 52
End: 2022-05-25
Payer: COMMERCIAL

## 2022-05-25 NOTE — TELEPHONE ENCOUNTER
PA Initiation    Medication: Dmitriy VIEIRA Initiated  Insurance Company: [employer] - Phone [phone] Fax [phone]  Pharmacy Filling the Rx:    Filling Pharmacy Phone:    Filling Pharmacy Fax:    Start Date: 5/25/2022        Starr Rajan [facility] [email or web address]  Phone: [phone]  Fax: [phone]

## 2022-05-31 NOTE — TELEPHONE ENCOUNTER
Prior Authorization Approval    Authorization Effective Date:    Authorization Expiration Date:    Medication: Saxenda PA Approved  Approved Dose/Quantity: 3 pens  Reference #: [id number]   Insurance Company: [employer] - Phone [phone] Fax [phone]  Expected CoPay:       CoPay Card Available:      Foundation Assistance Needed:    Which Pharmacy is filling the prescription (Not needed for infusion/clinic administered): [facility] - [place] [address]  Pharmacy Notified:    Patient Notified:        Starr Rajan [facility]ia[facility]  [email or web address]  Phone: [phone]  Fax: [phone]

## 2022-06-16 ENCOUNTER — OFFICE VISIT (OUTPATIENT)
Dept: PEDIATRICS | Facility: CLINIC | Age: 52
End: 2022-06-16
Payer: COMMERCIAL

## 2022-06-16 VITALS
HEART RATE: 64 BPM | TEMPERATURE: 97.6 F | RESPIRATION RATE: 20 BRPM | WEIGHT: 230.3 LBS | BODY MASS INDEX: 40.8 KG/M2 | SYSTOLIC BLOOD PRESSURE: 126 MMHG | DIASTOLIC BLOOD PRESSURE: 88 MMHG | HEIGHT: 63 IN

## 2022-06-16 DIAGNOSIS — Z13.1 SCREENING FOR DIABETES MELLITUS: ICD-10-CM

## 2022-06-16 DIAGNOSIS — F41.9 ANXIETY: ICD-10-CM

## 2022-06-16 DIAGNOSIS — E03.8 SUBCLINICAL HYPOTHYROIDISM: Primary | ICD-10-CM

## 2022-06-16 DIAGNOSIS — M25.522 LEFT ELBOW PAIN: ICD-10-CM

## 2022-06-16 DIAGNOSIS — D72.819 LEUKOPENIA, UNSPECIFIED TYPE: ICD-10-CM

## 2022-06-16 DIAGNOSIS — I10 BENIGN ESSENTIAL HYPERTENSION: ICD-10-CM

## 2022-06-16 DIAGNOSIS — Z00.00 HEALTHCARE MAINTENANCE: ICD-10-CM

## 2022-06-16 LAB
BASOPHILS # BLD AUTO: 0 10E3/UL (ref 0–0.2)
BASOPHILS NFR BLD AUTO: 1 %
EOSINOPHIL # BLD AUTO: 0.1 10E3/UL (ref 0–0.7)
EOSINOPHIL NFR BLD AUTO: 3 %
ERYTHROCYTE [DISTWIDTH] IN BLOOD BY AUTOMATED COUNT: 13.3 % (ref 10–15)
HBA1C MFR BLD: 5.6 % (ref 0–5.6)
HCT VFR BLD AUTO: 36.4 % (ref 35–47)
HGB BLD-MCNC: 11.8 G/DL (ref 11.7–15.7)
LYMPHOCYTES # BLD AUTO: 0.9 10E3/UL (ref 0.8–5.3)
LYMPHOCYTES NFR BLD AUTO: 30 %
MCH RBC QN AUTO: 30.7 PG (ref 26.5–33)
MCHC RBC AUTO-ENTMCNC: 32.4 G/DL (ref 31.5–36.5)
MCV RBC AUTO: 95 FL (ref 78–100)
MONOCYTES # BLD AUTO: 0.3 10E3/UL (ref 0–1.3)
MONOCYTES NFR BLD AUTO: 11 %
NEUTROPHILS # BLD AUTO: 1.7 10E3/UL (ref 1.6–8.3)
NEUTROPHILS NFR BLD AUTO: 56 %
PLATELET # BLD AUTO: 239 10E3/UL (ref 150–450)
RBC # BLD AUTO: 3.84 10E6/UL (ref 3.8–5.2)
VIT B12 SERPL-MCNC: 417 PG/ML (ref 193–986)
WBC # BLD AUTO: 3.1 10E3/UL (ref 4–11)

## 2022-06-16 PROCEDURE — 80061 LIPID PANEL: CPT | Performed by: NURSE PRACTITIONER

## 2022-06-16 PROCEDURE — 36415 COLL VENOUS BLD VENIPUNCTURE: CPT | Performed by: NURSE PRACTITIONER

## 2022-06-16 PROCEDURE — 84443 ASSAY THYROID STIM HORMONE: CPT | Performed by: NURSE PRACTITIONER

## 2022-06-16 PROCEDURE — 82607 VITAMIN B-12: CPT | Performed by: NURSE PRACTITIONER

## 2022-06-16 PROCEDURE — 83550 IRON BINDING TEST: CPT | Performed by: NURSE PRACTITIONER

## 2022-06-16 PROCEDURE — 82746 ASSAY OF FOLIC ACID SERUM: CPT | Performed by: NURSE PRACTITIONER

## 2022-06-16 PROCEDURE — 83036 HEMOGLOBIN GLYCOSYLATED A1C: CPT | Performed by: NURSE PRACTITIONER

## 2022-06-16 PROCEDURE — 85025 COMPLETE CBC W/AUTO DIFF WBC: CPT | Performed by: NURSE PRACTITIONER

## 2022-06-16 PROCEDURE — 82728 ASSAY OF FERRITIN: CPT | Performed by: NURSE PRACTITIONER

## 2022-06-16 PROCEDURE — 99214 OFFICE O/P EST MOD 30 MIN: CPT | Performed by: NURSE PRACTITIONER

## 2022-06-16 NOTE — PROGRESS NOTES
"  Assessment & Plan     (E03.8) Subclinical hypothyroidism  (primary encounter diagnosis)  Comment: Needs recheck    (Z68.39) BMI 39.0-39.9,adult  Comment: Just started saxenda    (I10) Benign essential hypertension  Comment: Stable.     (Z13.1) Screening for diabetes mellitus    (F41.9) Anxiety  Comment: Stable  Plan: sertraline (ZOLOFT) 50 MG tablet    (M25.522) Left elbow pain  Comment: Possible tennis elbow but pain radiating to the wrist so unclear diagnosis  Plan: Wrist/Arm/Hand Supplies Order for DME - ONLY         FOR DME            (Z00.00) Healthcare maintenance  Plan: TSH WITH FREE T4 REFLEX, Hemoglobin A1c, Lipid         panel reflex to direct LDL Fasting, Adult         Dermatology Referral        Wants routine skin check    (D72.689) Leukopenia, unspecified type  Comment: Following with heme  Plan:     BMI:   Estimated body mass index is 40.47 kg/m  as calculated from the following:    Height as of this encounter: 1.607 m (5' 3.25\").    Weight as of this encounter: 104.5 kg (230 lb 4.8 oz).   Weight management plan: See above        No follow-ups on file.    Carolina Gomez, HSAQ CNP  [facility] SHARI Albright is a 51 year old, presenting for the following health issues:  Elbow Pain (/) and Hypertension      HPI     Concern - Lt elbow pain  Onset: 2-3 months ago  Description: numbness  Intensity: mild to moderate  Progression of Symptoms:  intermittent  Accompanying Signs & Symptoms: Pt notices sx when pulling and gripping  Previous history of similar problem: yes. Hx of carpel tunnel in pat  Precipitating factors:        Worsened by: working  Alleviating factors:        Improved by: resting arm  Therapies tried and outcome: IBU is somewhat effective    Hypertension Follow-up      Do you check your blood pressure regularly outside of the clinic? Yes     Are you following a low salt diet? No    Are your blood pressures ever more than 140 on the top number (systolic) OR " "more   than 90 on the bottom number (diastolic), for example 140/90? Yes      How many servings of fruits and vegetables do you eat daily?  0-1    On average, how many sweetened beverages do you drink each day (Examples: soda, juice, sweet tea, etc.  Do NOT count diet or artificially sweetened beverages)?   0    How many days per week do you exercise enough to make your heart beat faster? 3 or less    How many minutes a day do you exercise enough to make your heart beat faster? 30 - 60    How many days per week do you miss taking your medication? 0      Review of Systems   Constitutional, HEENT, cardiovascular, pulmonary, gi and gu systems are negative, except as otherwise noted.      Objective    /88   Pulse 64   Temp 97.6  F (36.4  C) (Oral)   Resp 20   Ht 1.607 m (5' 3.25\")   Wt 104.5 kg (230 lb 4.8 oz)   LMP 05/05/2022   BMI 40.47 kg/m    Body mass index is 40.47 kg/m .  Physical Exam   CONSTITUTIONAL: Alert, well-nourished, well-groomed, NAD  MSK: No ttp lateral epicondyle left        .  ..  "

## 2022-06-17 LAB
CHOLEST SERPL-MCNC: 285 MG/DL
FASTING STATUS PATIENT QL REPORTED: YES
FERRITIN SERPL-MCNC: 23 NG/ML (ref 8–252)
FOLATE SERPL-MCNC: 29.6 NG/ML
HDLC SERPL-MCNC: 63 MG/DL
IRON SATN MFR SERPL: 28 % (ref 15–46)
IRON SERPL-MCNC: 89 UG/DL (ref 35–180)
LDLC SERPL CALC-MCNC: 201 MG/DL
NONHDLC SERPL-MCNC: 222 MG/DL
TIBC SERPL-MCNC: 317 UG/DL (ref 240–430)
TRIGL SERPL-MCNC: 103 MG/DL
TSH SERPL DL<=0.005 MIU/L-ACNC: 3.09 MU/L (ref 0.4–4)

## 2022-06-20 ENCOUNTER — OFFICE VISIT (OUTPATIENT)
Dept: DERMATOLOGY | Facility: CLINIC | Age: 52
End: 2022-06-20
Payer: COMMERCIAL

## 2022-06-20 VITALS — HEART RATE: 78 BPM | SYSTOLIC BLOOD PRESSURE: 140 MMHG | OXYGEN SATURATION: 97 % | DIASTOLIC BLOOD PRESSURE: 91 MMHG

## 2022-06-20 DIAGNOSIS — Z00.00 HEALTHCARE MAINTENANCE: ICD-10-CM

## 2022-06-20 DIAGNOSIS — L91.8 INFLAMED SKIN TAG: Primary | ICD-10-CM

## 2022-06-20 PROCEDURE — 11200 RMVL SKIN TAGS UP TO&INC 15: CPT | Performed by: PHYSICIAN ASSISTANT

## 2022-06-20 NOTE — LETTER
2022         RE: Natalee Maya  [address] [place] [address] [address]        Dear Colleague,    Thank you for referring your patient, Natalee Maya, to the [facility]. Please see a copy of my visit note below.    HPI:  I was asked to see pt by Carolina NEW CNP. Natalee Maya is a 51 year old female patient here today for bothersome spots on left underarm .  Patient states this has been present for a while.  Patient reports the following symptoms: bothersome, irritated by shaving .  Patient reports the following previous treatments: none.  Patient reports the following modifying factors: none.  Associated symptoms: none.  Patient has no other skin complaints today.  Remainder of the HPI, Meds, PMH, Allergies, FH, and SH was reviewed in chart.      Past Medical History:   Diagnosis Date     Calculus of kidney     History of kidney stone -- Abstracted 02     Hypertension      Lymphedema of lower extremity        Past Surgical History:   Procedure Laterality Date     COLONOSCOPY Left 2021    Procedure: COLONOSCOPY; incomplete secondary to pt discomfort. will schedule CT colonography today if possible.;  Surgeon: Ceasar Cardoza MD;  Location:  GI     FOOT SURGERY      2017     [facility] NONSPECIFIC PROCEDURE       -- Abstracted 02        Family History   Problem Relation Age of Onset     Diabetes Father      C.A.D. Father         hyperlipidemia     Bleeding Disorder Mother         Factor 5     Breast Cancer No family hx of      Prostate Cancer No family hx of      Colon Cancer No family hx of        Social History     Socioeconomic History     Marital status:      Spouse name: Not on file     Number of children: Not on file     Years of education: Not on file     Highest education level: Not on file   Occupational History     Not on file   Tobacco Use     Smoking status: Never Smoker     Smokeless tobacco:  Never Used   Vaping Use     Vaping Use: Never used   Substance and Sexual Activity     Alcohol use: No     Drug use: No     Sexual activity: Yes     Partners: Male   Other Topics Concern     Parent/sibling w/ CABG, MI or angioplasty before 65F 55M? Not Asked   Social History Narrative     Not on file     Social Determinants of Health     Financial Resource Strain: Not on file   Food Insecurity: Not on file   Transportation Needs: Not on file   Physical Activity: Not on file   Stress: Not on file   Social Connections: Not on file   Intimate Partner Violence: Not on file   Housing Stability: Not on file       Outpatient Encounter Medications as of 6/20/2022   Medication Sig Dispense Refill     Ferrous Sulfate (IRON) 325 (65 Fe) MG tablet TAKE ONE TABLET BY MOUTH EVERY DAY, AVOID AROUND SAME TIME AS LEVOTHYROXINE 90 tablet 3     folic acid (FOLVITE) 1 MG tablet TAKE ONE TABLET BY MOUTH EVERY DAY 90 tablet 2     insulin pen needle (32G X 6 MM) 32G X 6 MM miscellaneous Use 1 pen needles daily or as directed. 90 each 0     levothyroxine (SYNTHROID/LEVOTHROID) 50 MCG tablet TAKE ONE TABLET BY MOUTH EVERY DAY 90 tablet 2     liraglutide - Weight Management (SAXENDA) 18 MG/3ML pen Inject 0.6 mg Subcutaneous daily for 7 days, THEN 1.2 mg daily for 7 days, THEN 1.8 mg daily for 7 days, THEN 2.4 mg daily for 7 days. 3 pen 0     losartan (COZAAR) 100 MG tablet Take 1 tablet (100 mg) by mouth daily 90 tablet 3     sertraline (ZOLOFT) 50 MG tablet Take 1.5 tablets (75 mg) by mouth daily 135 tablet 3     No facility-administered encounter medications on file as of 6/20/2022.       Review Of Systems:  Skin: spots  Eyes: negative  Ears/Nose/Throat: negative  Respiratory: No shortness of breath, dyspnea on exertion, cough, or hemoptysis  Cardiovascular: negative  Gastrointestinal: negative  Genitourinary: negative  Musculoskeletal: negative  Neurologic: negative  Psychiatric: negative  Hematologic/Lymphatic/Immunologic:  negative  Endocrine: negative      Objective:     BP (!) 140/91   Pulse 78   LMP 05/05/2022   SpO2 97%   Eyes: Conjunctivae/lids: Normal   ENT: Lips:  Normal  MSK: Normal  Cardiovascular: Peripheral edema none  Pulm: Breathing Normal  Neuro/Psych: Orientation: A/O x 3. Normal; Mood/Affect: Normal, NAD, WDWN  Pt accompanied by: self  Following areas examined: face ( wearing mask), left axilla  Mooney skin type:ii   Findings:  Inflamed flesh-colored smooth pedunculated papules on left axilla x 2    Assessment and Plan:     1) irritated skin tags x 2  Treatment of these lesions would be purely cosmetic and not medically neccessary.  Lesion may recur and/or may not completely resolve. May need additional treatment.  Different removal options including excision, cryotherapy, cautery and /or laser.    SHAVE REMOVAL: After consent, anesthesia with LEC and prep, SHAVE excision performed.  No complications and routine wound care.  May grow back and will get a scar. Wound care directions given.    Reviewed pertinent charts and labs prior to office visit.     Proper skin care from [facility]:    -Eliminate harsh soaps as they strip the natural oils from the skin, often resulting in dry itchy skin ( i.e. Dial, Zest, Bulgarian Spring)  -Use mild soaps such as Cetaphil or Dove Sensitive Skin in the shower. You do not need to use soap on arms, legs, and trunk every time you shower unless visibly soiled.   -Avoid hot or cold showers.  -After showering, lightly dry off and apply moisturizing within 2-3 minutes. This will help trap moisture in the skin.   -Aggressive use of a moisturizer at least 1-2 times a day to the entire body (including -Vanicream, Cetaphil, Aquaphor or Cerave) and moisturize hands after every washing.  -We recommend using moisturizers that come in a tub that needs to be scooped out, not a pump. This has more of an oil base. It will hold moisture in your skin much better than a water base  moisturizer. The above recommended are non-pore clogging.         It was a pleasure speaking with Natalee Maya today.       Follow up in PRN        Again, thank you for allowing me to participate in the care of your patient.        Sincerely,        Allison Calderon PA-C

## 2022-06-20 NOTE — PATIENT INSTRUCTIONS
Wound Care Instructions     FOR SUPERFICIAL WOUNDS     [facility] or     [facility] [phone]          AFTER 24 HOURS YOU SHOULD REMOVE THE BANDAGE AND BEGIN DAILY DRESSING CHANGES AS FOLLOWS:     1) Remove Dressing.     2) Clean and dry the area with tap water using a Q-tip or sterile gauze pad.     3) Apply Vaseline, Aquaphor, Polysporin ointment or Bacitracin ointment over entire wound.  Do NOT use Neosporin ointment.     4) Cover the wound with a band-aid, or a sterile non-stick gauze pad and micropore paper tape      REPEAT THESE INSTRUCTIONS AT LEAST ONCE A DAY UNTIL THE WOUND HAS COMPLETELY HEALED.    It is an old wives tale that a wound heals better when it is exposed to air and allowed to dry out. The wound will heal faster with a better cosmetic result if it is kept moist with ointment and covered with a bandage.    **Do not let the wound dry out.**      Supplies Needed:      *Cotton tipped applicators (Q-tips)    *Polysporin Ointment or Bacitracin Ointment (NOT NEOSPORIN)    *Band-aids or non-stick gauze pads and micropore paper tape.      PATIENT INFORMATION:    During the healing process you will notice a number of changes. All wounds develop a small halo of redness surrounding the wound.  This means healing is occurring. Severe itching with extensive redness usually indicates sensitivity to the ointment or bandage tape used to dress the wound.  You should call our office if this develops.      Swelling  and/or discoloration around your surgical site is common, particularly when performed around the eye.    All wounds normally drain.  The larger the wound the more drainage there will be.  After 7-10 days, you will notice the wound beginning to shrink and new skin will begin to grow.  The wound is healed when you can see skin has formed over the entire area.  A healed wound has a healthy, shiny look to the surface and is red to dark pink in color to normalize.   Wounds may take approximately 4-6 weeks to heal.  Larger wounds may take 6-8 weeks.  After the wound is healed you may discontinue dressing changes.    You may experience a sensation of tightness as your wound heals. This is normal and will gradually subside.    Your healed wound may be sensitive to temperature changes. This sensitivity improves with time, but if you re having a lot of discomfort, try to avoid temperature extremes.    Patients frequently experience itching after their wound appears to have healed because of the continue healing under the skin.  Plain Vaseline will help relieve the itching.        POSSIBLE COMPLICATIONS    BLEEDING:    Leave the bandage in place.  Use tightly rolled up gauze or a cloth to apply direct pressure over the bandage for 30  minutes.  Reapply pressure for an additional 30 minutes if necessary  Use additional gauze and tape to maintain pressure once the bleeding has stopped.

## 2022-06-20 NOTE — PROGRESS NOTES
HPI:  I was asked to see pt by Carolina NEW CNP. Natalee Maya is a 51 year old female patient here today for bothersome spots on left underarm .  Patient states this has been present for a while.  Patient reports the following symptoms: bothersome, irritated by shaving .  Patient reports the following previous treatments: none.  Patient reports the following modifying factors: none.  Associated symptoms: none.  Patient has no other skin complaints today.  Remainder of the HPI, Meds, PMH, Allergies, FH, and SH was reviewed in chart.      Past Medical History:   Diagnosis Date     Calculus of kidney     History of kidney stone -- Abstracted 02     Hypertension      Lymphedema of lower extremity        Past Surgical History:   Procedure Laterality Date     COLONOSCOPY Left 2021    Procedure: COLONOSCOPY; incomplete secondary to pt discomfort. will schedule CT colonography today if possible.;  Surgeon: Ceasar Cardoza MD;  Location:  GI     FOOT SURGERY      2017     [facility] NONSPECIFIC PROCEDURE       -- Abstracted 02        Family History   Problem Relation Age of Onset     Diabetes Father      C.A.D. Father         hyperlipidemia     Bleeding Disorder Mother         Factor 5     Breast Cancer No family hx of      Prostate Cancer No family hx of      Colon Cancer No family hx of        Social History     Socioeconomic History     Marital status:      Spouse name: Not on file     Number of children: Not on file     Years of education: Not on file     Highest education level: Not on file   Occupational History     Not on file   Tobacco Use     Smoking status: Never Smoker     Smokeless tobacco: Never Used   Vaping Use     Vaping Use: Never used   Substance and Sexual Activity     Alcohol use: No     Drug use: No     Sexual activity: Yes     Partners: Male   Other Topics Concern     Parent/sibling w/ CABG, MI or angioplasty before 65F 55M? Not Asked   Social History  Narrative     Not on file     Social Determinants of Health     Financial Resource Strain: Not on file   Food Insecurity: Not on file   Transportation Needs: Not on file   Physical Activity: Not on file   Stress: Not on file   Social Connections: Not on file   Intimate Partner Violence: Not on file   Housing Stability: Not on file       Outpatient Encounter Medications as of 6/20/2022   Medication Sig Dispense Refill     Ferrous Sulfate (IRON) 325 (65 Fe) MG tablet TAKE ONE TABLET BY MOUTH EVERY DAY, AVOID AROUND SAME TIME AS LEVOTHYROXINE 90 tablet 3     folic acid (FOLVITE) 1 MG tablet TAKE ONE TABLET BY MOUTH EVERY DAY 90 tablet 2     insulin pen needle (32G X 6 MM) 32G X 6 MM miscellaneous Use 1 pen needles daily or as directed. 90 each 0     levothyroxine (SYNTHROID/LEVOTHROID) 50 MCG tablet TAKE ONE TABLET BY MOUTH EVERY DAY 90 tablet 2     liraglutide - Weight Management (SAXENDA) 18 MG/3ML pen Inject 0.6 mg Subcutaneous daily for 7 days, THEN 1.2 mg daily for 7 days, THEN 1.8 mg daily for 7 days, THEN 2.4 mg daily for 7 days. 3 pen 0     losartan (COZAAR) 100 MG tablet Take 1 tablet (100 mg) by mouth daily 90 tablet 3     sertraline (ZOLOFT) 50 MG tablet Take 1.5 tablets (75 mg) by mouth daily 135 tablet 3     No facility-administered encounter medications on file as of 6/20/2022.       Review Of Systems:  Skin: spots  Eyes: negative  Ears/Nose/Throat: negative  Respiratory: No shortness of breath, dyspnea on exertion, cough, or hemoptysis  Cardiovascular: negative  Gastrointestinal: negative  Genitourinary: negative  Musculoskeletal: negative  Neurologic: negative  Psychiatric: negative  Hematologic/Lymphatic/Immunologic: negative  Endocrine: negative      Objective:     BP (!) 140/91   Pulse 78   LMP 05/05/2022   SpO2 97%   Eyes: Conjunctivae/lids: Normal   ENT: Lips:  Normal  MSK: Normal  Cardiovascular: Peripheral edema none  Pulm: Breathing Normal  Neuro/Psych: Orientation: A/O x 3. Normal;  Mood/Affect: Normal, NAD, WDWN  Pt accompanied by: self  Following areas examined: face ( wearing mask), left axilla  Mooney skin type:ii   Findings:  Inflamed flesh-colored smooth pedunculated papules on left axilla x 2    Assessment and Plan:     1) irritated skin tags x 2  Treatment of these lesions would be purely cosmetic and not medically neccessary.  Lesion may recur and/or may not completely resolve. May need additional treatment.  Different removal options including excision, cryotherapy, cautery and /or laser.    SHAVE REMOVAL: After consent, anesthesia with LEC and prep, SHAVE excision performed.  No complications and routine wound care.  May grow back and will get a scar. Wound care directions given.    Reviewed pertinent charts and labs prior to office visit.     Proper skin care from [facility]:    -Eliminate harsh soaps as they strip the natural oils from the skin, often resulting in dry itchy skin ( i.e. Dial, Zest, [place] Spring)  -Use mild soaps such as Cetaphil or Dove Sensitive Skin in the shower. You do not need to use soap on arms, legs, and trunk every time you shower unless visibly soiled.   -Avoid hot or cold showers.  -After showering, lightly dry off and apply moisturizing within 2-3 minutes. This will help trap moisture in the skin.   -Aggressive use of a moisturizer at least 1-2 times a day to the entire body (including -Vanicream, Cetaphil, Aquaphor or Cerave) and moisturize hands after every washing.  -We recommend using moisturizers that come in a tub that needs to be scooped out, not a pump. This has more of an oil base. It will hold moisture in your skin much better than a water base moisturizer. The above recommended are non-pore clogging.         It was a pleasure speaking with Natalee Maya today.       Follow up in PRN

## 2022-06-23 ENCOUNTER — VIRTUAL VISIT (OUTPATIENT)
Dept: PSYCHOLOGY | Facility: CLINIC | Age: 52
End: 2022-06-23
Payer: COMMERCIAL

## 2022-06-23 DIAGNOSIS — F33.41 MAJOR DEPRESSIVE DISORDER, RECURRENT EPISODE, IN PARTIAL REMISSION WITH ANXIOUS DISTRESS (H): Primary | ICD-10-CM

## 2022-06-23 PROCEDURE — 90834 PSYTX W PT 45 MINUTES: CPT | Mod: GT | Performed by: SOCIAL WORKER

## 2022-06-23 NOTE — PROGRESS NOTES
[facility]                                     Progress Note    Patient Name: Natalee Maya  Date: 6/23/2022       Service Type: Individual      Session Start Time: 7:03 AM  Session End Time: 7:42 AM    Session Length: 38-52 minutes    Session #: 29 (4 with DB)    Attendees: Client attended alone    Service Modality:  Video Visit:      Provider verified identity through the following two step process.  Patient provided:  Patient is known previously to provider    Telemedicine Visit: The patient's condition can be safely assessed and treated via synchronous audio and visual telemedicine encounter.      Reason for Telemedicine Visit: Services only offered telehealth    Originating Site (Patient Location): Patient's home    Distant Site (Provider Location): Provider Remote Setting- Home Office    Consent:  The patient/guardian has verbally consented to: the potential risks and benefits of telemedicine (video visit) versus in person care; bill my insurance or make self-payment for services provided; and responsibility for payment of non-covered services.     Patient would like the video invitation sent by:  [email or web address]    Mode of Communication:  Video Conference via [email or web address]    As the provider I attest to compliance with applicable laws and regulations related to telemedicine.    DATA  Interactive Complexity: No  Crisis: No        Progress Since Last Session (Related to Symptoms / Goals / Homework):   Symptoms: no change; continue stable mood    Homework: Achieved / completed to satisfaction       Episode of Care Goals: Satisfactory progress - MAINTENANCE (Working to maintain change, with risk of relapse); Intervened by continuing to positively reinforce healthy behavior choice       There has been demonstrated improvement in functioning while patient has been engaged in psychotherapy/psychological service- if withdrawn the patient would deteriorate and/or relapse.      Current / Ongoing Stressors  and Concerns:    moved in with her; client reported it has been going well.  Client reported she is making improvements on being less guarded and concerned about being hurt again     Treatment Objective(s) Addressed in This Session:   identify 1 fears / thoughts that contribute to feeling anxious  use assertive communication skills     Intervention:   CBT:  identified feelings, behaviors, and cognitions, guided discovery, reinforced behavior changes    Motivational interviewing: expressed empathy/understanding, use of reflective listening and open ended questions    Assessments completed prior to visit:  The following assessments were completed by patient for this visit: None    ASSESSMENT: Current Emotional / Mental Status (status of significant symptoms):   Risk status (Self / Other harm or suicidal ideation)   Patient denies current fears or concerns for personal safety.   Patient denies current or recent suicidal ideation or behaviors.   Patient denies current or recent homicidal ideation or behaviors.   Patient denies current or recent self injurious behavior or ideation.   Patient denies other safety concerns.   Patient reports there has been no change in risk factors since their last session.     Patient reports there has been no change in protective factors since their last session.     A safety and risk management plan has been previously developed.  Patient consented to co-developed safety plan.  Safety and risk management plan was completed.  Patient agreed to use safety plan should any safety concerns arise.  A copy was given to the patient.     Appearance:   Appropriate    Eye Contact:   Good    Psychomotor Behavior: Normal    Attitude:   Cooperative  Interested Friendly Pleasant Open to feedback and support   Orientation:   All   Speech    Rate / Production: Normal/ Responsive    Volume:  Normal    Mood:    Stable   Affect:    Appropriate    Thought Content:  Clear    Thought Form:  Coherent   Goal Directed  Logical    Insight:    Good      Medication Review:   No changes to current psychiatric medication(s)     Zoloft 75 mg ; plans to decrease in the future     Medication Compliance:   Yes     Changes in Health Issues:   None reported     Chemical Use Review:   Substance Use: no active concerns     Tobacco Use: No current tobacco use.      Diagnosis:  Major Depressive Disorder, Moderate, in partial remission    Collateral Reports Completed:     Not Applicable    PLAN: (Patient Tasks / Therapist Tasks / Other)  Started engaging in Noom.  Client will continue to maintain behavior changes.  Therapist will complete [id number]    Celeste Chavez, [facility] 6/23/2022    ______________________________________________________________________    Individual Treatment Plan    Patient's Name: Natalee Maya  YOB: 1970    Date of Creation: 7/2/2020  Date Treatment Plan Last Reviewed/Revised: 3/25/2022    DSM5 Diagnoses: Major Depressive Disorder, Moderate, in partial remission  Psychosocial / Contextual Factors:  from   PROMIS (reviewed every 90 days): 25    Referral / Collaboration:  Referral to another professional/service is not indicated at this time..    Anticipated number of session for this episode of care: will review in 90 days  Anticipation frequency of session: Monthly  Anticipated Duration of each session: 38-52 minutes  Treatment plan will be reviewed in 90 days or when goals have been changed.       MeasurableTreatment Goal(s) related to diagnosis / functional impairment(s)  Goal 1: Patient will reduce symptoms of anxiety and depression by reporting MADINA-7 and PHQ-9 scores below a 5, where symptoms where symptoms occur fewer than half the days for a minimum of 4 weeks.     Objective #A (Patient Action)                          Patient will learn and utilize 3 techniques to manage emotions related to marital stress/conflict.  Status: Completed - Date: 5/14/2021   "     Intervention(s)  Therapist will teach relaxation techniques and ways to engage in self-care.     Objective #B  Patient will compile a list of boundaries that they would like to set with others. Utilize the boundaries .  Status: Completed - Date: 5/14/2021       Intervention(s)  Therapist will teach health boundaries and encourage client to identify their boundaries and implement them.     Objective #C  Patient will learn & utilize at least 1 assertive communication skills weekly.  Status: Completed - Date: 5/14/2021       Intervention(s)  Therapist will teach assertive communication skills.     Objective #D  Patient will make improvements to diet and increase engagement in exercise by setting weekly goals.              Status: Continued Date: 9/24/2021, 3/25/2022                            Intervention(s)              Therapist will encourage client to establish goals to work toward between appointments, assist with identifying any barriers and address them with client.       Objective #E  Patient will increase engagement in areas of interest.                Status: Completed Date: 3/25/2022                 Intervention(s)              Therapist will assist client with identifying negative self-talk that impacts engagement in areas of interest.                   Patient has reviewed and agreed to the above plan.        Celeste Chavez, [facility]                  7/2/2020  Celeste Chavez, [facility]                  5/14/2021  Celeste Chavez, [facility]                  9/24/2021  Celeste Chavez, [facility]                  3/25/2022                                                 Natalee Maya         SAFETY PLAN:  Step 1: Warning signs / cues (Thoughts, images, mood, situation, behavior) that a crisis may be developing:  ? Thoughts: \"I don't matter\", \"I'm a burden\", \"I can't do this anymore\", \"I just want this to end\" and \"Nothing makes it better\"  ? Images: none  ? Thinking Processes: ruminations (can't stop thinking " "about my problems): marital stresss, racing thoughts and highly critical and negative thoughts: about myself  ? Mood: worsening depression, hopelessness, agitation and mood swings  ? Behaviors: isolating/withdrawing  and can't stop crying  ? Situations: relationship problems   Step 2: Coping strategies - Things I can do to take my mind off of my problems without contacting another person (relaxation technique, physical activity):  ? Distress Tolerance Strategies:  read a book and geneology  ? Physical Activities: go for a walk  ? Focus on helpful thoughts:  \"This is temporary\", \"I will get through this\", \"It always passes\",   think about happy memories:   and remind myself of what is important to me:  Step 3: People and social settings that provide distraction:                 Name: Isaías                                      Phone: in phone                 Name: Dre                                        Phone: in phone                 Name: Polina                                     Phone: in phone                 Name: Tracy                                       Phone: in phone  ? work and go outside                 Step 4: Remind myself of people and things that are important to me and worth living for:  Children, , the job I do is important, I have talents and skills to offer people, and I am a good person  Step 5: When I am in crisis, I can ask these people to help me use my safety plan:                 Name: Isaías                                      Phone: [phone]                 Name: Dre                                        Phone: [phone]                 Name: Tracy                                       Phone: [phone]  Step 6: Making the environment safe:   ? be around others  Step 7: Professionals or agencies I can contact during a crisis:  ? [facility] Daytime Number: [phone]  ? Suicide Prevention Lifeline: 5-800-564-VZFP (1515)  ? Crisis Text Line Service " (available 24 hours a day, 7 days a week): Text [place] to 973423  ? Local Crisis Services: [facility] [phone]     Call 911 or go to my nearest emergency department.       I helped develop this safety plan and agree to use it when needed.  I have been given a copy of this plan.       Client signature _________________________________________________________________  Today s date:  1/8/2021  Adapted from Safety Plan Template 2008 Valerie Broderick and Alistair Briseno is reprinted with the express permission of the authors.  No portion of the Safety Plan Template may be reproduced without the express, written permission.  You can contact the authors at bhs@[facility] or [email or web address].

## 2022-07-08 ENCOUNTER — MYC MEDICAL ADVICE (OUTPATIENT)
Dept: PEDIATRICS | Facility: CLINIC | Age: 52
End: 2022-07-08

## 2022-07-08 DIAGNOSIS — E66.01 MORBID OBESITY (H): ICD-10-CM

## 2022-07-08 NOTE — TELEPHONE ENCOUNTER
Carolina,    Please see [facility] message and advise as to when pt should go up to the 3 mg dose    Thank you  Wayne Negron RN on 7/8/2022 at 11:58 AM

## 2022-07-20 DIAGNOSIS — E03.8 SUBCLINICAL HYPOTHYROIDISM: ICD-10-CM

## 2022-07-22 RX ORDER — LEVOTHYROXINE SODIUM 50 UG/1
50 TABLET ORAL DAILY
Qty: 90 TABLET | Refills: 2 | Status: SHIPPED | OUTPATIENT
Start: 2022-07-22 | End: 2023-05-22

## 2022-07-22 NOTE — TELEPHONE ENCOUNTER
"Requested Prescriptions   Pending Prescriptions Disp Refills     levothyroxine (SYNTHROID/LEVOTHROID) 50 MCG tablet 90 tablet 2     Sig: Take 1 tablet (50 mcg) by mouth daily       Thyroid Protocol Failed - 7/22/2022 11:10 AM        Failed - Recent (12 mo) or future (30 days) visit within the authorizing provider's specialty     Patient has had an office visit with the authorizing provider or a provider within the authorizing providers department within the previous 12 mos or has a future within next 30 days. See \"Patient Info\" tab in inbasket, or \"Choose Columns\" in Meds & Orders section of the refill encounter.              Passed - Patient is 12 years or older        Passed - Medication is active on med list        Passed - Normal TSH on file in past 12 months     Recent Labs   Lab Test 06/16/22  1138   TSH 3.09              Passed - No active pregnancy on record     If patient is pregnant or has had a positive pregnancy test, please check TSH.          Passed - No positive pregnancy test in past 12 months     If patient is pregnant or has had a positive pregnancy test, please check TSH.             Routing refill request to provider for review/approval because:  Patient needs to be seen because it has been more than 1 year since last office visit.    Thanks,  SIENA Huff  [facility]           "

## 2022-08-15 ENCOUNTER — LAB (OUTPATIENT)
Dept: LAB | Facility: CLINIC | Age: 52
End: 2022-08-15
Payer: COMMERCIAL

## 2022-08-15 ENCOUNTER — HOSPITAL ENCOUNTER (OUTPATIENT)
Facility: CLINIC | Age: 52
Discharge: HOME | End: 2022-08-15
Payer: COMMERCIAL

## 2022-08-15 ENCOUNTER — ALLIED HEALTH/NURSE VISIT (OUTPATIENT)
Dept: PEDIATRICS | Facility: CLINIC | Age: 52
End: 2022-08-15

## 2022-08-15 VITALS — SYSTOLIC BLOOD PRESSURE: 120 MMHG | DIASTOLIC BLOOD PRESSURE: 78 MMHG

## 2022-08-15 DIAGNOSIS — I10 BENIGN ESSENTIAL HYPERTENSION: ICD-10-CM

## 2022-08-15 DIAGNOSIS — D72.819 LEUKOPENIA, UNSPECIFIED TYPE: ICD-10-CM

## 2022-08-15 DIAGNOSIS — Z01.30 BP CHECK: Primary | ICD-10-CM

## 2022-08-15 DIAGNOSIS — D72.819 LEUKOPENIA, UNSPECIFIED TYPE: Primary | ICD-10-CM

## 2022-08-15 LAB
ANION GAP SERPL CALCULATED.3IONS-SCNC: 7 MMOL/L (ref 3–14)
BASOPHILS # BLD AUTO: 0 10E3/UL (ref 0–0.2)
BASOPHILS NFR BLD AUTO: 0 %
BUN SERPL-MCNC: 12 MG/DL (ref 7–30)
CALCIUM SERPL-MCNC: 9.3 MG/DL (ref 8.5–10.1)
CHLORIDE BLD-SCNC: 107 MMOL/L (ref 94–109)
CO2 SERPL-SCNC: 24 MMOL/L (ref 20–32)
CREAT SERPL-MCNC: 0.59 MG/DL (ref 0.52–1.04)
EOSINOPHIL # BLD AUTO: 0.1 10E3/UL (ref 0–0.7)
EOSINOPHIL NFR BLD AUTO: 4 %
ERYTHROCYTE [DISTWIDTH] IN BLOOD BY AUTOMATED COUNT: 12.8 % (ref 10–15)
GFR SERPL CREATININE-BSD FRML MDRD: >90 ML/MIN/1.73M2
GLUCOSE BLD-MCNC: 92 MG/DL (ref 70–99)
HCT VFR BLD AUTO: 38.1 % (ref 35–47)
HGB BLD-MCNC: 12.6 G/DL (ref 11.7–15.7)
HOLD SPECIMEN: NORMAL
LYMPHOCYTES # BLD AUTO: 1 10E3/UL (ref 0.8–5.3)
LYMPHOCYTES NFR BLD AUTO: 38 %
MCH RBC QN AUTO: 31.3 PG (ref 26.5–33)
MCHC RBC AUTO-ENTMCNC: 33.1 G/DL (ref 31.5–36.5)
MCV RBC AUTO: 95 FL (ref 78–100)
MONOCYTES # BLD AUTO: 0.4 10E3/UL (ref 0–1.3)
MONOCYTES NFR BLD AUTO: 14 %
NEUTROPHILS # BLD AUTO: 1.2 10E3/UL (ref 1.6–8.3)
NEUTROPHILS NFR BLD AUTO: 44 %
PLATELET # BLD AUTO: 267 10E3/UL (ref 150–450)
POTASSIUM BLD-SCNC: 4 MMOL/L (ref 3.4–5.3)
RBC # BLD AUTO: 4.03 10E6/UL (ref 3.8–5.2)
SODIUM SERPL-SCNC: 138 MMOL/L (ref 133–144)
WBC # BLD AUTO: 2.7 10E3/UL (ref 4–11)

## 2022-08-15 PROCEDURE — 99207 PR NO CHARGE NURSE ONLY: CPT | Performed by: NURSE PRACTITIONER

## 2022-08-15 PROCEDURE — 36415 COLL VENOUS BLD VENIPUNCTURE: CPT

## 2022-08-15 PROCEDURE — 80048 BASIC METABOLIC PNL TOTAL CA: CPT

## 2022-08-15 PROCEDURE — 85025 COMPLETE CBC W/AUTO DIFF WBC: CPT

## 2022-08-15 NOTE — PROGRESS NOTES
Natalee Maya was evaluated at [facility] on August 15, 2022 at which time her blood pressure was:    BP Readings from Last 3 Encounters:   08/15/22 120/78   06/20/22 (!) 140/91   06/16/22 126/88     Pulse Readings from Last 3 Encounters:   06/20/22 78   06/16/22 64   05/12/22 74       Reviewed lifestyle modifications for blood pressure control and reduction: including making healthy food choices, managing weight, getting regular exercise, smoking cessation, reducing alcohol consumption, monitoring blood pressure regularly.     Symptoms: None    BP Goal:< 140/90 mmHg    BP Assessment:  BP at goal    Potential Reasons for BP too high: NA - Not applicable    BP Follow-Up Plan: Recheck BP in 6 months at pharmacy    Recommendation to Provider: pt in pharmacy to check BP and compare with her home monitor. She thought it had been running high but suspecting her cuff wasn't reading correct. She currently takes HBP medication and reports taking daily, denied side effects. Her home cuff reading was taken 5 minutes after and was 128/79. We discussed her technique using her home cuff and suggested she rest her arm palm facing up, maybe try a different time of day since the morning is a busy time of day. She will continue to monitor and return to pharmacy if still getting higher than expected readings.     Note completed by:   Concepcion Nolan, Evangelina  [facility]  [phone]

## 2022-08-18 ENCOUNTER — VIRTUAL VISIT (OUTPATIENT)
Dept: ONCOLOGY | Facility: CLINIC | Age: 52
End: 2022-08-18
Attending: INTERNAL MEDICINE
Payer: COMMERCIAL

## 2022-08-18 ENCOUNTER — TELEPHONE (OUTPATIENT)
Dept: ONCOLOGY | Facility: CLINIC | Age: 52
End: 2022-08-18

## 2022-08-18 DIAGNOSIS — D50.9 IRON DEFICIENCY ANEMIA, UNSPECIFIED IRON DEFICIENCY ANEMIA TYPE: ICD-10-CM

## 2022-08-18 DIAGNOSIS — D70.9 NEUTROPENIA, UNSPECIFIED TYPE (H): ICD-10-CM

## 2022-08-18 DIAGNOSIS — D72.819 LEUKOPENIA, UNSPECIFIED TYPE: Primary | ICD-10-CM

## 2022-08-18 PROCEDURE — G0463 HOSPITAL OUTPT CLINIC VISIT: HCPCS | Mod: PN,RTG | Performed by: INTERNAL MEDICINE

## 2022-08-18 PROCEDURE — 99213 OFFICE O/P EST LOW 20 MIN: CPT | Mod: GT | Performed by: INTERNAL MEDICINE

## 2022-08-18 NOTE — LETTER
8/18/2022         RE: Natalee Maya  [address]  [place] [address]        Dear Colleague,    Thank you for referring your patient, Natalee Maya, to the [facility]. Please see a copy of my visit note below.    Natalee is a 51 year old who is being evaluated via a billable video visit.      How would you like to obtain your AVS? MyChart  If the video visit is dropped, the invitation should be resent by: Text to cell phone: [phone]  Will anyone else be joining your video visit? No     Janine KRAUSE          Video-Visit Details    Video Start Time: 8:08 AM    Type of service:  Video Visit    Video End Time: 8:16 AM    Originating Location (pt. Location): Home    Distant Location (provider location):  [facility]     Platform used for Video Visit: Xcerion    [facility]    Hematology/Oncology Established Patient Follow-up Note    Treatment Summary:      Today's Date: 8/18/22    Reason for Follow-up: Leukopenia      HISTORY OF PRESENT ILLNESS: Natalee Maya is a 51 year old female with PMHx of hypothyroidism who presents with leukopenia.  She was found on labs in June 2020 to have low WBC of 1.9K.  Repeat checks have been in the 2K range.  Hemoglobin and platelet count were normal.  Differential shows a moderate to mild neutropenia.  On further chart review, she had a mild leukopenia in the 3K range back in 2018, but did recover back to normal a few months later.  There are not many labs prior to that.  Iron, ferritin, vitamin 12, and folate were normal.  Peripheral smear shows low WBC, but non-specific.       She denies recent illness, infections, fever.  Her Prozac dose increased a few months ago.  Otherwise, she has not started any new medications recently.       She does not smoke.  She drinks a glass of wine maybe once or twice a month.  She denies illicit drug use.      INTERIM  HISTORY:  Patient continues to do well. She is on/of iron supplementation. No B symptoms. No gross evidence of bleed. No recent infection or antibiotic use.        REVIEW OF SYSTEMS:   A 14 point ROS was reviewed with pertinent positives and negatives in the HPI.       HOME MEDICATIONS:  Current Outpatient Medications   Medication Sig Dispense Refill     Ferrous Sulfate (IRON) 325 (65 Fe) MG tablet TAKE ONE TABLET BY MOUTH EVERY DAY, AVOID AROUND SAME TIME AS LEVOTHYROXINE 90 tablet 3     folic acid (FOLVITE) 1 MG tablet TAKE ONE TABLET BY MOUTH EVERY DAY 90 tablet 2     insulin pen needle (32G X 6 MM) 32G X 6 MM miscellaneous Use 1 pen needles daily or as directed. 90 each 0     levothyroxine (SYNTHROID/LEVOTHROID) 50 MCG tablet Take 1 tablet (50 mcg) by mouth daily 90 tablet 2     liraglutide - Weight Management (SAXENDA) 18 MG/3ML pen Inject 3 mg Subcutaneous daily for 90 days 15 pen 1     losartan (COZAAR) 100 MG tablet Take 1 tablet (100 mg) by mouth daily 90 tablet 3     sertraline (ZOLOFT) 50 MG tablet Take 1.5 tablets (75 mg) by mouth daily 135 tablet 3         ALLERGIES:  Allergies   Allergen Reactions     No Known Drug Allergies          PAST MEDICAL HISTORY:  Past Medical History:   Diagnosis Date     Calculus of kidney     History of kidney stone -- Abstracted 02     Hypertension      Lymphedema of lower extremity          PAST SURGICAL HISTORY:  Past Surgical History:   Procedure Laterality Date     COLONOSCOPY Left 2021    Procedure: COLONOSCOPY; incomplete secondary to pt discomfort. will schedule CT colonography today if possible.;  Surgeon: Ceasar Cardoza MD;  Location:  GI     FOOT SURGERY      2017     [email or web address] NONSPECIFIC PROCEDURE       -- Abstracted 02         SOCIAL HISTORY:  Social History     Socioeconomic History     Marital status:      Spouse name: Not on file     Number of children: Not on file     Years of education: Not on file     Highest  education level: Not on file   Occupational History     Not on file   Tobacco Use     Smoking status: Never Smoker     Smokeless tobacco: Never Used   Vaping Use     Vaping Use: Never used   Substance and Sexual Activity     Alcohol use: No     Drug use: No     Sexual activity: Yes     Partners: Male   Other Topics Concern     Parent/sibling w/ CABG, MI or angioplasty before 65F 55M? Not Asked   Social History Narrative     Not on file     Social Determinants of Health     Financial Resource Strain: Not on file   Food Insecurity: Not on file   Transportation Needs: Not on file   Physical Activity: Not on file   Stress: Not on file   Social Connections: Not on file   Intimate Partner Violence: Not on file   Housing Stability: Not on file         FAMILY HISTORY:  Family History   Problem Relation Age of Onset     Diabetes Father      C.A.D. Father         hyperlipidemia     Bleeding Disorder Mother         Factor 5     Breast Cancer No family hx of      Prostate Cancer No family hx of      Colon Cancer No family hx of          PHYSICAL EXAM:  Vital signs:    ECO  GENERAL/CONSTITUTIONAL: No acute distress. Healthy, alert..    RESPIRATORY: No audible wheeze, cough, or visible cyanosis.  No visible retractions or increased work of breathing.  Able to speak fully in complete sentences.  NEUROLOGIC: Alert, oriented, answers questions appropriately. No tremor. Mentation intact and speech normal  PSYCHIATRIC:  Mentation appears normal, affect normal/bright, judgement and insight intact, normal speech and appearance well-groomed.     The rest of a comprehensive physical exam is deferred due to public health emergency video visit restrictions.      LABS:  CBC RESULTS:   Recent Labs   Lab Test 08/15/22  0741   WBC 2.7*   RBC 4.03   HGB 12.6   HCT 38.1   MCV 95   MCH 31.3   MCHC 33.1   RDW 12.8          Recent Labs   Lab Test 08/15/22  0746 21  1000    138   POTASSIUM 4.0 4.2   CHLORIDE 107 106   CO2 24 27    ANIONGAP 7 5   GLC 92 83   BUN 12 14   CR 0.59 0.65   SAGE 9.3 8.7         PATHOLOGY:  Peripheral flow:  INTERPRETATION:   Blood:        Polytypic B cells        No aberrant immunophenotype on T cells        See comment     COMMENT:   There is no immunophenotypic evidence of non-Hodgkin lymphoma or lymphoid   leukemia.  Final interpretation   requires correlation with morphologic and clinical features.         PATHOLOGY:  Peripheral smear 6/30/20:  FINAL DIAGNOSIS:   Peripheral Blood Morphology-   -Moderate leukopenia with moderate absolute neutropenia.   -See comment.     COMMENT:   The patient has history of intermittent leukopenia (see prior peripheral   blood morphology; [id number], 8/2018.   Absolute neutropenia appears to be a new finding. Possible causes of   neutropenia include drugs, infection,   autoimmune/immune-mediated disorders, hypersplenism, and bone marrow   disorder. At present, there is   insufficient morphologic evidence to suggest a clonal bone marrow   disorder. Serum vitamin B12/folate   deficiency appears as an unlikely cause, given normal reported levels in   this patient. Serum ferritin level is   additionally within normal limits. If absolute neutropenia persists   without clinical explanation, TCR-V beta   flow cytometric analysis of peripheral blood may be considered for further    evaluation.      IMAGING:  Abdomen ultrasound 9/10/20:  Unremarkable complete abdominal ultrasound.        ASSESSMENT/PLAN:  Natalee Maya is a 51 year old female with:     Leukopenia: We discussed this is likely cyclic neutropenia. Differential with moderate to mild neutropenia.  Other two cell lines are normal.  This seems to be a fairly new finding, although it happened to a milder and short extent in 2018.  She has not had recent infections.  She has not started new medications.  She has no history of hepatitis or HIV.  She does not drink alcohol frequently.  We discussed other potential causes  including autoimmune or primary bone marrow disorder.  Abdomen ultrasound was normal; no hepatosplenomegaly. Her WBC remains low. She remains asymptomatic.  We discussed options of continued observation or proceeding with bone marrow biopsy.  She would rather not do a bone marrow biopsy now.  If there is persistent downward trend of WBC, then we will do bone marrow biopsy.       WBC is decreased this time to 2.4 with ANC of 0.9.  She is slightly anemic this time.  It's still not out of her baseline range of counts.  We discussed options again of continued observation or bone marrow biopsy.  Natalee says that she feels very good currently and is comfortable with continued observation.  If there is persistent downtrending of counts, then we will proceed with bone biopsy.  She is agreeable with this.  For the anemia, we will check iron and ferritin, as well as vitamin B12 and folate.  She says that she stopped taking her iron pills 3 weeks ago, but will resume them.  She did get colonoscopy and CT colonography done in June 2021.     -Repeat labs every 6 months with CBC/diff, ferritin, iron, vitamin B12, folate, peripheral smear.  -Follow up virtually in 12 months shortly after lab draw.           Katherin Newman DO  Hematology/Oncology  [facility]          Again, thank you for allowing me to participate in the care of your patient.        Sincerely,        Katherin Newman DO

## 2022-08-18 NOTE — TELEPHONE ENCOUNTER
Left  that I was calling to schedule her follow up appts from her visit with Dr Newman today. Left [place] scheduling dept # for her to call. PO

## 2022-08-18 NOTE — LETTER
8/18/2022         RE: Natalee Maya  [address]  [facility] [address]        Dear Colleague,    Thank you for referring your patient, Natalee Maya, to the [facility]. Please see a copy of my visit note below.    Natalee is a 51 year old who is being evaluated via a billable video visit.      How would you like to obtain your AVS? MyChart  If the video visit is dropped, the invitation should be resent by: Text to cell phone: [phone]  Will anyone else be joining your video visit? No     Janine KRAUSE          Video-Visit Details    Video Start Time: 8:08 AM    Type of service:  Video Visit    Video End Time: 8:16 AM    Originating Location (pt. Location): Home    Distant Location (provider location):  [facility]     Platform used for Video Visit: [employer]    [facility]    Hematology/Oncology Established Patient Follow-up Note    Treatment Summary:      Today's Date: 8/18/22    Reason for Follow-up: Leukopenia      HISTORY OF PRESENT ILLNESS: Natalee Maya is a 51 year old female with PMHx of hypothyroidism who presents with leukopenia.  She was found on labs in June 2020 to have low WBC of 1.9K.  Repeat checks have been in the 2K range.  Hemoglobin and platelet count were normal.  Differential shows a moderate to mild neutropenia.  On further chart review, she had a mild leukopenia in the 3K range back in 2018, but did recover back to normal a few months later.  There are not many labs prior to that.  Iron, ferritin, vitamin 12, and folate were normal.  Peripheral smear shows low WBC, but non-specific.       She denies recent illness, infections, fever.  Her Prozac dose increased a few months ago.  Otherwise, she has not started any new medications recently.       She does not smoke.  She drinks a glass of wine maybe once or twice a month.  She denies illicit drug use.      INTERIM  HISTORY:  Patient continues to do well. She is on/of iron supplementation. No B symptoms. No gross evidence of bleed. No recent infection or antibiotic use.        REVIEW OF SYSTEMS:   A 14 point ROS was reviewed with pertinent positives and negatives in the HPI.       HOME MEDICATIONS:  Current Outpatient Medications   Medication Sig Dispense Refill     Ferrous Sulfate (IRON) 325 (65 Fe) MG tablet TAKE ONE TABLET BY MOUTH EVERY DAY, AVOID AROUND SAME TIME AS LEVOTHYROXINE 90 tablet 3     folic acid (FOLVITE) 1 MG tablet TAKE ONE TABLET BY MOUTH EVERY DAY 90 tablet 2     insulin pen needle (32G X 6 MM) 32G X 6 MM miscellaneous Use 1 pen needles daily or as directed. 90 each 0     levothyroxine (SYNTHROID/LEVOTHROID) 50 MCG tablet Take 1 tablet (50 mcg) by mouth daily 90 tablet 2     liraglutide - Weight Management (SAXENDA) 18 MG/3ML pen Inject 3 mg Subcutaneous daily for 90 days 15 pen 1     losartan (COZAAR) 100 MG tablet Take 1 tablet (100 mg) by mouth daily 90 tablet 3     sertraline (ZOLOFT) 50 MG tablet Take 1.5 tablets (75 mg) by mouth daily 135 tablet 3         ALLERGIES:  Allergies   Allergen Reactions     No Known Drug Allergies          PAST MEDICAL HISTORY:  Past Medical History:   Diagnosis Date     Calculus of kidney     History of kidney stone -- Abstracted 02     Hypertension      Lymphedema of lower extremity          PAST SURGICAL HISTORY:  Past Surgical History:   Procedure Laterality Date     COLONOSCOPY Left 2021    Procedure: COLONOSCOPY; incomplete secondary to pt discomfort. will schedule CT colonography today if possible.;  Surgeon: Ceasar Cardoza MD;  Location:  GI     FOOT SURGERY      2017     [facility] NONSPECIFIC PROCEDURE       -- Abstracted 02         SOCIAL HISTORY:  Social History     Socioeconomic History     Marital status:      Spouse name: Not on file     Number of children: Not on file     Years of education: Not on file     Highest  education level: Not on file   Occupational History     Not on file   Tobacco Use     Smoking status: Never Smoker     Smokeless tobacco: Never Used   Vaping Use     Vaping Use: Never used   Substance and Sexual Activity     Alcohol use: No     Drug use: No     Sexual activity: Yes     Partners: Male   Other Topics Concern     Parent/sibling w/ CABG, MI or angioplasty before 65F 55M? Not Asked   Social History Narrative     Not on file     Social Determinants of Health     Financial Resource Strain: Not on file   Food Insecurity: Not on file   Transportation Needs: Not on file   Physical Activity: Not on file   Stress: Not on file   Social Connections: Not on file   Intimate Partner Violence: Not on file   Housing Stability: Not on file         FAMILY HISTORY:  Family History   Problem Relation Age of Onset     Diabetes Father      C.A.D. Father         hyperlipidemia     Bleeding Disorder Mother         Factor 5     Breast Cancer No family hx of      Prostate Cancer No family hx of      Colon Cancer No family hx of          PHYSICAL EXAM:  Vital signs:    ECO  GENERAL/CONSTITUTIONAL: No acute distress. Healthy, alert..    RESPIRATORY: No audible wheeze, cough, or visible cyanosis.  No visible retractions or increased work of breathing.  Able to speak fully in complete sentences.  NEUROLOGIC: Alert, oriented, answers questions appropriately. No tremor. Mentation intact and speech normal  PSYCHIATRIC:  Mentation appears normal, affect normal/bright, judgement and insight intact, normal speech and appearance well-groomed.     The rest of a comprehensive physical exam is deferred due to public health emergency video visit restrictions.      LABS:  CBC RESULTS:   Recent Labs   Lab Test 08/15/22  0741   WBC 2.7*   RBC 4.03   HGB 12.6   HCT 38.1   MCV 95   MCH 31.3   MCHC 33.1   RDW 12.8          Recent Labs   Lab Test 08/15/22  0746 21  1000    138   POTASSIUM 4.0 4.2   CHLORIDE 107 106   CO2 24 27    ANIONGAP 7 5   GLC 92 83   BUN 12 14   CR 0.59 0.65   SAGE 9.3 8.7         PATHOLOGY:  Peripheral flow:  INTERPRETATION:   Blood:        Polytypic B cells        No aberrant immunophenotype on T cells        See comment     COMMENT:   There is no immunophenotypic evidence of non-Hodgkin lymphoma or lymphoid   leukemia.  Final interpretation   requires correlation with morphologic and clinical features.         PATHOLOGY:  Peripheral smear 6/30/20:  FINAL DIAGNOSIS:   Peripheral Blood Morphology-   -Moderate leukopenia with moderate absolute neutropenia.   -See comment.     COMMENT:   The patient has history of intermittent leukopenia (see prior peripheral   blood morphology; [id number], 8/2018.   Absolute neutropenia appears to be a new finding. Possible causes of   neutropenia include drugs, infection,   autoimmune/immune-mediated disorders, hypersplenism, and bone marrow   disorder. At present, there is   insufficient morphologic evidence to suggest a clonal bone marrow   disorder. Serum vitamin B12/folate   deficiency appears as an unlikely cause, given normal reported levels in   this patient. Serum ferritin level is   additionally within normal limits. If absolute neutropenia persists   without clinical explanation, TCR-V beta   flow cytometric analysis of peripheral blood may be considered for further    evaluation.      IMAGING:  Abdomen ultrasound 9/10/20:  Unremarkable complete abdominal ultrasound.        ASSESSMENT/PLAN:  Natalee Maya is a 51 year old female with:     Leukopenia: We discussed this is likely cyclic neutropenia. Differential with moderate to mild neutropenia.  Other two cell lines are normal.  This seems to be a fairly new finding, although it happened to a milder and short extent in 2018.  She has not had recent infections.  She has not started new medications.  She has no history of hepatitis or HIV.  She does not drink alcohol frequently.  We discussed other potential causes  including autoimmune or primary bone marrow disorder.  Abdomen ultrasound was normal; no hepatosplenomegaly. Her WBC remains low. She remains asymptomatic.  We discussed options of continued observation or proceeding with bone marrow biopsy.  She would rather not do a bone marrow biopsy now.  If there is persistent downward trend of WBC, then we will do bone marrow biopsy.       WBC is decreased this time to 2.4 with ANC of 0.9.  She is slightly anemic this time.  It's still not out of her baseline range of counts.  We discussed options again of continued observation or bone marrow biopsy.  Natalee says that she feels very good currently and is comfortable with continued observation.  If there is persistent downtrending of counts, then we will proceed with bone biopsy.  She is agreeable with this.  For the anemia, we will check iron and ferritin, as well as vitamin B12 and folate.  She says that she stopped taking her iron pills 3 weeks ago, but will resume them.  She did get colonoscopy and CT colonography done in June 2021.     -Repeat labs every 6 months with CBC/diff, ferritin, iron, vitamin B12, folate, peripheral smear.  -Follow up virtually in 12 months shortly after lab draw.           Katherin Newman DO  Hematology/Oncology  [facility]          Again, thank you for allowing me to participate in the care of your patient.        Sincerely,        Katherin Newman DO

## 2022-08-18 NOTE — PROGRESS NOTES
[facility]    Hematology/Oncology Established Patient Follow-up Note    Treatment Summary:      Today's Date: 8/18/22    Reason for Follow-up: Leukopenia      HISTORY OF PRESENT ILLNESS: Natalee Maya is a 51 year old female with PMHx of hypothyroidism who presents with leukopenia.  She was found on labs in June 2020 to have low WBC of 1.9K.  Repeat checks have been in the 2K range.  Hemoglobin and platelet count were normal.  Differential shows a moderate to mild neutropenia.  On further chart review, she had a mild leukopenia in the 3K range back in 2018, but did recover back to normal a few months later.  There are not many labs prior to that.  Iron, ferritin, vitamin 12, and folate were normal.  Peripheral smear shows low WBC, but non-specific.       She denies recent illness, infections, fever.  Her Prozac dose increased a few months ago.  Otherwise, she has not started any new medications recently.       She does not smoke.  She drinks a glass of wine maybe once or twice a month.  She denies illicit drug use.      INTERIM HISTORY:  Patient continues to do well. She is on/of iron supplementation. No B symptoms. No gross evidence of bleed. No recent infection or antibiotic use.        REVIEW OF SYSTEMS:   A 14 point ROS was reviewed with pertinent positives and negatives in the HPI.       HOME MEDICATIONS:  Current Outpatient Medications   Medication Sig Dispense Refill     Ferrous Sulfate (IRON) 325 (65 Fe) MG tablet TAKE ONE TABLET BY MOUTH EVERY DAY, AVOID AROUND SAME TIME AS LEVOTHYROXINE 90 tablet 3     folic acid (FOLVITE) 1 MG tablet TAKE ONE TABLET BY MOUTH EVERY DAY 90 tablet 2     insulin pen needle (32G X 6 MM) 32G X 6 MM miscellaneous Use 1 pen needles daily or as directed. 90 each 0     levothyroxine (SYNTHROID/LEVOTHROID) 50 MCG tablet Take 1 tablet (50 mcg) by mouth daily 90 tablet 2     liraglutide - Weight Management (SAXENDA) 18 MG/3ML pen Inject 3 mg Subcutaneous  daily for 90 days 15 pen 1     losartan (COZAAR) 100 MG tablet Take 1 tablet (100 mg) by mouth daily 90 tablet 3     sertraline (ZOLOFT) 50 MG tablet Take 1.5 tablets (75 mg) by mouth daily 135 tablet 3         ALLERGIES:  Allergies   Allergen Reactions     No Known Drug Allergies          PAST MEDICAL HISTORY:  Past Medical History:   Diagnosis Date     Calculus of kidney     History of kidney stone -- Abstracted 02     Hypertension      Lymphedema of lower extremity          PAST SURGICAL HISTORY:  Past Surgical History:   Procedure Laterality Date     COLONOSCOPY Left 2021    Procedure: COLONOSCOPY; incomplete secondary to pt discomfort. will schedule CT colonography today if possible.;  Surgeon: Ceasar Cardoza MD;  Location:  GI     FOOT SURGERY      2017     [facility] NONSPECIFIC PROCEDURE       -- Abstracted 02         SOCIAL HISTORY:  Social History     Socioeconomic History     Marital status:      Spouse name: Not on file     Number of children: Not on file     Years of education: Not on file     Highest education level: Not on file   Occupational History     Not on file   Tobacco Use     Smoking status: Never Smoker     Smokeless tobacco: Never Used   Vaping Use     Vaping Use: Never used   Substance and Sexual Activity     Alcohol use: No     Drug use: No     Sexual activity: Yes     Partners: Male   Other Topics Concern     Parent/sibling w/ CABG, MI or angioplasty before 65F 55M? Not Asked   Social History Narrative     Not on file     Social Determinants of Health     Financial Resource Strain: Not on file   Food Insecurity: Not on file   Transportation Needs: Not on file   Physical Activity: Not on file   Stress: Not on file   Social Connections: Not on file   Intimate Partner Violence: Not on file   Housing Stability: Not on file         FAMILY HISTORY:  Family History   Problem Relation Age of Onset     Diabetes Father      C.A.D. Father          hyperlipidemia     Bleeding Disorder Mother         Factor 5     Breast Cancer No family hx of      Prostate Cancer No family hx of      Colon Cancer No family hx of          PHYSICAL EXAM:  Vital signs:    ECO  GENERAL/CONSTITUTIONAL: No acute distress. Healthy, alert..    RESPIRATORY: No audible wheeze, cough, or visible cyanosis.  No visible retractions or increased work of breathing.  Able to speak fully in complete sentences.  NEUROLOGIC: Alert, oriented, answers questions appropriately. No tremor. Mentation intact and speech normal  PSYCHIATRIC:  Mentation appears normal, affect normal/bright, judgement and insight intact, normal speech and appearance well-groomed.     The rest of a comprehensive physical exam is deferred due to public [facility] emergency video visit restrictions.      LABS:  CBC RESULTS:   Recent Labs   Lab Test 08/15/22  0741   WBC 2.7*   RBC 4.03   HGB 12.6   HCT 38.1   MCV 95   MCH 31.3   MCHC 33.1   RDW 12.8          Recent Labs   Lab Test 08/15/22  0746 21  1000    138   POTASSIUM 4.0 4.2   CHLORIDE 107 106   CO2 24 27   ANIONGAP 7 5   GLC 92 83   BUN 12 14   CR 0.59 0.65   SAGE 9.3 8.7         PATHOLOGY:  Peripheral flow:  INTERPRETATION:   Blood:        Polytypic B cells        No aberrant immunophenotype on T cells        See comment     COMMENT:   There is no immunophenotypic evidence of non-Hodgkin lymphoma or lymphoid   leukemia.  Final interpretation   requires correlation with morphologic and clinical features.         PATHOLOGY:  Peripheral smear 20:  FINAL DIAGNOSIS:   Peripheral Blood Morphology-   -Moderate leukopenia with moderate absolute neutropenia.   -See comment.     COMMENT:   The patient has history of intermittent leukopenia (see prior peripheral   blood morphology; [id number], 2018.   Absolute neutropenia appears to be a new finding. Possible causes of   neutropenia include drugs, infection,   autoimmune/immune-mediated disorders,  hypersplenism, and bone marrow   disorder. At present, there is   insufficient morphologic evidence to suggest a clonal bone marrow   disorder. Serum vitamin B12/folate   deficiency appears as an unlikely cause, given normal reported levels in   this patient. Serum ferritin level is   additionally within normal limits. If absolute neutropenia persists   without clinical explanation, TCR-V beta   flow cytometric analysis of peripheral blood may be considered for further    evaluation.      IMAGING:  Abdomen ultrasound 9/10/20:  Unremarkable complete abdominal ultrasound.        ASSESSMENT/PLAN:  Natalee Maya is a 51 year old female with:     Leukopenia: We discussed this is likely cyclic neutropenia. Differential with moderate to mild neutropenia.  Other two cell lines are normal.  This seems to be a fairly new finding, although it happened to a milder and short extent in 2018.  She has not had recent infections.  She has not started new medications.  She has no history of hepatitis or HIV.  She does not drink alcohol frequently.  We discussed other potential causes including autoimmune or primary bone marrow disorder.  Abdomen ultrasound was normal; no hepatosplenomegaly. Her WBC remains low. She remains asymptomatic.  We discussed options of continued observation or proceeding with bone marrow biopsy.  She would rather not do a bone marrow biopsy now.  If there is persistent downward trend of WBC, then we will do bone marrow biopsy.       WBC is decreased this time to 2.4 with ANC of 0.9.  She is slightly anemic this time.  It's still not out of her baseline range of counts.  We discussed options again of continued observation or bone marrow biopsy.  Natalee says that she feels very good currently and is comfortable with continued observation.  If there is persistent downtrending of counts, then we will proceed with bone biopsy.  She is agreeable with this.  For the anemia, we will check iron and ferritin, as  well as vitamin B12 and folate.  She says that she stopped taking her iron pills 3 weeks ago, but will resume them.  She did get colonoscopy and CT colonography done in June 2021.     -Repeat labs every 6 months with CBC/diff, ferritin, iron, vitamin B12, folate, peripheral smear.  -Follow up virtually in 12 months shortly after lab draw.           Katherin Newman,   Hematology/Oncology  [facility]

## 2022-08-18 NOTE — PROGRESS NOTES
Natalee is a 51 year old who is being evaluated via a billable video visit.      How would you like to obtain your AVS? MyChart  If the video visit is dropped, the invitation should be resent by: Text to cell phone: [phone]  Will anyone else be joining your video visit? Rhonda KRAUSE          Video-Visit Details    Video Start Time: 8:08 AM    Type of service:  Video Visit    Video End Time: 8:16 AM    Originating Location (pt. Location): Home    Distant Location (provider location):  [facility]     Platform used for Video Visit: [employer]

## 2022-08-21 DIAGNOSIS — E66.01 MORBID OBESITY (H): ICD-10-CM

## 2022-08-23 RX ORDER — PEN NEEDLE, DIABETIC 32 GX 1/4"
NEEDLE, DISPOSABLE MISCELLANEOUS DAILY
Qty: 100 EACH | Refills: 0 | Status: SHIPPED | OUTPATIENT
Start: 2022-08-23 | End: 2022-12-14

## 2022-08-23 NOTE — TELEPHONE ENCOUNTER
Prescription approved per [facility] Refill Protocol.  Juli Medeiros RN, BSN  [facility]

## 2022-09-16 ENCOUNTER — MYC MEDICAL ADVICE (OUTPATIENT)
Dept: PEDIATRICS | Facility: CLINIC | Age: 52
End: 2022-09-16

## 2022-09-16 DIAGNOSIS — M25.561 PAIN IN BOTH KNEES, UNSPECIFIED CHRONICITY: Primary | ICD-10-CM

## 2022-09-16 DIAGNOSIS — M25.562 PAIN IN BOTH KNEES, UNSPECIFIED CHRONICITY: Primary | ICD-10-CM

## 2022-09-23 ENCOUNTER — VIRTUAL VISIT (OUTPATIENT)
Dept: PSYCHOLOGY | Facility: CLINIC | Age: 52
End: 2022-09-23
Payer: COMMERCIAL

## 2022-09-23 DIAGNOSIS — F32.1 MAJOR DEPRESSIVE DISORDER, SINGLE EPISODE, MODERATE WITH ANXIOUS DISTRESS (H): Primary | ICD-10-CM

## 2022-09-23 PROCEDURE — 90832 PSYTX W PT 30 MINUTES: CPT | Mod: GT | Performed by: SOCIAL WORKER

## 2022-09-23 NOTE — PROGRESS NOTES
Discharge Summary  Multiple Sessions    Client Name: Natalee Maya MRN#: [id number] YOB: 1970    Discharge Date:   September 23, 2022    Service Modality: Video Visit:      Provider verified identity through the following two step process.  Patient provided:  Patient is known previously to provider    Telemedicine Visit: The patient's condition can be safely assessed and treated via synchronous audio and visual telemedicine encounter.      Reason for Telemedicine Visit: Patient convenience (e.g. access to timely appointments / distance to available provider) and Services only offered telehealth    Originating Site (Patient Location): Patient's home    Distant Site (Provider Location): Provider Remote Setting- Home Office    Consent:  The patient/guardian has verbally consented to: the potential risks and benefits of telemedicine (video visit) versus in person care; bill my insurance or make self-payment for services provided; and responsibility for payment of non-covered services.     Patient would like the video invitation sent by:  [email or web address]    Mode of Communication:  Video Conference via [email or web address]    As the provider I attest to compliance with applicable laws and regulations related to telemedicine.    Service Type: Individual      Session Start Time: 7:04 AM  Session End Time: 7:30 AM      Session Length: 16-37 minutes     Session #: 30 (4 with DB)     Attendees: Client attended alone      Focus of Treatment Objective(s):  Client's presenting concerns included: Depressed Mood - decrease symptoms, increase coping strategies  Relational Problems related to: Conflict or difficulties with partner/spouse  Stage of Change at time of Discharge: MAINTENANCE (Working to maintain change, with risk of relapse)    Medication Adherence:  Yes    Chemical Use:  No active concerns    Assessment: Current Emotional / Mental Status (status of significant symptoms):    Risk status (Self / Other  harm or suicidal ideation)  Client denies current fears or concerns for personal safety.  Client denies current or recent suicidal ideation or behaviors.  Client denies current or recent homicidal ideation or behaviors.  Client denies current or recent self injurious behavior or ideation.  Client denies other safety concerns.  A safety and risk management plan has been previously developed.  Patient consented to co-developed safety plan.  Safety and risk management plan was completed.  Patient agreed to use safety plan should any safety concerns arise.  A copy was given to the patient.    Appearance:   Appropriate   Eye Contact:   Good   Psychomotor Behavior: Normal   Attitude:   Cooperative  Interested Pleasant  Orientation:   All  Speech   Rate / Production: Normal    Volume:  Normal   Mood:    Stable  Affect:    Appropriate   Thought Content:  Clear   Thought Form:  Coherent  Goal Directed  Logical   Insight:   Good     DSM5 Diagnoses: (Sustained by DSM5 Criteria Listed Above)  Diagnoses: Major Depressive Disorder, Moderate, in full remission  Psychosocial & Contextual Factors: client's  moved in with her a few months ago following a separation  Assessments completed prior to visit:  The following assessments were completed by patient for this visit:  PROMIS 10-Global Health (only subscores and total score):   PROMIS-10 Scores Only 2/18/2022 3/24/2022 9/23/2022   Global Mental Health Score 10 12 14   Global Physical Health Score 15 15 14   PROMIS TOTAL - SUBSCORES 25 27 28       Reason for Discharge:  Client is satisfied with progress      Aftercare Plan:  Client may resume counseling services at any time in the future by calling the [facility] Intake Office, [phone].      Celeste Chavez, NATIVIDAD  September 23, 2022

## 2022-10-22 ENCOUNTER — HEALTH MAINTENANCE LETTER (OUTPATIENT)
Age: 52
End: 2022-10-22

## 2022-10-25 ENCOUNTER — THERAPY VISIT (OUTPATIENT)
Dept: PHYSICAL THERAPY | Facility: CLINIC | Age: 52
End: 2022-10-25
Attending: NURSE PRACTITIONER
Payer: COMMERCIAL

## 2022-10-25 DIAGNOSIS — M25.562 PAIN IN BOTH KNEES, UNSPECIFIED CHRONICITY: Primary | ICD-10-CM

## 2022-10-25 DIAGNOSIS — M25.561 PAIN IN BOTH KNEES, UNSPECIFIED CHRONICITY: Primary | ICD-10-CM

## 2022-10-25 PROCEDURE — 97110 THERAPEUTIC EXERCISES: CPT | Mod: GP | Performed by: PHYSICAL THERAPIST

## 2022-10-25 PROCEDURE — 97161 PT EVAL LOW COMPLEX 20 MIN: CPT | Mod: GP | Performed by: PHYSICAL THERAPIST

## 2022-10-25 ASSESSMENT — ACTIVITIES OF DAILY LIVING (ADL)
SIT WITH YOUR KNEE BENT: ACTIVITY IS SOMEWHAT DIFFICULT
WEAKNESS: THE SYMPTOM AFFECTS MY ACTIVITY SLIGHTLY
STIFFNESS: THE SYMPTOM AFFECTS MY ACTIVITY MODERATELY
WALK: ACTIVITY IS FAIRLY DIFFICULT
GIVING WAY, BUCKLING OR SHIFTING OF KNEE: THE SYMPTOM AFFECTS MY ACTIVITY SLIGHTLY
SQUAT: ACTIVITY IS SOMEWHAT DIFFICULT
RISE FROM A CHAIR: ACTIVITY IS FAIRLY DIFFICULT
HOW_WOULD_YOU_RATE_THE_CURRENT_FUNCTION_OF_YOUR_KNEE_DURING_YOUR_USUAL_DAILY_ACTIVITIES_ON_A_SCALE_FROM_0_TO_100_WITH_100_BEING_YOUR_LEVEL_OF_KNEE_FUNCTION_PRIOR_TO_YOUR_INJURY_AND_0_BEING_THE_INABILITY_TO_PERFORM_ANY_OF_YOUR_USUAL_DAILY_ACTIVITIES?: 40
KNEE_ACTIVITY_OF_DAILY_LIVING_SUM: 36
STAND: ACTIVITY IS SOMEWHAT DIFFICULT
KNEEL ON THE FRONT OF YOUR KNEE: ACTIVITY IS FAIRLY DIFFICULT
LIMPING: THE SYMPTOM AFFECTS MY ACTIVITY MODERATELY
PAIN: THE SYMPTOM AFFECTS MY ACTIVITY MODERATELY
RAW_SCORE: 36
GO UP STAIRS: ACTIVITY IS SOMEWHAT DIFFICULT
HOW_WOULD_YOU_RATE_THE_OVERALL_FUNCTION_OF_YOUR_KNEE_DURING_YOUR_USUAL_DAILY_ACTIVITIES?: ABNORMAL
AS_A_RESULT_OF_YOUR_KNEE_INJURY,_HOW_WOULD_YOU_RATE_YOUR_CURRENT_LEVEL_OF_DAILY_ACTIVITY?: ABNORMAL
GO DOWN STAIRS: ACTIVITY IS SOMEWHAT DIFFICULT
KNEE_ACTIVITY_OF_DAILY_LIVING_SCORE: 51.43
SWELLING: THE SYMPTOM AFFECTS MY ACTIVITY SLIGHTLY

## 2022-10-25 NOTE — PROGRESS NOTES
Physical Therapy Initial Evaluation  Subjective:  The history is provided by the patient. No  was used.   Patient Health History  Natalee Maya being seen for Knee Pain.     Date of Onset: Mid August 2022.   Problem occurred: unknown   Pain is reported as 5/10 on pain scale.  General health as reported by patient is good.  Pertinent medical history includes: high blood pressure and thyroid problems.     Medical allergies: none.   Surgeries include:  Orthopedic surgery. Other surgery history details: R foot 2017.    Current medications:  High blood pressure medication. Other medications details: Iron, folic acid, Saxenda-weight loss.    Current occupation is Teacher.   Primary job tasks include:  Computer work.                  Therapist Generated HPI Evaluation  Problem details: Bilateral knee pain started in August 2022. Located in the front of the left knee. Worst when she gets up to stand and it takes a few minutes to work its way out. Patient has stopped her normal exercise (walking). Patient was at 10,000 steps a day.  She had tried some swimming and that was okay. Patient has difficulty fully extend the left knee. Has not tried NSAIDs, ice, heat and has been worried about over doing it and making pain worse.     .         Type of problem:  Bilateral knees.    This is a chronic condition.  Condition occurred with:  Repetition/overuse.  Where condition occurred: for unknown reasons.  Patient reports pain:  Anterior.  Pain quality: spre stiffness.  and is intermittent.  Pain is the same all the time.  Since onset symptoms are unchanged.  Associated symptoms:  Buckling/giving out, loss of motion/stiffness and loss of strength. Symptoms are exacerbated by certain positions, walking, weight bearing, standing, transfers and kneeling  and relieved by activity/movement.      Restrictions due to condition include:  Working in normal job without restrictions (avoiding kneeling, avoiding medium  lifting. ).  Barriers include:  None as reported by patient.                        Objective:  System                                                Knee Evaluation:  ROM:    AROM    Hyperextension:  Left:  8    Right: 13    Flexion: Left: 115    Right: 115        Strength:     Extension:  Left: 5/5   Pain:      Right: 4+/5   Pain:  Flexion:  Left: 5-/5   Pain:      Right: 4+/5   Pain:    Quad Set Left: Poor    Pain:   Quad Set Right: Good    Pain:  Ligament Testing:    Varus 0:  Left:  Neg   Right:  Neg  Varus 30:  Left:  Neg  Right:  Neg  Valgus 0:  Left:  Pos and Gr I  Right:  Pos and Gr I  Valgus 30:  Left:  Pos and Gr I    Right:  Pos and Gr I  Anterior Drawer:  Left:  Neg    Right:  Neg  Posterior Drawer: Left:  Neg  Right:  Neg    Special Tests:   Left knee positive for the following special tests:  Patellar Tracking-Abduction Lateral  Left knee negative for the following special tests:  Patellar compression  Right knee positive for the following tests:  Patellar Tracking-Abduction Lateral  Right knee negative for the following special tests:  Patellar Compression  Palpation:    Left knee tenderness present at:  Medial Joint Line  Left knee tenderness not present at:  Lateral Joint Line and Patellar Tendon  Right knee tenderness present at:  Medial Joint Line  Right knee tenderness not present at:  Lateral Joint Line and Patellar Tendon    Mobility Testing:  Normal                  General     ROS    Assessment/Plan:    Patient is a 52 year old female with both sides knee complaints.    Patient has the following significant findings with corresponding treatment plan.                Diagnosis 1:  B Knee Pain  Pain -  hot/cold therapy, electric stimulation, manual therapy, splint/taping/bracing/orthotics, self management, education and home program  Decreased ROM/flexibility - manual therapy, therapeutic exercise, therapeutic activity and home program  Decreased joint mobility - manual therapy, therapeutic  exercise, therapeutic activity and home program  Decreased strength - therapeutic exercise, therapeutic activities and home program  Decreased proprioception - neuro re-education, therapeutic activities and home program  Inflammation - cold therapy, electric stimulation and self management/home program  Impaired muscle performance - neuro re-education and home program  Decreased function - therapeutic activities, home program and functional performance testing    Therapy Evaluation Codes:     1) Clinical presentation characteristics are:   Stable/Uncomplicated.  2) Decision-Making    Low complexity using standardized patient assessment instrument and/or measureable assessment of functional outcome.  Cumulative Therapy Evaluation is: Low complexity.    Previous and current functional limitations:  (See Goal Flow Sheet for this information)    Short term and Long term goals: (See Goal Flow Sheet for this information)     Communication ability:  Patient appears to be able to clearly communicate and understand verbal and written communication and follow directions correctly.  Treatment Explanation - The following has been discussed with the patient:   RX ordered/plan of care  Anticipated outcomes  Possible risks and side effects  This patient would benefit from PT intervention to resume normal activities.   Rehab potential is good.    Frequency:  1 X week, once daily  Duration:  for 8 weeks  Discharge Plan:  Achieve all LTG.  Independent in home treatment program.  Reach maximal therapeutic benefit.    Please refer to the daily flowsheet for treatment today, total treatment time and time spent performing 1:1 timed codes.

## 2022-11-01 ENCOUNTER — THERAPY VISIT (OUTPATIENT)
Dept: PHYSICAL THERAPY | Facility: CLINIC | Age: 52
End: 2022-11-01
Payer: COMMERCIAL

## 2022-11-01 DIAGNOSIS — M25.562 PAIN IN BOTH KNEES, UNSPECIFIED CHRONICITY: Primary | ICD-10-CM

## 2022-11-01 DIAGNOSIS — M25.561 PAIN IN BOTH KNEES, UNSPECIFIED CHRONICITY: Primary | ICD-10-CM

## 2022-11-01 PROCEDURE — 97140 MANUAL THERAPY 1/> REGIONS: CPT | Mod: GP | Performed by: PHYSICAL THERAPIST

## 2022-11-01 PROCEDURE — 97110 THERAPEUTIC EXERCISES: CPT | Mod: GP | Performed by: PHYSICAL THERAPIST

## 2022-11-09 ENCOUNTER — THERAPY VISIT (OUTPATIENT)
Dept: PHYSICAL THERAPY | Facility: CLINIC | Age: 52
End: 2022-11-09
Payer: COMMERCIAL

## 2022-11-09 DIAGNOSIS — M25.561 PAIN IN BOTH KNEES, UNSPECIFIED CHRONICITY: Primary | ICD-10-CM

## 2022-11-09 DIAGNOSIS — M25.562 PAIN IN BOTH KNEES, UNSPECIFIED CHRONICITY: Primary | ICD-10-CM

## 2022-11-09 PROCEDURE — 97110 THERAPEUTIC EXERCISES: CPT | Mod: GP | Performed by: PHYSICAL THERAPIST

## 2022-11-09 PROCEDURE — 97140 MANUAL THERAPY 1/> REGIONS: CPT | Mod: GP | Performed by: PHYSICAL THERAPIST

## 2022-11-11 NOTE — TELEPHONE ENCOUNTER
Called Emiliano and left a detailed message advising of below.     Dapsone Pregnancy And Lactation Text: This medication is Pregnancy Category C and is not considered safe during pregnancy or breast feeding.

## 2022-11-14 ENCOUNTER — THERAPY VISIT (OUTPATIENT)
Dept: PHYSICAL THERAPY | Facility: CLINIC | Age: 52
End: 2022-11-14
Payer: COMMERCIAL

## 2022-11-14 DIAGNOSIS — M25.562 PAIN IN BOTH KNEES, UNSPECIFIED CHRONICITY: Primary | ICD-10-CM

## 2022-11-14 DIAGNOSIS — M25.561 PAIN IN BOTH KNEES, UNSPECIFIED CHRONICITY: Primary | ICD-10-CM

## 2022-11-14 PROCEDURE — 97530 THERAPEUTIC ACTIVITIES: CPT | Mod: GP | Performed by: PHYSICAL THERAPIST

## 2022-11-14 PROCEDURE — 97110 THERAPEUTIC EXERCISES: CPT | Mod: GP | Performed by: PHYSICAL THERAPIST

## 2022-11-17 ENCOUNTER — TELEPHONE (OUTPATIENT)
Dept: PEDIATRICS | Facility: CLINIC | Age: 52
End: 2022-11-17

## 2022-11-17 DIAGNOSIS — E66.01 MORBID OBESITY (H): ICD-10-CM

## 2022-11-17 NOTE — TELEPHONE ENCOUNTER
PA renewal received from Liaison, but there is no current Rx in chart for Saxenda. Routing back clinic to add Rx

## 2022-11-17 NOTE — TELEPHONE ENCOUNTER
Prior authorization expires 11/20/22. Please start a PA renewal. Thank you!    [employer]    RX BIN: [id number]  RX PCN: [id number]  RX ID:[id number]  RX GRP:[id number]

## 2022-11-23 NOTE — TELEPHONE ENCOUNTER
Central Prior Authorization Team   Phone: [phone]      PA Initiation    Medication: Saxenda 18 MG/3ML - PA Initiated  Insurance Company: [email or web address] - Phone [phone] Fax [phone]  Pharmacy Filling the Rx: [facility] - [place], [place] - [facility] [address]  Filling Pharmacy Phone:    Filling Pharmacy Fax:    Start Date: 11/17/2022

## 2022-11-25 NOTE — TELEPHONE ENCOUNTER
Prior Authorization Approval    Authorization Effective Date: 10/23/2022  Authorization Expiration Date: 5/23/2023  Medication: Saxenda 18 MG/3ML - PA Initiated  Approved Dose/Quantity:   Reference #:     Insurance Company: [email or web address] - Phone [phone] Fax [phone]  Expected CoPay:       CoPay Card Available:      Foundation Assistance Needed:    Which Pharmacy is filling the prescription (Not needed for infusion/clinic administered): [facility] - [place], [place] - [address]  Pharmacy Notified: No  Patient Notified: No

## 2022-12-12 ENCOUNTER — THERAPY VISIT (OUTPATIENT)
Dept: PHYSICAL THERAPY | Facility: CLINIC | Age: 52
End: 2022-12-12
Payer: COMMERCIAL

## 2022-12-12 DIAGNOSIS — M25.561 PAIN IN BOTH KNEES, UNSPECIFIED CHRONICITY: Primary | ICD-10-CM

## 2022-12-12 DIAGNOSIS — M25.562 PAIN IN BOTH KNEES, UNSPECIFIED CHRONICITY: Primary | ICD-10-CM

## 2022-12-12 PROCEDURE — 97110 THERAPEUTIC EXERCISES: CPT | Mod: GP | Performed by: PHYSICAL THERAPIST

## 2022-12-12 PROCEDURE — 97112 NEUROMUSCULAR REEDUCATION: CPT | Mod: GP | Performed by: PHYSICAL THERAPIST

## 2022-12-13 DIAGNOSIS — E66.01 MORBID OBESITY (H): ICD-10-CM

## 2022-12-14 RX ORDER — PEN NEEDLE, DIABETIC 32 GX 1/4"
NEEDLE, DISPOSABLE MISCELLANEOUS DAILY
Qty: 100 EACH | Refills: 11 | Status: SHIPPED | OUTPATIENT
Start: 2022-12-14 | End: 2024-01-02

## 2023-01-24 DIAGNOSIS — E66.01 MORBID OBESITY (H): ICD-10-CM

## 2023-01-25 RX ORDER — LIRAGLUTIDE 6 MG/ML
INJECTION, SOLUTION SUBCUTANEOUS
Qty: 45 ML | Refills: 1 | Status: SHIPPED | OUTPATIENT
Start: 2023-01-25 | End: 2023-07-31

## 2023-01-25 NOTE — TELEPHONE ENCOUNTER
Routing refill request to provider for review/approval because:  Labs not current:  A1C  Diagnosis for diabetes    Tia GARIBAY RN, BSN

## 2023-01-31 ENCOUNTER — OFFICE VISIT (OUTPATIENT)
Dept: PEDIATRICS | Facility: CLINIC | Age: 53
End: 2023-01-31
Payer: COMMERCIAL

## 2023-01-31 VITALS
SYSTOLIC BLOOD PRESSURE: 116 MMHG | HEART RATE: 75 BPM | HEIGHT: 64 IN | WEIGHT: 223.3 LBS | RESPIRATION RATE: 18 BRPM | OXYGEN SATURATION: 98 % | BODY MASS INDEX: 38.12 KG/M2 | TEMPERATURE: 97.6 F | DIASTOLIC BLOOD PRESSURE: 76 MMHG

## 2023-01-31 DIAGNOSIS — Z12.31 VISIT FOR SCREENING MAMMOGRAM: ICD-10-CM

## 2023-01-31 DIAGNOSIS — E66.01 MORBID OBESITY (H): ICD-10-CM

## 2023-01-31 DIAGNOSIS — D50.9 IRON DEFICIENCY ANEMIA, UNSPECIFIED IRON DEFICIENCY ANEMIA TYPE: ICD-10-CM

## 2023-01-31 DIAGNOSIS — Z00.00 ROUTINE GENERAL MEDICAL EXAMINATION AT A HEALTH CARE FACILITY: ICD-10-CM

## 2023-01-31 DIAGNOSIS — D70.9 NEUTROPENIA, UNSPECIFIED TYPE (H): ICD-10-CM

## 2023-01-31 DIAGNOSIS — Z00.00 HEALTHCARE MAINTENANCE: Primary | ICD-10-CM

## 2023-01-31 DIAGNOSIS — I10 BENIGN ESSENTIAL HYPERTENSION: ICD-10-CM

## 2023-01-31 DIAGNOSIS — D72.819 LEUKOPENIA, UNSPECIFIED TYPE: ICD-10-CM

## 2023-01-31 DIAGNOSIS — E03.9 HYPOTHYROIDISM, UNSPECIFIED TYPE: ICD-10-CM

## 2023-01-31 LAB
ALBUMIN SERPL BCG-MCNC: 4.2 G/DL (ref 3.5–5.2)
ALP SERPL-CCNC: 68 U/L (ref 35–104)
ALT SERPL W P-5'-P-CCNC: 21 U/L (ref 10–35)
ANION GAP SERPL CALCULATED.3IONS-SCNC: 12 MMOL/L (ref 7–15)
AST SERPL W P-5'-P-CCNC: 28 U/L (ref 10–35)
BASOPHILS # BLD AUTO: 0 10E3/UL (ref 0–0.2)
BASOPHILS NFR BLD AUTO: 0 %
BILIRUB SERPL-MCNC: 0.7 MG/DL
BUN SERPL-MCNC: 16.4 MG/DL (ref 6–20)
CALCIUM SERPL-MCNC: 9.2 MG/DL (ref 8.6–10)
CHLORIDE SERPL-SCNC: 104 MMOL/L (ref 98–107)
CHOLEST SERPL-MCNC: 266 MG/DL
CREAT SERPL-MCNC: 0.56 MG/DL (ref 0.51–0.95)
DEPRECATED HCO3 PLAS-SCNC: 23 MMOL/L (ref 22–29)
EOSINOPHIL # BLD AUTO: 0.1 10E3/UL (ref 0–0.7)
EOSINOPHIL NFR BLD AUTO: 3 %
ERYTHROCYTE [DISTWIDTH] IN BLOOD BY AUTOMATED COUNT: 13.2 % (ref 10–15)
FASTING STATUS PATIENT QL REPORTED: YES
FERRITIN SERPL-MCNC: 75 NG/ML (ref 11–328)
FOLATE SERPL-MCNC: 38.6 NG/ML (ref 4.6–34.8)
GFR SERPL CREATININE-BSD FRML MDRD: >90 ML/MIN/1.73M2
GLUCOSE SERPL-MCNC: 87 MG/DL (ref 70–99)
GLUCOSE SERPL-MCNC: 88 MG/DL (ref 70–99)
HCT VFR BLD AUTO: 37.9 % (ref 35–47)
HDLC SERPL-MCNC: 59 MG/DL
HGB BLD-MCNC: 12.6 G/DL (ref 11.7–15.7)
IRON BINDING CAPACITY (ROCHE): 301 UG/DL (ref 240–430)
IRON SATN MFR SERPL: 24 % (ref 15–46)
IRON SERPL-MCNC: 73 UG/DL (ref 37–145)
LDLC SERPL CALC-MCNC: 190 MG/DL
LYMPHOCYTES # BLD AUTO: 0.9 10E3/UL (ref 0.8–5.3)
LYMPHOCYTES NFR BLD AUTO: 31 %
MCH RBC QN AUTO: 31.6 PG (ref 26.5–33)
MCHC RBC AUTO-ENTMCNC: 33.2 G/DL (ref 31.5–36.5)
MCV RBC AUTO: 95 FL (ref 78–100)
MONOCYTES # BLD AUTO: 0.3 10E3/UL (ref 0–1.3)
MONOCYTES NFR BLD AUTO: 12 %
NEUTROPHILS # BLD AUTO: 1.5 10E3/UL (ref 1.6–8.3)
NEUTROPHILS NFR BLD AUTO: 54 %
NONHDLC SERPL-MCNC: 207 MG/DL
PLATELET # BLD AUTO: 230 10E3/UL (ref 150–450)
POTASSIUM SERPL-SCNC: 4.4 MMOL/L (ref 3.4–5.3)
PROT SERPL-MCNC: 6.6 G/DL (ref 6.4–8.3)
RBC # BLD AUTO: 3.99 10E6/UL (ref 3.8–5.2)
RETICS # AUTO: 0.06 10E6/UL (ref 0.03–0.1)
RETICS/RBC NFR AUTO: 1.5 % (ref 0.5–2)
SODIUM SERPL-SCNC: 139 MMOL/L (ref 136–145)
TRIGL SERPL-MCNC: 85 MG/DL
TSH SERPL DL<=0.005 MIU/L-ACNC: 2.8 UIU/ML (ref 0.3–4.2)
VIT B12 SERPL-MCNC: 455 PG/ML (ref 232–1245)
WBC # BLD AUTO: 2.8 10E3/UL (ref 4–11)

## 2023-01-31 PROCEDURE — 80061 LIPID PANEL: CPT | Performed by: NURSE PRACTITIONER

## 2023-01-31 PROCEDURE — 85060 BLOOD SMEAR INTERPRETATION: CPT | Performed by: PATHOLOGY

## 2023-01-31 PROCEDURE — 83540 ASSAY OF IRON: CPT | Performed by: NURSE PRACTITIONER

## 2023-01-31 PROCEDURE — 82728 ASSAY OF FERRITIN: CPT | Performed by: NURSE PRACTITIONER

## 2023-01-31 PROCEDURE — 82607 VITAMIN B-12: CPT | Performed by: NURSE PRACTITIONER

## 2023-01-31 PROCEDURE — 36415 COLL VENOUS BLD VENIPUNCTURE: CPT | Performed by: NURSE PRACTITIONER

## 2023-01-31 PROCEDURE — 83550 IRON BINDING TEST: CPT | Performed by: NURSE PRACTITIONER

## 2023-01-31 PROCEDURE — 99213 OFFICE O/P EST LOW 20 MIN: CPT | Mod: 25 | Performed by: NURSE PRACTITIONER

## 2023-01-31 PROCEDURE — 80050 GENERAL HEALTH PANEL: CPT | Performed by: NURSE PRACTITIONER

## 2023-01-31 PROCEDURE — 85045 AUTOMATED RETICULOCYTE COUNT: CPT | Performed by: NURSE PRACTITIONER

## 2023-01-31 PROCEDURE — 82746 ASSAY OF FOLIC ACID SERUM: CPT | Performed by: NURSE PRACTITIONER

## 2023-01-31 PROCEDURE — 99396 PREV VISIT EST AGE 40-64: CPT | Performed by: NURSE PRACTITIONER

## 2023-01-31 RX ORDER — LOSARTAN POTASSIUM 100 MG/1
100 TABLET ORAL DAILY
Qty: 90 TABLET | Refills: 3 | Status: SHIPPED | OUTPATIENT
Start: 2023-01-31 | End: 2024-01-04

## 2023-01-31 RX ORDER — PNV NO.95/FERROUS FUM/FOLIC AC 28MG-0.8MG
TABLET ORAL
Qty: 90 TABLET | Refills: 3 | Status: CANCELLED | OUTPATIENT
Start: 2023-01-31

## 2023-01-31 SDOH — ECONOMIC STABILITY: TRANSPORTATION INSECURITY
IN THE PAST 12 MONTHS, HAS LACK OF TRANSPORTATION KEPT YOU FROM MEETINGS, WORK, OR FROM GETTING THINGS NEEDED FOR DAILY LIVING?: NO

## 2023-01-31 SDOH — ECONOMIC STABILITY: FOOD INSECURITY: WITHIN THE PAST 12 MONTHS, THE FOOD YOU BOUGHT JUST DIDN'T LAST AND YOU DIDN'T HAVE MONEY TO GET MORE.: NEVER TRUE

## 2023-01-31 SDOH — ECONOMIC STABILITY: INCOME INSECURITY: HOW HARD IS IT FOR YOU TO PAY FOR THE VERY BASICS LIKE FOOD, HOUSING, MEDICAL CARE, AND HEATING?: NOT VERY HARD

## 2023-01-31 SDOH — HEALTH STABILITY: PHYSICAL HEALTH: ON AVERAGE, HOW MANY MINUTES DO YOU ENGAGE IN EXERCISE AT THIS LEVEL?: 20 MIN

## 2023-01-31 SDOH — ECONOMIC STABILITY: FOOD INSECURITY: WITHIN THE PAST 12 MONTHS, YOU WORRIED THAT YOUR FOOD WOULD RUN OUT BEFORE YOU GOT MONEY TO BUY MORE.: NEVER TRUE

## 2023-01-31 SDOH — ECONOMIC STABILITY: INCOME INSECURITY: IN THE LAST 12 MONTHS, WAS THERE A TIME WHEN YOU WERE NOT ABLE TO PAY THE MORTGAGE OR RENT ON TIME?: NO

## 2023-01-31 SDOH — ECONOMIC STABILITY: TRANSPORTATION INSECURITY
IN THE PAST 12 MONTHS, HAS THE LACK OF TRANSPORTATION KEPT YOU FROM MEDICAL APPOINTMENTS OR FROM GETTING MEDICATIONS?: NO

## 2023-01-31 SDOH — HEALTH STABILITY: PHYSICAL HEALTH: ON AVERAGE, HOW MANY DAYS PER WEEK DO YOU ENGAGE IN MODERATE TO STRENUOUS EXERCISE (LIKE A BRISK WALK)?: 3 DAYS

## 2023-01-31 ASSESSMENT — ENCOUNTER SYMPTOMS
BREAST MASS: 0
NERVOUS/ANXIOUS: 0
DIARRHEA: 0
ARTHRALGIAS: 0
DIZZINESS: 0
PALPITATIONS: 0
SORE THROAT: 0
JOINT SWELLING: 0
SHORTNESS OF BREATH: 0
HEADACHES: 0
DYSURIA: 0
PARESTHESIAS: 0
HEARTBURN: 0
HEMATURIA: 0
ABDOMINAL PAIN: 0
CHILLS: 0
FEVER: 0
WEAKNESS: 0
FREQUENCY: 0
NAUSEA: 0
MYALGIAS: 0
COUGH: 0
EYE PAIN: 0
CONSTIPATION: 0
HEMATOCHEZIA: 0

## 2023-01-31 ASSESSMENT — SOCIAL DETERMINANTS OF HEALTH (SDOH)
HOW OFTEN DO YOU ATTEND CHURCH OR RELIGIOUS SERVICES?: 1 TO 4 TIMES PER YEAR
IN A TYPICAL WEEK, HOW MANY TIMES DO YOU TALK ON THE PHONE WITH FAMILY, FRIENDS, OR NEIGHBORS?: TWICE A WEEK
DO YOU BELONG TO ANY CLUBS OR ORGANIZATIONS SUCH AS CHURCH GROUPS UNIONS, FRATERNAL OR ATHLETIC GROUPS, OR SCHOOL GROUPS?: NO
HOW OFTEN DO YOU GET TOGETHER WITH FRIENDS OR RELATIVES?: ONCE A WEEK

## 2023-01-31 ASSESSMENT — LIFESTYLE VARIABLES
HOW OFTEN DO YOU HAVE A DRINK CONTAINING ALCOHOL: 2-4 TIMES A MONTH
SKIP TO QUESTIONS 9-10: 1
AUDIT-C TOTAL SCORE: 2
HOW OFTEN DO YOU HAVE SIX OR MORE DRINKS ON ONE OCCASION: NEVER
HOW MANY STANDARD DRINKS CONTAINING ALCOHOL DO YOU HAVE ON A TYPICAL DAY: 1 OR 2

## 2023-01-31 ASSESSMENT — PATIENT HEALTH QUESTIONNAIRE - PHQ9
SUM OF ALL RESPONSES TO PHQ QUESTIONS 1-9: 3
SUM OF ALL RESPONSES TO PHQ QUESTIONS 1-9: 3
10. IF YOU CHECKED OFF ANY PROBLEMS, HOW DIFFICULT HAVE THESE PROBLEMS MADE IT FOR YOU TO DO YOUR WORK, TAKE CARE OF THINGS AT HOME, OR GET ALONG WITH OTHER PEOPLE: NOT DIFFICULT AT ALL

## 2023-01-31 ASSESSMENT — PAIN SCALES - GENERAL: PAINLEVEL: NO PAIN (0)

## 2023-01-31 NOTE — PROGRESS NOTES
SUBJECTIVE:   CC: Natalee is an 52 year old who presents for preventive health visit.   Patient has been advised of split billing requirements and indicates understanding: Yes  Healthy Habits:     Getting at least 3 servings of Calcium per day:  Yes    Bi-annual eye exam:  Yes    Dental care twice a year:  Yes    Sleep apnea or symptoms of sleep apnea:  None    Diet:  Regular (no restrictions)    Frequency of exercise:  2-3 days/week    Taking medications regularly:  Yes    Medication side effects:  None    PHQ-2 Total Score: 2    Additional concerns today:  No    Concerns today: knee problems  FASTING today        Today's PHQ-2 Score:   PHQ-2 ( 1999 [employer]) 1/31/2023   Q1: Little interest or pleasure in doing things 1   Q2: Feeling down, depressed or hopeless 1   PHQ-2 Score 2   PHQ-2 Total Score (12-17 Years)- Positive if 3 or more points; Administer PHQ-A if positive -   Q1: Little interest or pleasure in doing things Several days   Q2: Feeling down, depressed or hopeless Several days   PHQ-2 Score 2     Have you ever done Advance Care Planning? (For example, a Health Directive, POLST, or a discussion with a medical provider or your loved ones about your wishes): No, advance care planning information given to patient to review.  Patient declined advance care planning discussion at this time.    Social History     Tobacco Use     Smoking status: Never     Smokeless tobacco: Never   Substance Use Topics     Alcohol use: No     Alcohol Use 1/31/2023   Prescreen: >3 drinks/day or >7 drinks/week? No   Prescreen: >3 drinks/day or >7 drinks/week? -     Reviewed orders with patient.  Reviewed health maintenance and updated orders accordingly - Yes      Breast Cancer Screening:    Breast CA Risk Assessment (FHS-7) 1/31/2023   Do you have a family history of breast, colon, or ovarian cancer? No / Unknown       click delete button to remove this line now    Pertinent mammograms are reviewed under the imaging  "tab.    History of abnormal Pap smear: NO - age 30-65 PAP every 5 years with negative HPV co-testing recommended  PAP / HPV Latest Ref Rng & Units 8/16/2018   PAP (Historical) - NIL   HPV16 NEG:Negative Negative   HPV18 NEG:Negative Negative   HRHPV NEG:Negative Negative     Reviewed and updated as needed this visit by clinical staff   Tobacco  Allergies  Meds              Reviewed and updated as needed this visit by Provider                     Review of Systems   Constitutional: Negative for chills and fever.   HENT: Negative for congestion, ear pain, hearing loss and sore throat.    Eyes: Negative for pain and visual disturbance.   Respiratory: Negative for cough and shortness of breath.    Cardiovascular: Negative for chest pain, palpitations and peripheral edema.   Gastrointestinal: Negative for abdominal pain, constipation, diarrhea, heartburn, hematochezia and nausea.   Breasts:  Negative for tenderness, breast mass and discharge.   Genitourinary: Negative for dysuria, frequency, genital sores, hematuria, pelvic pain, urgency, vaginal bleeding and vaginal discharge.   Musculoskeletal: Negative for arthralgias, joint swelling and myalgias.   Skin: Negative for rash.   Neurological: Negative for dizziness, weakness, headaches and paresthesias.   Psychiatric/Behavioral: Negative for mood changes. The patient is not nervous/anxious.           OBJECTIVE:   /76 (BP Location: Right arm, Cuff Size: Adult Large)   Pulse 75   Temp 97.6  F (36.4  C) (Tympanic)   Resp 18   Ht 1.613 m (5' 3.5\")   Wt 101.3 kg (223 lb 4.8 oz)   LMP 11/28/2022 (Exact Date)   SpO2 98%   BMI 38.94 kg/m    Physical Exam  GENERAL: healthy, alert and no distress  EYES: Eyes grossly normal to inspection, PERRL and conjunctivae and sclerae normal  HENT: ear canals and TM's normal, nose and mouth without ulcers or lesions  NECK: no adenopathy, no asymmetry, masses, or scars and thyroid normal to palpation  RESP: lungs clear to " "auscultation - no rales, rhonchi or wheezes  CV: regular rate and rhythm, normal S1 S2, no S3 or S4, no murmur, click or rub, no peripheral edema and peripheral pulses strong  ABDOMEN: soft, nontender, no hepatosplenomegaly, no masses and bowel sounds normal  MS: no gross musculoskeletal defects noted, no edema  SKIN: no suspicious lesions or rashes  NEURO: Normal strength and tone, mentation intact and speech normal  PSYCH: mentation appears normal, affect normal/bright    Diagnostic Test Results:  Labs reviewed in Epic    ASSESSMENT/PLAN:   (E66.01) Morbid obesity (H)  (primary encounter diagnosis)  Comment: With hypertension. Has only lost 7lbs on max liraglutide  Plan: Lipid panel reflex to direct LDL Fasting          -Continue liraglutide. In the future, consider switching to Mounjaro  -Recheck TSH. Goal to get closer to 2. Was 3 on last check. May or may not help with weight loss  -Consider adding back low dose phentermine now that BP is much better  -Re-start PT so that she can exercise more  -Consider sleep study in future. Doesn't snore but sleep apnea could make weight loss difficult  -Continue NOOM    (E03.8)  hypothyroidism  Comment: Recheck. Consider getting TSH closer to 2  Plan: TSH with free T4 reflex    (D70.9) Neutropenia, unspecified type (H)  Comment: Monitored by oncolog    (Z00.00) Routine general medical examination at a health care facility  Comment: Declines all vaccines. Due for mammo. She will schedule    (Z12.31) Visit for screening mammogram  Plan: MA SCREENING DIGITAL BILAT - Future  (s+30)            (I10) Benign essential hypertension  Comment: Stable  Plan: losartan (COZAAR) 100 MG tablet            COUNSELING:  Reviewed preventive health counseling, as reflected in patient instructions      BMI:   Estimated body mass index is 38.94 kg/m  as calculated from the following:    Height as of this encounter: 1.613 m (5' 3.5\").    Weight as of this encounter: 101.3 kg (223 lb 4.8 oz). "   Weight management plan: Discussed healthy diet and exercise guidelines      She reports that she has never smoked. She has never used smokeless tobacco.          SHAQ Peraza [facility] SHARI  Answers for HPI/ROS submitted by the patient on 1/31/2023  If you checked off any problems, how difficult have these problems made it for you to do your work, take care of things at home, or get along with other people?: Not difficult at all  PHQ9 TOTAL SCORE: 3

## 2023-01-31 NOTE — PATIENT INSTRUCTIONS
Mammo    Preventive Health Recommendations  Female Ages 50 - 64    Yearly exam: See your health care provider every year in order to  Review health changes.   Discuss preventive care.    Review your medicines if your doctor has prescribed any.    Get a Pap test every three years (unless you have an abnormal result and your provider advises testing more often).  If you get Pap tests with HPV test, you only need to test every 5 years, unless you have an abnormal result.   You do not need a Pap test if your uterus was removed (hysterectomy) and you have not had cancer.  You should be tested each year for STDs (sexually transmitted diseases) if you're at risk.   Have a mammogram every 1 to 2 years.  Have a colonoscopy at age 50, or have a yearly FIT test (stool test). These exams screen for colon cancer.    Have a cholesterol test every 5 years, or more often if advised.  Have a diabetes test (fasting glucose) every three years. If you are at risk for diabetes, you should have this test more often.   If you are at risk for osteoporosis (brittle bone disease), think about having a bone density scan (DEXA).    Shots: Get a flu shot each year. Get a tetanus shot every 10 years.    Nutrition:   Eat at least 5 servings of fruits and vegetables each day.  Eat whole-grain bread, whole-wheat pasta and brown rice instead of white grains and rice.  Get adequate Calcium and Vitamin D.     Lifestyle  Exercise at least 150 minutes a week (30 minutes a day, 5 days a week). This will help you control your weight and prevent disease.  Limit alcohol to one drink per day.  No smoking.   Wear sunscreen to prevent skin cancer.   See your dentist every six months for an exam and cleaning.  See your eye doctor every 1 to 2 years.

## 2023-02-01 LAB
PATH REPORT.COMMENTS IMP SPEC: NORMAL
PATH REPORT.FINAL DX SPEC: NORMAL
PATH REPORT.MICROSCOPIC SPEC OTHER STN: NORMAL
PATH REPORT.MICROSCOPIC SPEC OTHER STN: NORMAL

## 2023-02-01 RX ORDER — LEVOTHYROXINE SODIUM 25 UG/1
12.5 TABLET ORAL
Qty: 12 TABLET | Refills: 3 | Status: SHIPPED | OUTPATIENT
Start: 2023-02-02 | End: 2023-12-26

## 2023-02-01 RX ORDER — PHENTERMINE HYDROCHLORIDE 15 MG/1
15 CAPSULE ORAL EVERY MORNING
Qty: 90 CAPSULE | Refills: 3 | Status: SHIPPED | OUTPATIENT
Start: 2023-02-01 | End: 2023-11-09

## 2023-03-17 DIAGNOSIS — Q25.40 AORTIC ANOMALY: Primary | ICD-10-CM

## 2023-03-17 DIAGNOSIS — R01.1 HEART MURMUR: ICD-10-CM

## 2023-03-27 ENCOUNTER — ANCILLARY PROCEDURE (OUTPATIENT)
Dept: MAMMOGRAPHY | Facility: CLINIC | Age: 53
End: 2023-03-27
Attending: NURSE PRACTITIONER
Payer: COMMERCIAL

## 2023-03-27 DIAGNOSIS — Z00.00 HEALTHCARE MAINTENANCE: ICD-10-CM

## 2023-03-27 DIAGNOSIS — Z12.31 VISIT FOR SCREENING MAMMOGRAM: ICD-10-CM

## 2023-03-27 PROCEDURE — 77067 SCR MAMMO BI INCL CAD: CPT | Mod: TC | Performed by: RADIOLOGY

## 2023-04-13 PROBLEM — M25.562 PAIN IN BOTH KNEES, UNSPECIFIED CHRONICITY: Status: RESOLVED | Noted: 2022-10-25 | Resolved: 2023-04-13

## 2023-04-13 PROBLEM — M25.561 PAIN IN BOTH KNEES, UNSPECIFIED CHRONICITY: Status: RESOLVED | Noted: 2022-10-25 | Resolved: 2023-04-13

## 2023-04-13 NOTE — PROGRESS NOTES
Discharge Note    Progress reporting period is from initial evaluation date (please see noted date below) to Dec 12, 2022.  Linked Episodes   Type: Episode: Status: Noted: Resolved: Last update: Updated by:   PHYSICAL THERAPY B Knee Pain (10/25/2022) Active 10/25/2022  12/12/2022  3:54 PM Trina Ny, PT      Comments:       Natalee failed to follow up and current status is unknown.  Please see information below for last relevant information on current status.  Patient seen for 5 visits.    SUBJECTIVE  Subjective changes noted by patient:  Things are better. When she does the exercises every other day she feels good, however when she does not do her exercises after 1.5-2 hours of walking she has increased pain.  She has minimal to no pain after exercise days.  .  Current pain level is 0/10.     Previous pain level was  5/10.   Changes in function:  Yes (See Goal flowsheet attached for changes in current functional level)  Adverse reaction to treatment or activity: None    OBJECTIVE  Changes noted in objective findings: Squat: Good form requiring min cuing to maintain.     ASSESSMENT/PLAN  Diagnosis: B Knee Pain   Updated problem list and treatment plan:   Decreased strength - HEP   STG/LTGs have been met or progress has been made towards goals:  Yes, please see goal flowsheet for most current information  Assessment of Progress: current status is unknown.    Last current status:     Self Management Plans:  HEP  I have re-evaluated this patient and find that the nature, scope, duration and intensity of the therapy is appropriate for the medical condition of the patient.  Natalee continues to require the following intervention to meet STG and LTG's:  HEP.    Recommendations:  Discharge with current home program.  Patient to follow up with MD as needed.    Please refer to the daily flowsheet for treatment today, total treatment time and time spent performing 1:1 timed codes.

## 2023-05-12 ENCOUNTER — TELEPHONE (OUTPATIENT)
Dept: PEDIATRICS | Facility: CLINIC | Age: 53
End: 2023-05-12
Payer: COMMERCIAL

## 2023-05-12 NOTE — TELEPHONE ENCOUNTER
PA Initiation    Medication: Saxenda 18MG/3ML pen-injectors - PA Pending  Insurance Company: [employer] - Phone [phone] Fax [phone]  Pharmacy Filling the Rx: [facility] - [place], [place] - [address]  Filling Pharmacy Phone: [phone]  Filling Pharmacy Fax: [phone]  Start Date: 5/12/2023    PA renewal started.          Thank you,     Robert Lawrence [facility]  Pharmacy Clinic [facility]  [email or web address]   Phone: [phone]  Fax: [phone]

## 2023-05-15 NOTE — TELEPHONE ENCOUNTER
Prior Authorization Approval    Authorization Effective Date: 4/15/2023  Authorization Expiration Date: 5/14/2024  Medication: Saxenda 18MG/3ML pen-injectors - PA Approved  Approved Dose/Quantity: 15 ml per 30 days  Reference #: [id number]   Insurance Company: [employer] - Phone [phone] Fax [phone]  Expected CoPay: $0     CoPay Card Available:      Foundation Assistance Needed:    Which Pharmacy is filling the prescription (Not needed for infusion/clinic administered): [facility] - [place], [place] - [address] [place] [address] [facility]  Pharmacy Notified: Yes  Patient Notified:              Thank you,     Robert Lawrence [facility]  Pharmacy Clinic [facility] [email or web address]   Phone: [phone]  Fax: [phone]

## 2023-05-20 DIAGNOSIS — E03.8 SUBCLINICAL HYPOTHYROIDISM: ICD-10-CM

## 2023-05-22 RX ORDER — LEVOTHYROXINE SODIUM 50 UG/1
TABLET ORAL
Qty: 90 TABLET | Refills: 2 | Status: SHIPPED | OUTPATIENT
Start: 2023-05-22 | End: 2024-01-04

## 2023-05-22 NOTE — TELEPHONE ENCOUNTER
Physical was 1/31/23.    TSH   Date Value Ref Range Status   01/31/2023 2.80 0.30 - 4.20 uIU/mL Final   06/16/2022 3.09 0.40 - 4.00 mU/L Final   03/11/2021 1.65 0.40 - 4.00 mU/L Final       Prescription approved per [facility] Refill Protocol.  Betty Magaña RN

## 2023-05-29 NOTE — TELEPHONE ENCOUNTER
"Requested Prescriptions   Pending Prescriptions Disp Refills     ferrous sulfate (IRON) 325 (65 Fe) MG tablet [Pharmacy Med Name: FERROUS SULFATE 325MG (5GR) TABS]    Last Written Prescription Date:  6/15/2017  Last Fill Quantity: 90,  # refills: 2   Last office visit: 6/13/2017 with prescribing provider:  Carolina Gomez     Future Office Visit:     90 tablet 0     Sig: TAKE 1 TABLET(325 MG) BY MOUTH DAILY WITH BREAKFAST    Iron Supplements Failed    7/26/2018  8:51 AM       Failed - Hgb OR Hct on record within the past 12 mos.    Patient need only have had a HGB or HCT on file in the past 12 mos. That result does not need to be normal.    Recent Labs   Lab Test  06/13/17   1544   HGB  10.6*     Recent Labs   Lab Test  06/13/17   1544   HCT  33.7*       Please verify a HGB or HCT has been checked SINCE THE LAST DOSE CHANGE.           Passed - Patient is 12 years of age or older       Passed - Recent (12 mo) or future (30 days) visit within the authorizing provider's specialty    Patient had office visit in the last 12 months or has a visit in the next 30 days with authorizing provider or within the authorizing provider's specialty.  See \"Patient Info\" tab in inbasket, or \"Choose Columns\" in Meds & Orders section of the refill encounter.              "
Called and left  to schedule appointment.  Vani Farmer, CMA    
Medication is being filled for 1 time refill only due to:  Patient needs labs annual hemoglobin. Patient needs to be seen because it has been more than one year since last visit. Call to schedule an appointment with pcp    Janneth Murillo RN      
Sent letter to make appointment.  Vani Farmer, CMA    
Still no appointment made.  Nadine Haynes LPN    
Go for blood tests as directed. Your doctor will do lab tests at regular visits to monitor the effects of this medicine. Please follow up with your doctor and keep your health care provider appointments.

## 2023-06-12 ENCOUNTER — HOSPITAL ENCOUNTER (OUTPATIENT)
Dept: CARDIOLOGY | Facility: CLINIC | Age: 53
Discharge: HOME | End: 2023-06-12
Admitting: INTERNAL MEDICINE
Payer: COMMERCIAL

## 2023-06-12 DIAGNOSIS — Q25.40 AORTIC ANOMALY: ICD-10-CM

## 2023-06-12 DIAGNOSIS — R01.1 HEART MURMUR: ICD-10-CM

## 2023-06-12 PROCEDURE — 93306 TTE W/DOPPLER COMPLETE: CPT | Mod: 26 | Performed by: INTERNAL MEDICINE

## 2023-06-12 PROCEDURE — 93306 TTE W/DOPPLER COMPLETE: CPT

## 2023-06-13 ENCOUNTER — OFFICE VISIT (OUTPATIENT)
Dept: CARDIOLOGY | Facility: CLINIC | Age: 53
End: 2023-06-13
Payer: COMMERCIAL

## 2023-06-13 VITALS
WEIGHT: 222 LBS | DIASTOLIC BLOOD PRESSURE: 79 MMHG | HEIGHT: 64 IN | BODY MASS INDEX: 37.9 KG/M2 | SYSTOLIC BLOOD PRESSURE: 130 MMHG | HEART RATE: 74 BPM

## 2023-06-13 DIAGNOSIS — R07.89 ATYPICAL CHEST PAIN: Primary | ICD-10-CM

## 2023-06-13 PROCEDURE — 99213 OFFICE O/P EST LOW 20 MIN: CPT | Performed by: INTERNAL MEDICINE

## 2023-06-13 PROCEDURE — 93000 ELECTROCARDIOGRAM COMPLETE: CPT | Performed by: INTERNAL MEDICINE

## 2023-06-13 NOTE — PROGRESS NOTES
HISTORY:    Natalee Maya is a pleasant 52-year-old female last seen 2 years ago in cardiology clinic with an audible murmur, hypertension, and atypical chest pain.  Her other medical problems include hypothyroidism and obesity.  She is here for routine follow-up visit today.    Natalee reports that she feels she is doing well and reports no cardiovascular concerns.  The chest pain that she had a couple of years ago when she was last seen has completely resolved.  She denies any exertional chest arm neck shoulder or jaw discomfort.  She also denies any symptoms of palpitations, PND/orthopnea, syncope or near syncope, strokelike symptoms, orthostasis, or claudication.  She had some surgery on her right ankle in the past and had some mild lymphedema which is stable.    Valarie has struggled with her weight for many years and is continuing to use phentermine.  She had an echocardiogram done yesterday which I reviewed with her.  It is completely normal.  The cause of her 2/6 audible murmur is not clear but it is an innocent murmur probably due to some angulation of one of the outflow  vessels or another such innocent issue.    ECG done today is normal.      ASSESSMENT/PLAN:    1.  Hypertension.  Well-controlled with losartan 100 mg daily, managed by primary care.  2.  Chest pain.  Completely resolved.  She had a normal stress echo 2 years ago, no further evaluation needed.  3.  Audible murmur, normal echocardiogram indicating that this is an innocent systolic murmur.    Thank you for inviting me to participate in the care of your patient.  Please don't hesitate to call if I can be of further assistance.  28 minutes were spent today reviewing the chart and other records, interviewing and examining the patient, and documenting our visit.    Natalee has no ongoing cardiology issues that require monitoring.  I will not schedule any return appointments but of course I would be happy to see her on an as-needed basis should  further cardiology questions or issues arise.    Chart documentation was completed, in part, with [employer] voice-recognition software. Even though reviewed, some grammatical, spelling, and word errors may remain.       Orders Placed This Encounter   Procedures     EKG 12-lead complete w/read - Clinics (performed today)     No orders of the defined types were placed in this encounter.    There are no discontinued medications.    10 year ASCVD risk: The 10-year ASCVD risk score (Sarah QUIROZ, et al., 2019) is: 2.7%    Values used to calculate the score:      Age: 52 years      Sex: Female      Is Non- : No      Diabetic: No      Tobacco smoker: No      Systolic Blood Pressure: 130 mmHg      Is BP treated: Yes      HDL Cholesterol: 59 mg/dL      Total Cholesterol: 266 mg/dL    Encounter Diagnosis   Name Primary?     Atypical chest pain Yes       CURRENT MEDICATIONS:  Current Outpatient Medications   Medication Sig Dispense Refill     Ferrous Sulfate (IRON) 325 (65 Fe) MG tablet TAKE ONE TABLET BY MOUTH EVERY DAY, AVOID AROUND SAME TIME AS LEVOTHYROXINE 90 tablet 3     folic acid (FOLVITE) 1 MG tablet TAKE ONE TABLET BY MOUTH ONCE DAILY 90 tablet 1     levothyroxine (SYNTHROID/LEVOTHROID) 25 MCG tablet Take 0.5 tablets (12.5 mcg) by mouth twice a week for 90 days In addition to your daily 50mcg pills 12 tablet 3     levothyroxine (SYNTHROID/LEVOTHROID) 50 MCG tablet TAKE ONE TABLET BY MOUTH ONCE DAILY 90 tablet 2     losartan (COZAAR) 100 MG tablet Take 1 tablet (100 mg) by mouth daily 90 tablet 3     phentermine (ADIPEX-P) 15 MG capsule Take 1 capsule (15 mg) by mouth every morning 90 capsule 3     SAXENDA 18 MG/3ML pen INJECT 3 MG UNDER THE SKIN DAILY 45 mL 1     BD PEN NEEDLE MICRO U/F 32G X 6 MM miscellaneous INJECT INTO THE SKIN DAILY WITH SAXENDA (Patient not taking: Reported on 6/13/2023) 100 each 11       ALLERGIES     Allergies   Allergen Reactions     No Known Drug Allergy        PAST  MEDICAL HISTORY:  Past Medical History:   Diagnosis Date     Calculus of kidney     History of kidney stone -- Abstracted 02     Hypertension      Lymphedema of lower extremity        PAST SURGICAL HISTORY:  Past Surgical History:   Procedure Laterality Date     COLONOSCOPY Left 2021    Procedure: COLONOSCOPY; incomplete secondary to pt discomfort. will schedule CT colonography today if possible.;  Surgeon: Ceasar Cardoza MD;  Location:  GI     FOOT SURGERY      2017     [facility] NONSPECIFIC PROCEDURE       -- Abstracted 02       FAMILY HISTORY:  Family History   Problem Relation Age of Onset     Diabetes Father      C.A.D. Father         hyperlipidemia     Bleeding Disorder Mother         Factor 5     Breast Cancer No family hx of      Prostate Cancer No family hx of      Colon Cancer No family hx of        SOCIAL HISTORY:  Social History     Socioeconomic History     Marital status:      Spouse name: None     Number of children: None     Years of education: None     Highest education level: None   Tobacco Use     Smoking status: Never     Smokeless tobacco: Never   Vaping Use     Vaping status: Never Used   Substance and Sexual Activity     Alcohol use: No     Drug use: No     Sexual activity: Yes     Partners: Male     Social Determinants of Health     Financial Resource Strain: Low Risk  (2023)    Overall Financial Resource Strain (CARDIA)      Difficulty of Paying Living Expenses: Not very hard   Food Insecurity: No Food Insecurity (2023)    Hunger Vital Sign      Worried About Running Out of Food in the Last Year: Never true      Ran Out of Food in the Last Year: Never true   Transportation Needs: No Transportation Needs (2023)    PRAPARE - Transportation      Lack of Transportation (Medical): No      Lack of Transportation (Non-Medical): No   Physical Activity: Insufficiently Active (2023)    Exercise Vital Sign      Days of Exercise per Week:  "3 days      Minutes of Exercise per Session: 20 min   Stress: No Stress Concern Present (1/31/2023)    [employer] [place] [employer] - Occupational Stress Questionnaire      Feeling of Stress : Not at all   Social Connections: Moderately Integrated (1/31/2023)    Social Connection and Isolation Panel [NHANES]      Frequency of Communication with Friends and Family: Twice a week      Frequency of Social Gatherings with Friends and Family: Once a week      Attends Denominational Services: 1 to 4 times per year      Active Member of Clubs or Organizations: No      Marital Status:    Housing Stability: Low Risk  (1/31/2023)    Housing Stability Vital Sign      Unable to Pay for Housing in the Last Year: No      Number of Places Lived in the Last Year: 1      Unstable Housing in the Last Year: No       Review of Systems:  Skin:        Eyes:       ENT:       Respiratory:  Negative    Cardiovascular:  Negative    Gastroenterology:      Genitourinary:       Musculoskeletal:       Neurologic:       Psychiatric:       Heme/Lymph/Imm:       Endocrine:         Physical Exam:  Vitals: /79   Pulse 74   Ht 1.613 m (5' 3.5\")   Wt 100.7 kg (222 lb)   BMI 38.71 kg/m      Constitutional:  cooperative, alert and oriented, well developed, well nourished, in no acute distress obese      Skin:  warm and dry to the touch        Head:  normocephalic        Eyes:           ENT:  no pallor or cyanosis        Neck:  carotid pulses are full and equal bilaterally, JVP normal, no carotid bruit        Chest:  normal breath sounds, clear to auscultation, normal A-P diameter, normal symmetry, normal respiratory excursion, no use of accessory muscles        Cardiac: regular rhythm;normal S1 and S2;no S3 or S4;apical impulse not displaced       systolic murmur;grade 2;RUSB;radiation to the carotid          Abdomen:  abdomen soft;BS normoactive        Vascular: pulses full and equal                                  "     Extremities and Muscular Skeletal:    RLE edema;trace         Neurological:  no gross motor deficits        Psych:  affect appropriate, oriented to time, person and place     Recent Lab Results:  LIPID RESULTS:  Lab Results   Component Value Date    CHOL 266 (H) 01/31/2023    CHOL 259 (H) 06/29/2020    HDL 59 01/31/2023    HDL 66 06/29/2020     (H) 01/31/2023     (H) 06/29/2020    TRIG 85 01/31/2023    TRIG 125 06/29/2020    CHOLHDLRATIO 4.5 10/17/2009       LIVER ENZYME RESULTS:  Lab Results   Component Value Date    AST 28 01/31/2023    AST 22 03/11/2021    ALT 21 01/31/2023    ALT 27 03/11/2021       CBC RESULTS:  Lab Results   Component Value Date    WBC 2.8 (L) 01/31/2023    WBC 2.7 (L) 06/14/2021    RBC 3.99 01/31/2023    RBC 4.05 06/14/2021    HGB 12.6 01/31/2023    HGB 12.5 06/14/2021    HCT 37.9 01/31/2023    HCT 38.5 06/14/2021    MCV 95 01/31/2023    MCV 95 06/14/2021    MCH 31.6 01/31/2023    MCH 30.9 06/14/2021    MCHC 33.2 01/31/2023    MCHC 32.5 06/14/2021    RDW 13.2 01/31/2023    RDW 13.1 06/14/2021     01/31/2023     06/14/2021       BMP RESULTS:  Lab Results   Component Value Date     01/31/2023     06/14/2021    POTASSIUM 4.4 01/31/2023    POTASSIUM 4.0 08/15/2022    POTASSIUM 4.2 06/14/2021    CHLORIDE 104 01/31/2023    CHLORIDE 107 08/15/2022    CHLORIDE 106 06/14/2021    CO2 23 01/31/2023    CO2 24 08/15/2022    CO2 27 06/14/2021    ANIONGAP 12 01/31/2023    ANIONGAP 7 08/15/2022    ANIONGAP 5 06/14/2021    GLC 88 01/31/2023    GLC 87 01/31/2023    GLC 92 08/15/2022    GLC 83 06/14/2021    BUN 16.4 01/31/2023    BUN 12 08/15/2022    BUN 14 06/14/2021    CR 0.56 01/31/2023    CR 0.65 06/14/2021    GFRESTIMATED >90 01/31/2023    GFRESTIMATED >90 06/14/2021    GFRESTBLACK >90 06/14/2021    SAGE 9.2 01/31/2023    SAGE 8.7 06/14/2021        A1C RESULTS:  Lab Results   Component Value Date    A1C 5.6 06/16/2022       INR RESULTS:  Lab Results   Component  Value Date    INR 0.85 (L) 10/27/2018         Rio May MD, [facility]    CC  No referring provider defined for this encounter.

## 2023-06-13 NOTE — LETTER
6/13/2023    COLETTE GRULLON, APRN CNP  [address] [place] [address]    RE: Natalee Maya       Dear Colleague,     I had the pleasure of seeing Natalee Maya in the [facility].  HISTORY:    Natalee Maya is a pleasant 52-year-old female last seen 2 years ago in cardiology clinic with an audible murmur, hypertension, and atypical chest pain.  Her other medical problems include hypothyroidism and obesity.  She is here for routine follow-up visit today.    Natalee reports that she feels she is doing well and reports no cardiovascular concerns.  The chest pain that she had a couple of years ago when she was last seen has completely resolved.  She denies any exertional chest arm neck shoulder or jaw discomfort.  She also denies any symptoms of palpitations, PND/orthopnea, syncope or near syncope, strokelike symptoms, orthostasis, or claudication.  She had some surgery on her right ankle in the past and had some mild lymphedema which is stable.    Valarie has struggled with her weight for many years and is continuing to use phentermine.  She had an echocardiogram done yesterday which I reviewed with her.  It is completely normal.  The cause of her 2/6 audible murmur is not clear but it is an innocent murmur probably due to some angulation of one of the outflow  vessels or another such innocent issue.    ECG done today is normal.      ASSESSMENT/PLAN:    1.  Hypertension.  Well-controlled with losartan 100 mg daily, managed by primary care.  2.  Chest pain.  Completely resolved.  She had a normal stress echo 2 years ago, no further evaluation needed.  3.  Audible murmur, normal echocardiogram indicating that this is an innocent systolic murmur.    Thank you for inviting me to participate in the care of your patient.  Please don't hesitate to call if I can be of further assistance.  28 minutes were spent today reviewing the chart and other records, interviewing and  examining the patient, and documenting our visit.    Natalee has no ongoing cardiology issues that require monitoring.  I will not schedule any return appointments but of course I would be happy to see her on an as-needed basis should further cardiology questions or issues arise.    Chart documentation was completed, in part, with [employer] voice-recognition software. Even though reviewed, some grammatical, spelling, and word errors may remain.       Orders Placed This Encounter   Procedures    EKG 12-lead complete w/read - Clinics (performed today)     No orders of the defined types were placed in this encounter.    There are no discontinued medications.    10 year ASCVD risk: The 10-year ASCVD risk score (Sarah QUIROZ, et al., 2019) is: 2.7%    Values used to calculate the score:      Age: 52 years      Sex: Female      Is Non- : No      Diabetic: No      Tobacco smoker: No      Systolic Blood Pressure: 130 mmHg      Is BP treated: Yes      HDL Cholesterol: 59 mg/dL      Total Cholesterol: 266 mg/dL    Encounter Diagnosis   Name Primary?    Atypical chest pain Yes       CURRENT MEDICATIONS:  Current Outpatient Medications   Medication Sig Dispense Refill    Ferrous Sulfate (IRON) 325 (65 Fe) MG tablet TAKE ONE TABLET BY MOUTH EVERY DAY, AVOID AROUND SAME TIME AS LEVOTHYROXINE 90 tablet 3    folic acid (FOLVITE) 1 MG tablet TAKE ONE TABLET BY MOUTH ONCE DAILY 90 tablet 1    levothyroxine (SYNTHROID/LEVOTHROID) 25 MCG tablet Take 0.5 tablets (12.5 mcg) by mouth twice a week for 90 days In addition to your daily 50mcg pills 12 tablet 3    levothyroxine (SYNTHROID/LEVOTHROID) 50 MCG tablet TAKE ONE TABLET BY MOUTH ONCE DAILY 90 tablet 2    losartan (COZAAR) 100 MG tablet Take 1 tablet (100 mg) by mouth daily 90 tablet 3    phentermine (ADIPEX-P) 15 MG capsule Take 1 capsule (15 mg) by mouth every morning 90 capsule 3    SAXENDA 18 MG/3ML pen INJECT 3 MG UNDER THE SKIN DAILY 45 mL 1    BD PEN NEEDLE  MICRO U/F 32G X 6 MM miscellaneous INJECT INTO THE SKIN DAILY WITH SAXENDA (Patient not taking: Reported on 2023) 100 each 11       ALLERGIES     Allergies   Allergen Reactions    No Known Drug Allergy        PAST MEDICAL HISTORY:  Past Medical History:   Diagnosis Date    Calculus of kidney     History of kidney stone -- Abstracted 02    Hypertension     Lymphedema of lower extremity        PAST SURGICAL HISTORY:  Past Surgical History:   Procedure Laterality Date    COLONOSCOPY Left 2021    Procedure: COLONOSCOPY; incomplete secondary to pt discomfort. will schedule CT colonography today if possible.;  Surgeon: Ceasar Cardoza MD;  Location:  GI    FOOT SURGERY      2017    [facility] NONSPECIFIC PROCEDURE       -- Abstracted 02       FAMILY HISTORY:  Family History   Problem Relation Age of Onset    Diabetes Father     C.A.D. Father         hyperlipidemia    Bleeding Disorder Mother         Factor 5    Breast Cancer No family hx of     Prostate Cancer No family hx of     Colon Cancer No family hx of        SOCIAL HISTORY:  Social History     Socioeconomic History    Marital status:      Spouse name: None    Number of children: None    Years of education: None    Highest education level: None   Tobacco Use    Smoking status: Never    Smokeless tobacco: Never   Vaping Use    Vaping status: Never Used   Substance and Sexual Activity    Alcohol use: No    Drug use: No    Sexual activity: Yes     Partners: Male     Social Determinants of Health     Financial Resource Strain: Low Risk  (2023)    Overall Financial Resource Strain (CARDIA)     Difficulty of Paying Living Expenses: Not very hard   Food Insecurity: No Food Insecurity (2023)    Hunger Vital Sign     Worried About Running Out of Food in the Last Year: Never true     Ran Out of Food in the Last Year: Never true   Transportation Needs: No Transportation Needs (2023)    PRAPARE - Transportation      "Lack of Transportation (Medical): No     Lack of Transportation (Non-Medical): No   Physical Activity: Insufficiently Active (1/31/2023)    Exercise Vital Sign     Days of Exercise per Week: 3 days     Minutes of Exercise per Session: 20 min   Stress: No Stress Concern Present (1/31/2023)    [employer] [place] [employer] - Occupational Stress Questionnaire     Feeling of Stress : Not at all   Social Connections: Moderately Integrated (1/31/2023)    Social Connection and Isolation Panel [NHANES]     Frequency of Communication with Friends and Family: Twice a week     Frequency of Social Gatherings with Friends and Family: Once a week     Attends Buddhism Services: 1 to 4 times per year     Active Member of Clubs or Organizations: No     Marital Status:    Housing Stability: Low Risk  (1/31/2023)    Housing Stability Vital Sign     Unable to Pay for Housing in the Last Year: No     Number of Places Lived in the Last Year: 1     Unstable Housing in the Last Year: No       Review of Systems:  Skin:        Eyes:       ENT:       Respiratory:  Negative    Cardiovascular:  Negative    Gastroenterology:      Genitourinary:       Musculoskeletal:       Neurologic:       Psychiatric:       Heme/Lymph/Imm:       Endocrine:         Physical Exam:  Vitals: /79   Pulse 74   Ht 1.613 m (5' 3.5\")   Wt 100.7 kg (222 lb)   BMI 38.71 kg/m      Constitutional:  cooperative, alert and oriented, well developed, well nourished, in no acute distress obese      Skin:  warm and dry to the touch        Head:  normocephalic        Eyes:           ENT:  no pallor or cyanosis        Neck:  carotid pulses are full and equal bilaterally, JVP normal, no carotid bruit        Chest:  normal breath sounds, clear to auscultation, normal A-P diameter, normal symmetry, normal respiratory excursion, no use of accessory muscles        Cardiac: regular rhythm;normal S1 and S2;no S3 or S4;apical impulse not displaced       " systolic murmur;grade 2;RUSB;radiation to the carotid          Abdomen:  abdomen soft;BS normoactive        Vascular: pulses full and equal                                      Extremities and Muscular Skeletal:    RLE edema;trace         Neurological:  no gross motor deficits        Psych:  affect appropriate, oriented to time, person and place     Recent Lab Results:  LIPID RESULTS:  Lab Results   Component Value Date    CHOL 266 (H) 01/31/2023    CHOL 259 (H) 06/29/2020    HDL 59 01/31/2023    HDL 66 06/29/2020     (H) 01/31/2023     (H) 06/29/2020    TRIG 85 01/31/2023    TRIG 125 06/29/2020    CHOLHDLRATIO 4.5 10/17/2009       LIVER ENZYME RESULTS:  Lab Results   Component Value Date    AST 28 01/31/2023    AST 22 03/11/2021    ALT 21 01/31/2023    ALT 27 03/11/2021       CBC RESULTS:  Lab Results   Component Value Date    WBC 2.8 (L) 01/31/2023    WBC 2.7 (L) 06/14/2021    RBC 3.99 01/31/2023    RBC 4.05 06/14/2021    HGB 12.6 01/31/2023    HGB 12.5 06/14/2021    HCT 37.9 01/31/2023    HCT 38.5 06/14/2021    MCV 95 01/31/2023    MCV 95 06/14/2021    MCH 31.6 01/31/2023    MCH 30.9 06/14/2021    MCHC 33.2 01/31/2023    MCHC 32.5 06/14/2021    RDW 13.2 01/31/2023    RDW 13.1 06/14/2021     01/31/2023     06/14/2021       BMP RESULTS:  Lab Results   Component Value Date     01/31/2023     06/14/2021    POTASSIUM 4.4 01/31/2023    POTASSIUM 4.0 08/15/2022    POTASSIUM 4.2 06/14/2021    CHLORIDE 104 01/31/2023    CHLORIDE 107 08/15/2022    CHLORIDE 106 06/14/2021    CO2 23 01/31/2023    CO2 24 08/15/2022    CO2 27 06/14/2021    ANIONGAP 12 01/31/2023    ANIONGAP 7 08/15/2022    ANIONGAP 5 06/14/2021    GLC 88 01/31/2023    GLC 87 01/31/2023    GLC 92 08/15/2022    GLC 83 06/14/2021    BUN 16.4 01/31/2023    BUN 12 08/15/2022    BUN 14 06/14/2021    CR 0.56 01/31/2023    CR 0.65 06/14/2021    GFRESTIMATED >90 01/31/2023    GFRESTIMATED >90 06/14/2021    GFRESTBLACK >90  06/14/2021    SAGE 9.2 01/31/2023    SAGE 8.7 06/14/2021        A1C RESULTS:  Lab Results   Component Value Date    A1C 5.6 06/16/2022       INR RESULTS:  Lab Results   Component Value Date    INR 0.85 (L) 10/27/2018         Rio May MD, [facility]    CC  No referring provider defined for this encounter.                      Thank you for allowing me to participate in the care of your patient.      Sincerely,     Rio May MD     [facility]   Detail Level: Detailed

## 2023-06-22 DIAGNOSIS — E53.8 FOLATE DEFICIENCY: ICD-10-CM

## 2023-06-26 RX ORDER — FOLIC ACID 1 MG/1
TABLET ORAL
Qty: 90 TABLET | Refills: 1 | OUTPATIENT
Start: 2023-06-26

## 2023-07-26 DIAGNOSIS — E53.8 FOLATE DEFICIENCY: ICD-10-CM

## 2023-07-28 RX ORDER — FOLIC ACID 1 MG/1
TABLET ORAL
Qty: 90 TABLET | Refills: 0 | Status: SHIPPED | OUTPATIENT
Start: 2023-07-28 | End: 2023-11-07

## 2023-07-31 ENCOUNTER — MYC MEDICAL ADVICE (OUTPATIENT)
Dept: PEDIATRICS | Facility: CLINIC | Age: 53
End: 2023-07-31
Payer: COMMERCIAL

## 2023-07-31 DIAGNOSIS — E66.01 MORBID OBESITY (H): ICD-10-CM

## 2023-07-31 RX ORDER — LIRAGLUTIDE 6 MG/ML
INJECTION, SOLUTION SUBCUTANEOUS
Qty: 45 ML | Refills: 11 | Status: SHIPPED | OUTPATIENT
Start: 2023-07-31 | End: 2023-10-31

## 2023-07-31 NOTE — TELEPHONE ENCOUNTER
Due for PHQ9. Please send now    Due for 6 month weight medication check in and pap. Please schedule with SAVANAH lang. Should schedule her physical for January

## 2023-07-31 NOTE — TELEPHONE ENCOUNTER
Routing refill request to provider for review/approval because:  Labs not current:  A1c  Patient needs to be seen because:  due for follow up  Fail: Order for Saxenda with diagnosis of diabetes     Juan MAHMOOD RN 7/31/2023 at 10:53 AM

## 2023-08-02 RX ORDER — LIRAGLUTIDE 6 MG/ML
INJECTION, SOLUTION SUBCUTANEOUS
Qty: 45 ML | Refills: 1 | OUTPATIENT
Start: 2023-08-02

## 2023-08-03 NOTE — TELEPHONE ENCOUNTER
Patient has an appointment scheduled for 8/10 with Rachana Mendoza.     Closing encounter at this time.     Gaby Khan CMA

## 2023-08-10 ENCOUNTER — OFFICE VISIT (OUTPATIENT)
Dept: PEDIATRICS | Facility: CLINIC | Age: 53
End: 2023-08-10
Payer: COMMERCIAL

## 2023-08-10 VITALS
WEIGHT: 225.4 LBS | TEMPERATURE: 98.9 F | DIASTOLIC BLOOD PRESSURE: 80 MMHG | SYSTOLIC BLOOD PRESSURE: 112 MMHG | OXYGEN SATURATION: 98 % | HEART RATE: 80 BPM | RESPIRATION RATE: 14 BRPM | BODY MASS INDEX: 39.3 KG/M2

## 2023-08-10 DIAGNOSIS — E03.9 HYPOTHYROIDISM, UNSPECIFIED TYPE: ICD-10-CM

## 2023-08-10 DIAGNOSIS — D50.9 IRON DEFICIENCY ANEMIA, UNSPECIFIED IRON DEFICIENCY ANEMIA TYPE: ICD-10-CM

## 2023-08-10 DIAGNOSIS — D70.9 NEUTROPENIA, UNSPECIFIED TYPE (H): ICD-10-CM

## 2023-08-10 DIAGNOSIS — E66.01 MORBID OBESITY (H): ICD-10-CM

## 2023-08-10 DIAGNOSIS — D72.819 LEUKOPENIA, UNSPECIFIED TYPE: ICD-10-CM

## 2023-08-10 DIAGNOSIS — Z12.4 CERVICAL CANCER SCREENING: Primary | ICD-10-CM

## 2023-08-10 LAB
ALBUMIN SERPL BCG-MCNC: 4.8 G/DL (ref 3.5–5.2)
ALP SERPL-CCNC: 83 U/L (ref 35–104)
ALT SERPL W P-5'-P-CCNC: 20 U/L (ref 0–50)
ANION GAP SERPL CALCULATED.3IONS-SCNC: 14 MMOL/L (ref 7–15)
AST SERPL W P-5'-P-CCNC: 23 U/L (ref 0–45)
BASOPHILS # BLD AUTO: 0 10E3/UL (ref 0–0.2)
BASOPHILS NFR BLD AUTO: 0 %
BILIRUB SERPL-MCNC: 0.7 MG/DL
BUN SERPL-MCNC: 12.2 MG/DL (ref 6–20)
CALCIUM SERPL-MCNC: 9.9 MG/DL (ref 8.6–10)
CHLORIDE SERPL-SCNC: 101 MMOL/L (ref 98–107)
CREAT SERPL-MCNC: 0.69 MG/DL (ref 0.51–0.95)
DEPRECATED HCO3 PLAS-SCNC: 23 MMOL/L (ref 22–29)
EOSINOPHIL # BLD AUTO: 0.1 10E3/UL (ref 0–0.7)
EOSINOPHIL NFR BLD AUTO: 2 %
ERYTHROCYTE [DISTWIDTH] IN BLOOD BY AUTOMATED COUNT: 13.2 % (ref 10–15)
FERRITIN SERPL-MCNC: 90 NG/ML (ref 11–328)
FOLATE SERPL-MCNC: >40 NG/ML (ref 4.6–34.8)
GFR SERPL CREATININE-BSD FRML MDRD: >90 ML/MIN/1.73M2
GLUCOSE SERPL-MCNC: 99 MG/DL (ref 70–99)
HCT VFR BLD AUTO: 39.6 % (ref 35–47)
HGB BLD-MCNC: 13.3 G/DL (ref 11.7–15.7)
IMM GRANULOCYTES # BLD: 0 10E3/UL
IMM GRANULOCYTES NFR BLD: 0 %
IRON BINDING CAPACITY (ROCHE): 289 UG/DL (ref 240–430)
IRON SATN MFR SERPL: 27 % (ref 15–46)
IRON SERPL-MCNC: 79 UG/DL (ref 37–145)
LYMPHOCYTES # BLD AUTO: 1.3 10E3/UL (ref 0.8–5.3)
LYMPHOCYTES NFR BLD AUTO: 27 %
MCH RBC QN AUTO: 31.3 PG (ref 26.5–33)
MCHC RBC AUTO-ENTMCNC: 33.6 G/DL (ref 31.5–36.5)
MCV RBC AUTO: 93 FL (ref 78–100)
MONOCYTES # BLD AUTO: 0.3 10E3/UL (ref 0–1.3)
MONOCYTES NFR BLD AUTO: 6 %
NEUTROPHILS # BLD AUTO: 3.2 10E3/UL (ref 1.6–8.3)
NEUTROPHILS NFR BLD AUTO: 65 %
PLATELET # BLD AUTO: 250 10E3/UL (ref 150–450)
POTASSIUM SERPL-SCNC: 3.8 MMOL/L (ref 3.4–5.3)
PROT SERPL-MCNC: 7.4 G/DL (ref 6.4–8.3)
RBC # BLD AUTO: 4.25 10E6/UL (ref 3.8–5.2)
RETICS # AUTO: 0.06 10E6/UL (ref 0.03–0.1)
RETICS/RBC NFR AUTO: 1.5 % (ref 0.5–2)
SODIUM SERPL-SCNC: 138 MMOL/L (ref 136–145)
TSH SERPL DL<=0.005 MIU/L-ACNC: 2.88 UIU/ML (ref 0.3–4.2)
VIT B12 SERPL-MCNC: 440 PG/ML (ref 232–1245)
WBC # BLD AUTO: 4.9 10E3/UL (ref 4–11)

## 2023-08-10 PROCEDURE — 83540 ASSAY OF IRON: CPT | Performed by: NURSE PRACTITIONER

## 2023-08-10 PROCEDURE — 85025 COMPLETE CBC W/AUTO DIFF WBC: CPT | Performed by: NURSE PRACTITIONER

## 2023-08-10 PROCEDURE — 84443 ASSAY THYROID STIM HORMONE: CPT | Performed by: NURSE PRACTITIONER

## 2023-08-10 PROCEDURE — 85060 BLOOD SMEAR INTERPRETATION: CPT | Performed by: PATHOLOGY

## 2023-08-10 PROCEDURE — 82607 VITAMIN B-12: CPT | Performed by: NURSE PRACTITIONER

## 2023-08-10 PROCEDURE — 82728 ASSAY OF FERRITIN: CPT | Performed by: NURSE PRACTITIONER

## 2023-08-10 PROCEDURE — 85045 AUTOMATED RETICULOCYTE COUNT: CPT | Performed by: NURSE PRACTITIONER

## 2023-08-10 PROCEDURE — 36415 COLL VENOUS BLD VENIPUNCTURE: CPT | Performed by: NURSE PRACTITIONER

## 2023-08-10 PROCEDURE — 99214 OFFICE O/P EST MOD 30 MIN: CPT | Performed by: NURSE PRACTITIONER

## 2023-08-10 PROCEDURE — 80053 COMPREHEN METABOLIC PANEL: CPT | Performed by: NURSE PRACTITIONER

## 2023-08-10 PROCEDURE — G0145 SCR C/V CYTO,THINLAYER,RESCR: HCPCS | Performed by: NURSE PRACTITIONER

## 2023-08-10 PROCEDURE — 82746 ASSAY OF FOLIC ACID SERUM: CPT | Performed by: NURSE PRACTITIONER

## 2023-08-10 PROCEDURE — 87624 HPV HI-RISK TYP POOLED RSLT: CPT | Performed by: NURSE PRACTITIONER

## 2023-08-10 PROCEDURE — 83550 IRON BINDING TEST: CPT | Performed by: NURSE PRACTITIONER

## 2023-08-10 ASSESSMENT — PAIN SCALES - GENERAL: PAINLEVEL: NO PAIN (0)

## 2023-08-10 NOTE — Clinical Note
Hey. Your patient has basically had no weight loss since starting saxenda and phentermine. She initially was on 37.5 mg of phentermine but you stopped dt high blood pressure and currently on 15 mg. BP looks good today. Thoughts: increase phentermine vs refer her to weight clinic?

## 2023-08-10 NOTE — PROGRESS NOTES
Assessment & Plan     Cervical cancer screening  - Pap Screen with HPV - recommended age 30 - 65 years    Morbid obesity (H)  Minimal weight loss with max dose saxenda and recent addition of phentermine. Other GLP-1s previously not covered by insurance and had BP issues with higher dose of phentermine previously. Refer to weight clinic.  - Adult Comprehensive Weight Management  Referral; Future    Hypothyroidism, unspecified type  Recheck, recent increase in dose  - TSH with free T4 reflex    Leukopenia, unspecified type  Iron deficiency anemia, unspecified iron deficiency anemia type  Neutropenia, unspecified type (H)  Follows with hematology  - Comprehensive metabolic panel  - Ferritin  - Folate  - Iron & Iron Binding Capacity  - Lab Blood Morphology Pathologist Review  - Vitamin B12    There are no Patient Instructions on file for this visit.    Rachana Mendoza NP  [facility] SHARI Albright is a 52 year old, presenting for the following health issues:  Weight Check        8/10/2023     1:44 PM   Additional Questions   Roomed by Gaby Khan CMA   Accompanied by SIDDHARTHA       History of Present Illness       Reason for visit:  Weight loss    She eats 2-3 servings of fruits and vegetables daily.She consumes 0 sweetened beverage(s) daily.She exercises with enough effort to increase her heart rate 10 to 19 minutes per day.  She exercises with enough effort to increase her heart rate 4 days per week.   She is taking medications regularly.     Wt Readings from Last 4 Encounters:   08/10/23 102.2 kg (225 lb 6.4 oz)   06/13/23 100.7 kg (222 lb)   01/31/23 101.3 kg (223 lb 4.8 oz)   06/16/22 104.5 kg (230 lb 4.8 oz)     Phentermine and saxenda  -started phentermine 1/31/23  -NOOM danish, stopped past few mos  -saxenda June 2022  -increased thyroid dose last visit  -notices the appetite suppression  -stopped for   -not exercising as much      Review of Systems         Objective    /80  (BP Location: Right arm, Patient Position: Sitting, Cuff Size: Adult Large)   Pulse 80   Temp 98.9  F (37.2  C) (Tympanic)   Resp 14   Wt 102.2 kg (225 lb 6.4 oz)   LMP 07/12/2023   SpO2 98%   BMI 39.30 kg/m    Body mass index is 39.3 kg/m .  Physical Exam   GENERAL: healthy, alert and no distress   (female): normal female external genitalia, normal urethral meatus, vaginal mucosa, normal cervix/adnexa/uterus without masses or discharge

## 2023-08-12 ENCOUNTER — MYC MEDICAL ADVICE (OUTPATIENT)
Dept: PEDIATRICS | Facility: CLINIC | Age: 53
End: 2023-08-12
Payer: COMMERCIAL

## 2023-08-12 DIAGNOSIS — D50.9 IRON DEFICIENCY ANEMIA, UNSPECIFIED IRON DEFICIENCY ANEMIA TYPE: ICD-10-CM

## 2023-08-14 RX ORDER — PNV NO.95/FERROUS FUM/FOLIC AC 28MG-0.8MG
TABLET ORAL
Qty: 90 TABLET | Refills: 3 | Status: SHIPPED | OUTPATIENT
Start: 2023-08-14

## 2023-08-14 NOTE — TELEPHONE ENCOUNTER
Routing refill request to provider for review/approval because:  Prescribed by an outside provider, patient confirmed taking one per day.    Betty Magaña RN

## 2023-08-15 LAB
BKR LAB AP GYN ADEQUACY: NORMAL
BKR LAB AP GYN INTERPRETATION: NORMAL
BKR LAB AP HPV REFLEX: NORMAL
BKR LAB AP PREVIOUS ABNORMAL: NORMAL
PATH REPORT.COMMENTS IMP SPEC: NORMAL
PATH REPORT.COMMENTS IMP SPEC: NORMAL
PATH REPORT.RELEVANT HX SPEC: NORMAL

## 2023-08-17 LAB
HUMAN PAPILLOMA VIRUS 16 DNA: NEGATIVE
HUMAN PAPILLOMA VIRUS 18 DNA: NEGATIVE
HUMAN PAPILLOMA VIRUS FINAL DIAGNOSIS: NORMAL
HUMAN PAPILLOMA VIRUS OTHER HR: NEGATIVE

## 2023-08-18 ENCOUNTER — VIRTUAL VISIT (OUTPATIENT)
Dept: ONCOLOGY | Facility: CLINIC | Age: 53
End: 2023-08-18
Attending: INTERNAL MEDICINE
Payer: COMMERCIAL

## 2023-08-18 DIAGNOSIS — D50.9 IRON DEFICIENCY ANEMIA, UNSPECIFIED IRON DEFICIENCY ANEMIA TYPE: ICD-10-CM

## 2023-08-18 DIAGNOSIS — D72.819 LEUKOPENIA, UNSPECIFIED TYPE: ICD-10-CM

## 2023-08-18 DIAGNOSIS — D70.9 NEUTROPENIA, UNSPECIFIED TYPE (H): ICD-10-CM

## 2023-08-18 PROCEDURE — 99213 OFFICE O/P EST LOW 20 MIN: CPT | Mod: VID | Performed by: INTERNAL MEDICINE

## 2023-08-18 NOTE — LETTER
8/18/2023         RE: Natalee Maya  [address]  [facility] [address]        Dear Colleague,    Thank you for referring your patient, Natalee Maya, to the [facility]. Please see a copy of my visit note below.    Virtual Visit Details    Type of service:  Video Visit     Originating Location (pt. Location): Home    Distant Location (provider location):  On-site  Platform used for Video Visit: Kenzei    Start: 828  End: 832      [facility]    Hematology/Oncology Established Patient Follow-up Note    Treatment Summary:      Today's Date: 8/18/23    Reason for Follow-up: Leukopenia      HISTORY OF PRESENT ILLNESS: Natalee Maya is a 52 year old female with PMHx of hypothyroidism who presents with leukopenia.  She was found on labs in June 2020 to have low WBC of 1.9K.  Repeat checks have been in the 2K range.  Hemoglobin and platelet count were normal.  Differential shows a moderate to mild neutropenia.  On further chart review, she had a mild leukopenia in the 3K range back in 2018, but did recover back to normal a few months later.  There are not many labs prior to that.  Iron, ferritin, vitamin 12, and folate were normal.  Peripheral smear shows low WBC, but non-specific.       She denies recent illness, infections, fever.  Her Prozac dose increased a few months ago.  Otherwise, she has not started any new medications recently.       She does not smoke.  She drinks a glass of wine maybe once or twice a month.  She denies illicit drug use.      INTERIM HISTORY:  Patient continues to do well. She is on/off iron supplementation. No B symptoms. No gross evidence of bleed. No recent infection or antibiotic use.        REVIEW OF SYSTEMS:   A 14 point ROS was reviewed with pertinent positives and negatives in the HPI.       HOME MEDICATIONS:  Current Outpatient Medications   Medication Sig Dispense Refill     BD PEN NEEDLE MICRO U/F 32G  X 6 MM miscellaneous INJECT INTO THE SKIN DAILY WITH SAXENDA (Patient not taking: Reported on 2023) 100 each 11     Ferrous Sulfate (IRON) 325 (65 Fe) MG tablet TAKE ONE TABLET BY MOUTH EVERY DAY, AVOID AROUND SAME TIME AS LEVOTHYROXINE 90 tablet 3     folic acid (FOLVITE) 1 MG tablet TAKE ONE TABLET BY MOUTH ONCE DAILY 90 tablet 0     levothyroxine (SYNTHROID/LEVOTHROID) 25 MCG tablet Take 0.5 tablets (12.5 mcg) by mouth twice a week for 90 days In addition to your daily 50mcg pills 12 tablet 3     levothyroxine (SYNTHROID/LEVOTHROID) 50 MCG tablet TAKE ONE TABLET BY MOUTH ONCE DAILY 90 tablet 2     liraglutide - Weight Management (SAXENDA) 18 MG/3ML pen INJECT 3 MG UNDER THE SKIN DAILY 45 mL 11     losartan (COZAAR) 100 MG tablet Take 1 tablet (100 mg) by mouth daily 90 tablet 3     phentermine (ADIPEX-P) 15 MG capsule Take 1 capsule (15 mg) by mouth every morning 90 capsule 3         ALLERGIES:  Allergies   Allergen Reactions     No Known Drug Allergy          PAST MEDICAL HISTORY:  Past Medical History:   Diagnosis Date     Calculus of kidney     History of kidney stone -- Abstracted 02     Hypertension      Lymphedema of lower extremity          PAST SURGICAL HISTORY:  Past Surgical History:   Procedure Laterality Date     COLONOSCOPY Left 2021    Procedure: COLONOSCOPY; incomplete secondary to pt discomfort. will schedule CT colonography today if possible.;  Surgeon: Ceasar Cardoza MD;  Location:  GI     FOOT SURGERY      2017     [facility] NONSPECIFIC PROCEDURE       -- Abstracted 02         SOCIAL HISTORY:  Social History     Socioeconomic History     Marital status:      Spouse name: Not on file     Number of children: Not on file     Years of education: Not on file     Highest education level: Not on file   Occupational History     Not on file   Tobacco Use     Smoking status: Never     Smokeless tobacco: Never   Vaping Use     Vaping Use: Never used    Substance and Sexual Activity     Alcohol use: No     Drug use: No     Sexual activity: Yes     Partners: Male   Other Topics Concern     Parent/sibling w/ CABG, MI or angioplasty before 65F 55M? Not Asked   Social History Narrative     Not on file     Social Determinants of Health     Financial Resource Strain: Low Risk  (1/31/2023)    Overall Financial Resource Strain (CARDIA)      Difficulty of Paying Living Expenses: Not very hard   Food Insecurity: No Food Insecurity (1/31/2023)    Hunger Vital Sign      Worried About Running Out of Food in the Last Year: Never true      Ran Out of Food in the Last Year: Never true   Transportation Needs: No Transportation Needs (1/31/2023)    PRAPARE - Transportation      Lack of Transportation (Medical): No      Lack of Transportation (Non-Medical): No   Physical Activity: Insufficiently Active (1/31/2023)    Exercise Vital Sign      Days of Exercise per Week: 3 days      Minutes of Exercise per Session: 20 min   Stress: No Stress Concern Present (1/31/2023)    [employer] [place] [employer] - Occupational Stress Questionnaire      Feeling of Stress : Not at all   Social Connections: Moderately Integrated (1/31/2023)    Social Connection and Isolation Panel [NHANES]      Frequency of Communication with Friends and Family: Twice a week      Frequency of Social Gatherings with Friends and Family: Once a week      Attends Confucianist Services: 1 to 4 times per year      Active Member of Clubs or Organizations: No      Attends Club or Organization Meetings: Not on file      Marital Status:    Intimate Partner Violence: Not on file   Housing Stability: Low Risk  (1/31/2023)    Housing Stability Vital Sign      Unable to Pay for Housing in the Last Year: No      Number of Places Lived in the Last Year: 1      Unstable Housing in the Last Year: No         FAMILY HISTORY:  Family History   Problem Relation Age of Onset     Diabetes Father      C.A.D. Father          hyperlipidemia     Bleeding Disorder Mother         Factor 5     Breast Cancer No family hx of      Prostate Cancer No family hx of      Colon Cancer No family hx of          PHYSICAL EXAM:  ECO  GENERAL/CONSTITUTIONAL: No acute distress. Healthy, alert..    RESPIRATORY: No audible wheeze, cough, or visible cyanosis.  No visible retractions or increased work of breathing.  Able to speak fully in complete sentences.  NEUROLOGIC: Alert, oriented, answers questions appropriately. No tremor. Mentation intact and speech normal  PSYCHIATRIC:  Mentation appears normal, affect normal/bright, judgement and insight intact, normal speech and appearance well-groomed.     The rest of a comprehensive physical exam is deferred due to public [facility] emergency video visit restrictions.      LABS:   Latest Reference Range & Units 08/10/23 14:17   Sodium 136 - 145 mmol/L 138   Potassium 3.4 - 5.3 mmol/L 3.8   Chloride 98 - 107 mmol/L 101   Carbon Dioxide (CO2) 22 - 29 mmol/L 23   Urea Nitrogen 6.0 - 20.0 mg/dL 12.2   Creatinine 0.51 - 0.95 mg/dL 0.69   GFR Estimate >60 mL/min/1.73m2 >90   Calcium 8.6 - 10.0 mg/dL 9.9   Anion Gap 7 - 15 mmol/L 14   Albumin 3.5 - 5.2 g/dL 4.8   Protein Total 6.4 - 8.3 g/dL 7.4   Alkaline Phosphatase 35 - 104 U/L 83   ALT 0 - 50 U/L 20   AST 0 - 45 U/L 23   Bilirubin Total <=1.2 mg/dL 0.7   Ferritin 11 - 328 ng/mL 90   Folate 4.6 - 34.8 ng/mL >40.0 (H)   Glucose 70 - 99 mg/dL 99   Iron 37 - 145 ug/dL 79   Iron Binding Capacity 240 - 430 ug/dL 289   Iron Sat Index 15 - 46 % 27   TSH 0.30 - 4.20 uIU/mL 2.88   Vitamin B12 232 - 1,245 pg/mL 440   WBC 4.0 - 11.0 10e3/uL 4.9   Hemoglobin 11.7 - 15.7 g/dL 13.3   Hematocrit 35.0 - 47.0 % 39.6   Platelet Count 150 - 450 10e3/uL 250   RBC Count 3.80 - 5.20 10e6/uL 4.25   MCV 78 - 100 fL 93   MCH 26.5 - 33.0 pg 31.3   MCHC 31.5 - 36.5 g/dL 33.6   RDW 10.0 - 15.0 % 13.2   % Neutrophils % 65   % Lymphocytes % 27   % Monocytes % 6   % Eosinophils % 2   % Basophils  % 0   Absolute Basophils 0.0 - 0.2 10e3/uL 0.0   Absolute Eosinophils 0.0 - 0.7 10e3/uL 0.1   Absolute Immature Granulocytes <=0.4 10e3/uL 0.0   Absolute Lymphocytes 0.8 - 5.3 10e3/uL 1.3   Absolute Monocytes 0.0 - 1.3 10e3/uL 0.3   % Immature Granulocytes % 0   Absolute Neutrophils 1.6 - 8.3 10e3/uL 3.2   % Retic 0.5 - 2.0 % 1.5   Absolute Retic 0.025 - 0.095 10e6/uL 0.062         PATHOLOGY:  Peripheral flow:  INTERPRETATION:   Blood:        Polytypic B cells        No aberrant immunophenotype on T cells        See comment     COMMENT:   There is no immunophenotypic evidence of non-Hodgkin lymphoma or lymphoid   leukemia.  Final interpretation   requires correlation with morphologic and clinical features.         PATHOLOGY:  Peripheral smear 6/30/20:  FINAL DIAGNOSIS:   Peripheral Blood Morphology-   -Moderate leukopenia with moderate absolute neutropenia.   -See comment.     COMMENT:   The patient has history of intermittent leukopenia (see prior peripheral   blood morphology; [id number], 8/2018.   Absolute neutropenia appears to be a new finding. Possible causes of   neutropenia include drugs, infection,   autoimmune/immune-mediated disorders, hypersplenism, and bone marrow   disorder. At present, there is   insufficient morphologic evidence to suggest a clonal bone marrow   disorder. Serum vitamin B12/folate   deficiency appears as an unlikely cause, given normal reported levels in   this patient. Serum ferritin level is   additionally within normal limits. If absolute neutropenia persists   without clinical explanation, TCR-V beta   flow cytometric analysis of peripheral blood may be considered for further    evaluation.     Final Diagnosis 8/10/23   Peripheral blood, morphology:  - Peripheral blood without diagnostic morphologic abnormality.  - Hemoglobin quantitatively within normal limits.  - WBC subsets quantitatively within normal limits.  Negative for neutropenia.  - Platelet count quantitatively within  normal limits.         IMAGING:  Abdomen ultrasound 9/10/20:  Unremarkable complete abdominal ultrasound.        ASSESSMENT/PLAN:  Natalee Maya is a 52 year old female with:     Leukopenia: We discussed this is likely cyclic neutropenia. Differential with moderate to mild neutropenia.  Other two cell lines are normal.  This seems to be a fairly new finding, although it happened to a milder and short extent in 2018.  She has not had recent infections.  She has not started new medications.  She has no history of hepatitis or HIV.  She does not drink alcohol frequently.  We discussed other potential causes including autoimmune or primary bone marrow disorder.  Abdomen ultrasound was normal; no hepatosplenomegaly. Her WBC remains low. She remains asymptomatic.  We discussed options of continued observation or proceeding with bone marrow biopsy.  She would rather not do a bone marrow biopsy now.  If there is persistent downward trend of WBC, then we will do bone marrow biopsy.       Natalee continues to have waxing and waning leukopenia. We discussed the liklihood of cyclic neutropenia in the past. She has not had indication for bone marrow biopsy. At our follow up today, WBC is 4.9 with normal diff, hemoglobin 13.3, platelet 250K. Peripheral smear is normal. Iron studies, B12, folate adequate. She has been in good health. She will continue to follow with her primary care physician with annual CBC with diff for monitoring. She is instructed to contact clinic in the future with any CBC concerns.            Katherin Newman DO  Hematology/Oncology  [facility]      Again, thank you for allowing me to participate in the care of your patient.        Sincerely,        Katherin Newman DO

## 2023-08-18 NOTE — PROGRESS NOTES
Virtual Visit Details    Type of service:  Video Visit     Originating Location (pt. Location): Home    Distant Location (provider location):  On-site  Platform used for Video Visit: Yasmeen    Start: 828  End: 832      [facility]    Hematology/Oncology Established Patient Follow-up Note    Treatment Summary:      Today's Date: 8/18/23    Reason for Follow-up: Leukopenia      HISTORY OF PRESENT ILLNESS: Natalee Maya is a 52 year old female with PMHx of hypothyroidism who presents with leukopenia.  She was found on labs in June 2020 to have low WBC of 1.9K.  Repeat checks have been in the 2K range.  Hemoglobin and platelet count were normal.  Differential shows a moderate to mild neutropenia.  On further chart review, she had a mild leukopenia in the 3K range back in 2018, but did recover back to normal a few months later.  There are not many labs prior to that.  Iron, ferritin, vitamin 12, and folate were normal.  Peripheral smear shows low WBC, but non-specific.       She denies recent illness, infections, fever.  Her Prozac dose increased a few months ago.  Otherwise, she has not started any new medications recently.       She does not smoke.  She drinks a glass of wine maybe once or twice a month.  She denies illicit drug use.      INTERIM HISTORY:  Patient continues to do well. She is on/off iron supplementation. No B symptoms. No gross evidence of bleed. No recent infection or antibiotic use.        REVIEW OF SYSTEMS:   A 14 point ROS was reviewed with pertinent positives and negatives in the HPI.       HOME MEDICATIONS:  Current Outpatient Medications   Medication Sig Dispense Refill    BD PEN NEEDLE MICRO U/F 32G X 6 MM miscellaneous INJECT INTO THE SKIN DAILY WITH SAXENDA (Patient not taking: Reported on 6/13/2023) 100 each 11    Ferrous Sulfate (IRON) 325 (65 Fe) MG tablet TAKE ONE TABLET BY MOUTH EVERY DAY, AVOID AROUND SAME TIME AS LEVOTHYROXINE 90 tablet 3    folic acid  (FOLVITE) 1 MG tablet TAKE ONE TABLET BY MOUTH ONCE DAILY 90 tablet 0    levothyroxine (SYNTHROID/LEVOTHROID) 25 MCG tablet Take 0.5 tablets (12.5 mcg) by mouth twice a week for 90 days In addition to your daily 50mcg pills 12 tablet 3    levothyroxine (SYNTHROID/LEVOTHROID) 50 MCG tablet TAKE ONE TABLET BY MOUTH ONCE DAILY 90 tablet 2    liraglutide - Weight Management (SAXENDA) 18 MG/3ML pen INJECT 3 MG UNDER THE SKIN DAILY 45 mL 11    losartan (COZAAR) 100 MG tablet Take 1 tablet (100 mg) by mouth daily 90 tablet 3    phentermine (ADIPEX-P) 15 MG capsule Take 1 capsule (15 mg) by mouth every morning 90 capsule 3         ALLERGIES:  Allergies   Allergen Reactions    No Known Drug Allergy          PAST MEDICAL HISTORY:  Past Medical History:   Diagnosis Date    Calculus of kidney     History of kidney stone -- Abstracted 02    Hypertension     Lymphedema of lower extremity          PAST SURGICAL HISTORY:  Past Surgical History:   Procedure Laterality Date    COLONOSCOPY Left 2021    Procedure: COLONOSCOPY; incomplete secondary to pt discomfort. will schedule CT colonography today if possible.;  Surgeon: Ceasar Cardoza MD;  Location:  GI    FOOT SURGERY      2017    [facility] NONSPECIFIC PROCEDURE       -- Abstracted 02         SOCIAL HISTORY:  Social History     Socioeconomic History    Marital status:      Spouse name: Not on file    Number of children: Not on file    Years of education: Not on file    Highest education level: Not on file   Occupational History    Not on file   Tobacco Use    Smoking status: Never    Smokeless tobacco: Never   Vaping Use    Vaping Use: Never used   Substance and Sexual Activity    Alcohol use: No    Drug use: No    Sexual activity: Yes     Partners: Male   Other Topics Concern    Parent/sibling w/ CABG, MI or angioplasty before 65F 55M? Not Asked   Social History Narrative    Not on file     Social Determinants of Health     Financial  Resource Strain: Low Risk  (2023)    Overall Financial Resource Strain (CARDIA)     Difficulty of Paying Living Expenses: Not very hard   Food Insecurity: No Food Insecurity (2023)    Hunger Vital Sign     Worried About Running Out of Food in the Last Year: Never true     Ran Out of Food in the Last Year: Never true   Transportation Needs: No Transportation Needs (2023)    PRAPARE - Transportation     Lack of Transportation (Medical): No     Lack of Transportation (Non-Medical): No   Physical Activity: Insufficiently Active (2023)    Exercise Vital Sign     Days of Exercise per Week: 3 days     Minutes of Exercise per Session: 20 min   Stress: No Stress Concern Present (2023)    [employer] [employer] - Occupational Stress Questionnaire     Feeling of Stress : Not at all   Social Connections: Moderately Integrated (2023)    Social Connection and Isolation Panel [NHANES]     Frequency of Communication with Friends and Family: Twice a week     Frequency of Social Gatherings with Friends and Family: Once a week     Attends Christianity Services: 1 to 4 times per year     Active Member of Clubs or Organizations: No     Attends Club or Organization Meetings: Not on file     Marital Status:    Intimate Partner Violence: Not on file   Housing Stability: Low Risk  (2023)    Housing Stability Vital Sign     Unable to Pay for Housing in the Last Year: No     Number of Places Lived in the Last Year: 1     Unstable Housing in the Last Year: No         FAMILY HISTORY:  Family History   Problem Relation Age of Onset    Diabetes Father     C.A.D. Father         hyperlipidemia    Bleeding Disorder Mother         Factor 5    Breast Cancer No family hx of     Prostate Cancer No family hx of     Colon Cancer No family hx of          PHYSICAL EXAM:  ECO  GENERAL/CONSTITUTIONAL: No acute distress. Healthy, alert..    RESPIRATORY: No audible wheeze, cough, or visible cyanosis.   No visible retractions or increased work of breathing.  Able to speak fully in complete sentences.  NEUROLOGIC: Alert, oriented, answers questions appropriately. No tremor. Mentation intact and speech normal  PSYCHIATRIC:  Mentation appears normal, affect normal/bright, judgement and insight intact, normal speech and appearance well-groomed.     The rest of a comprehensive physical exam is deferred due to public [facility] emergency video visit restrictions.      LABS:   Latest Reference Range & Units 08/10/23 14:17   Sodium 136 - 145 mmol/L 138   Potassium 3.4 - 5.3 mmol/L 3.8   Chloride 98 - 107 mmol/L 101   Carbon Dioxide (CO2) 22 - 29 mmol/L 23   Urea Nitrogen 6.0 - 20.0 mg/dL 12.2   Creatinine 0.51 - 0.95 mg/dL 0.69   GFR Estimate >60 mL/min/1.73m2 >90   Calcium 8.6 - 10.0 mg/dL 9.9   Anion Gap 7 - 15 mmol/L 14   Albumin 3.5 - 5.2 g/dL 4.8   Protein Total 6.4 - 8.3 g/dL 7.4   Alkaline Phosphatase 35 - 104 U/L 83   ALT 0 - 50 U/L 20   AST 0 - 45 U/L 23   Bilirubin Total <=1.2 mg/dL 0.7   Ferritin 11 - 328 ng/mL 90   Folate 4.6 - 34.8 ng/mL >40.0 (H)   Glucose 70 - 99 mg/dL 99   Iron 37 - 145 ug/dL 79   Iron Binding Capacity 240 - 430 ug/dL 289   Iron Sat Index 15 - 46 % 27   TSH 0.30 - 4.20 uIU/mL 2.88   Vitamin B12 232 - 1,245 pg/mL 440   WBC 4.0 - 11.0 10e3/uL 4.9   Hemoglobin 11.7 - 15.7 g/dL 13.3   Hematocrit 35.0 - 47.0 % 39.6   Platelet Count 150 - 450 10e3/uL 250   RBC Count 3.80 - 5.20 10e6/uL 4.25   MCV 78 - 100 fL 93   MCH 26.5 - 33.0 pg 31.3   MCHC 31.5 - 36.5 g/dL 33.6   RDW 10.0 - 15.0 % 13.2   % Neutrophils % 65   % Lymphocytes % 27   % Monocytes % 6   % Eosinophils % 2   % Basophils % 0   Absolute Basophils 0.0 - 0.2 10e3/uL 0.0   Absolute Eosinophils 0.0 - 0.7 10e3/uL 0.1   Absolute Immature Granulocytes <=0.4 10e3/uL 0.0   Absolute Lymphocytes 0.8 - 5.3 10e3/uL 1.3   Absolute Monocytes 0.0 - 1.3 10e3/uL 0.3   % Immature Granulocytes % 0   Absolute Neutrophils 1.6 - 8.3 10e3/uL 3.2   % Retic 0.5 -  2.0 % 1.5   Absolute Retic 0.025 - 0.095 10e6/uL 0.062         PATHOLOGY:  Peripheral flow:  INTERPRETATION:   Blood:        Polytypic B cells        No aberrant immunophenotype on T cells        See comment     COMMENT:   There is no immunophenotypic evidence of non-Hodgkin lymphoma or lymphoid   leukemia.  Final interpretation   requires correlation with morphologic and clinical features.         PATHOLOGY:  Peripheral smear 6/30/20:  FINAL DIAGNOSIS:   Peripheral Blood Morphology-   -Moderate leukopenia with moderate absolute neutropenia.   -See comment.     COMMENT:   The patient has history of intermittent leukopenia (see prior peripheral   blood morphology; [id number], 8/2018.   Absolute neutropenia appears to be a new finding. Possible causes of   neutropenia include drugs, infection,   autoimmune/immune-mediated disorders, hypersplenism, and bone marrow   disorder. At present, there is   insufficient morphologic evidence to suggest a clonal bone marrow   disorder. Serum vitamin B12/folate   deficiency appears as an unlikely cause, given normal reported levels in   this patient. Serum ferritin level is   additionally within normal limits. If absolute neutropenia persists   without clinical explanation, TCR-V beta   flow cytometric analysis of peripheral blood may be considered for further    evaluation.     Final Diagnosis 8/10/23   Peripheral blood, morphology:  - Peripheral blood without diagnostic morphologic abnormality.  - Hemoglobin quantitatively within normal limits.  - WBC subsets quantitatively within normal limits.  Negative for neutropenia.  - Platelet count quantitatively within normal limits.         IMAGING:  Abdomen ultrasound 9/10/20:  Unremarkable complete abdominal ultrasound.        ASSESSMENT/PLAN:  Natalee Maya is a 52 year old female with:     Leukopenia: We discussed this is likely cyclic neutropenia. Differential with moderate to mild neutropenia.  Other two cell lines are normal.   This seems to be a fairly new finding, although it happened to a milder and short extent in 2018.  She has not had recent infections.  She has not started new medications.  She has no history of hepatitis or HIV.  She does not drink alcohol frequently.  We discussed other potential causes including autoimmune or primary bone marrow disorder.  Abdomen ultrasound was normal; no hepatosplenomegaly. Her WBC remains low. She remains asymptomatic.  We discussed options of continued observation or proceeding with bone marrow biopsy.  She would rather not do a bone marrow biopsy now.  If there is persistent downward trend of WBC, then we will do bone marrow biopsy.       Natalee continues to have waxing and waning leukopenia. We discussed the liklihood of cyclic neutropenia in the past. She has not had indication for bone marrow biopsy. At our follow up today, WBC is 4.9 with normal diff, hemoglobin 13.3, platelet 250K. Peripheral smear is normal. Iron studies, B12, folate adequate. She has been in good health. She will continue to follow with her primary care physician with annual CBC with diff for monitoring. She is instructed to contact clinic in the future with any CBC concerns.            Katherin Newman, DO  Hematology/Oncology  [facility]

## 2023-08-18 NOTE — LETTER
8/18/2023         RE: Natalee Maya  [address]  [facility] [address]        Dear Colleague,    Thank you for referring your patient, Natalee Maya, to the [facility]. Please see a copy of my visit note below.    Virtual Visit Details    Type of service:  Video Visit     Originating Location (pt. Location): Home    Distant Location (provider location):  On-site  Platform used for Video Visit: SourceYourCity    Start: 828  End: 832      [facility]    Hematology/Oncology Established Patient Follow-up Note    Treatment Summary:      Today's Date: 8/18/23    Reason for Follow-up: Leukopenia      HISTORY OF PRESENT ILLNESS: Natalee Maya is a 52 year old female with PMHx of hypothyroidism who presents with leukopenia.  She was found on labs in June 2020 to have low WBC of 1.9K.  Repeat checks have been in the 2K range.  Hemoglobin and platelet count were normal.  Differential shows a moderate to mild neutropenia.  On further chart review, she had a mild leukopenia in the 3K range back in 2018, but did recover back to normal a few months later.  There are not many labs prior to that.  Iron, ferritin, vitamin 12, and folate were normal.  Peripheral smear shows low WBC, but non-specific.       She denies recent illness, infections, fever.  Her Prozac dose increased a few months ago.  Otherwise, she has not started any new medications recently.       She does not smoke.  She drinks a glass of wine maybe once or twice a month.  She denies illicit drug use.      INTERIM HISTORY:  Patient continues to do well. She is on/off iron supplementation. No B symptoms. No gross evidence of bleed. No recent infection or antibiotic use.        REVIEW OF SYSTEMS:   A 14 point ROS was reviewed with pertinent positives and negatives in the HPI.       HOME MEDICATIONS:  Current Outpatient Medications   Medication Sig Dispense Refill     BD PEN NEEDLE MICRO U/F 32G  X 6 MM miscellaneous INJECT INTO THE SKIN DAILY WITH SAXENDA (Patient not taking: Reported on 2023) 100 each 11     Ferrous Sulfate (IRON) 325 (65 Fe) MG tablet TAKE ONE TABLET BY MOUTH EVERY DAY, AVOID AROUND SAME TIME AS LEVOTHYROXINE 90 tablet 3     folic acid (FOLVITE) 1 MG tablet TAKE ONE TABLET BY MOUTH ONCE DAILY 90 tablet 0     levothyroxine (SYNTHROID/LEVOTHROID) 25 MCG tablet Take 0.5 tablets (12.5 mcg) by mouth twice a week for 90 days In addition to your daily 50mcg pills 12 tablet 3     levothyroxine (SYNTHROID/LEVOTHROID) 50 MCG tablet TAKE ONE TABLET BY MOUTH ONCE DAILY 90 tablet 2     liraglutide - Weight Management (SAXENDA) 18 MG/3ML pen INJECT 3 MG UNDER THE SKIN DAILY 45 mL 11     losartan (COZAAR) 100 MG tablet Take 1 tablet (100 mg) by mouth daily 90 tablet 3     phentermine (ADIPEX-P) 15 MG capsule Take 1 capsule (15 mg) by mouth every morning 90 capsule 3         ALLERGIES:  Allergies   Allergen Reactions     No Known Drug Allergy          PAST MEDICAL HISTORY:  Past Medical History:   Diagnosis Date     Calculus of kidney     History of kidney stone -- Abstracted 02     Hypertension      Lymphedema of lower extremity          PAST SURGICAL HISTORY:  Past Surgical History:   Procedure Laterality Date     COLONOSCOPY Left 2021    Procedure: COLONOSCOPY; incomplete secondary to pt discomfort. will schedule CT colonography today if possible.;  Surgeon: Ceasar Cardoza MD;  Location:  GI     FOOT SURGERY      2017     [facility] NONSPECIFIC PROCEDURE       -- Abstracted 02         SOCIAL HISTORY:  Social History     Socioeconomic History     Marital status:      Spouse name: Not on file     Number of children: Not on file     Years of education: Not on file     Highest education level: Not on file   Occupational History     Not on file   Tobacco Use     Smoking status: Never     Smokeless tobacco: Never   Vaping Use     Vaping Use: Never used    Substance and Sexual Activity     Alcohol use: No     Drug use: No     Sexual activity: Yes     Partners: Male   Other Topics Concern     Parent/sibling w/ CABG, MI or angioplasty before 65F 55M? Not Asked   Social History Narrative     Not on file     Social Determinants of Health     Financial Resource Strain: Low Risk  (1/31/2023)    Overall Financial Resource Strain (CARDIA)      Difficulty of Paying Living Expenses: Not very hard   Food Insecurity: No Food Insecurity (1/31/2023)    Hunger Vital Sign      Worried About Running Out of Food in the Last Year: Never true      Ran Out of Food in the Last Year: Never true   Transportation Needs: No Transportation Needs (1/31/2023)    PRAPARE - Transportation      Lack of Transportation (Medical): No      Lack of Transportation (Non-Medical): No   Physical Activity: Insufficiently Active (1/31/2023)    Exercise Vital Sign      Days of Exercise per Week: 3 days      Minutes of Exercise per Session: 20 min   Stress: No Stress Concern Present (1/31/2023)    [employer] - Occupational Stress Questionnaire      Feeling of Stress : Not at all   Social Connections: Moderately Integrated (1/31/2023)    Social Connection and Isolation Panel [NHANES]      Frequency of Communication with Friends and Family: Twice a week      Frequency of Social Gatherings with Friends and Family: Once a week      Attends Judaism Services: 1 to 4 times per year      Active Member of Clubs or Organizations: No      Attends Club or Organization Meetings: Not on file      Marital Status:    Intimate Partner Violence: Not on file   Housing Stability: Low Risk  (1/31/2023)    Housing Stability Vital Sign      Unable to Pay for Housing in the Last Year: No      Number of Places Lived in the Last Year: 1      Unstable Housing in the Last Year: No         FAMILY HISTORY:  Family History   Problem Relation Age of Onset     Diabetes Father      C.A.D. Father          hyperlipidemia     Bleeding Disorder Mother         Factor 5     Breast Cancer No family hx of      Prostate Cancer No family hx of      Colon Cancer No family hx of          PHYSICAL EXAM:  ECO  GENERAL/CONSTITUTIONAL: No acute distress. Healthy, alert..    RESPIRATORY: No audible wheeze, cough, or visible cyanosis.  No visible retractions or increased work of breathing.  Able to speak fully in complete sentences.  NEUROLOGIC: Alert, oriented, answers questions appropriately. No tremor. Mentation intact and speech normal  PSYCHIATRIC:  Mentation appears normal, affect normal/bright, judgement and insight intact, normal speech and appearance well-groomed.     The rest of a comprehensive physical exam is deferred due to public [facility] emergency video visit restrictions.      LABS:   Latest Reference Range & Units 08/10/23 14:17   Sodium 136 - 145 mmol/L 138   Potassium 3.4 - 5.3 mmol/L 3.8   Chloride 98 - 107 mmol/L 101   Carbon Dioxide (CO2) 22 - 29 mmol/L 23   Urea Nitrogen 6.0 - 20.0 mg/dL 12.2   Creatinine 0.51 - 0.95 mg/dL 0.69   GFR Estimate >60 mL/min/1.73m2 >90   Calcium 8.6 - 10.0 mg/dL 9.9   Anion Gap 7 - 15 mmol/L 14   Albumin 3.5 - 5.2 g/dL 4.8   Protein Total 6.4 - 8.3 g/dL 7.4   Alkaline Phosphatase 35 - 104 U/L 83   ALT 0 - 50 U/L 20   AST 0 - 45 U/L 23   Bilirubin Total <=1.2 mg/dL 0.7   Ferritin 11 - 328 ng/mL 90   Folate 4.6 - 34.8 ng/mL >40.0 (H)   Glucose 70 - 99 mg/dL 99   Iron 37 - 145 ug/dL 79   Iron Binding Capacity 240 - 430 ug/dL 289   Iron Sat Index 15 - 46 % 27   TSH 0.30 - 4.20 uIU/mL 2.88   Vitamin B12 232 - 1,245 pg/mL 440   WBC 4.0 - 11.0 10e3/uL 4.9   Hemoglobin 11.7 - 15.7 g/dL 13.3   Hematocrit 35.0 - 47.0 % 39.6   Platelet Count 150 - 450 10e3/uL 250   RBC Count 3.80 - 5.20 10e6/uL 4.25   MCV 78 - 100 fL 93   MCH 26.5 - 33.0 pg 31.3   MCHC 31.5 - 36.5 g/dL 33.6   RDW 10.0 - 15.0 % 13.2   % Neutrophils % 65   % Lymphocytes % 27   % Monocytes % 6   % Eosinophils % 2   % Basophils  % 0   Absolute Basophils 0.0 - 0.2 10e3/uL 0.0   Absolute Eosinophils 0.0 - 0.7 10e3/uL 0.1   Absolute Immature Granulocytes <=0.4 10e3/uL 0.0   Absolute Lymphocytes 0.8 - 5.3 10e3/uL 1.3   Absolute Monocytes 0.0 - 1.3 10e3/uL 0.3   % Immature Granulocytes % 0   Absolute Neutrophils 1.6 - 8.3 10e3/uL 3.2   % Retic 0.5 - 2.0 % 1.5   Absolute Retic 0.025 - 0.095 10e6/uL 0.062         PATHOLOGY:  Peripheral flow:  INTERPRETATION:   Blood:        Polytypic B cells        No aberrant immunophenotype on T cells        See comment     COMMENT:   There is no immunophenotypic evidence of non-Hodgkin lymphoma or lymphoid   leukemia.  Final interpretation   requires correlation with morphologic and clinical features.         PATHOLOGY:  Peripheral smear 6/30/20:  FINAL DIAGNOSIS:   Peripheral Blood Morphology-   -Moderate leukopenia with moderate absolute neutropenia.   -See comment.     COMMENT:   The patient has history of intermittent leukopenia (see prior peripheral   blood morphology; [id number], 8/2018.   Absolute neutropenia appears to be a new finding. Possible causes of   neutropenia include drugs, infection,   autoimmune/immune-mediated disorders, hypersplenism, and bone marrow   disorder. At present, there is   insufficient morphologic evidence to suggest a clonal bone marrow   disorder. Serum vitamin B12/folate   deficiency appears as an unlikely cause, given normal reported levels in   this patient. Serum ferritin level is   additionally within normal limits. If absolute neutropenia persists   without clinical explanation, TCR-V beta   flow cytometric analysis of peripheral blood may be considered for further    evaluation.     Final Diagnosis 8/10/23   Peripheral blood, morphology:  - Peripheral blood without diagnostic morphologic abnormality.  - Hemoglobin quantitatively within normal limits.  - WBC subsets quantitatively within normal limits.  Negative for neutropenia.  - Platelet count quantitatively within  normal limits.         IMAGING:  Abdomen ultrasound 9/10/20:  Unremarkable complete abdominal ultrasound.        ASSESSMENT/PLAN:  Natalee Maya is a 52 year old female with:     Leukopenia: We discussed this is likely cyclic neutropenia. Differential with moderate to mild neutropenia.  Other two cell lines are normal.  This seems to be a fairly new finding, although it happened to a milder and short extent in 2018.  She has not had recent infections.  She has not started new medications.  She has no history of hepatitis or HIV.  She does not drink alcohol frequently.  We discussed other potential causes including autoimmune or primary bone marrow disorder.  Abdomen ultrasound was normal; no hepatosplenomegaly. Her WBC remains low. She remains asymptomatic.  We discussed options of continued observation or proceeding with bone marrow biopsy.  She would rather not do a bone marrow biopsy now.  If there is persistent downward trend of WBC, then we will do bone marrow biopsy.       Natalee continues to have waxing and waning leukopenia. We discussed the liklihood of cyclic neutropenia in the past. She has not had indication for bone marrow biopsy. At our follow up today, WBC is 4.9 with normal diff, hemoglobin 13.3, platelet 250K. Peripheral smear is normal. Iron studies, B12, folate adequate. She has been in good health. She will continue to follow with her primary care physician with annual CBC with diff for monitoring. She is instructed to contact clinic in the future with any CBC concerns.            Katherin Newman DO  Hematology/Oncology  [facility]      Again, thank you for allowing me to participate in the care of your patient.        Sincerely,        Katherin Newman DO

## 2023-08-18 NOTE — NURSING NOTE
Is the patient currently in the state of [place]? YES    Visit mode:VIDEO    If the visit is dropped, the patient can be reconnected by: VIDEO VISIT: Text to cell phone:   Telephone Information:   Mobile [phone]       Will anyone else be joining the visit? NO  (If patient encounters technical issues they should call [phone])    How would you like to obtain your AVS? MyChart    Are changes needed to the allergy or medication list? No    Reason for visit: RECHECK (Follow up)    Blayne KEATING

## 2023-11-01 ENCOUNTER — MYC MEDICAL ADVICE (OUTPATIENT)
Dept: PEDIATRICS | Facility: CLINIC | Age: 53
End: 2023-11-01
Payer: COMMERCIAL

## 2023-11-05 DIAGNOSIS — E53.8 FOLATE DEFICIENCY: ICD-10-CM

## 2023-11-07 RX ORDER — FOLIC ACID 1 MG/1
TABLET ORAL
Qty: 90 TABLET | Refills: 0 | Status: SHIPPED | OUTPATIENT
Start: 2023-11-07 | End: 2024-01-02

## 2023-11-09 ENCOUNTER — MYC REFILL (OUTPATIENT)
Dept: PEDIATRICS | Facility: CLINIC | Age: 53
End: 2023-11-09
Payer: COMMERCIAL

## 2023-11-09 DIAGNOSIS — E66.01 MORBID OBESITY (H): ICD-10-CM

## 2023-11-09 RX ORDER — PHENTERMINE HYDROCHLORIDE 15 MG/1
15 CAPSULE ORAL EVERY MORNING
Qty: 90 CAPSULE | Refills: 3 | Status: SHIPPED | OUTPATIENT
Start: 2023-11-09 | End: 2024-09-17

## 2023-11-28 ENCOUNTER — VIRTUAL VISIT (OUTPATIENT)
Dept: ENDOCRINOLOGY | Facility: CLINIC | Age: 53
End: 2023-11-28
Payer: COMMERCIAL

## 2023-11-28 VITALS — HEIGHT: 64 IN | BODY MASS INDEX: 37.05 KG/M2 | WEIGHT: 217 LBS

## 2023-11-28 DIAGNOSIS — E66.01 MORBID OBESITY (H): ICD-10-CM

## 2023-11-28 PROCEDURE — 99203 OFFICE O/P NEW LOW 30 MIN: CPT | Mod: VID | Performed by: INTERNAL MEDICINE

## 2023-11-28 ASSESSMENT — PAIN SCALES - GENERAL: PAINLEVEL: NO PAIN (0)

## 2023-11-28 NOTE — PROGRESS NOTES
"Video-Visit Details    Type of service:  Video Visit    Video Start Time: 3:27 PM   Video End Time: 3:53 PM    Originating Location (pt. Location): Home    Distant Location (provider location): Offsite (providers home) [facility] [place]     Platform used for Video Visit: [employer]    New Weight Management Nutrition Consultation    Natalee Maya is a 53 year old female presents today for new weight management nutrition consultation.  Patient referred by Dr. Barlow on 2023.    Patient with Co-morbidities of obesity includin/28/2023     3:00 PM   --   I have the following health issues associated with obesity High Blood Pressure    Hypothyroidism   I have the following symptoms associated with obesity Depression    Lower Extremity Swelling    Fatigue     Anthropometrics:  Initial consult weight: 217 lb on 23   Estimated body mass index is 37.23 kg/m  as calculated from the following:    Height as of this encounter: 1.626 m (5' 4.02\").    Weight as of this encounter: 98.4 kg (217 lb).    Medications for Weight Loss:  Discontinue Saxenda, start Wegovy 1.7 mg     Full-time teacher/     Typically fasts between 7 or 8 pm until 11 am the next day.     NUTRITION HISTORY  Food allergies: NKFA  Food intolerances: None   Vitamin/Mineral Supplements: Iron, Folic Acid   Previous methods of diet modification for weight loss: 2019 was on phentermine and intermittent fasting.   RD before: None     Loves to cook and loves to eat. Current household is just patient and her . Has 3 children, 1 son currently lives with them but it isn't having many meals at home.     Diet recall:   Coffee in the morning  Lunch - whole fat yogurt with fruit   Snack - crackers with cheese, fruit  Dinner - leftover turkey, scrambled eggs with ham, cheese/crackers/deli meat, protein source is common.   Snack - sweet treat (ice cream, chocolate)     80% of the time will track " nutrition in the Lose It Roberto. Roberto has patient eating around 2984-0984 calories per day without exercise.     Hydration: did not discuss in detail.        11/28/2023     3:00 PM   Diet Recall Review with Patient   How many glasses of juice do you drink in a typical day? 0   How many of glasses of milk do you drink in a typical day? 0   How many 8oz glasses of sugar containing drinks such as Tony-Aid/sweet tea do you drink in a day? 0   How many cans/bottles of sugar pop/soda/tea/sports drinks do you drink in a day? 0   How many cans/bottles of diet pop/soda/tea or sports drink do you drink in a day? 0   How often do you have a drink of alcohol? Monthly or Less   If you do drink, how many drinks might you have in a day? 1 or 2           11/28/2023     3:00 PM   Eating Habits   Generally, my meals include foods like these bread, pasta, rice, potatoes, corn, crackers, sweet dessert, pop, or juice A Few Times a Week   Generally, my meals include foods like these fried meats, brats, burgers, french fries, pizza, cheese, chips, or ice cream Once a Week   Eat fast food (like McDonalds, Burger Oscar, Taco Bell) Less Than Weekly   Eat at a buffet or sit-down restaurant Less Than Weekly   Eat most of my meals in front of the TV or computer A Few Times a Week   Often skip meals, eat at random times, have no regular eating times Less Than Weekly   Rarely sit down for a meal but snack or graze throughout A Few Times a Week   Eat extra snacks between meals Less Than Weekly   Eat most of my food at the end of the day A Few Times a Week   Eat in the middle of the night or wake up at night to eat Never   Eat extra snacks to prevent or correct low blood sugar Never   Eat to prevent acid reflux or stomach pain Never   Worry about not having enough food to eat Never   I eat when I am depressed A Few Times a Week   I eat when I am stressed A Few Times a Week   I eat when I am bored A Few Times a Week   I eat when I am anxious A Few Times  a Week   I eat when I am happy or as a reward A Few Times a Week   I feel hungry all the time even if I just have eaten Less Than Weekly   Feeling full is important to me Never   I finish all the food on my plate even if I am already full Almost Everyday   I can't resist eating delicious food or walk past the good food/smell A Few Times a Week   I eat/snack without noticing that I am eating Never   I eat when I am preparing the meal A Few Times a Week   I eat more than usual when I see others eating Less Than Weekly   I have trouble not eating sweets, ice cream, cookies, or chips if they are around the house A Few Times a Week   I think about food all day Less Than Weekly   What foods, if any, do you crave? Sweets/Candy/Chocolate         11/28/2023     3:00 PM   Amount of Food   I feel out of control when eating Monthly   I eat a large amount of food, like a loaf of bread, a box of cookies, a pint/quart of ice cream, all at once Never   I eat a large amount of food even when I am not hungry Never   I eat rapidly Never   I eat alone because I feel embarrassed and do not want others to see how much I have eaten Never   I eat until I am uncomfortably full Never   I feel bad, disgusted, or guilty after I overeat Never     Physical Activity:  Did not discuss         11/28/2023     3:00 PM   Activity/Exercise History   How much of a typical 12 hour day do you spend sitting? Most of the Day   How much of a typical 12 hour day do you spend lying down? Less Than Half the Day   How much of a typical day do you spend walking/standing? Less Than Half the Day   How many hours (not including work) do you spend on the TV/Video Games/Computer/Tablet/Phone? 2-3 Hours   How many times a week are you active for the purpose of exercise? 2-3 Times a Week   What keeps you from being more active? Other   How many total minutes do you spend doing some activity for the purpose of exercising when you exercise? 15-30 Minutes     Nutrition  "Prescription  Recommended energy/nutrient modification.    Nutrition Diagnosis  Obesity r/t long history of positive energy balance aeb BMI >30.    Nutrition Intervention  Patient has been tracking her nutrition in the lose it Roberto. Patient has no problems meeting the calorie goal but is often short on her protein. Encouraged patient to work on increasing protein intake throughout the day. Aim for 60-80 grams of protein daily.  Provided handouts/material for patient to review. Patient demonstrates understanding.  Co-developed goals to work towards. Provided pt with list of goals and resources below via [employer].     Expected Engagement: good  Follow-Up Plans: TBD     Nutrition Goals  1) Aim to consume 60-80 grams of protein daily.     Protein Sources   [email or web address]     Quick/Easy Protein Sources:  Hard boiled eggs  Part-skim cheese sticks  Baby Bell cheese rounds  Low-fat/low-sugar Greek yogurt  Low-fat cottage cheese  Lean deli meat (chicken/turkey/ham)  Roasted chickpeas or lentils  Nuts   Turkey meat stick  Protein shake/bar  \"P3\" snack (cheese, nuts, deli meat)  [employer] \"Protein Bread\"   \"Egglife\" egg white wrap    Tuna/salmon can/packet     Non-meat protein Ideas  Quinoa  Eggs  Dairy (Cottage cheese, low fat cheese, greek yogurt)   Nuts  Beans  Lentils  Protein pasta   Nutritional yeast  Garden of life raw meal powder  Liquid aminos  Homemade meats - a taco meat could be made with chopped cauliflower/mushroom as an example   Hummus (could do homemade if preferred)  Hemp hearts   Tofu  Sebuck     Follow-Up: January 31.     Time spent with patient: 26 minutes.  Katherin Irvin, SHADY, LD    "

## 2023-11-28 NOTE — PROGRESS NOTES
"Virtual Visit Details     Type of service:  Video Visit      Joined the call at 2023, 4:15:52 pm.  Left the call at 2023, 4:19:25 pm.  You were on the call for 3 minutes 32 seconds .    Originating Location (pt. Location): Home    Distant Location (provider location):  Off-site  Platform used for Video Visit: [employer] [facility]    PATIENT:  Natalee Maya  MRN:         [id number]  :         1970  SHAN:         2023    Dear PCP,    I had the pleasure of seeing your patient, Natalee Maya. Full intake/assessment was done to determine barriers to weight loss success and develop a treatment plan. Natalee Maya is a 53 year old female interested in treatment of medical problems associated with excess weight. She has a height of 5' 4\", a weight of 217 lbs 0 oz, and the calculated Body mass index is 37.25 kg/m .    HPI  Weight history: Started gaining weight about 25 years ago after having kids. Weight has gradually increased overtime since then. Highest weight of 240 lbs, which was approximately 5 years ago; lowest weight of 120 lbs.  Contributing factors/barriers: mental health struggles, life stressors, emotional eating, FH of obesity in dad, lack of desire to exercise  Trials -- phentermine, saxenda, exercise, fasting    Highest weight ever was 240-250 lbs in 2018. She then started taking phentermine 15 mg and tried intermittent fasting; was able to bring weight down to 190 lbs by late . However, she had to stop the phentermine ~1 year later due to HTN. During this time, she was also going through a separation from her . Endorses emotional eating during this period. She then started Saxenda in , and after achieving better BP control, she was able to restart phentermine about 6-8 months ago. She had issues with getting Saxenda refilled about 4 months ago and was therefore off it for several months. However, for the last 1-2 months, she " "has been taking Saxenda and phentermine consistently as prescribed. Over the last few years, her weight has been relatively stable, despite the changes in her medication regimen. She estimates she is down about 10-20 lbs overall compared to her weight before starting the phentermine ~5 years ago.     Notices a decreased appetite with Saxenda. Endorses no major side effects. Possibly interested in switching to Wegovy but wants to ensure that it'll be available and has a clear benefit over Saxenda before switching. Likes phentermine and notices it also helps with her fatigue.    Eating habits: Lunch is her first meal of the day, around 11am or 12pm, usually has yogurt and/or fruit. Snack at 4pm, generally consists of crackers and/or fruit. Dinner usually consists of meat, vegetables, eggs. Does not eat a lot of carbs (limits bread, pasta, etc.) or dairy. Fasts after dinner, usually by 7pm. Therefore, generally fasts from 7pm until 11am the next day.  Describes herself as an emotional eater but would not classify her behaviors as \"binging\". Says when she is feeling down or stressed out, it is easy for her to check out when she's eating. Describes it as an \"absence of caring\". However, this was much more of an issue when she was going through her separation a few years back. Now the only times when she struggles with emotional eating/cravings is during the pre-menstrual periods or when she is particularly stressed out.   Did counseling for 2 years during COVID, mostly to discuss her marital issues, but found this also helped her feel more healthy overall. She and her  are now back together and she feels good about where she is with her mental health today. Thinks this has had a positive impact on her eating habits.    Activity: Doesn't necessarily enjoy exercising. Walking is generally her main form of exercise. Sometimes walks around the track at school in the early mornings for about 20-30 minutes but hasn't " "done this for awhile. States that she would like to start doing this again soon. Also enjoys pickelball. Does yard work in the summer. In the past few years, she had a foot surgery and knee injury that made exercise more difficult. Both issues are now resolved. No ongoing barriers to exercise.    Sleep: Endorses great sleep. Doesn't wake up in the middle of the night.     Job: Teacher    She has the following co-morbidities: hypothyroidism, HTN  Hypothyroidism: Diagnosed 4-5 years ago. Taking levothyroxine 50 mcg daily, in addition to 12.5 mcg (0.5 tablet of 25 mcg) twice per week, for a total of 375 mcg per week. Thinks phentermine has also helps with fatigue symptoms.   HTN: Taking losartan 100 mg daily.        11/28/2023     3:00 PM   --   I have the following health issues associated with obesity High Blood Pressure    Hypothyroidism   I have the following symptoms associated with obesity Depression    Lower Extremity Swelling    Fatigue            No data to display                    11/28/2023     3:00 PM   Referring Provider   Please name the provider who referred you to Medical Weight Management  If you do not know, please answer \"I Don't Know\" Nancy Mendoza           11/28/2023     3:00 PM   Weight History   How concerned are you about your weight? Somewhat Concerned   I became overweight As an Adult   The following factors have contributed to my weight gain Mental Health Issues    A Health Crisis    Genetic (Runs in the Family)    Stress   I have tried the following methods to lose weight Watching Portions or Calories    Exercise    Medications    Fasting   My lowest weight since age 18 was 120   My highest weight since age 18 was 240   The most weight I have ever lost was (lbs) 40   I have the following family history of obesity/being overweight My father is overweight   How has your weight changed over the last year? Gained           11/28/2023     3:00 PM   Diet Recall Review with Patient   How many " glasses of juice do you drink in a typical day? 0   How many of glasses of milk do you drink in a typical day? 0   How many 8oz glasses of sugar containing drinks such as Tony-Aid/sweet tea do you drink in a day? 0   How many cans/bottles of sugar pop/soda/tea/sports drinks do you drink in a day? 0   How many cans/bottles of diet pop/soda/tea or sports drink do you drink in a day? 0   How often do you have a drink of alcohol? Monthly or Less   If you do drink, how many drinks might you have in a day? 1 or 2           11/28/2023     3:00 PM   Eating Habits   Generally, my meals include foods like these bread, pasta, rice, potatoes, corn, crackers, sweet dessert, pop, or juice A Few Times a Week   Generally, my meals include foods like these fried meats, brats, burgers, french fries, pizza, cheese, chips, or ice cream Once a Week   Eat fast food (like [employer], [employer], [employer]) Less Than Weekly   Eat at a buffet or sit-down restaurant Less Than Weekly   Eat most of my meals in front of the TV or computer A Few Times a Week   Often skip meals, eat at random times, have no regular eating times Less Than Weekly   Rarely sit down for a meal but snack or graze throughout A Few Times a Week   Eat extra snacks between meals Less Than Weekly   Eat most of my food at the end of the day A Few Times a Week   Eat in the middle of the night or wake up at night to eat Never   Eat extra snacks to prevent or correct low blood sugar Never   Eat to prevent acid reflux or stomach pain Never   Worry about not having enough food to eat Never   I eat when I am depressed A Few Times a Week   I eat when I am stressed A Few Times a Week   I eat when I am bored A Few Times a Week   I eat when I am anxious A Few Times a Week   I eat when I am happy or as a reward A Few Times a Week   I feel hungry all the time even if I just have eaten Less Than Weekly   Feeling full is important to me Never   I finish all the food on my plate even if I  am already full Almost Everyday   I can't resist eating delicious food or walk past the good food/smell A Few Times a Week   I eat/snack without noticing that I am eating Never   I eat when I am preparing the meal A Few Times a Week   I eat more than usual when I see others eating Less Than Weekly   I have trouble not eating sweets, ice cream, cookies, or chips if they are around the house A Few Times a Week   I think about food all day Less Than Weekly   What foods, if any, do you crave? Sweets/Candy/Chocolate           11/28/2023     3:00 PM   Amount of Food   I feel out of control when eating Monthly   I eat a large amount of food, like a loaf of bread, a box of cookies, a pint/quart of ice cream, all at once Never   I eat a large amount of food even when I am not hungry Never   I eat rapidly Never   I eat alone because I feel embarrassed and do not want others to see how much I have eaten Never   I eat until I am uncomfortably full Never   I feel bad, disgusted, or guilty after I overeat Never           11/28/2023     3:00 PM   Activity/Exercise History   How much of a typical 12 hour day do you spend sitting? Most of the Day   How much of a typical 12 hour day do you spend lying down? Less Than Half the Day   How much of a typical day do you spend walking/standing? Less Than Half the Day   How many hours (not including work) do you spend on the TV/Video Games/Computer/Tablet/Phone? 2-3 Hours   How many times a week are you active for the purpose of exercise? 2-3 Times a Week   What keeps you from being more active? Other   How many total minutes do you spend doing some activity for the purpose of exercising when you exercise? 15-30 Minutes       PAST MEDICAL HISTORY:  Past Medical History:   Diagnosis Date    Calculus of kidney     History of kidney stone -- Abstracted 7/22/02    Hypertension     Lymphedema of lower extremity            11/28/2023     3:00 PM   Work/Social History Reviewed With Patient   My  "employment status is Full-Time   My job is Teacher/library   How much of your job is spent on the computer or phone? 75%   How many hours do you spend commuting to work daily? 0   What is your marital status? /In a Relationship   If in a relationship, is your significant other overweight? No   If you have children, are they overweight? No   Who do you live with? , son   Who does the food shopping? Me           11/28/2023     3:00 PM   Mental Health History Reviewed With Patient   Have you ever been physically or sexually abused? No   How often in the past 2 weeks have you felt little interest or pleasure in doing things? Not at all   Over the past 2 weeks how often have you felt down, depressed, or hopeless? Not at all           11/28/2023     3:00 PM   Sleep History Reviewed With Patient   How many hours do you sleep at night? 7       MEDICATIONS:   Current Outpatient Medications   Medication Sig Dispense Refill    BD PEN NEEDLE MICRO U/F 32G X 6 MM miscellaneous INJECT INTO THE SKIN DAILY WITH SAXENDA (Patient not taking: Reported on 6/13/2023) 100 each 11    Ferrous Sulfate (IRON) 325 (65 Fe) MG tablet TAKE ONE TABLET BY MOUTH EVERY DAY, AVOID AROUND SAME TIME AS LEVOTHYROXINE 90 tablet 3    folic acid (FOLVITE) 1 MG tablet TAKE ONE TABLET BY MOUTH ONCE DAILY 90 tablet 0    levothyroxine (SYNTHROID/LEVOTHROID) 25 MCG tablet Take 0.5 tablets (12.5 mcg) by mouth twice a week for 90 days In addition to your daily 50mcg pills 12 tablet 3    levothyroxine (SYNTHROID/LEVOTHROID) 50 MCG tablet TAKE ONE TABLET BY MOUTH ONCE DAILY 90 tablet 2    losartan (COZAAR) 100 MG tablet Take 1 tablet (100 mg) by mouth daily 90 tablet 3    phentermine (ADIPEX-P) 15 MG capsule Take 1 capsule (15 mg) by mouth every morning 90 capsule 3       ALLERGIES:   Allergies   Allergen Reactions    No Known Drug Allergy        PHYSICAL EXAM:  Objective    Ht 1.626 m (5' 4\")   Wt 98.4 kg (217 lb)   BMI 37.25 kg/m    Vitals - " Patient Reported  Pain Score: No Pain (0)    Physical Exam   GENERAL: Healthy, alert and no distress  EYES: Eyes grossly normal to inspection.  No discharge or erythema, or obvious scleral/conjunctival abnormalities.  RESP: No audible wheeze, cough, or visible cyanosis.  No visible retractions or increased work of breathing.    SKIN: Visible skin clear. No significant rash, abnormal pigmentation or lesions.  NEURO: Cranial nerves grossly intact.  Mentation and speech appropriate for age.  PSYCH: Mentation appears normal, affect normal/bright, judgement and insight intact, normal speech and appearance well-groomed.    Labs reviewed   Latest Reference Range & Units 06/16/22 11:38 01/31/23 07:50   Cholesterol <200 mg/dL 285 (H) 266 (H)   HDL Cholesterol >=50 mg/dL 63 59   Hemoglobin A1C 0.0 - 5.6 % 5.6    LDL Cholesterol Calculated <=100 mg/dL 201 (H) 190 (H)   Non HDL Cholesterol <130 mg/dL 222 (H) 207 (H)   Triglycerides <150 mg/dL 103 85      Latest Reference Range & Units 01/31/23 07:50 08/10/23 14:17   TSH 0.30 - 4.20 uIU/mL 2.80 2.88     ASSESSMENT:  Natalee is a patient with mature onset obesity with significant element of familial/genetic influence and with current health consequences. She does not need aggressive weight loss plan.  Natalee Maya uses food as mood management and endorses emotional eating.    Her problem is complicated by a binge eating component    Her ability to lose weight is impacted by current work life.    Despite her current regimen, she likely has not had significant weight loss yet due to changes in her medications and increased life stressors over the past couple years. In terms of the stress and emotional eating component, this seems much improved today compared to years prior. Although we could continue with daily Saxenda injections, she is open to switching to a more efficacious drug. I think it is reasonable to switch at this point as she has been on Saxenda for over a year  without a significant change in weight.     PLAN:  -Discontinue Saxenda. Start Wegovy 1.7 mg/week. Will consider increasing to 2.4 mg/week at next visit.  -Continue phentermine 15 mg daily  -Dietician tomorrow. Recommend focusing on ways to combat emotional eating/cravings.  -Increase exercise, with an ultimate goal of 150 minutes of moderate-intensity exercise per week.    FOLLOW UP:  MICHAEL Pharm(D) in 2 months  Dr. Yen RAMOS in 4 months    Sincerely,    Desirae Johnson, MS3  [employer]    I was present with medical student who participated in the service and in the documentation of the note. I have verified the history and personally performed the physical exam and medical decision making. I agree with the assessment and plan of care as documented in the note. Medical student acts as a scribe.      External notes/medical records independently reviewed, labs and imaging independently reviewed, medical management and tests to be discussed/communicated to patient.    Time: I spent 30 minutes spent on the date of the encounter preparing to see patient (including chart review and preparation), obtaining and or reviewing additional medical history, performing a physical exam and evaluation, documenting clinical information in the electronic health record, independently interpreting results, communicating results to the patient and coordinating care.    Yen Faustin MD, MS  Division of Diabetes and Endocrinology  Department of Medicine

## 2023-11-28 NOTE — NURSING NOTE
Is the patient currently in the state of [place]? YES    Visit mode:VIDEO    If the visit is dropped, the patient can be reconnected by: VIDEO VISIT: Text to cell phone:   Telephone Information:   Mobile [phone]       Will anyone else be joining the visit? NO  (If patient encounters technical issues they should call [phone]    How would you like to obtain your AVS? MyChart    Are changes needed to the allergy or medication list? No    Reason for visit: Consult    Abdulaziz KEATING

## 2023-11-28 NOTE — PROGRESS NOTES
Virtual Visit Details    Type of service:  Video Visit     Joined the call at 11/28/2023, 4:15:52 pm.  Left the call at 11/28/2023, 4:19:25 pm.  You were on the call for 3 minutes 32 seconds .    Originating Location (pt. Location): Home    Distant Location (provider location):  Off-site  Platform used for Video Visit: [employer]

## 2023-11-28 NOTE — LETTER
"2023       RE: Natalee Maya  [id number]  [facility] [address]     Dear Colleague,    Thank you for referring your patient, Natalee Maya, to the [facility] [place] at [facility]. Please see a copy of my visit note below.      New Medical Weight Management Consult    PATIENT:  Natalee Maya  MRN:         [id number]  :         1970  SHAN:         2023    Dear PCP,    I had the pleasure of seeing your patient, Natalee Maya. Full intake/assessment was done to determine barriers to weight loss success and develop a treatment plan. Natalee Maya is a 53 year old female interested in treatment of medical problems associated with excess weight. She has a height of 5' 4\", a weight of 217 lbs 0 oz, and the calculated Body mass index is 37.25 kg/m .    HPI  Weight history: Started gaining weight about 25 years ago after having kids. Weight has gradually increased overtime since then. Highest weight of 240 lbs, which was approximately 5 years ago; lowest weight of 120 lbs.  Contributing factors/barriers: mental health struggles, life stressors, emotional eating, FH of obesity in dad, lack of desire to exercise  Trials -- phentermine, saxenda, exercise, fasting    Highest weight ever was 240-250 lbs in 2018. She then started taking phentermine 15 mg and tried intermittent fasting; was able to bring weight down to 190 lbs by late . However, she had to stop the phentermine ~1 year later due to HTN. During this time, she was also going through a separation from her . Endorses emotional eating during this period. She then started Saxenda in , and after achieving better BP control, she was able to restart phentermine about 6-8 months ago. She had issues with getting Saxenda refilled about 4 months ago and was therefore off it for several months. However, for the last 1-2 months, " "she has been taking Saxenda and phentermine consistently as prescribed. Over the last few years, her weight has been relatively stable, despite the changes in her medication regimen. She estimates she is down about 10-20 lbs overall compared to her weight before starting the phentermine ~5 years ago.     Notices a decreased appetite with Saxenda. Endorses no major side effects. Possibly interested in switching to Wegovy but wants to ensure that it'll be available and has a clear benefit over Saxenda before switching. Likes phentermine and notices it also helps with her fatigue.    Eating habits: Lunch is her first meal of the day, around 11am or 12pm, usually has yogurt and/or fruit. Snack at 4pm, generally consists of crackers and/or fruit. Dinner usually consists of meat, vegetables, eggs. Does not eat a lot of carbs (limits bread, pasta, etc.) or dairy. Fasts after dinner, usually by 7pm. Therefore, generally fasts from 7pm until 11am the next day.  Describes herself as an emotional eater but would not classify her behaviors as \"binging\". Says when she is feeling down or stressed out, it is easy for her to check out when she's eating. Describes it as an \"absence of caring\". However, this was much more of an issue when she was going through her separation a few years back. Now the only times when she struggles with emotional eating/cravings is during the pre-menstrual periods or when she is particularly stressed out.   Did counseling for 2 years during COVID, mostly to discuss her marital issues, but found this also helped her feel more healthy overall. She and her  are now back together and she feels good about where she is with her mental health today. Thinks this has had a positive impact on her eating habits.    Activity: Doesn't necessarily enjoy exercising. Walking is generally her main form of exercise. Sometimes walks around the track at school in the early mornings for about 20-30 minutes but " "hasn't done this for awhile. States that she would like to start doing this again soon. Also enjoys pickelball. Does yard work in the summer. In the past few years, she had a foot surgery and knee injury that made exercise more difficult. Both issues are now resolved. No ongoing barriers to exercise.    Sleep: Endorses great sleep. Doesn't wake up in the middle of the night.     Job: Teacher    She has the following co-morbidities: hypothyroidism, HTN  Hypothyroidism: Diagnosed 4-5 years ago. Taking levothyroxine 50 mcg daily, in addition to 12.5 mcg (0.5 tablet of 25 mcg) twice per week, for a total of 375 mcg per week. Thinks phentermine has also helps with fatigue symptoms.   HTN: Taking losartan 100 mg daily.        11/28/2023     3:00 PM   --   I have the following health issues associated with obesity High Blood Pressure    Hypothyroidism   I have the following symptoms associated with obesity Depression    Lower Extremity Swelling    Fatigue            No data to display                    11/28/2023     3:00 PM   Referring Provider   Please name the provider who referred you to Medical Weight Management  If you do not know, please answer \"I Don't Know\" Nancy Mendoza           11/28/2023     3:00 PM   Weight History   How concerned are you about your weight? Somewhat Concerned   I became overweight As an Adult   The following factors have contributed to my weight gain Mental Health Issues    A Health Crisis    Genetic (Runs in the Family)    Stress   I have tried the following methods to lose weight Watching Portions or Calories    Exercise    Medications    Fasting   My lowest weight since age 18 was 120   My highest weight since age 18 was 240   The most weight I have ever lost was (lbs) 40   I have the following family history of obesity/being overweight My father is overweight   How has your weight changed over the last year? Gained           11/28/2023     3:00 PM   Diet Recall Review with Patient   How " many glasses of juice do you drink in a typical day? 0   How many of glasses of milk do you drink in a typical day? 0   How many 8oz glasses of sugar containing drinks such as Tony-Aid/sweet tea do you drink in a day? 0   How many cans/bottles of sugar pop/soda/tea/sports drinks do you drink in a day? 0   How many cans/bottles of diet pop/soda/tea or sports drink do you drink in a day? 0   How often do you have a drink of alcohol? Monthly or Less   If you do drink, how many drinks might you have in a day? 1 or 2           11/28/2023     3:00 PM   Eating Habits   Generally, my meals include foods like these bread, pasta, rice, potatoes, corn, crackers, sweet dessert, pop, or juice A Few Times a Week   Generally, my meals include foods like these fried meats, brats, burgers, french fries, pizza, cheese, chips, or ice cream Once a Week   Eat fast food (like McDonalds, Burger Oscar, Taco Bell) Less Than Weekly   Eat at a buffet or sit-down restaurant Less Than Weekly   Eat most of my meals in front of the TV or computer A Few Times a Week   Often skip meals, eat at random times, have no regular eating times Less Than Weekly   Rarely sit down for a meal but snack or graze throughout A Few Times a Week   Eat extra snacks between meals Less Than Weekly   Eat most of my food at the end of the day A Few Times a Week   Eat in the middle of the night or wake up at night to eat Never   Eat extra snacks to prevent or correct low blood sugar Never   Eat to prevent acid reflux or stomach pain Never   Worry about not having enough food to eat Never   I eat when I am depressed A Few Times a Week   I eat when I am stressed A Few Times a Week   I eat when I am bored A Few Times a Week   I eat when I am anxious A Few Times a Week   I eat when I am happy or as a reward A Few Times a Week   I feel hungry all the time even if I just have eaten Less Than Weekly   Feeling full is important to me Never   I finish all the food on my plate even  if I am already full Almost Everyday   I can't resist eating delicious food or walk past the good food/smell A Few Times a Week   I eat/snack without noticing that I am eating Never   I eat when I am preparing the meal A Few Times a Week   I eat more than usual when I see others eating Less Than Weekly   I have trouble not eating sweets, ice cream, cookies, or chips if they are around the house A Few Times a Week   I think about food all day Less Than Weekly   What foods, if any, do you crave? Sweets/Candy/Chocolate           11/28/2023     3:00 PM   Amount of Food   I feel out of control when eating Monthly   I eat a large amount of food, like a loaf of bread, a box of cookies, a pint/quart of ice cream, all at once Never   I eat a large amount of food even when I am not hungry Never   I eat rapidly Never   I eat alone because I feel embarrassed and do not want others to see how much I have eaten Never   I eat until I am uncomfortably full Never   I feel bad, disgusted, or guilty after I overeat Never           11/28/2023     3:00 PM   Activity/Exercise History   How much of a typical 12 hour day do you spend sitting? Most of the Day   How much of a typical 12 hour day do you spend lying down? Less Than Half the Day   How much of a typical day do you spend walking/standing? Less Than Half the Day   How many hours (not including work) do you spend on the TV/Video Games/Computer/Tablet/Phone? 2-3 Hours   How many times a week are you active for the purpose of exercise? 2-3 Times a Week   What keeps you from being more active? Other   How many total minutes do you spend doing some activity for the purpose of exercising when you exercise? 15-30 Minutes       PAST MEDICAL HISTORY:  Past Medical History:   Diagnosis Date    Calculus of kidney     History of kidney stone -- Abstracted 7/22/02    Hypertension     Lymphedema of lower extremity            11/28/2023     3:00 PM   Work/Social History Reviewed With Patient  "  My employment status is Full-Time   My job is Teacher/library   How much of your job is spent on the computer or phone? 75%   How many hours do you spend commuting to work daily? 0   What is your marital status? /In a Relationship   If in a relationship, is your significant other overweight? No   If you have children, are they overweight? No   Who do you live with? , son   Who does the food shopping? Me           11/28/2023     3:00 PM   Mental Health History Reviewed With Patient   Have you ever been physically or sexually abused? No   How often in the past 2 weeks have you felt little interest or pleasure in doing things? Not at all   Over the past 2 weeks how often have you felt down, depressed, or hopeless? Not at all           11/28/2023     3:00 PM   Sleep History Reviewed With Patient   How many hours do you sleep at night? 7       MEDICATIONS:   Current Outpatient Medications   Medication Sig Dispense Refill    BD PEN NEEDLE MICRO U/F 32G X 6 MM miscellaneous INJECT INTO THE SKIN DAILY WITH SAXENDA (Patient not taking: Reported on 6/13/2023) 100 each 11    Ferrous Sulfate (IRON) 325 (65 Fe) MG tablet TAKE ONE TABLET BY MOUTH EVERY DAY, AVOID AROUND SAME TIME AS LEVOTHYROXINE 90 tablet 3    folic acid (FOLVITE) 1 MG tablet TAKE ONE TABLET BY MOUTH ONCE DAILY 90 tablet 0    levothyroxine (SYNTHROID/LEVOTHROID) 25 MCG tablet Take 0.5 tablets (12.5 mcg) by mouth twice a week for 90 days In addition to your daily 50mcg pills 12 tablet 3    levothyroxine (SYNTHROID/LEVOTHROID) 50 MCG tablet TAKE ONE TABLET BY MOUTH ONCE DAILY 90 tablet 2    losartan (COZAAR) 100 MG tablet Take 1 tablet (100 mg) by mouth daily 90 tablet 3    phentermine (ADIPEX-P) 15 MG capsule Take 1 capsule (15 mg) by mouth every morning 90 capsule 3       ALLERGIES:   Allergies   Allergen Reactions    No Known Drug Allergy        PHYSICAL EXAM:  Objective   Ht 1.626 m (5' 4\")   Wt 98.4 kg (217 lb)   BMI 37.25 kg/m    Vitals - " Patient Reported  Pain Score: No Pain (0)    Physical Exam   GENERAL: Healthy, alert and no distress  EYES: Eyes grossly normal to inspection.  No discharge or erythema, or obvious scleral/conjunctival abnormalities.  RESP: No audible wheeze, cough, or visible cyanosis.  No visible retractions or increased work of breathing.    SKIN: Visible skin clear. No significant rash, abnormal pigmentation or lesions.  NEURO: Cranial nerves grossly intact.  Mentation and speech appropriate for age.  PSYCH: Mentation appears normal, affect normal/bright, judgement and insight intact, normal speech and appearance well-groomed.    Labs reviewed   Latest Reference Range & Units 06/16/22 11:38 01/31/23 07:50   Cholesterol <200 mg/dL 285 (H) 266 (H)   HDL Cholesterol >=50 mg/dL 63 59   Hemoglobin A1C 0.0 - 5.6 % 5.6    LDL Cholesterol Calculated <=100 mg/dL 201 (H) 190 (H)   Non HDL Cholesterol <130 mg/dL 222 (H) 207 (H)   Triglycerides <150 mg/dL 103 85      Latest Reference Range & Units 01/31/23 07:50 08/10/23 14:17   TSH 0.30 - 4.20 uIU/mL 2.80 2.88     ASSESSMENT:  Natalee is a patient with mature onset obesity with significant element of familial/genetic influence and with current health consequences. She does not need aggressive weight loss plan.  Natalee Maya uses food as mood management and endorses emotional eating.    Her problem is complicated by a binge eating component    Her ability to lose weight is impacted by current work life.    Despite her current regimen, she likely has not had significant weight loss yet due to changes in her medications and increased life stressors over the past couple years. In terms of the stress and emotional eating component, this seems much improved today compared to years prior. Although we could continue with daily Saxenda injections, she is open to switching to a more efficacious drug. I think it is reasonable to switch at this point as she has been on Saxenda for over a year  without a significant change in weight.     PLAN:  -Discontinue Saxenda. Start Wegovy 1.7 mg/week. Will consider increasing to 2.4 mg/week at next visit.  -Continue phentermine 15 mg daily  -Dietician tomorrow. Recommend focusing on ways to combat emotional eating/cravings.  -Increase exercise, with an ultimate goal of 150 minutes of moderate-intensity exercise per week.    FOLLOW UP:  MICHAEL Pharm(D) in 2 months  Dr. Yen RAMOS in 4 months    Sincerely,    Desirae Johnson, MS3  [facility] [employer] [employer]    I was present with medical student who participated in the service and in the documentation of the note. I have verified the history and personally performed the physical exam and medical decision making. I agree with the assessment and plan of care as documented in the note. Medical student acts as a scribe.      External notes/medical records independently reviewed, labs and imaging independently reviewed, medical management and tests to be discussed/communicated to patient.    Time: I spent 30 minutes spent on the date of the encounter preparing to see patient (including chart review and preparation), obtaining and or reviewing additional medical history, performing a physical exam and evaluation, documenting clinical information in the electronic health record, independently interpreting results, communicating results to the patient and coordinating care.    Yen Faustin MD, MS  Division of Diabetes and Endocrinology  Department of Medicine

## 2023-11-29 ENCOUNTER — VIRTUAL VISIT (OUTPATIENT)
Dept: ENDOCRINOLOGY | Facility: CLINIC | Age: 53
End: 2023-11-29
Payer: COMMERCIAL

## 2023-11-29 VITALS — HEIGHT: 64 IN | BODY MASS INDEX: 37.05 KG/M2 | WEIGHT: 217 LBS

## 2023-11-29 DIAGNOSIS — E66.9 OBESITY: ICD-10-CM

## 2023-11-29 DIAGNOSIS — Z71.3 NUTRITIONAL COUNSELING: Primary | ICD-10-CM

## 2023-11-29 PROCEDURE — 99207 PR NO CHARGE LOS: CPT | Mod: VID

## 2023-11-29 PROCEDURE — 97802 MEDICAL NUTRITION INDIV IN: CPT | Mod: VID

## 2023-11-29 NOTE — PATIENT INSTRUCTIONS
"Hi Natalee,     It was nice to meet you today!    Follow-up with RD on January 31.     Thank you,    Katherin Irvin, SHADY, LD  If you would like to schedule or reschedule an appointment with the RD, please call [phone]    Nutrition Goals  1) Aim to consume 60-80 grams of protein daily.     Protein Sources   [email or web address]     Quick/Easy Protein Sources:  Hard boiled eggs  Part-skim cheese sticks  Baby Bell cheese rounds  Low-fat/low-sugar Greek yogurt  Low-fat cottage cheese  Lean deli meat (chicken/turkey/ham)  Roasted chickpeas or lentils  Nuts   Turkey meat stick  Protein shake/bar  \"P3\" snack (cheese, nuts, deli meat)  Aldi's \"Protein Bread\"   \"Egglife\" egg white wrap    Tuna/salmon can/packet     Non-meat protein Ideas  Quinoa  Eggs  Dairy (Cottage cheese, low fat cheese, greek yogurt)   Nuts  Beans  Lentils  Protein pasta   Nutritional yeast  Garden of life raw meal powder  Liquid aminos  Homemade meats - a taco meat could be made with chopped cauliflower/mushroom as an example   Hummus (could do homemade if preferred)  Hemp hearts   [facility]       COMPREHENSIVE WEIGHT MANAGEMENT PROGRAM  VIRTUAL SUPPORT GROUPS    For Support Group Information:      We offer support groups for patients who are working on weight loss and considering, preparing for, or have had weight loss surgery.     There is no cost for this opportunity.  You are invited to attend the?Virtual Support Groups?provided by any of the following locations:    [facility] via [employer] Teams with Charisma Garnica RN  2.   [place] via [employer] Teams with Cortes Troncoso, PhD, LP  3.   [place] via [employer] Teams with Stefany Ivy RN  4.   [facility] via [employer] Teams with JOVANI Sanchez-[facility]    The following Support Group information can also be found on our website:  [email or web address]      [facility] Weight Loss Surgery " "Support Group    [facility] Weight Loss Surgery Support Group  The support group is a patient-lead forum that meets monthly to share experiences, encouragement and education. It is open to those who have had weight loss surgery, are scheduled for surgery, or are considering surgery.   WHEN: This group meets on the 3rd Wednesday of each month from 5:00PM - 6:00PM virtually using Microsoft Teams.   FACILITATOR: Led by Charisma Kohler RD, JOE, RN, the program's Clinical Coordinator.   TO REGISTER: Please contact the clinic via [place] or call the nurse line directly at [phone] to inform our staff that you would like an invite sent to you and to let us know the email you would like the invite sent to. Prior to the meeting, a link with directions on how to join the meeting will be sent to you.    2023 Meetings   September 20  October 18  November 15  December 20    [facility] and [facility] Support Groups    Connections Bariatric Care Support Group?  This is open to all pre- and post- operative bariatric surgery patients as well as their support system.   WHEN: Starting June 2023, this group meets the 3rd Tuesday of each month from 6:30 PM - 8:00 PM virtually using Microsoft Teams.   FACILITATOR: Led by Cortes Troncoso, Ph.D who is a Licensed Psychologist with the [facility] Comprehensive Weight Management Program.   TO REGISTER: Please send an email to Cortes Troncoso, Ph.D., LP [email or web address] you would like an invitation to the group and to learn about using Microsoft Teams.    2023 Meetings  September 19 Bg Reynoso MD, FACS, [facility],   [facility] and [facility], \"Body Contouring and the Bariatric Surgery Patient\".  October 17: Stefany Ivy RN, [facility],  Hospital Stay and Compliance   November 21: Yolanda Avitia RD, JOE, [facility],  Holiday Eating   December " 19    Connections Post-Operative Bariatric Surgery Support Group  This is a support group for [facility] bariatric patients (and those external to [facility]) who have had bariatric surgery and are at least 3 months post-surgery.  WHEN: This support group meets the 4th Wednesday of the month from 11:00 AM - 12:00 PM virtually using Microsoft Teams.   FACILITATOR: Led by Certified Bariatric Nurse, Stefany Ivy RN.   TO REGISTER: Please send an email to Stefany at [email or web address] if you would like an invitation to the group and to learn about using Microsoft Teams.    2023 Meetings  September 27 October 25 November 22 December 27    [facility]   Healthy Lifestyle Virtual Support Group      Healthy Lifestyle Group  This is a 60 minute virtual coaching group for those who want to lead a healthier lifestyle. Come together to set goals and overcome barriers in a supportive group environment. We will address the four pillars of health--nutrition, exercise, sleep and emotional well-being.  This group is highly recommended for those who are participating in the 24 week Healthy Lifestyle Plan and our Health Coaching sessions.  WHEN: This group meets the first Friday of the month, 12:30 PM - 1:30 PM online, via a zoom meeting.    FACILITATOR: Led by National Board Certified Health and , Stefany [facility].  TO REGISTER: Please call the Call Center at [phone] to register. You will get an appointment to attend in [place]. Fifteen minutes prior to the meeting, complete the e-check in and you will get the link to join the meeting.  There is no charge to attend this group and space is limited.    2023 and 2024 Meeting Topics and Dates:    November 3: Introduction to Mindfulness (Learn simple and effective mindfulness practices and how it can benefit you)  December 8: Let's Talk (guided discussion on our wins and challenges)  January 5: New  "Years Vision: Manifest your Best 2024! (Guided imagery,  journaling and discussion)  February 2: Let's Talk  March 1: 10 Percent Happier by Adis Patel (Book Bites; a guided discussion on the nuggets of wisdom from favorite wellness books; no need to read the book but highly encouraged)  April 5: Let's Talk  May 3: \"Essentialism; The Disciplined Pursuit of Less by Micheal Mansfield (book bites discussion)  June 7: Let's Talk  July 5: NO MEETING, off for the 4th of July Holiday August 2: The Blue Zones, Secrets for Living a Longer Life by Adis Rob (book bites discussion)          "

## 2023-11-29 NOTE — LETTER
"2023       RE: Natalee Maya  [id number]  [place] [address]     Dear Colleague,    Thank you for referring your patient, Natalee Maya, to the [facility] [place] at [facility]. Please see a copy of my visit note below.    Video-Visit Details    Type of service:  Video Visit    Video Start Time: 3:27 PM   Video End Time: 3:53 PM    Originating Location (pt. Location): Home    Distant Location (provider location): Offsite (providers home) [facility] [place]     Platform used for Video Visit: Evoz    New Weight Management Nutrition Consultation    Natalee Maya is a 53 year old female presents today for new weight management nutrition consultation.  Patient referred by Dr. Barlow on 2023.    Patient with Co-morbidities of obesity includin/28/2023     3:00 PM   --   I have the following health issues associated with obesity High Blood Pressure    Hypothyroidism   I have the following symptoms associated with obesity Depression    Lower Extremity Swelling    Fatigue     Anthropometrics:  Initial consult weight: 217 lb on 23   Estimated body mass index is 37.23 kg/m  as calculated from the following:    Height as of this encounter: 1.626 m (5' 4.02\").    Weight as of this encounter: 98.4 kg (217 lb).    Medications for Weight Loss:  Discontinue Saxenda, start Wegovy 1.7 mg     Full-time teacher/     Typically fasts between 7 or 8 pm until 11 am the next day.     NUTRITION HISTORY  Food allergies: NKFA  Food intolerances: None   Vitamin/Mineral Supplements: Iron, Folic Acid   Previous methods of diet modification for weight loss: 2019 was on phentermine and intermittent fasting.   RD before: None     Loves to cook and loves to eat. Current household is just patient and her . Has 3 children, 1 son currently lives with them but " it isn't having many meals at home.     Diet recall:   Coffee in the morning  Lunch - whole fat yogurt with fruit   Snack - crackers with cheese, fruit  Dinner - leftover turkey, scrambled eggs with ham, cheese/crackers/deli meat, protein source is common.   Snack - sweet treat (ice cream, chocolate)     80% of the time will track nutrition in the [employer] Roberto. Roberto has patient eating around 1146-5622 calories per day without exercise.     Hydration: did not discuss in detail.        11/28/2023     3:00 PM   Diet Recall Review with Patient   How many glasses of juice do you drink in a typical day? 0   How many of glasses of milk do you drink in a typical day? 0   How many 8oz glasses of sugar containing drinks such as Tony-Aid/sweet tea do you drink in a day? 0   How many cans/bottles of sugar pop/soda/tea/sports drinks do you drink in a day? 0   How many cans/bottles of diet pop/soda/tea or sports drink do you drink in a day? 0   How often do you have a drink of alcohol? Monthly or Less   If you do drink, how many drinks might you have in a day? 1 or 2           11/28/2023     3:00 PM   Eating Habits   Generally, my meals include foods like these bread, pasta, rice, potatoes, corn, crackers, sweet dessert, pop, or juice A Few Times a Week   Generally, my meals include foods like these fried meats, brats, burgers, french fries, pizza, cheese, chips, or ice cream Once a Week   Eat fast food (like McDonalds, Burger Oscar, Taco Bell) Less Than Weekly   Eat at a buffet or sit-down restaurant Less Than Weekly   Eat most of my meals in front of the TV or computer A Few Times a Week   Often skip meals, eat at random times, have no regular eating times Less Than Weekly   Rarely sit down for a meal but snack or graze throughout A Few Times a Week   Eat extra snacks between meals Less Than Weekly   Eat most of my food at the end of the day A Few Times a Week   Eat in the middle of the night or wake up at night to eat Never   Eat  extra snacks to prevent or correct low blood sugar Never   Eat to prevent acid reflux or stomach pain Never   Worry about not having enough food to eat Never   I eat when I am depressed A Few Times a Week   I eat when I am stressed A Few Times a Week   I eat when I am bored A Few Times a Week   I eat when I am anxious A Few Times a Week   I eat when I am happy or as a reward A Few Times a Week   I feel hungry all the time even if I just have eaten Less Than Weekly   Feeling full is important to me Never   I finish all the food on my plate even if I am already full Almost Everyday   I can't resist eating delicious food or walk past the good food/smell A Few Times a Week   I eat/snack without noticing that I am eating Never   I eat when I am preparing the meal A Few Times a Week   I eat more than usual when I see others eating Less Than Weekly   I have trouble not eating sweets, ice cream, cookies, or chips if they are around the house A Few Times a Week   I think about food all day Less Than Weekly   What foods, if any, do you crave? Sweets/Candy/Chocolate         11/28/2023     3:00 PM   Amount of Food   I feel out of control when eating Monthly   I eat a large amount of food, like a loaf of bread, a box of cookies, a pint/quart of ice cream, all at once Never   I eat a large amount of food even when I am not hungry Never   I eat rapidly Never   I eat alone because I feel embarrassed and do not want others to see how much I have eaten Never   I eat until I am uncomfortably full Never   I feel bad, disgusted, or guilty after I overeat Never     Physical Activity:  Did not discuss         11/28/2023     3:00 PM   Activity/Exercise History   How much of a typical 12 hour day do you spend sitting? Most of the Day   How much of a typical 12 hour day do you spend lying down? Less Than Half the Day   How much of a typical day do you spend walking/standing? Less Than Half the Day   How many hours (not including work) do you  "spend on the TV/Video Games/Computer/Tablet/Phone? 2-3 Hours   How many times a week are you active for the purpose of exercise? 2-3 Times a Week   What keeps you from being more active? Other   How many total minutes do you spend doing some activity for the purpose of exercising when you exercise? 15-30 Minutes     Nutrition Prescription  Recommended energy/nutrient modification.    Nutrition Diagnosis  Obesity r/t long history of positive energy balance aeb BMI >30.    Nutrition Intervention  Patient has been tracking her nutrition in the [employer] Roberto. Patient has no problems meeting the calorie goal but is often short on her protein. Encouraged patient to work on increasing protein intake throughout the day. Aim for 60-80 grams of protein daily.  Provided handouts/material for patient to review. Patient demonstrates understanding.  Co-developed goals to work towards. Provided pt with list of goals and resources below via [employer].     Expected Engagement: good  Follow-Up Plans: TBD     Nutrition Goals  1) Aim to consume 60-80 grams of protein daily.     Protein Sources   [email or web address]     Quick/Easy Protein Sources:  Hard boiled eggs  Part-skim cheese sticks  Baby Bell cheese rounds  Low-fat/low-sugar Greek yogurt  Low-fat cottage cheese  Lean deli meat (chicken/turkey/ham)  Roasted chickpeas or lentils  Nuts   Turkey meat stick  Protein shake/bar  \"P3\" snack (cheese, nuts, deli meat)  Aldi's \"Protein Bread\"   \"Egglife\" egg white wrap    Tuna/salmon can/packet     Non-meat protein Ideas  Quinoa  Eggs  Dairy (Cottage cheese, low fat cheese, greek yogurt)   Nuts  Beans  Lentils  Protein pasta   Nutritional yeast  Garden of life raw meal powder  Liquid aminos  Homemade meats - a taco meat could be made with chopped cauliflower/mushroom as an example   Hummus (could do homemade if preferred)  Hemp hearts   Tofu  Sebuck     Follow-Up: January 31.     Time spent with patient: 26 minutes.  Katherin Irvin, " SHADY, JOE

## 2023-11-29 NOTE — NURSING NOTE
Is the patient currently in the state of [place]? YES  Visit mode:VIDEO    If the visit is dropped, the patient can be reconnected by: VIDEO VISIT: Text to cell phone:   Telephone Information:   Mobile [phone]       Will anyone else be joining the visit? NO  (If patient encounters technical issues they should call [phone]    How would you like to obtain your AVS? MyChart    Are changes needed to the allergy or medication list? Pt declined allergy review and Pt declined med review. Patient  notes nothing has changed since virtual visit yesterday. VF did not review medications, allergies, and nicotine/tobacco status due to this.    Reason for visit: Consult    Tiara KEATING

## 2023-11-30 ENCOUNTER — TELEPHONE (OUTPATIENT)
Dept: ENDOCRINOLOGY | Facility: CLINIC | Age: 53
End: 2023-11-30
Payer: COMMERCIAL

## 2023-12-04 NOTE — TELEPHONE ENCOUNTER
PA Initiation    Medication: WEGOVY 1.7 MG/0.75ML SC SOAJ  Insurance Company: [employer] - Phone [phone] Fax [phone]  Pharmacy Filling the Rx: [facility] - [place], [place] - [facility] [address]  Filling Pharmacy Phone: [phone]  Filling Pharmacy Fax: [phone]  Start Date: 12/4/2023

## 2023-12-05 NOTE — TELEPHONE ENCOUNTER
Prior Authorization Approval    Medication: WEGOVY 1.7 MG/0.75ML SC SOAJ  Authorization Effective Date: 11/4/2023  Authorization Expiration Date: 12/3/2024  Approved Dose/Quantity:   Reference #:     Insurance Company: [employer] - Phone [phone] Fax [phone]  Expected CoPay: $    CoPay Card Available:      Financial Assistance Needed:   Which Pharmacy is filling the prescription: [facility] - [place] [address] [address]  Pharmacy Notified: YES  Patient Notified: **Instructed pharmacy to notify patient when script is ready to /ship.**

## 2023-12-23 DIAGNOSIS — E03.9 HYPOTHYROIDISM, UNSPECIFIED TYPE: ICD-10-CM

## 2023-12-26 RX ORDER — LEVOTHYROXINE SODIUM 25 UG/1
TABLET ORAL
Qty: 12 TABLET | Refills: 0 | Status: SHIPPED | OUTPATIENT
Start: 2023-12-26 | End: 2024-04-02

## 2023-12-28 ENCOUNTER — FCC EXTENDED DOCUMENTATION (OUTPATIENT)
Dept: PSYCHOLOGY | Facility: CLINIC | Age: 53
End: 2023-12-28
Payer: COMMERCIAL

## 2023-12-28 NOTE — PROGRESS NOTES
"[facility]      PATIENT'S NAME: Natalee Maya  PREFERRED NAME: Natalee  PRONOUNS: she/her  MRN: [id number]  : 1970  ADDRESS: [address] [address] [address] [address]  [facility]  [id number]  DATE OF SERVICE: 2023  START TIME: 8:31 AM  END TIME: 9:28 AM  PREFERRED PHONE: [phone]  May we leave a program related message: Yes  EMERGENCY CONTACT: was obtained    Armando Maya (Spouse)  [phone] (Mobile)   SERVICE MODALITY:  Video Visit:      Provider verified identity through the following two step process.  Patient provided:  Patient is known previously to provider    Telemedicine Visit: The patient's condition can be safely assessed and treated via synchronous audio and visual telemedicine encounter.      Reason for Telemedicine Visit: Services only offered telehealth    Originating Site (Patient Location): Patient's home    Distant Site (Provider Location): Provider Remote Setting- Home Office    Consent:  The patient/guardian has verbally consented to: the potential risks and benefits of telemedicine (video visit) versus in person care; bill my insurance or make self-payment for services provided; and responsibility for payment of non-covered services.     Patient would like the video invitation sent by:  My Chart    Mode of Communication:  [facility]    Distant Location (Provider):  Off-site    As the provider I attest to compliance with applicable laws and regulations related to telemedicine.    UNIVERSAL ADULT Mental Health DIAGNOSTIC ASSESSMENT    Identifying Information:  Patient is a 53 year old,    individual.  Patient was referred for an assessment by self .  Patient attended the session alone.    Chief Complaint:   See diagnostic assessment dated 2020 for additional history of presenting concerns.  The reason for seeking services at this time is: \" I spun out of control\" two weeks ago.  Client reported her response to an " "interaction with her  was out-of-proportion.  She reported difficultly controlling emotions; crying for an hour or more after the incident.  Client reported the intensity of her response scared her.  Client denied having thoughts of being better off dead or hurting/killing herself.  She reported missing a few days of work last week.  Client reported improvements in mood this week and questions whether hormones were a factor in how she was feeling last week.  Client shared plans to discuss further with PCP.  Patient has attempted to resolve these concerns in the past through individual and couples therapy .    Social/Family History:  From Diagnostic Assessment Dated 1/6/2020:  Client reported she grew up in [place], [place]. They were the first born of four children; she has three brothers Parents remained  until her father's death in 2017. Client reported that her childhood was \"good family, good education, wanda environment, lived [place] farm\". Client described her current relationships with family of origin as \"healthy, good connection, and close\".  Patient described their current relationships with family of origin as good with mother and brother.      Cultural influences and impact on patient's life structure, values, norms, and healthcare:  Rastafarian beliefs: [employer]ology . Contextual influences on patient's health include: Contextual Factors: Family Factors dynamics in relationship with  can be a stressor .  Cultural, Contextual, and socioeconomic factors do not affect the patient's access to services.  These factors will be addressed in the Preliminary Treatment plan.  Patient identified their preferred language to be English. Patient reported they do not  need the assistance of an  or other support involved in therapy.     No significant delays in completing development tasks were reported.  Patient's highest education level was graduate school. Patient identified the following learning " problems: none reported.  Modifications will not be used to assist communication in therapy.   Patient reports they are  able to understand written materials.    Patient's current relationship status is  for 28 years.   Patient identified their sexual orientation as heterosexual.  Patient reported having three child(lisandro); daughter (25 years), sons (21 years and 18 years). Patient identified mother, siblings, and friends as part of their support system.  Patient identified the quality of these relationships as good.     Patient's current living/housing situation involves staying in own home/apartment.  They live with  and son and they report that housing is stable.     Patient is currently employed full time and reports they are able to function appropriately at work..  Patient reports their finances are obtained through employment.  Patient does not identify finances as a current stressor.      Patient reported that they have not been involved with the legal system.  Patient denies being on probation / parole / under the jurisdiction of the court.    Patient's Strengths and Limitations:  Patient identified the following strengths or resources that will help them succeed in treatment: Temple / Worship, commitment to health and well being, friends / good social support, insight, motivation, strong social skills, and work ethic. Things that may interfere with the patient's success in treatment include: none identified.     Assessments:  The following assessments were completed by patient for this visit:  PHQ9:       11/19/2021     6:09 AM 12/16/2021     6:41 AM 1/20/2022     4:14 PM 3/11/2022     6:45 AM 1/31/2023     6:40 AM 8/1/2023    10:30 AM 12/29/2023     8:22 AM   PHQ-9 SCORE   PHQ-9 Total Score MyChart 4 (Minimal depression) 2 (Minimal depression) 3 (Minimal depression) 2 (Minimal depression) 3 (Minimal depression) 0 4 (Minimal depression)   PHQ-9 Total Score 4 2 3 2 3 0 4     GAD7:        9/23/2021     7:33 PM 10/22/2021     6:13 AM 11/19/2021     6:08 AM 12/16/2021     6:40 AM 1/20/2022     4:13 PM 3/11/2022     6:45 AM 12/29/2023     8:23 AM   MADINA-7 SCORE   Total Score 4 (minimal anxiety) 3 (minimal anxiety) 4 (minimal anxiety) 2 (minimal anxiety) 3 (minimal anxiety) 1 (minimal anxiety) 2 (minimal anxiety)   Total Score 4 3 4 2 3 1 2     PROMIS 10-Global Health (only subscores and total score):       2/18/2022     6:21 AM 3/24/2022     8:48 AM 9/23/2022     6:09 AM 12/29/2023     8:24 AM   PROMIS-10 Scores Only   Global Mental Health Score 10 12 14 13   Global Physical Health Score 15 15 14 15   PROMIS TOTAL - SUBSCORES 25 27 28 28     [place] Suicide Severity Rating Scale (Short Version)      12/29/2023     8:30 AM   [place] Suicide Severity Rating (Short Version)   1. Wish to be Dead (Since Last Contact) N   2. Non-Specific Active Suicidal Thoughts (Since Last Contact) N   Actual Attempt (Since Last Contact) N   Has subject engaged in non-suicidal self-injurious behavior? (Since Last Contact) N   Interrupted Attempts (Since Last Contact) N   Aborted or Self-Interrupted Attempt (Since Last Contact) N   Preparatory Acts or Behavior (Since Last Contact) N   Suicide (Since Last Contact) N   Calculated C-SSRS Risk Score (Since Last Contact) No Risk Indicated        Personal and Family Medical History:  Patient does report a family history of mental health concerns; father with history of depression  Patient reports family history includes Bleeding Disorder in her mother; C.A.D. in her father; Diabetes in her father..     Patient does report Mental Health Diagnosis and/or Treatment.   Patient reported the following previous diagnoses which include(s): Depression.  Patient reported symptoms began see diagnostic assessment dated 1/6/2020.   Patient has received mental health services in the past:  individual therapy .  Psychiatric Hospitalizations: None.  Patient denies a history of civil commitment.   Patient is not receiving other mental health services.  These include none.       Patient has a physical exam with PCP scheduled.  The patient has a [facility] Primary Care Provider, who is named Carolina Gong..  Patient reports no current medical concerns.  Patient denies any issues with pain..   There are significant appetite / nutritional concerns / weight changes.  See medical chart.  Patient reports the following sleep concerns:  No concerns.   Patient does not report a history of head injury / trauma / cognitive impairment.      Patient reports not taking any current medications    Medication Adherence:  NA    Patient Allergies:  Therapist confirmed  Allergies   Allergen Reactions    No Known Drug Allergy        Medical History:    Past Medical History:   Diagnosis Date    Calculus of kidney     History of kidney stone -- Abstracted 7/22/02    Hypertension     Lymphedema of lower extremity          Current Mental Status Exam:   Appearance:  Appropriate    Eye Contact:  Good   Psychomotor:  Normal       Gait / station:  Unable to assess via video  Attitude / Demeanor: Cooperative  Interested Pleasant  Speech      Rate / Production: Normal/ Responsive Talkative      Volume:  Normal  volume      Language:  intact  Mood:   Sad Stable  Affect:   Appropriate  Tearful    Thought Content: Clear   Thought Process: Coherent       Associations: No loosening of associations  Insight:   Good   Judgment:  Intact   Orientation:  All  Attention/concentration: Good    Substance Use:   Patient did not report a family history of substance use concerns; see medical history section for details.  Patient has not received chemical dependency treatment in the past.  Patient has not ever been to detox.      Patient is not currently receiving any chemical dependency treatment. Patient reported the following problems as a result of their substance use:   none  .    Patient reports using alcohol 1 times per month (sometimes  less) and has 1-2 glasses of wine at time. Patient first started drinking at age college.     Patient denies using tobacco.  Patient denies using cannabis.  Patient reports having 2 cups of coffee daily  Patient reports using/abusing the following substance(s). Patient reported no other substance use.     Substance Use: No symptoms    Significant Losses / Trauma / Abuse / Neglect Issues:   Patient   did not serve in the .  There are indications or report of significant loss, trauma, abuse or neglect issues related to: death of father    Concerns for possible neglect are not present.    Safety Assessment:   Patient denies current homicidal ideation and behaviors.  Patient denies current self-injurious ideation and behaviors.    Patient denied risk behaviors associated with substance use.   Patient denies any high risk behaviors associated with mental health symptoms.  Patient reports the following current concerns for their personal safety: None.  Patient reports there  are not firearms in the house.         History of Safety Concerns:  Patient denied a history of homicidal ideation.     Patient denied a history of personal safety concerns.    Patient denied a history of assaultive behaviors.    Patient denied a history of sexual assault behaviors.     Patient denied a history of risk behaviors associated with substance use.  Patient denies any history of high risk behaviors associated with mental health symptoms.  Patient reports the following protective factors: family     Risk Plan:  See Recommendations for Safety and Risk Management Plan    Review of Symptoms per patient report:   Depression: See PHQ9  Liane:  No Symptoms  Psychosis: No Symptoms  Anxiety: See GAD7  Panic:  No symptoms  Post Traumatic Stress Disorder:  No Symptoms   Eating Disorder: No Symptoms  ADD / ADHD:  No symptoms  Conduct Disorder: No symptoms  Autism Spectrum Disorder: No symptoms  Obsessive Compulsive Disorder: No  Symptoms    Patient reports the following compulsive behaviors and treatment history:  none reported .      Diagnostic Criteria:   Major Depressive Disorder  CRITERIA (A-C) REPRESENT A MAJOR DEPRESSIVE EPISODE - SELECT THESE CRITERIA  A) Single episode - symptoms have been present during the same 2-week period and represent a change from previous functioning 5 or more symptoms (required for diagnosis)   - Depressed mood. Note: In children and adolescents, can be irritable mood.     - Diminished interest or pleasure in all, or almost all, activities.    - Significant weight changes/appetite changes.    - Feelings of worthlessness or identified guilt.   B) The symptoms cause clinically significant distress or impairment in social, occupational, or other important areas of functioning  C) The episode is not attributable to the physiological effects of a substance or to another medical condition  D) The occurence of major depressive episode is not better explained by other thought / psychotic disorders  E) There has never been a manic episode or hypomanic episode    Functional Status:  Patient reports the following functional impairments:  relationship(s) and work / vocational responsibilities.         Clinical Summary:  1. Psychosocial, Cultural and Contextual Factors: Roman Catholic beliefs: Moravian. Contextual influences on patient's health include: Contextual Factors: Family Factors dynamics in relationship with  can be a stressor.   .  2. Principal DSM5 Diagnoses  (Sustained by DSM5 Criteria Listed Above):   Major Depressive Disorder, Moderate, in full remission   3. Other Diagnoses that is relevant to services: None  4. Provisional Diagnosis: None  5. Prognosis: Return to Baseline Functioning and Expect Improvement.  6. Likely consequences of symptoms if not treated: worsening symptoms.  7. Client strengths include:  educated, employed, goal-focused, has a previous history of therapy, insightful, motivated,  open to learning, open to suggestions / feedback, responsible parent, support of family, friends and providers, wants to learn, willing to ask questions, and work history .     Recommendations:     1. Plan for Safety and Risk Management:   Safety and Risk: Recommended that patient call 911 or go to the local ED should there be a change in any of these risk factors..  Safety plan was previously created with therapist       Report to child / adult protection services was NA.     2. Patient's identified mental health concerns with a cultural influence will be addressed by therapist using person centered approach .     3. Initial Treatment will focus on:    Depressed Mood - alleviate symptoms  Relational Problems related to: Conflict or difficulties with partner/spouse.     4. Resources/Service Plan:    services are not indicated.   Modifications to assist communication are not indicated.   Additional disability accommodations are not indicated.      5. Collaboration:   Collaboration / coordination of treatment will be initiated with the following  support professionals:  None .      6.  Referrals:   The following referral(s) will be initiated:  None .       A Release of Information has been obtained for the following:  None .     Clinical Substantiation/medical necessity for the above recommendations:  client reported worsening depression symptoms and would likely benefit from additional support.    7. TERE:    TERE:  No active concerns    8. Records:   These were reviewed at time of assessment.   Information in this assessment was obtained from the medical record and  provided by patient who is a good historian.    Patient will have open access to their mental health medical record.    9.   Interactive Complexity: No    10. Safety Plan:  Patient has no change in safety concerns. Committed to safety and agreed to follow previously developed safety plan.    Provider Name/ Credentials:  Celeste Chavez  [facility]  December 29, 2023

## 2023-12-29 ENCOUNTER — VIRTUAL VISIT (OUTPATIENT)
Dept: PSYCHOLOGY | Facility: CLINIC | Age: 53
End: 2023-12-29
Payer: COMMERCIAL

## 2023-12-29 DIAGNOSIS — F32.1 MAJOR DEPRESSIVE DISORDER, SINGLE EPISODE, MODERATE WITH ANXIOUS DISTRESS (H): Primary | ICD-10-CM

## 2023-12-29 PROCEDURE — 90791 PSYCH DIAGNOSTIC EVALUATION: CPT | Mod: VID | Performed by: SOCIAL WORKER

## 2023-12-29 ASSESSMENT — COLUMBIA-SUICIDE SEVERITY RATING SCALE - C-SSRS
1. SINCE LAST CONTACT, HAVE YOU WISHED YOU WERE DEAD OR WISHED YOU COULD GO TO SLEEP AND NOT WAKE UP?: NO
2. HAVE YOU ACTUALLY HAD ANY THOUGHTS OF KILLING YOURSELF?: NO
6. HAVE YOU EVER DONE ANYTHING, STARTED TO DO ANYTHING, OR PREPARED TO DO ANYTHING TO END YOUR LIFE?: NO
TOTAL  NUMBER OF ABORTED OR SELF INTERRUPTED ATTEMPTS SINCE LAST CONTACT: NO
ATTEMPT SINCE LAST CONTACT: NO
TOTAL  NUMBER OF INTERRUPTED ATTEMPTS SINCE LAST CONTACT: NO
SUICIDE, SINCE LAST CONTACT: NO

## 2023-12-29 ASSESSMENT — ANXIETY QUESTIONNAIRES
1. FEELING NERVOUS, ANXIOUS, OR ON EDGE: SEVERAL DAYS
7. FEELING AFRAID AS IF SOMETHING AWFUL MIGHT HAPPEN: NOT AT ALL
GAD7 TOTAL SCORE: 2
2. NOT BEING ABLE TO STOP OR CONTROL WORRYING: SEVERAL DAYS
6. BECOMING EASILY ANNOYED OR IRRITABLE: NOT AT ALL
IF YOU CHECKED OFF ANY PROBLEMS ON THIS QUESTIONNAIRE, HOW DIFFICULT HAVE THESE PROBLEMS MADE IT FOR YOU TO DO YOUR WORK, TAKE CARE OF THINGS AT HOME, OR GET ALONG WITH OTHER PEOPLE: SOMEWHAT DIFFICULT
3. WORRYING TOO MUCH ABOUT DIFFERENT THINGS: NOT AT ALL
4. TROUBLE RELAXING: NOT AT ALL
5. BEING SO RESTLESS THAT IT IS HARD TO SIT STILL: NOT AT ALL
GAD7 TOTAL SCORE: 2

## 2023-12-29 ASSESSMENT — PATIENT HEALTH QUESTIONNAIRE - PHQ9
SUM OF ALL RESPONSES TO PHQ QUESTIONS 1-9: 4
10. IF YOU CHECKED OFF ANY PROBLEMS, HOW DIFFICULT HAVE THESE PROBLEMS MADE IT FOR YOU TO DO YOUR WORK, TAKE CARE OF THINGS AT HOME, OR GET ALONG WITH OTHER PEOPLE: VERY DIFFICULT
SUM OF ALL RESPONSES TO PHQ QUESTIONS 1-9: 4

## 2023-12-29 NOTE — PROGRESS NOTES
"[facility]      PATIENT'S NAME: Natalee Maya  PREFERRED NAME: Natalee  PRONOUNS: she/her  MRN: [id number]  : 1970  ADDRESS: [address] [address]  [facility]  [id number]  DATE OF SERVICE: 2023  START TIME: 8:31 AM  END TIME: 9:28 AM  PREFERRED PHONE: [phone]  May we leave a program related message: Yes  EMERGENCY CONTACT: was obtained    Armando Maya (Spouse)  [phone] (Mobile)   SERVICE MODALITY:  Video Visit:      Provider verified identity through the following two step process.  Patient provided:  Patient is known previously to provider    Telemedicine Visit: The patient's condition can be safely assessed and treated via synchronous audio and visual telemedicine encounter.      Reason for Telemedicine Visit: Services only offered telehealth    Originating Site (Patient Location): Patient's home    Distant Site (Provider Location): Provider Remote Setting- Home Office    Consent:  The patient/guardian has verbally consented to: the potential risks and benefits of telemedicine (video visit) versus in person care; bill my insurance or make self-payment for services provided; and responsibility for payment of non-covered services.     Patient would like the video invitation sent by:  My Chart    Mode of Communication:  Video Conference via [facility]    Distant Location (Provider):  Off-site    As the provider I attest to compliance with applicable laws and regulations related to telemedicine.    UNIVERSAL ADULT Mental Health DIAGNOSTIC ASSESSMENT    Identifying Information:  Patient is a 53 year old,    individual.  Patient was referred for an assessment by self .  Patient attended the session alone.    Chief Complaint:   See diagnostic assessment dated 2020 for additional history of presenting concerns.  The reason for seeking services at this time is: \" I spun out of control\" two weeks ago.  Client reported her response to an " "interaction with her  was out-of-proportion.  She reported difficultly controlling emotions; crying for an hour or more after the incident.  Client reported the intensity of her response scared her.  Client denied having thoughts of being better off dead or hurting/killing herself.  She reported missing a few days of work last week.  Client reported improvements in mood this week and questions whether hormones were a factor in how she was feeling last week.  Client shared plans to discuss further with PCP.  Patient has attempted to resolve these concerns in the past through individual and couples therapy .    Social/Family History:  From Diagnostic Assessment Dated 1/6/2020:  Client reported she grew up in [place], [place]. They were the first born of four children; she has three brothers Parents remained  until her father's death in 2017. Client reported that her childhood was \"good family, good education, wanda environment, lived [place] a farm\". Client described her current relationships with family of origin as \"healthy, good connection, and close\".  Patient described their current relationships with family of origin as good with mother and brother.      Cultural influences and impact on patient's life structure, values, norms, and healthcare:  Pentecostal beliefs: Amish . Contextual influences on patient's health include: Contextual Factors: Family Factors dynamics in relationship with  can be a stressor .  Cultural, Contextual, and socioeconomic factors do not affect the patient's access to services.  These factors will be addressed in the Preliminary Treatment plan.  Patient identified their preferred language to be English. Patient reported they do not  need the assistance of an  or other support involved in therapy.     No significant delays in completing development tasks were reported.  Patient's highest education level was graduate school. Patient identified the following learning " problems: none reported.  Modifications will not be used to assist communication in therapy.   Patient reports they are  able to understand written materials.    Patient's current relationship status is  for 28 years.   Patient identified their sexual orientation as heterosexual.  Patient reported having three child(lisandro); daughter (25 years), sons (21 years and 18 years). Patient identified mother, siblings, and friends as part of their support system.  Patient identified the quality of these relationships as good.     Patient's current living/housing situation involves staying in own home/apartment.  They live with  and son and they report that housing is stable.     Patient is currently employed full time and reports they are able to function appropriately at work..  Patient reports their finances are obtained through employment.  Patient does not identify finances as a current stressor.      Patient reported that they have not been involved with the legal system.  Patient denies being on probation / parole / under the jurisdiction of the court.    Patient's Strengths and Limitations:  Patient identified the following strengths or resources that will help them succeed in treatment: Adventist / Scientology, commitment to health and well being, friends / good social support, insight, motivation, strong social skills, and work ethic. Things that may interfere with the patient's success in treatment include: none identified.     Assessments:  The following assessments were completed by patient for this visit:  PHQ9:       11/19/2021     6:09 AM 12/16/2021     6:41 AM 1/20/2022     4:14 PM 3/11/2022     6:45 AM 1/31/2023     6:40 AM 8/1/2023    10:30 AM 12/29/2023     8:22 AM   PHQ-9 SCORE   PHQ-9 Total Score MyChart 4 (Minimal depression) 2 (Minimal depression) 3 (Minimal depression) 2 (Minimal depression) 3 (Minimal depression) 0 4 (Minimal depression)   PHQ-9 Total Score 4 2 3 2 3 0 4     GAD7:        9/23/2021     7:33 PM 10/22/2021     6:13 AM 11/19/2021     6:08 AM 12/16/2021     6:40 AM 1/20/2022     4:13 PM 3/11/2022     6:45 AM 12/29/2023     8:23 AM   MADINA-7 SCORE   Total Score 4 (minimal anxiety) 3 (minimal anxiety) 4 (minimal anxiety) 2 (minimal anxiety) 3 (minimal anxiety) 1 (minimal anxiety) 2 (minimal anxiety)   Total Score 4 3 4 2 3 1 2     PROMIS 10-Global Health (only subscores and total score):       2/18/2022     6:21 AM 3/24/2022     8:48 AM 9/23/2022     6:09 AM 12/29/2023     8:24 AM   PROMIS-10 Scores Only   Global Mental Health Score 10 12 14 13   Global Physical Health Score 15 15 14 15   PROMIS TOTAL - SUBSCORES 25 27 28 28     [place] Suicide Severity Rating Scale (Short Version)      12/29/2023     8:30 AM   [place] Suicide Severity Rating (Short Version)   1. Wish to be Dead (Since Last Contact) N   2. Non-Specific Active Suicidal Thoughts (Since Last Contact) N   Actual Attempt (Since Last Contact) N   Has subject engaged in non-suicidal self-injurious behavior? (Since Last Contact) N   Interrupted Attempts (Since Last Contact) N   Aborted or Self-Interrupted Attempt (Since Last Contact) N   Preparatory Acts or Behavior (Since Last Contact) N   Suicide (Since Last Contact) N   Calculated C-SSRS Risk Score (Since Last Contact) No Risk Indicated        Personal and Family Medical History:  Patient does report a family history of mental health concerns; father with history of depression  Patient reports family history includes Bleeding Disorder in her mother; C.A.D. in her father; Diabetes in her father..     Patient does report Mental Health Diagnosis and/or Treatment.   Patient reported the following previous diagnoses which include(s): Depression.  Patient reported symptoms began see diagnostic assessment dated 1/6/2020.   Patient has received mental health services in the past:  individual therapy .  Psychiatric Hospitalizations: None.  Patient denies a history of civil commitment.   Patient is not receiving other mental health services.  These include none.       Patient has a physical exam with PCP scheduled.  The patient has a [facility] Provider, who is named Carolina Gong..  Patient reports no current medical concerns.  Patient denies any issues with pain..   There are significant appetite / nutritional concerns / weight changes.  See medical chart.  Patient reports the following sleep concerns:  No concerns.   Patient does not report a history of head injury / trauma / cognitive impairment.      Patient reports not taking any current medications    Medication Adherence:  NA    Patient Allergies:  Therapist confirmed  Allergies   Allergen Reactions    No Known Drug Allergy        Medical History:    Past Medical History:   Diagnosis Date    Calculus of kidney     History of kidney stone -- Abstracted 7/22/02    Hypertension     Lymphedema of lower extremity          Current Mental Status Exam:   Appearance:  Appropriate    Eye Contact:  Good   Psychomotor:  Normal       Gait / station:  Unable to assess via video  Attitude / Demeanor: Cooperative  Interested Pleasant  Speech      Rate / Production: Normal/ Responsive Talkative      Volume:  Normal  volume      Language:  intact  Mood:   Sad Stable  Affect:   Appropriate  Tearful    Thought Content: Clear   Thought Process: Coherent       Associations: No loosening of associations  Insight:   Good   Judgment:  Intact   Orientation:  All  Attention/concentration: Good    Substance Use:   Patient did not report a family history of substance use concerns; see medical history section for details.  Patient has not received chemical dependency treatment in the past.  Patient has not ever been to detox.      Patient is not currently receiving any chemical dependency treatment. Patient reported the following problems as a result of their substance use:   none  .    Patient reports using alcohol 1 times per month (sometimes  less) and has 1-2 glasses of wine at time. Patient first started drinking at age college.     Patient denies using tobacco.  Patient denies using cannabis.  Patient reports having 2 cups of coffee daily  Patient reports using/abusing the following substance(s). Patient reported no other substance use.     Substance Use: No symptoms    Significant Losses / Trauma / Abuse / Neglect Issues:   Patient   did not serve in the .  There are indications or report of significant loss, trauma, abuse or neglect issues related to: death of father    Concerns for possible neglect are not present.    Safety Assessment:   Patient denies current homicidal ideation and behaviors.  Patient denies current self-injurious ideation and behaviors.    Patient denied risk behaviors associated with substance use.   Patient denies any high risk behaviors associated with mental health symptoms.  Patient reports the following current concerns for their personal safety: None.  Patient reports there  are not firearms in the house.         History of Safety Concerns:  Patient denied a history of homicidal ideation.     Patient denied a history of personal safety concerns.    Patient denied a history of assaultive behaviors.    Patient denied a history of sexual assault behaviors.     Patient denied a history of risk behaviors associated with substance use.  Patient denies any history of high risk behaviors associated with mental health symptoms.  Patient reports the following protective factors: family     Risk Plan:  See Recommendations for Safety and Risk Management Plan    Review of Symptoms per patient report:   Depression: See PHQ9  Liane:  No Symptoms  Psychosis: No Symptoms  Anxiety: See GAD7  Panic:  No symptoms  Post Traumatic Stress Disorder:  No Symptoms   Eating Disorder: No Symptoms  ADD / ADHD:  No symptoms  Conduct Disorder: No symptoms  Autism Spectrum Disorder: No symptoms  Obsessive Compulsive Disorder: No  Symptoms    Patient reports the following compulsive behaviors and treatment history:  none reported .      Diagnostic Criteria:   Major Depressive Disorder  CRITERIA (A-C) REPRESENT A MAJOR DEPRESSIVE EPISODE - SELECT THESE CRITERIA  A) Single episode - symptoms have been present during the same 2-week period and represent a change from previous functioning 5 or more symptoms (required for diagnosis)   - Depressed mood. Note: In children and adolescents, can be irritable mood.     - Diminished interest or pleasure in all, or almost all, activities.    - Significant weight changes/appetite changes.    - Feelings of worthlessness or identified guilt.   B) The symptoms cause clinically significant distress or impairment in social, occupational, or other important areas of functioning  C) The episode is not attributable to the physiological effects of a substance or to another medical condition  D) The occurence of major depressive episode is not better explained by other thought / psychotic disorders  E) There has never been a manic episode or hypomanic episode    Functional Status:  Patient reports the following functional impairments:  relationship(s) and work / vocational responsibilities.         Clinical Summary:  1. Psychosocial, Cultural and Contextual Factors: Catholic beliefs: Advent. Contextual influences on patient's health include: Contextual Factors: Family Factors dynamics in relationship with  can be a stressor.   .  2. Principal DSM5 Diagnoses  (Sustained by DSM5 Criteria Listed Above):   Major Depressive Disorder, Moderate, in full remission   3. Other Diagnoses that is relevant to services: None  4. Provisional Diagnosis: None  5. Prognosis: Return to Baseline Functioning and Expect Improvement.  6. Likely consequences of symptoms if not treated: worsening symptoms.  7. Client strengths include:  educated, employed, goal-focused, has a previous history of therapy, insightful, motivated,  open to learning, open to suggestions / feedback, responsible parent, support of family, friends and providers, wants to learn, willing to ask questions, and work history .     Recommendations:     1. Plan for Safety and Risk Management:   Safety and Risk: Recommended that patient call 911 or go to the local ED should there be a change in any of these risk factors..  Safety plan was previously created with therapist       Report to child / adult protection services was NA.     2. Patient's identified mental health concerns with a cultural influence will be addressed by therapist using person centered approach .     3. Initial Treatment will focus on:    Depressed Mood - alleviate symptoms  Relational Problems related to: Conflict or difficulties with partner/spouse.     4. Resources/Service Plan:    services are not indicated.   Modifications to assist communication are not indicated.   Additional disability accommodations are not indicated.      5. Collaboration:   Collaboration / coordination of treatment will be initiated with the following  support professionals:  None .      6.  Referrals:   The following referral(s) will be initiated:  None .       A Release of Information has been obtained for the following:  None .     Clinical Substantiation/medical necessity for the above recommendations:  client reported worsening depression symptoms and would likely benefit from additional support.    7. TERE:    TERE:  No active concerns    8. Records:   These were reviewed at time of assessment.   Information in this assessment was obtained from the medical record and  provided by patient who is a good historian.    Patient will have open access to their mental health medical record.    9.   Interactive Complexity: No    10. Safety Plan:  Patient has no change in safety concerns. Committed to safety and agreed to follow previously developed safety plan.    Provider Name/ Credentials:  Celeste Chavez  [facility]  December 29, 2023

## 2023-12-30 DIAGNOSIS — E53.8 FOLATE DEFICIENCY: ICD-10-CM

## 2023-12-30 DIAGNOSIS — E66.01 MORBID OBESITY (H): ICD-10-CM

## 2024-01-02 RX ORDER — PEN NEEDLE, DIABETIC 32 GX 1/4"
NEEDLE, DISPOSABLE MISCELLANEOUS
Qty: 100 EACH | Refills: 11 | Status: SHIPPED | OUTPATIENT
Start: 2024-01-02 | End: 2024-07-08

## 2024-01-02 RX ORDER — FOLIC ACID 1 MG/1
TABLET ORAL
Qty: 90 TABLET | Refills: 0 | Status: SHIPPED | OUTPATIENT
Start: 2024-01-02 | End: 2024-01-04

## 2024-01-04 ENCOUNTER — OFFICE VISIT (OUTPATIENT)
Dept: PEDIATRICS | Facility: CLINIC | Age: 54
End: 2024-01-04
Payer: COMMERCIAL

## 2024-01-04 VITALS
BODY MASS INDEX: 37.76 KG/M2 | HEIGHT: 63 IN | DIASTOLIC BLOOD PRESSURE: 87 MMHG | OXYGEN SATURATION: 98 % | TEMPERATURE: 98.2 F | HEART RATE: 84 BPM | SYSTOLIC BLOOD PRESSURE: 124 MMHG | WEIGHT: 213.1 LBS

## 2024-01-04 DIAGNOSIS — D50.9 IRON DEFICIENCY ANEMIA, UNSPECIFIED IRON DEFICIENCY ANEMIA TYPE: ICD-10-CM

## 2024-01-04 DIAGNOSIS — E03.8 SUBCLINICAL HYPOTHYROIDISM: ICD-10-CM

## 2024-01-04 DIAGNOSIS — Q25.40 AORTIC ANOMALY: ICD-10-CM

## 2024-01-04 DIAGNOSIS — F32.1 MAJOR DEPRESSIVE DISORDER, SINGLE EPISODE, MODERATE WITH ANXIOUS DISTRESS (H): ICD-10-CM

## 2024-01-04 DIAGNOSIS — E66.01 MORBID OBESITY (H): ICD-10-CM

## 2024-01-04 DIAGNOSIS — E53.8 FOLATE DEFICIENCY: ICD-10-CM

## 2024-01-04 DIAGNOSIS — Z00.00 HEALTHCARE MAINTENANCE: Primary | ICD-10-CM

## 2024-01-04 DIAGNOSIS — D70.9 NEUTROPENIA, UNSPECIFIED TYPE (H): ICD-10-CM

## 2024-01-04 DIAGNOSIS — R10.84 ABDOMINAL PAIN, GENERALIZED: ICD-10-CM

## 2024-01-04 DIAGNOSIS — I10 BENIGN ESSENTIAL HYPERTENSION: ICD-10-CM

## 2024-01-04 PROCEDURE — 99396 PREV VISIT EST AGE 40-64: CPT | Performed by: NURSE PRACTITIONER

## 2024-01-04 RX ORDER — LEVOTHYROXINE SODIUM 50 UG/1
50 TABLET ORAL DAILY
Qty: 90 TABLET | Refills: 3 | Status: SHIPPED | OUTPATIENT
Start: 2024-01-04

## 2024-01-04 RX ORDER — FOLIC ACID 1 MG/1
1000 TABLET ORAL DAILY
Qty: 90 TABLET | Refills: 3 | Status: SHIPPED | OUTPATIENT
Start: 2024-01-04

## 2024-01-04 RX ORDER — LOSARTAN POTASSIUM 100 MG/1
100 TABLET ORAL DAILY
Qty: 90 TABLET | Refills: 3 | Status: SHIPPED | OUTPATIENT
Start: 2024-01-04 | End: 2024-06-10

## 2024-01-04 ASSESSMENT — ENCOUNTER SYMPTOMS
HEMATURIA: 0
HEARTBURN: 0
CHILLS: 0
DIARRHEA: 0
DIZZINESS: 0
ABDOMINAL PAIN: 0
EYE PAIN: 0
PARESTHESIAS: 0
ARTHRALGIAS: 0
FEVER: 0
DYSURIA: 0
HEMATOCHEZIA: 0
FREQUENCY: 0
NERVOUS/ANXIOUS: 0
SHORTNESS OF BREATH: 0
COUGH: 0
WEAKNESS: 0
SORE THROAT: 0
MYALGIAS: 0
BREAST MASS: 0
CONSTIPATION: 0
PALPITATIONS: 0
NAUSEA: 0

## 2024-01-04 NOTE — PROGRESS NOTES
SUBJECTIVE:   Natalee is a 53 year old, presenting for the following:  Physical        2024     4:38 PM   Additional Questions   Roomed by mis   Accompanied by self         2024     4:38 PM   Patient Reported Additional Medications   Patient reports taking the following new medications no       Healthy Habits:     Getting at least 3 servings of Calcium per day:  Yes    Bi-annual eye exam:  Yes    Dental care twice a year:  Yes    Sleep apnea or symptoms of sleep apnea:  None    Diet:  Regular (no restrictions) and Breakfast skipped    Frequency of exercise:  2-3 days/week    Duration of exercise:  15-30 minutes    Taking medications regularly:  Yes    Medication side effects:  None    Additional concerns today:  No    Once in awhile feels like she wakes up sick. Feels like it's her lower intestines. Gets nausea, severe lower abdominal pain, then has diarrhea. No upper abdominal pain, vomiting. A few times a year.    Doing well until about a month ago. Hasn't needed therapy for a year.    Social History     Tobacco Use    Smoking status: Never    Smokeless tobacco: Never   Substance Use Topics    Alcohol use: Yes     Comment: social             2024     4:26 PM   Alcohol Use   Prescreen: >3 drinks/day or >7 drinks/week? No          No data to display              Reviewed orders with patient.  Reviewed health maintenance and updated orders accordingly - Yes  Lab work is in process    Breast Cancer Screenin/31/2023     6:48 AM   Breast CA Risk Assessment (FHS-7)   Do you have a family history of breast, colon, or ovarian cancer? No / Unknown       click delete button to remove this line now  Mammogram Screening: Recommended annual mammography  Pertinent mammograms are reviewed under the imaging tab.    History of abnormal Pap smear: NO - age 30-65 PAP every 5 years with negative HPV co-testing recommended      Latest Ref Rng & Units 8/10/2023     2:03 PM 2018     2:10 PM 2018     2:01  "PM   PAP / HPV   PAP  Negative for Intraepithelial Lesion or Malignancy (NILM)      PAP (Historical)    NIL    HPV 16 DNA Negative Negative  Negative     HPV 18 DNA Negative Negative  Negative     Other HR HPV Negative Negative  Negative       Reviewed and updated as needed this visit by clinical staff   Tobacco  Allergies  Meds              Reviewed and updated as needed this visit by Provider                   Review of Systems   Constitutional:  Negative for chills and fever.   HENT:  Negative for congestion, ear pain, hearing loss and sore throat.    Eyes:  Negative for pain and visual disturbance.   Respiratory:  Negative for cough and shortness of breath.    Cardiovascular:  Negative for chest pain, palpitations and peripheral edema.   Gastrointestinal:  Negative for abdominal pain, constipation, diarrhea, heartburn, hematochezia and nausea.   Breasts:  Negative for tenderness, breast mass and discharge.   Genitourinary:  Negative for dysuria, frequency, genital sores, hematuria, pelvic pain, urgency, vaginal bleeding and vaginal discharge.   Musculoskeletal:  Negative for arthralgias and myalgias.   Skin:  Negative for rash.   Neurological:  Negative for dizziness, weakness and paresthesias.   Psychiatric/Behavioral:  Positive for mood changes. The patient is not nervous/anxious.        OBJECTIVE:   /87 (BP Location: Right arm, Patient Position: Sitting, Cuff Size: Adult Large)   Pulse 84   Temp 98.2  F (36.8  C) (Tympanic)   Ht 1.6 m (5' 2.99\")   Wt 96.7 kg (213 lb 1.6 oz)   LMP 12/22/2023 (Exact Date)   SpO2 98%   BMI 37.76 kg/m    Physical Exam  GENERAL APPEARANCE: healthy, alert and no distress  EYES: Eyes grossly normal to inspection, PERRL and conjunctivae and sclerae normal  HENT: ear canals and TM's normal, nose and mouth without ulcers or lesions, oropharynx clear and oral mucous membranes moist  NECK: no adenopathy, no asymmetry, masses, or scars and thyroid normal to " palpation  RESP: lungs clear to auscultation - no rales, rhonchi or wheezes  BREAST: normal without masses, tenderness or nipple discharge and no palpable axillary masses or adenopathy  CV: regular rate and rhythm, normal S1 S2, no S3 or S4, no murmur, click or rub, no peripheral edema and peripheral pulses strong  ABDOMEN: soft, nontender, no hepatosplenomegaly, no masses and bowel sounds normal  MS: no musculoskeletal defects are noted and gait is age appropriate without ataxia  SKIN: no suspicious lesions or rashes  NEURO: Normal strength and tone, sensory exam grossly normal, mentation intact and speech normal  PSYCH: mentation appears normal and affect normal/bright    Diagnostic Test Results:  Labs reviewed in Epic    ASSESSMENT/PLAN:   (Z00.00) Healthcare maintenance  (primary encounter diagnosis)  -Declines vaccines today    (E66.01) Morbid obesity (H)  Comment: Toggling back and forth between wegovy and saxenda due to supply.   Plan:   -Continue to focus on protein goals, exercise  -Continue with weight management    (E03.8) Subclinical hypothyroidism  Comment: Stable    (Q25.40) Aortic anomaly  Comment: Stable. Not due for recheck    (D70.9) Neutropenia, unspecified type (H24)  Comment: Stable. Follows with endo    (D50.9) Iron deficiency anemia, unspecified iron deficiency anemia type: Extensive chart review. It sounds like she has had HAM for many years. Has heavy periods. MCV was low until starting iron in 2017 and has normalized. Wants to continue iron. Monitor yearly to be sure she doesn't get high. Consider stopping with menopause  Comment: Stable.    (E53.8) Folate deficiency  -On chronically    (I10) Benign essential hypertension  Comment: Stable    (F32.1) Major depressive disorder, single episode, moderate with anxious distress (H)  Comment: Stable. Has a therapist      COUNSELING:  Reviewed preventive health counseling, as reflected in patient instructions      BMI:   Estimated body mass index  "is 37.76 kg/m  as calculated from the following:    Height as of this encounter: 1.6 m (5' 2.99\").    Weight as of this encounter: 96.7 kg (213 lb 1.6 oz).   Weight management plan: Patient referred to endocrine and/or weight management specialty      She reports that she has never smoked. She has never used smokeless tobacco.          SHAQ PEREZ CNP  M [facility] SHARI  "

## 2024-01-09 PROBLEM — D50.9 IRON DEFICIENCY ANEMIA, UNSPECIFIED IRON DEFICIENCY ANEMIA TYPE: Status: ACTIVE | Noted: 2024-01-09

## 2024-01-09 PROBLEM — D70.9 NEUTROPENIA, UNSPECIFIED TYPE (H): Status: ACTIVE | Noted: 2022-05-12

## 2024-01-30 NOTE — PROGRESS NOTES
"Video-Visit Details    Type of service:  Video Visit    Video Start Time: 3:27 PM  Video End Time: 3:39 PM     Originating Location (pt. Location): Home    Distant Location (provider location): Offsite (providers home) [facility] [place]     Platform used for Video Visit: [employer]    Return Weight Management Nutrition Consultation    Natalee Maya is a 53 year old female presents today for return weight management nutrition consultation.  Patient referred by Dr. Barlow on 2023.    Patient with Co-morbidities of obesity includin/28/2023     3:00 PM   --   I have the following health issues associated with obesity High Blood Pressure    Hypothyroidism   I have the following symptoms associated with obesity Depression    Lower Extremity Swelling    Fatigue     Anthropometrics:  Initial consult weight: 217 lb on 23   Estimated body mass index is 35.79 kg/m  as calculated from the following:    Height as of 24: 1.6 m (5' 2.99\").    Weight as of this encounter: 91.6 kg (202 lb).  Current weight: 202 lb per pt report      Medications for Weight Loss:  Wegovy 1.7 mg - has been dealing with insomnia.     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     NUTRITION HISTORY  Food allergies: NKFA  Food intolerances: None   Vitamin/Mineral Supplements: Iron, Folic Acid   Previous methods of diet modification for weight loss: 2019 was on phentermine and intermittent fasting.   RD before: None     Loves to cook and loves to eat. Current household is just patient and her . Has 3 children, 1 son currently lives with them but it isn't having many meals at home.     Diet recall:   Coffee in the morning  Lunch - whole fat yogurt with fruit   Snack - crackers with cheese, fruit  Dinner - leftover turkey, scrambled eggs with ham, cheese/crackers/deli meat, protein source is common.   Snack - sweet treat (ice cream, " chocolate)     80% of the time will track nutrition in the Lose It Roberto. Roberto has patient eating around 1466-0902 calories per day without exercise.     Hydration: did not discuss in detail.    January 2024: Patient has been doing really well since last RD visit. Weight loss has been around 15 lbs. She has been doing a lot more exercise. She shared that she purchased an [employer] All in one headset which  you can do a variety of exercises on and loves it. Has been eating well. Continues to do intermittent fasting and using a certain calorie goal. She tracks her nutrition most days. Has been upper her protein and most days meets her goals. Only negative patient reports is since starting the Wegovy she has been experiencing insomnia, says she used to be a great sleeper. She has an appointment coming up with Lauren Bloch, Pharm D tomorrow and plans to try to discuss this further with her.         11/28/2023     3:00 PM   Diet Recall Review with Patient   How many glasses of juice do you drink in a typical day? 0   How many of glasses of milk do you drink in a typical day? 0   How many 8oz glasses of sugar containing drinks such as Tony-Aid/sweet tea do you drink in a day? 0   How many cans/bottles of sugar pop/soda/tea/sports drinks do you drink in a day? 0   How many cans/bottles of diet pop/soda/tea or sports drink do you drink in a day? 0   How often do you have a drink of alcohol? Monthly or Less   If you do drink, how many drinks might you have in a day? 1 or 2           11/28/2023     3:00 PM   Eating Habits   Generally, my meals include foods like these bread, pasta, rice, potatoes, corn, crackers, sweet dessert, pop, or juice A Few Times a Week   Generally, my meals include foods like these fried meats, brats, burgers, french fries, pizza, cheese, chips, or ice cream Once a Week   Eat fast food (like McDonalds, Burger Oscar, Taco Bell) Less Than Weekly   Eat at a buffet or sit-down restaurant Less Than Weekly    Eat most of my meals in front of the TV or computer A Few Times a Week   Often skip meals, eat at random times, have no regular eating times Less Than Weekly   Rarely sit down for a meal but snack or graze throughout A Few Times a Week   Eat extra snacks between meals Less Than Weekly   Eat most of my food at the end of the day A Few Times a Week   Eat in the middle of the night or wake up at night to eat Never   Eat extra snacks to prevent or correct low blood sugar Never   Eat to prevent acid reflux or stomach pain Never   Worry about not having enough food to eat Never   I eat when I am depressed A Few Times a Week   I eat when I am stressed A Few Times a Week   I eat when I am bored A Few Times a Week   I eat when I am anxious A Few Times a Week   I eat when I am happy or as a reward A Few Times a Week   I feel hungry all the time even if I just have eaten Less Than Weekly   Feeling full is important to me Never   I finish all the food on my plate even if I am already full Almost Everyday   I can't resist eating delicious food or walk past the good food/smell A Few Times a Week   I eat/snack without noticing that I am eating Never   I eat when I am preparing the meal A Few Times a Week   I eat more than usual when I see others eating Less Than Weekly   I have trouble not eating sweets, ice cream, cookies, or chips if they are around the house A Few Times a Week   I think about food all day Less Than Weekly   What foods, if any, do you crave? Sweets/Candy/Chocolate         11/28/2023     3:00 PM   Amount of Food   I feel out of control when eating Monthly   I eat a large amount of food, like a loaf of bread, a box of cookies, a pint/quart of ice cream, all at once Never   I eat a large amount of food even when I am not hungry Never   I eat rapidly Never   I eat alone because I feel embarrassed and do not want others to see how much I have eaten Never   I eat until I am uncomfortably full Never   I feel bad,  disgusted, or guilty after I overeat Never     Physical Activity:      11/28/2023     3:00 PM   Activity/Exercise History   How much of a typical 12 hour day do you spend sitting? Most of the Day   How much of a typical 12 hour day do you spend lying down? Less Than Half the Day   How much of a typical day do you spend walking/standing? Less Than Half the Day   How many hours (not including work) do you spend on the TV/Video Games/Computer/Tablet/Phone? 2-3 Hours   How many times a week are you active for the purpose of exercise? 2-3 Times a Week   What keeps you from being more active? Other   How many total minutes do you spend doing some activity for the purpose of exercising when you exercise? 15-30 Minutes     Nutrition Prescription  Recommended energy/nutrient modification.    Nutrition Diagnosis  Obesity r/t long history of positive energy balance aeb BMI >30 - improving.    Nutrition Intervention  Reviewed current dietary habits and pts history   Answered pt questions  Coordination of care   Nutrition education   AVS and handouts via [employer]    Expected Engagement: good    Nutrition Goals - continued from previous   1) Aim to consume 60-80 grams of protein daily.     Follow-Up: as needed.     Time spent with patient: 12 minutes.  Katherin Irvin RD, LD

## 2024-01-31 ENCOUNTER — VIRTUAL VISIT (OUTPATIENT)
Dept: ENDOCRINOLOGY | Facility: CLINIC | Age: 54
End: 2024-01-31
Payer: COMMERCIAL

## 2024-01-31 VITALS — WEIGHT: 202 LBS | BODY MASS INDEX: 35.79 KG/M2

## 2024-01-31 DIAGNOSIS — E66.9 OBESITY: ICD-10-CM

## 2024-01-31 DIAGNOSIS — Z71.3 NUTRITIONAL COUNSELING: Primary | ICD-10-CM

## 2024-01-31 PROCEDURE — 99207 PR NO CHARGE LOS: CPT | Mod: 95

## 2024-01-31 PROCEDURE — 97803 MED NUTRITION INDIV SUBSEQ: CPT | Mod: 95

## 2024-01-31 ASSESSMENT — PAIN SCALES - GENERAL: PAINLEVEL: NO PAIN (0)

## 2024-01-31 NOTE — PATIENT INSTRUCTIONS
Richard Albright,     Keep up the great work!    Follow-up with RD as needed.     Thank you,    Katherin Irvin, SHADY, LD  If you would like to schedule or reschedule an appointment with the RD, please call [phone]    Nutrition Goals - continued from previous   1) Aim to consume 60-80 grams of protein daily.     COMPREHENSIVE WEIGHT MANAGEMENT PROGRAM  VIRTUAL SUPPORT GROUPS    At [facility], our Comprehensive Weight Management program offers on-line support groups for patients who are working on weight loss and considering, preparing for, or have had weight loss surgery.     There is no cost for this opportunity.  You are invited to attend the?Virtual Support Groups?provided by any of the following locations:    [facility] via Microsoft Teams with Charisma Garnica RN  2.   [place] via [email or web address] with Cortes Troncoso, PhD, LP  3.   [place] via [email or web address] with Stefany Ivy RN  4.   [facility] via a Zoom Meeting with MIGNON Sanchez    The following Support Group information can also be found on our website:  [email or web address]      [facility] Weight Loss Surgery Support Group  The support group is a patient-lead forum that meets monthly to share experiences, encouragement and education. It is open to those who have had weight loss surgery, are scheduled for surgery, or are considering surgery.   WHEN: This group meets on the 3rd Wednesday of each month from 5:00PM - 6:00PM virtually using Microsoft Teams.   FACILITATOR: Led by Charisma Kohler RD, JOE, RN, the program's Clinical Coordinator.   TO REGISTER: Please contact the clinic via BodBot or call the nurse line directly at [phone] to inform our staff that you would like an invite sent to you and to let us know the email you would like the invite sent to. Prior to the meeting, a link with directions on how to join the meeting will be sent to  "you.    2024 Meetings   January 17  February 21  March 20  April 17  May 15  Susan 19      [facility] Bariatric Care Support Group?  This is open to all pre- and post- operative bariatric surgery patients as well as their support system.   WHEN: This group meets the 3rd Tuesday of each month from 6:30 PM - 8:00 PM virtually using Microsoft Teams.   FACILITATOR: Led by Cortes Troncoso, Ph.D who is a Licensed Psychologist with the [facility] Comprehensive Weight Management Program.   TO REGISTER: Please send an email to Cortes Troncoso, Ph.D.,  [email or web address]?if you would like an invitation to the group. Prior to the meeting, a link with directions on how to join the meeting will be sent to you.    2024 Meetings January 16: \"Medication Management and Bariatric Surgery\", Concepcion Gonzales, PharmD, Pharmacy Resident at [facility]  February 20: \"A Bariatric Surgery Patient's Perspective\", FREDDY Avitia, [facility], Behavioral Health Clinician at [facility]  March 19  April 16  May 21  Susan 18: \"Nutritional Labeling\", Dietitian speaker to be announced.  November 19: \"Holiday Eating\", Dietitian speaker to be announced.    [facility] Post-Operative Bariatric Surgery Support Group  This is a support group for [facility] bariatric patients (and those external to [facility]) who have had bariatric surgery and are at least 3 months post-surgery.  WHEN: This support group meets the 4th Wednesday of the month from 11:00 AM - 12:00 PM virtually using Microsoft Teams.   FACILITATOR: Led by Certified Bariatric Nurse, Stefany Ivy RN.   TO REGISTER: Please send an email to Stefany at [email or web address] if you would like an invitation to the group.  Prior to the meeting, a link with directions on how to join the meeting will be sent to " "you.    2024 Meetings  January 24  February 28  March 27  April 24  May 22  Susan 26    [facility] Healthy Lifestyle Group?  This is a 60 minute virtual coaching group for those who want to lead a healthier lifestyle. Come together to set goals and overcome barriers in a supportive group environment. We will address the four pillars of health: nutrition, exercise, sleep and emotional well-being.  This group is highly recommended for those who are participating in the 24 week Healthy Lifestyle Plan and our Health Coaching sessions.  WHEN: This group meets the 1st Friday of the month, 12:30 PM - 1:30 PM online, via a zoom meeting.    FACILITATOR: Led by National Board Certified Health and , Stefany Gleason [facility].   TO REGISTER: Please call the Call Center at [phone] to register. You will get an appointment to attend in [facility]. Fifteen minutes prior to the meeting, complete the e-check in and you will get the link to join the meeting.    There is no charge to attend this group and space is limited.     2024 Meetings  January 5: \"New Years Vision: Manifest your Best 2024!\" (guided imagery,  journaling and discussion)  February 2: \"Let's Talk\"  March 1: \"10 Percent Happier\" by Adis Patel (Book Bites - a guided discussion on the nuggets of wisdom from favorite wellness books, no need to read the book but highly encouraged)  April 5: \"Let's Talk\"  May 3: \"Essentialism: The Disciplined Pursuit of Less\" by Micheal Broderick (Book Bites discussion)  June 7: \"Let's Talk\"  July 5: NO MEETING, off for the 4th of July Holiday  August 2: \"The Blue Zones, Secrets for Living a Longer Life\" by Adis Rob (Book Bites discussion)        "

## 2024-01-31 NOTE — LETTER
"2024       RE: Natalee Maya  [id number]  [place] [address]     Dear Colleague,    Thank you for referring your patient, Natalee Maya, to the [facility] [place] at [facility]. Please see a copy of my visit note below.    Video-Visit Details    Type of service:  Video Visit    Video Start Time: 3:27 PM  Video End Time: 3:39 PM     Originating Location (pt. Location): Home    Distant Location (provider location): Offsite (providers home) [facility] [place]     Platform used for Video Visit: [employer]    Return Weight Management Nutrition Consultation    Natalee Maya is a 53 year old female presents today for return weight management nutrition consultation.  Patient referred by Dr. Barlow on 2023.    Patient with Co-morbidities of obesity includin/28/2023     3:00 PM   --   I have the following health issues associated with obesity High Blood Pressure    Hypothyroidism   I have the following symptoms associated with obesity Depression    Lower Extremity Swelling    Fatigue     Anthropometrics:  Initial consult weight: 217 lb on 23   Estimated body mass index is 35.79 kg/m  as calculated from the following:    Height as of 24: 1.6 m (5' 2.99\").    Weight as of this encounter: 91.6 kg (202 lb).  Current weight: 202 lb per pt report      Medications for Weight Loss:  Wegovy 1.7 mg - has been dealing with insomnia.     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     NUTRITION HISTORY  Food allergies: NKFA  Food intolerances: None   Vitamin/Mineral Supplements: Iron, Folic Acid   Previous methods of diet modification for weight loss: 2019 was on phentermine and intermittent fasting.   RD before: None     Loves to cook and loves to eat. Current household is just patient and her . Has 3 " children, 1 son currently lives with them but it isn't having many meals at home.     Diet recall:   Coffee in the morning  Lunch - whole fat yogurt with fruit   Snack - crackers with cheese, fruit  Dinner - leftover turkey, scrambled eggs with ham, cheese/crackers/deli meat, protein source is common.   Snack - sweet treat (ice cream, chocolate)     80% of the time will track nutrition in the Lose It Roberto. Roberto has patient eating around 7448-4869 calories per day without exercise.     Hydration: did not discuss in detail.    January 2024: Patient has been doing really well since last RD visit. Weight loss has been around 15 lbs. She has been doing a lot more exercise. She shared that she purchased an [employer] All in one headset which  you can do a variety of exercises on and loves it. Has been eating well. Continues to do intermittent fasting and using a certain calorie goal. She tracks her nutrition most days. Has been upper her protein and most days meets her goals. Only negative patient reports is since starting the Wegovy she has been experiencing insomnia, says she used to be a great sleeper. She has an appointment coming up with Lauren Bloch, Pharm D tomorrow and plans to try to discuss this further with her.         11/28/2023     3:00 PM   Diet Recall Review with Patient   How many glasses of juice do you drink in a typical day? 0   How many of glasses of milk do you drink in a typical day? 0   How many 8oz glasses of sugar containing drinks such as Tony-Aid/sweet tea do you drink in a day? 0   How many cans/bottles of sugar pop/soda/tea/sports drinks do you drink in a day? 0   How many cans/bottles of diet pop/soda/tea or sports drink do you drink in a day? 0   How often do you have a drink of alcohol? Monthly or Less   If you do drink, how many drinks might you have in a day? 1 or 2           11/28/2023     3:00 PM   Eating Habits   Generally, my meals include foods like these bread, pasta, rice,  potatoes, corn, crackers, sweet dessert, pop, or juice A Few Times a Week   Generally, my meals include foods like these fried meats, brats, burgers, french fries, pizza, cheese, chips, or ice cream Once a Week   Eat fast food (like McDonalds, Burger Oscar, Taco Bell) Less Than Weekly   Eat at a buffet or sit-down restaurant Less Than Weekly   Eat most of my meals in front of the TV or computer A Few Times a Week   Often skip meals, eat at random times, have no regular eating times Less Than Weekly   Rarely sit down for a meal but snack or graze throughout A Few Times a Week   Eat extra snacks between meals Less Than Weekly   Eat most of my food at the end of the day A Few Times a Week   Eat in the middle of the night or wake up at night to eat Never   Eat extra snacks to prevent or correct low blood sugar Never   Eat to prevent acid reflux or stomach pain Never   Worry about not having enough food to eat Never   I eat when I am depressed A Few Times a Week   I eat when I am stressed A Few Times a Week   I eat when I am bored A Few Times a Week   I eat when I am anxious A Few Times a Week   I eat when I am happy or as a reward A Few Times a Week   I feel hungry all the time even if I just have eaten Less Than Weekly   Feeling full is important to me Never   I finish all the food on my plate even if I am already full Almost Everyday   I can't resist eating delicious food or walk past the good food/smell A Few Times a Week   I eat/snack without noticing that I am eating Never   I eat when I am preparing the meal A Few Times a Week   I eat more than usual when I see others eating Less Than Weekly   I have trouble not eating sweets, ice cream, cookies, or chips if they are around the house A Few Times a Week   I think about food all day Less Than Weekly   What foods, if any, do you crave? Sweets/Candy/Chocolate         11/28/2023     3:00 PM   Amount of Food   I feel out of control when eating Monthly   I eat a large  amount of food, like a loaf of bread, a box of cookies, a pint/quart of ice cream, all at once Never   I eat a large amount of food even when I am not hungry Never   I eat rapidly Never   I eat alone because I feel embarrassed and do not want others to see how much I have eaten Never   I eat until I am uncomfortably full Never   I feel bad, disgusted, or guilty after I overeat Never     Physical Activity:      11/28/2023     3:00 PM   Activity/Exercise History   How much of a typical 12 hour day do you spend sitting? Most of the Day   How much of a typical 12 hour day do you spend lying down? Less Than Half the Day   How much of a typical day do you spend walking/standing? Less Than Half the Day   How many hours (not including work) do you spend on the TV/Video Games/Computer/Tablet/Phone? 2-3 Hours   How many times a week are you active for the purpose of exercise? 2-3 Times a Week   What keeps you from being more active? Other   How many total minutes do you spend doing some activity for the purpose of exercising when you exercise? 15-30 Minutes     Nutrition Prescription  Recommended energy/nutrient modification.    Nutrition Diagnosis  Obesity r/t long history of positive energy balance aeb BMI >30 - improving.    Nutrition Intervention  Reviewed current dietary habits and pts history   Answered pt questions  Coordination of care   Nutrition education   AVS and handouts via [employer]    Expected Engagement: good    Nutrition Goals - continued from previous   1) Aim to consume 60-80 grams of protein daily.     Follow-Up: as needed.     Time spent with patient: 12 minutes.  Katherin Irvin RD, LD

## 2024-01-31 NOTE — NURSING NOTE
Is the patient currently in the state of [place]? YES    Visit mode:VIDEO    If the visit is dropped, the patient can be reconnected by: VIDEO VISIT: Text to cell phone:   Telephone Information:   Mobile [phone]    and VIDEO VISIT: Send to e-mail at: [email or web address]    Will anyone else be joining the visit? NO  (If patient encounters technical issues they should call [phone]    How would you like to obtain your AVS? MyChart    Are changes needed to the allergy or medication list? No    Reason for visit: SUSAN KEATING

## 2024-02-01 ENCOUNTER — VIRTUAL VISIT (OUTPATIENT)
Dept: CARDIOLOGY | Facility: CLINIC | Age: 54
End: 2024-02-01
Attending: INTERNAL MEDICINE
Payer: COMMERCIAL

## 2024-02-01 VITALS — BODY MASS INDEX: 35.61 KG/M2 | HEIGHT: 63 IN | WEIGHT: 201 LBS

## 2024-02-01 DIAGNOSIS — E66.01 MORBID OBESITY (H): Primary | ICD-10-CM

## 2024-02-01 DIAGNOSIS — E03.8 SUBCLINICAL HYPOTHYROIDISM: ICD-10-CM

## 2024-02-01 DIAGNOSIS — I10 BENIGN ESSENTIAL HYPERTENSION: ICD-10-CM

## 2024-02-01 DIAGNOSIS — G47.00 INSOMNIA, UNSPECIFIED TYPE: ICD-10-CM

## 2024-02-01 DIAGNOSIS — D50.9 IRON DEFICIENCY ANEMIA, UNSPECIFIED IRON DEFICIENCY ANEMIA TYPE: ICD-10-CM

## 2024-02-01 DIAGNOSIS — E53.8 FOLATE DEFICIENCY: ICD-10-CM

## 2024-02-01 ASSESSMENT — PAIN SCALES - GENERAL: PAINLEVEL: NO PAIN (0)

## 2024-02-01 NOTE — PROGRESS NOTES
Medication Therapy Management (MTM) Encounter    ASSESSMENT:                            Medication Adherence/Access: No issues identified    Weight Management:   Weight loss progressing, if concerned regarding insomnia, could consider holding phentermine if concerned. Otherwise can continue as is.     Sleep issues:   Unclear significance. Discussed that insomnia is not a typical side effect from Wegovy, but did discuss that phentermine can impact sleep. Patient to monitor and if feels that it has continued could consider holding phentermine to see if contributing.     Hypothyroidism:   Last TSH within normal limits.     Hypertension:   Blood pressure at goal < 140/90 mmHg.     Anemia:   Hgb stable.     Folate Deficiency:   Last folic acid elevated. Could consider holding for now and re-evaluate in 6 months. Patient preferred to continue for now and follow up with primary care provider.     PLAN:                            Continue current regimen for now.   If concerned that phentermine is medication related, could consider holding phentermine as trial off for 2-4 weeks.   Follow up with your provider about holding folate as last level was high.     Follow-up: Return if questions or concerns about medications in future, for Call [phone] to schedule.    SUBJECTIVE/OBJECTIVE:                          Natalee Maya is a 53 year old female contacted via secure video for an initial visit. She was referred to me from Dr. Yen Faustin.      Reason for visit: comprehensive review of medications. Wonders if Wegovy is causing insomnia    Allergies/ADRs: Reviewed in chart  Past Medical History: Reviewed in chart  Tobacco: She reports that she has never smoked. She has never used smokeless tobacco.  Alcohol: not currently using    Medication Adherence/Access: no issues reported    Weight Management:   Phentermine 15 mg daily in AM   Wegovy 1.7 mg once weekly Saturday     Followed by Dr. Yen Faustin,  "seen for [facility]. Patient is experiencing the follow side effects: insomnia? See below. At beginning of COVID noticed issues of weight gain. So stated working with dietitian, intermittent fasting etc. She finds that phentermine does give her energy, without it feels \"blah\". Knows that phentermine is not necessarily helping with weight.   Diet/Eating Habits: Patient reports she is working on getting balanced foods, working on getting in protein with each meal.    Exercise/Activity: Patient reports exercising in the last afternoon daily     Current weight today: 201 lbs 0 oz    Wt Readings from Last 4 Encounters:   02/01/24 91.2 kg (201 lb)   01/31/24 91.6 kg (202 lb)   01/04/24 96.7 kg (213 lb 1.6 oz)   11/29/23 98.4 kg (217 lb)     Estimated body mass index is 35.61 kg/m  as calculated from the following:    Height as of this encounter: 1.6 m (5' 3\").    Weight as of this encounter: 91.2 kg (201 lb).    BP Readings from Last 3 Encounters:   01/04/24 124/87   08/10/23 112/80   06/13/23 130/79     Sleep issues:   Typically a good sleeper, 9-10 hours per night. Reports difficulty sleeping 3-4 weeks, shortly after holidays. She will go to bed then will wake up every 2-3 hours. When she wakes up in the night, she wakes up alert. So then will go on ipad, then will get tired fast. No caffeine other than 1 cup coffee in AM. She is unsure what is a contributing factor.     Hypothyroidism:   Levothyroxine 50 mcg daily, + 12.5 mcg twice weekly   Patient is having the following symptoms: none.   TSH   Date Value Ref Range Status   08/10/2023 2.88 0.30 - 4.20 uIU/mL Final   06/16/2022 3.09 0.40 - 4.00 mU/L Final   03/11/2021 1.65 0.40 - 4.00 mU/L Final     Hypertension:   Losartan 100 mg daily     Patient reports no current medication side effects.  Patient does not self-monitor blood pressure.    BP Readings from Last 3 Encounters:   01/04/24 124/87   08/10/23 112/80   06/13/23 130/79     Pulse Readings " "from Last 3 Encounters:   01/04/24 84   08/10/23 80   06/13/23 74     Anemia:   Ferrous sulfate 325 mg daily    No concerns. Tolerating without issues.   Hemoglobin   Date Value Ref Range Status   08/10/2023 13.3 11.7 - 15.7 g/dL Final   06/14/2021 12.5 11.7 - 15.7 g/dL Final     Ferritin   Date Value Ref Range Status   08/10/2023 90 11 - 328 ng/mL Final   06/29/2020 22 8 - 252 ng/mL Final     Folate Deficiency:   Folic acid 1 mg once daily     No concerns.      Today's Vitals: Ht 1.6 m (5' 3\")   Wt 91.2 kg (201 lb)   LMP 12/22/2023 (Exact Date)   BMI 35.61 kg/m    ----------------      I spent 20 minutes with this patient today. All changes were made via collaborative practice agreement with Dr. Yen Faustin. A copy of the visit note was provided to the patient's provider(s).    A summary of these recommendations was declined by the patient.    Lauren Bloch, PharmD, BCACP   Medication Therapy Management Pharmacist   [facility]    Telemedicine Visit Details  Type of service:  Telephone visit  Start Time:  3:00 PM  End Time:  3:20 PM     Medication Therapy Recommendations  Folate deficiency    Current Medication: folic acid (FOLVITE) 1 MG tablet   Rationale: No medical indication at this time - Unnecessary medication therapy - Indication   Recommendation: Discontinue Medication   Status: Declined per Patient         Morbid obesity (H)    Current Medication: phentermine (ADIPEX-P) 15 MG capsule   Rationale: Undesirable effect - Adverse medication event - Safety   Recommendation: Discontinue Medication   Status: Contact Provider - Awaiting Response            "

## 2024-02-01 NOTE — PROGRESS NOTES
"Virtual Visit Details    Type of service:  Video Visit     Originating Location (pt. Location): {video visit patient location:682803::\"Home\"}  {PROVIDER LOCATION On-site should be selected for visits conducted from your clinic location or adjoining [facility] hospital, academic office, or other nearby [facility] building. Off-site should be selected for all other provider locations, including home:410670}  Distant Location (provider location):  {virtual location provider:740519}  Platform used for Video Visit: {Virtual Visit Platforms:272240::\""[employer]"\"}  "

## 2024-02-01 NOTE — LETTER
2/1/2024      RE: Natalee Maya  [address]  [facility] [address]       Dear Colleague,    Thank you for the opportunity to participate in the care of your patient, Natalee Maya, at the [facility] [place] [facility]. Please see a copy of my visit note below.    Medication Therapy Management (MTM) Encounter    ASSESSMENT:                            Medication Adherence/Access: No issues identified    Weight Management:   Weight loss progressing, if concerned regarding insomnia, could consider holding phentermine if concerned. Otherwise can continue as is.     Sleep issues:   Unclear significance. Discussed that insomnia is not a typical side effect from Wegovy, but did discuss that phentermine can impact sleep. Patient to monitor and if feels that it has continued could consider holding phentermine to see if contributing.     Hypothyroidism:   Last TSH within normal limits.     Hypertension:   Blood pressure at goal < 140/90 mmHg.     Anemia:   Hgb stable.     Folate Deficiency:   Last folic acid elevated. Could consider holding for now and re-evaluate in 6 months. Patient preferred to continue for now and follow up with primary care provider.     PLAN:                            Continue current regimen for now.   If concerned that phentermine is medication related, could consider holding phentermine as trial off for 2-4 weeks.   Follow up with your provider about holding folate as last level was high.     Follow-up: Return if questions or concerns about medications in future, for Call [phone] to schedule.    SUBJECTIVE/OBJECTIVE:                          Natalee Maya is a 53 year old female contacted via secure video for an initial visit. She was referred to me from Dr. Yen Faustin.      Reason for visit: comprehensive review of medications. Wonders if Wegovy is causing insomnia    Allergies/ADRs: Reviewed  "in chart  Past Medical History: Reviewed in chart  Tobacco: She reports that she has never smoked. She has never used smokeless tobacco.  Alcohol: not currently using    Medication Adherence/Access: no issues reported    Weight Management:   Phentermine 15 mg daily in AM   Wegovy 1.7 mg once weekly Saturday     Followed by Dr. Yen Faustin, seen for [facility]. Patient is experiencing the follow side effects: insomnia? See below. At beginning of COVID noticed issues of weight gain. So stated working with dietitian, intermittent fasting etc. She finds that phentermine does give her energy, without it feels \"blah\". Knows that phentermine is not necessarily helping with weight.   Diet/Eating Habits: Patient reports she is working on getting balanced foods, working on getting in protein with each meal.    Exercise/Activity: Patient reports exercising in the last afternoon daily     Current weight today: 201 lbs 0 oz    Wt Readings from Last 4 Encounters:   02/01/24 91.2 kg (201 lb)   01/31/24 91.6 kg (202 lb)   01/04/24 96.7 kg (213 lb 1.6 oz)   11/29/23 98.4 kg (217 lb)     Estimated body mass index is 35.61 kg/m  as calculated from the following:    Height as of this encounter: 1.6 m (5' 3\").    Weight as of this encounter: 91.2 kg (201 lb).    BP Readings from Last 3 Encounters:   01/04/24 124/87   08/10/23 112/80   06/13/23 130/79     Sleep issues:   Typically a good sleeper, 9-10 hours per night. Reports difficulty sleeping 3-4 weeks, shortly after holidays. She will go to bed then will wake up every 2-3 hours. When she wakes up in the night, she wakes up alert. So then will go on ipad, then will get tired fast. No caffeine other than 1 cup coffee in AM. She is unsure what is a contributing factor.     Hypothyroidism:   Levothyroxine 50 mcg daily, + 12.5 mcg twice weekly   Patient is having the following symptoms: none.   TSH   Date Value Ref Range Status   08/10/2023 2.88 0.30 - 4.20 " "uIU/mL Final   06/16/2022 3.09 0.40 - 4.00 mU/L Final   03/11/2021 1.65 0.40 - 4.00 mU/L Final     Hypertension:   Losartan 100 mg daily     Patient reports no current medication side effects.  Patient does not self-monitor blood pressure.    BP Readings from Last 3 Encounters:   01/04/24 124/87   08/10/23 112/80   06/13/23 130/79     Pulse Readings from Last 3 Encounters:   01/04/24 84   08/10/23 80   06/13/23 74     Anemia:   Ferrous sulfate 325 mg daily    No concerns. Tolerating without issues.   Hemoglobin   Date Value Ref Range Status   08/10/2023 13.3 11.7 - 15.7 g/dL Final   06/14/2021 12.5 11.7 - 15.7 g/dL Final     Ferritin   Date Value Ref Range Status   08/10/2023 90 11 - 328 ng/mL Final   06/29/2020 22 8 - 252 ng/mL Final     Folate Deficiency:   Folic acid 1 mg once daily     No concerns.      Today's Vitals: Ht 1.6 m (5' 3\")   Wt 91.2 kg (201 lb)   LMP 12/22/2023 (Exact Date)   BMI 35.61 kg/m    ----------------      I spent 20 minutes with this patient today. All changes were made via collaborative practice agreement with Dr. Yen Faustin. A copy of the visit note was provided to the patient's provider(s).    A summary of these recommendations was declined by the patient.    Lauren Bloch, PharmD, BCACP   Medication Therapy Management Pharmacist   [facility]    Telemedicine Visit Details  Type of service:  Telephone visit  Start Time:  3:00 PM  End Time:  3:20 PM     Medication Therapy Recommendations  Folate deficiency    Current Medication: folic acid (FOLVITE) 1 MG tablet   Rationale: No medical indication at this time - Unnecessary medication therapy - Indication   Recommendation: Discontinue Medication   Status: Declined per Patient         Morbid obesity (H)    Current Medication: phentermine (ADIPEX-P) 15 MG capsule   Rationale: Undesirable effect - Adverse medication event - Safety   Recommendation: Discontinue Medication   Status: " Contact Provider - Awaiting Response                Please do not hesitate to contact me if you have any questions/concerns.     Sincerely,     Lauren T. Bloch, RPH

## 2024-02-01 NOTE — NURSING NOTE
Is the patient currently in the state of [place]? YES    Visit mode:VIDEO    If the visit is dropped, the patient can be reconnected by: VIDEO VISIT: Text to cell phone:   Telephone Information:   Mobile [phone]       Will anyone else be joining the visit? NO  (If patient encounters technical issues they should call [phone]    How would you like to obtain your AVS? MyChart    Are changes needed to the allergy or medication list? Pt stated no changes to allergies and Pt stated no med changes  Please remove any meds marked not taking and any flagged for removal.    Reason for visit: Consult    Wt/ht other than 24 hrs:    Pain more than one location:  no  Jeanette KEATING

## 2024-02-05 ENCOUNTER — VIRTUAL VISIT (OUTPATIENT)
Dept: PSYCHOLOGY | Facility: CLINIC | Age: 54
End: 2024-02-05
Payer: COMMERCIAL

## 2024-02-05 DIAGNOSIS — F33.41 MAJOR DEPRESSIVE DISORDER, RECURRENT EPISODE, IN PARTIAL REMISSION WITH ANXIOUS DISTRESS (H): Primary | ICD-10-CM

## 2024-02-05 PROCEDURE — 90837 PSYTX W PT 60 MINUTES: CPT | Mod: 95 | Performed by: SOCIAL WORKER

## 2024-02-05 NOTE — PROGRESS NOTES
[facility]                                     Progress Note    Patient Name: Natalee Maya  Date: 2/5/2024       Service Type: Individual      Session Start Time: 1:32 PM Session End Time: 2:28 PM    Session Length: 53+ minutes    Session #: 32 (4 with DB)    Attendees: Client attended alone    Service Modality:  Video Visit:      Provider verified identity through the following two step process.  Patient provided:  Patient is known previously to provider    Telemedicine Visit: The patient's condition can be safely assessed and treated via synchronous audio and visual telemedicine encounter.      Reason for Telemedicine Visit: Services only offered telehealth    Originating Site (Patient Location): Patient's home/car    Distant Site (Provider Location): Provider Remote Setting- Home Office    Consent:  The patient/guardian has verbally consented to: the potential risks and benefits of telemedicine (video visit) versus in person care; bill my insurance or make self-payment for services provided; and responsibility for payment of non-covered services.     Patient would like the video invitation sent by:  My Chart    Mode of Communication:  Video Conference via [employer]well    As the provider I attest to compliance with applicable laws and regulations related to telemedicine.    DATA  Interactive Complexity: No  Crisis: No        Progress Since Last Session (Related to Symptoms / Goals / Homework):   Symptoms:  improving; increased ability to manage symptoms      Homework: Achieved / completed to satisfaction   Diet, exercise      Episode of Care Goals: Satisfactory progress - ACTION (Actively working towards change); Intervened by reinforcing change plan / affirming steps taken      There has been demonstrated improvement in functioning while patient has been engaged in psychotherapy/psychological service- if withdrawn the patient would deteriorate and/or relapse.      Current / Ongoing Stressors  and Concerns:   Dynamics in relationship with      Treatment Objective(s) Addressed in This Session:   use assertive communication skills  Setting boundaries     Intervention:   CBT: reinforced behavior changes     Motivational interviewing: expressed empathy/understanding, use of reflective listening and open ended questions   Reviewed treatment plan    Assessments completed prior to visit:  The following assessments were completed by patient for this visit: None    ASSESSMENT: Current Emotional / Mental Status (status of significant symptoms):   Risk status (Self / Other harm or suicidal ideation)   Patient denies current fears or concerns for personal safety.   Patient denies current or recent suicidal ideation or behaviors.   Patient denies current or recent homicidal ideation or behaviors.   Patient denies current or recent self injurious behavior or ideation.   Patient denies other safety concerns.   Patient reports there has been no change in risk factors since their last session.     Patient reports there has been no change in protective factors since their last session.     A safety and risk management plan has been previously developed.  Patient consented to co-developed safety plan.  Safety and risk management plan was completed.  Patient agreed to use safety plan should any safety concerns arise.  A copy was given to the patient.     Appearance:   Appropriate    Eye Contact:   Fair    Psychomotor Behavior: Normal    Attitude:   Cooperative  Interested Open to feedback and support   Orientation:   All   Speech    Rate / Production: Normal/ Responsive    Volume:  Normal    Mood:    Sad  -minimal, stable   Affect:    Mood Congruent    Thought Content:  Clear    Thought Form:  Coherent  Goal Directed  Logical    Insight:    Good      Medication Review:   No current psychiatric medications prescribed      Medication Compliance:   NA     Changes in Health Issues:   None reported     Chemical Use  Review:   Substance Use: no active concerns     Tobacco Use: No current tobacco use.      Diagnosis:  Major Depressive Disorder, Moderate, in partial remission    Collateral Reports Completed:     Not Applicable    PLAN: (Patient Tasks / Therapist Tasks / Other)  Client will continue with behavior changes.  Consider discharge at next appointment    Celeste Chavez [facility] 2/5/2024    ______________________________________________________________________    Individual Treatment Plan    Patient's Name: Natalee Maya  YOB: 1970    Date of Creation: 7/2/2020  Date Treatment Plan Last Reviewed/Revised: 2/5/2024    DSM5 Diagnoses: Major Depressive Disorder, Moderate, in partial remission  Psychosocial / Contextual Factors: dynamics in relationship with   PROMIS (reviewed every 90 days):   PROMIS 10-Global Health (only subscores and total score):       2/18/2022     6:21 AM 3/24/2022     8:48 AM 9/23/2022     6:09 AM 12/29/2023     8:24 AM   PROMIS-10 Scores Only   Global Mental Health Score 10 12 14 13   Global Physical Health Score 15 15 14 15   PROMIS TOTAL - SUBSCORES 25 27 28 28        Referral / Collaboration:  Referral to another professional/service is not indicated at this time..    Anticipated number of session for this episode of care: will review in 90 days  Anticipation frequency of session: Every 1-2 months  Anticipated Duration of each session: 38-52 minutes  Treatment plan will be reviewed in 90 days or when goals have been changed.       MeasurableTreatment Goal(s) related to diagnosis / functional impairment(s)  Goal 1: Patient will reduce symptoms of anxiety and depression by reporting MADINA-7 and PHQ-9 scores below a 5, where symptoms where symptoms occur fewer than half the days for a minimum of 4 weeks.     Objective #A (Patient Action)                          Patient will learn and utilize 3 techniques to manage emotions related to marital stress/conflict.  Status: Completed  "- Date: 5/14/2021, restarted date: 2/5/2024       Intervention(s)  Therapist will teach relaxation techniques and ways to engage in self-care.     Objective #B  Patient will compile a list of boundaries that they would like to set with others. Utilize the boundaries .  Status: Completed - Date: 5/14/2021, restarted date: 2/5/2024       Intervention(s)  Therapist will teach health boundaries and encourage client to identify their boundaries and implement them.     Objective #C  Patient will learn & utilize at least 1 assertive communication skills weekly.  Status: Completed - Date: 5/14/2021, restarted date: 2/5/2024      Intervention(s)  Therapist will teach assertive communication skills.     Objective #D  Patient will make improvements to diet and increase engagement in exercise by setting weekly goals.              Status: Continued Date: 9/24/2021, 3/25/2022, completed date: 2/5/2024                            Intervention(s)              Therapist will encourage client to establish goals to work toward between appointments, assist with identifying any barriers and address them with client.       Objective #E  Patient will increase engagement in areas of interest.                Status: Completed Date: 3/25/2022                 Intervention(s)              Therapist will assist client with identifying negative self-talk that impacts engagement in areas of interest.                   Patient has reviewed and agreed to the above plan.        Celeste Chavez, [facility]                  7/2/2020  Celeste Chavez, [facility]                  5/14/2021  Celeste Chavez, [facility]                  9/24/2021  Celeste Chavez, [facility]                  3/25/2022  Celeste Chavez, [facility]                  2/5/2024                                                 Natalee Maya         SAFETY PLAN:  Step 1: Warning signs / cues (Thoughts, images, mood, situation, behavior) that a crisis may be developing:  Thoughts: \"I don't matter\", " "\"I'm a burden\", \"I can't do this anymore\", \"I just want this to end\" and \"Nothing makes it better\"  Images: none  Thinking Processes: ruminations (can't stop thinking about my problems): marital stresss, racing thoughts and highly critical and negative thoughts: about myself  Mood: worsening depression, hopelessness, agitation and mood swings  Behaviors: isolating/withdrawing  and can't stop crying  Situations: relationship problems   Step 2: Coping strategies - Things I can do to take my mind off of my problems without contacting another person (relaxation technique, physical activity):  Distress Tolerance Strategies:  read a book and geneology  Physical Activities: go for a walk  Focus on helpful thoughts:  \"This is temporary\", \"I will get through this\", \"It always passes\",   think about happy memories:   and remind myself of what is important to me:  Step 3: People and social settings that provide distraction:                 Name: Isaías                                      Phone: in phone                 Name: Dre                                        Phone: in phone                 Name: Polina                                     Phone: in phone                 Name: Tracy                                       Phone: in phone  work and go outside                 Step 4: Remind myself of people and things that are important to me and worth living for:  Children, , the job I do is important, I have talents and skills to offer people, and I am a good person  Step 5: When I am in crisis, I can ask these people to help me use my safety plan:                 Name: Isaías                                      Phone: [phone]                 Name: Dre                                        Phone: [phone]                 Name: Tracy                                       Phone: [phone]  Step 6: Making the environment safe:   be around others  Step 7: Professionals or agencies I can contact during a " crisis:  [facility] Daytime Number: [phone]  Suicide Prevention Lifeline: 2-413-580-TALK (2098)  Crisis Text Line Service (available 24 hours a day, 7 days a week): Text [place] to 724480  Local Crisis Services: [facility] [phone]     Call 911 or go to my nearest emergency department.       I helped develop this safety plan and agree to use it when needed.  I have been given a copy of this plan.       Client signature _________________________________________________________________  Today s date:  1/8/2021  Adapted from Safety Plan Template 2008 Valerie Broderick and Alistair Briseno is reprinted with the express permission of the authors.  No portion of the Safety Plan Template may be reproduced without the express, written permission.  You can contact the authors at [email or web address] or [email or web address].

## 2024-02-13 ENCOUNTER — TELEPHONE (OUTPATIENT)
Dept: ENDOCRINOLOGY | Facility: CLINIC | Age: 54
End: 2024-02-13
Payer: COMMERCIAL

## 2024-02-13 DIAGNOSIS — E66.01 MORBID OBESITY (H): Primary | ICD-10-CM

## 2024-02-14 NOTE — PATIENT INSTRUCTIONS
"Recommendations from today's MTM visit:                                                       Continue current regimen for now.   If concerned that phentermine is medication related, could consider holding phentermine as trial off for 2-4 weeks.   Follow up with your provider about holding folate as last level was high.     Follow-up: Return if questions or concerns about medications in future, for Call [phone] to schedule.    It was great speaking with you today.  I value your experience and would be very thankful for your time in providing feedback in our clinic survey. In the next few days, you may receive an email or text message from [facility] [employer] with a link to a survey related to your  clinical pharmacist.\"     To schedule another MTM appointment, please call the clinic directly or you may call the MTM scheduling line at [phone] or toll-free at [phone].     My Clinical Pharmacist's contact information:                                                      Please feel free to contact me with any questions or concerns you have.      Lauren Bloch, PharmD, BCACP   Medication Therapy Management Pharmacist   [facility]       "

## 2024-02-14 NOTE — TELEPHONE ENCOUNTER
MTM referral placed due to [facility] Medication Therapy Management (MTM) practice shift. CPA with Dr. Yen Faustin.

## 2024-02-26 ENCOUNTER — PATIENT OUTREACH (OUTPATIENT)
Dept: CARE COORDINATION | Facility: CLINIC | Age: 54
End: 2024-02-26
Payer: COMMERCIAL

## 2024-03-27 ENCOUNTER — VIRTUAL VISIT (OUTPATIENT)
Dept: PSYCHOLOGY | Facility: CLINIC | Age: 54
End: 2024-03-27
Payer: COMMERCIAL

## 2024-03-27 DIAGNOSIS — F33.41 MAJOR DEPRESSIVE DISORDER, RECURRENT EPISODE, IN PARTIAL REMISSION WITH ANXIOUS DISTRESS (H): Primary | ICD-10-CM

## 2024-03-27 PROCEDURE — 90834 PSYTX W PT 45 MINUTES: CPT | Mod: 95 | Performed by: SOCIAL WORKER

## 2024-03-27 ASSESSMENT — PATIENT HEALTH QUESTIONNAIRE - PHQ9
SUM OF ALL RESPONSES TO PHQ QUESTIONS 1-9: 3
SUM OF ALL RESPONSES TO PHQ QUESTIONS 1-9: 3
10. IF YOU CHECKED OFF ANY PROBLEMS, HOW DIFFICULT HAVE THESE PROBLEMS MADE IT FOR YOU TO DO YOUR WORK, TAKE CARE OF THINGS AT HOME, OR GET ALONG WITH OTHER PEOPLE: SOMEWHAT DIFFICULT

## 2024-03-27 NOTE — PROGRESS NOTES
[facility]                                     Progress Note    Patient Name: Natalee Maya  Date: 3/27/2024       Service Type: Individual      Session Start Time: 12:04 PM Session End Time:  12:53 PM    Session Length: 38-52 minutes    Session #: 33 (4 with DB)    Attendees: Client attended alone    Service Modality:  Video Visit:      Provider verified identity through the following two step process.  Patient provided:  Patient is known previously to provider    Telemedicine Visit: The patient's condition can be safely assessed and treated via synchronous audio and visual telemedicine encounter.      Reason for Telemedicine Visit: Patient convenience (e.g. access to timely appointments / distance to available provider)    Originating Site (Patient Location): Patient's home    Distant Site (Provider Location): [facility]: [place], [place]/On-Site    Consent:  The patient/guardian has verbally consented to: the potential risks and benefits of telemedicine (video visit) versus in person care; bill my insurance or make self-payment for services provided; and responsibility for payment of non-covered services.     Patient would like the video invitation sent by:  My Chart    Mode of Communication:  Video Conference via Amwell    As the provider I attest to compliance with applicable laws and regulations related to telemedicine.    DATA  Interactive Complexity: No  Crisis: No        Progress Since Last Session (Related to Symptoms / Goals / Homework):   Symptoms:  worsening depression symptoms      Homework: Achieved / completed to satisfaction       Episode of Care Goals: Satisfactory progress - ACTION (Actively working towards change); Intervened by reinforcing change plan / affirming steps taken      There has been demonstrated improvement in functioning while patient has been engaged in psychotherapy/psychological service- if withdrawn the patient would deteriorate and/or  relapse.      Current / Ongoing Stressors and Concerns:   Dynamics in relationship with    Work related stressors   Stressors related to son's health     Treatment Objective(s) Addressed in This Session:   Increase interest, engagement, and pleasure in doing things  Decrease frequency and intensity of feeling down, depressed, hopeless  Identify negative self-talk and behaviors: challenge core beliefs, myths, and actions  use assertive communication skills     Intervention:   Emotion focused: feeling identification     Engaged in active listening   Supported client with identifying goals and strategies to manage symptoms   Reviewed flowsheet      Assessments completed prior to visit:  The following assessments were completed by patient for this visit:  PHQ9:       12/16/2021     6:41 AM 1/20/2022     4:14 PM 3/11/2022     6:45 AM 1/31/2023     6:40 AM 8/1/2023    10:30 AM 12/29/2023     8:22 AM 3/27/2024     4:50 AM   PHQ-9 SCORE   PHQ-9 Total Score MyChart 2 (Minimal depression) 3 (Minimal depression) 2 (Minimal depression) 3 (Minimal depression) 0 4 (Minimal depression) 3 (Minimal depression)   PHQ-9 Total Score 2 3 2 3 0 4 3        ASSESSMENT: Current Emotional / Mental Status (status of significant symptoms):   Risk status (Self / Other harm or suicidal ideation)   Patient denies current fears or concerns for personal safety.   Patient denies current or recent suicidal ideation or behaviors.   Patient denies current or recent homicidal ideation or behaviors.   Patient denies current or recent self injurious behavior or ideation.   Patient denies other safety concerns.   Patient reports there has been no change in risk factors since their last session.     Patient reports there has been no change in protective factors since their last session.     A safety and risk management plan has been previously developed.  Patient consented to co-developed safety plan.  Safety and risk management plan was completed.   Patient agreed to use safety plan should any safety concerns arise.  A copy was given to the patient.     Appearance:   Appropriate    Eye Contact:   Good    Psychomotor Behavior: Normal    Attitude:   Cooperative  Interested Open to feedback and support   Orientation:   All   Speech    Rate / Production: Normal/ Responsive Emotional    Volume:  Normal    Mood:    Anxious  Sad    Affect:    Tearful Mood Congruent    Thought Content:  Clear    Thought Form:  Coherent  Goal Directed  Logical    Insight:    Good      Medication Review:   No current psychiatric medications prescribed      Medication Compliance:   NA     Changes in Health Issues:   None reported     Chemical Use Review:   Substance Use: no active concerns     Tobacco Use: No current tobacco use.      Diagnosis:  Major Depressive Disorder, Moderate, in partial remission    Collateral Reports Completed:     Not Applicable    PLAN: (Patient Tasks / Therapist Tasks / Other)  Client shared goal to continue behavior changes as it relates to weight loss and continue to focus on her needs.    Celeste Chavez, [facility] 3/27/2024    ______________________________________________________________________    Individual Treatment Plan    Patient's Name: Natalee Maya  YOB: 1970    Date of Creation: 7/2/2020  Date Treatment Plan Last Reviewed/Revised: 2/5/2024    DSM5 Diagnoses: Major Depressive Disorder, Moderate, in partial remission  Psychosocial / Contextual Factors: dynamics in relationship with   PROMIS (reviewed every 90 days):   PROMIS 10-Global Health (only subscores and total score):       2/18/2022     6:21 AM 3/24/2022     8:48 AM 9/23/2022     6:09 AM 12/29/2023     8:24 AM   PROMIS-10 Scores Only   Global Mental Health Score 10 12 14 13   Global Physical Health Score 15 15 14 15   PROMIS TOTAL - SUBSCORES 25 27 28 28        Referral / Collaboration:  Referral to another professional/service is not indicated at this  time..    Anticipated number of session for this episode of care: will review in 90 days  Anticipation frequency of session: Every 1-2 months  Anticipated Duration of each session: 38-52 minutes  Treatment plan will be reviewed in 90 days or when goals have been changed.       MeasurableTreatment Goal(s) related to diagnosis / functional impairment(s)  Goal 1: Patient will reduce symptoms of anxiety and depression by reporting MADINA-7 and PHQ-9 scores below a 5, where symptoms where symptoms occur fewer than half the days for a minimum of 4 weeks.     Objective #A (Patient Action)                          Patient will learn and utilize 3 techniques to manage emotions related to marital stress/conflict.  Status: Completed - Date: 5/14/2021, restarted date: 2/5/2024       Intervention(s)  Therapist will teach relaxation techniques and ways to engage in self-care.     Objective #B  Patient will compile a list of boundaries that they would like to set with others. Utilize the boundaries .  Status: Completed - Date: 5/14/2021, restarted date: 2/5/2024       Intervention(s)  Therapist will teach health boundaries and encourage client to identify their boundaries and implement them.     Objective #C  Patient will learn & utilize at least 1 assertive communication skills weekly.  Status: Completed - Date: 5/14/2021, restarted date: 2/5/2024      Intervention(s)  Therapist will teach assertive communication skills.     Objective #D  Patient will make improvements to diet and increase engagement in exercise by setting weekly goals.              Status: Continued Date: 9/24/2021, 3/25/2022, completed date: 2/5/2024                            Intervention(s)              Therapist will encourage client to establish goals to work toward between appointments, assist with identifying any barriers and address them with client.       Objective #E  Patient will increase engagement in areas of interest.                Status: Completed  "Date: 3/25/2022                 Intervention(s)              Therapist will assist client with identifying negative self-talk that impacts engagement in areas of interest.                   Patient has reviewed and agreed to the above plan.        Celeste Chavez, [facility]                  7/2/2020  Celeste Chavez, [facility]                  5/14/2021  Celeste Chavez, [facility]                  9/24/2021  Celeste Chavez, [facility]                  3/25/2022  Celeste Chavez, [facility]                  2/5/2024                                                 Natalee Maya         SAFETY PLAN:  Step 1: Warning signs / cues (Thoughts, images, mood, situation, behavior) that a crisis may be developing:  Thoughts: \"I don't matter\", \"I'm a burden\", \"I can't do this anymore\", \"I just want this to end\" and \"Nothing makes it better\"  Images: none  Thinking Processes: ruminations (can't stop thinking about my problems): marital stresss, racing thoughts and highly critical and negative thoughts: about myself  Mood: worsening depression, hopelessness, agitation and mood swings  Behaviors: isolating/withdrawing  and can't stop crying  Situations: relationship problems   Step 2: Coping strategies - Things I can do to take my mind off of my problems without contacting another person (relaxation technique, physical activity):  Distress Tolerance Strategies:  read a book and geneology  Physical Activities: go for a walk  Focus on helpful thoughts:  \"This is temporary\", \"I will get through this\", \"It always passes\",   think about happy memories:   and remind myself of what is important to me:  Step 3: People and social settings that provide distraction:                 Name: Isaías                                      Phone: in phone                 Name: Dre                                        Phone: in phone                 Name: Polina                                     Phone: in phone                 Name: Tracy"          Phone: in phone  work and go outside                 Step 4: Remind myself of people and things that are important to me and worth living for:  Children, , the job I do is important, I have talents and skills to offer people, and I am a good person  Step 5: When I am in crisis, I can ask these people to help me use my safety plan:                 Name: Isaías                                      Phone: [phone]                 Name: Dre                                        Phone: [phone]                 Name: Tracy                                       Phone: [phone]  Step 6: Making the environment safe:   be around others  Step 7: Professionals or agencies I can contact during a crisis:  [facility] Daytime Number: 185-826-0001  Suicide Prevention Lifeline: 5-535-592-RACP (5774)  Crisis Text Line Service (available 24 hours a day, 7 days a week): Text [place] to 900709  Local Crisis Services: [facility] [phone]     Call 911 or go to my nearest emergency department.       I helped develop this safety plan and agree to use it when needed.  I have been given a copy of this plan.       Client signature _________________________________________________________________  Today s date:  1/8/2021  Adapted from Safety Plan Template 2008 Valerie Broderick and Alistair Briseno is reprinted with the express permission of the authors.  No portion of the Safety Plan Template may be reproduced without the express, written permission.  You can contact the authors at bhs@[email or web address] or [email or web address].

## 2024-03-28 ENCOUNTER — ANCILLARY PROCEDURE (OUTPATIENT)
Dept: MAMMOGRAPHY | Facility: CLINIC | Age: 54
End: 2024-03-28
Attending: NURSE PRACTITIONER
Payer: COMMERCIAL

## 2024-03-28 DIAGNOSIS — Z12.31 VISIT FOR SCREENING MAMMOGRAM: ICD-10-CM

## 2024-03-28 PROCEDURE — 77067 SCR MAMMO BI INCL CAD: CPT | Mod: TC | Performed by: RADIOLOGY

## 2024-03-30 DIAGNOSIS — E66.01 MORBID OBESITY (H): ICD-10-CM

## 2024-03-30 DIAGNOSIS — E03.9 HYPOTHYROIDISM, UNSPECIFIED TYPE: ICD-10-CM

## 2024-04-01 RX ORDER — LEVOTHYROXINE SODIUM 25 UG/1
TABLET ORAL
Qty: 12 TABLET | Refills: 0 | OUTPATIENT
Start: 2024-04-01

## 2024-04-02 ENCOUNTER — VIRTUAL VISIT (OUTPATIENT)
Dept: CARDIOLOGY | Facility: CLINIC | Age: 54
End: 2024-04-02
Attending: INTERNAL MEDICINE
Payer: COMMERCIAL

## 2024-04-02 VITALS — BODY MASS INDEX: 33.3 KG/M2 | WEIGHT: 188 LBS

## 2024-04-02 DIAGNOSIS — I10 BENIGN ESSENTIAL HYPERTENSION: ICD-10-CM

## 2024-04-02 DIAGNOSIS — E03.8 SUBCLINICAL HYPOTHYROIDISM: Primary | ICD-10-CM

## 2024-04-02 DIAGNOSIS — E66.01 MORBID OBESITY (H): ICD-10-CM

## 2024-04-02 DIAGNOSIS — E03.9 HYPOTHYROIDISM, UNSPECIFIED TYPE: ICD-10-CM

## 2024-04-02 RX ORDER — LEVOTHYROXINE SODIUM 25 UG/1
TABLET ORAL
Qty: 12 TABLET | Refills: 0 | Status: SHIPPED | OUTPATIENT
Start: 2024-04-02 | End: 2024-08-19

## 2024-04-02 ASSESSMENT — PAIN SCALES - GENERAL: PAINLEVEL: NO PAIN (0)

## 2024-04-02 NOTE — NURSING NOTE
Is the patient currently in the state of [place]? YES    Visit mode:VIDEO    If the visit is dropped, the patient can be reconnected by: VIDEO VISIT: Text to cell phone:   Telephone Information:   Mobile [phone]       Will anyone else be joining the visit? NO  (If patient encounters technical issues they should call [phone])    How would you like to obtain your AVS? MyChart    Are changes needed to the allergy or medication list? No, Pt stated no changes to allergies, and Pt stated no med changes      Reason for visit: SUSAN KEATING

## 2024-04-02 NOTE — LETTER
4/2/2024      RE: Natalee Maya  [address]  [place] [address]       Dear Colleague,    Thank you for the opportunity to participate in the care of your patient, Natalee Maya, at the [facility]. Please see a copy of my visit note below.    Medication Therapy Management (MTM) Encounter    ASSESSMENT:                            Medication Adherence/Access: No issues identified    Weight Management:   Progressing well, continue current regimen with no changes.     Hypertension   Blood pressure stable.     Hypothyroidism:   TSH within normal limits, due to ~ 20% weight loss seen since last TSH and > 6 months since last level, reasonable to check again.    PLAN:                            Continue current regimen.   TSH lab ordered to complete.     Follow-up: 2 months with Dr. Yen Faustin, Return in about 4 months (around 8/2/2024) for Medication Therapy Management Pharmacist Visit.    SUBJECTIVE/OBJECTIVE:                          Natalee Maya is a 53 year old female contacted via secure video for a follow-up visit.       Reason for visit: Wegovy.    Allergies/ADRs: Reviewed in chart  Past Medical History: Reviewed in chart  Tobacco: She reports that she has never smoked. She has never used smokeless tobacco.  Alcohol: not currently using    Medication Adherence/Access: no issues reported    Obesity  Weight Management:   Phentermine 15 mg daily in AM   Wegovy 1.7 mg once weekly Saturday     Followed by Dr. Yen Faustin, seen for Return Medical Weight Management. She reports she tried going off phentermine for 2 weeks to see if contributed to insomnia. She noticed that sleep improved, but did enjoy the energy boost she gets so restarted. He also reports issues with filling her Wegovy, unsure if refills issues or Prior Authorization issue. As of now Prior Authorization approved until  "12/04/2024 for Wegovy. Did end up restarting phentermine and reports no issues sleeping as of now.   Diet/Eating Habits: Patient reports she is working on getting balanced foods, working on getting in protein with each meal.  Still getting in 2-3 meals per day. She does denote that at night she is fighting urge to snack, this is newer that has come back. Wants to monitor and make no changes to medications today. Working to get in more protein, 60 grams protein on average per day. Still using eBillme danish.   Exercise/Activity: Patient reports exercising in the last afternoon daily.     Current Weight: 188 lb   Initial Consult Weight: 230 lb   Cumulative Weight Loss: -42 lb, -18.2% from baseline      Wt Readings from Last 4 Encounters:   04/02/24 85.3 kg (188 lb)   02/01/24 91.2 kg (201 lb)   01/31/24 91.6 kg (202 lb)   01/04/24 96.7 kg (213 lb 1.6 oz)     Estimated body mass index is 33.3 kg/m  as calculated from the following:    Height as of 2/1/24: 1.6 m (5' 3\").    Weight as of this encounter: 85.3 kg (188 lb).    Hypertension  Losartan 100 mg daily   Patient reports no current medication side effects. No symptoms of dizziness  or lightheadedness.   Patient does not self-monitor blood pressure.       BP Readings from Last 3 Encounters:   01/04/24 124/87   08/10/23 112/80   06/13/23 130/79     Pulse Readings from Last 3 Encounters:   01/04/24 84   08/10/23 80   06/13/23 74     Hypothyroidism:   Levothyroxine 50 mcg daily + 12.5 mg twice weekly     Patient is having the following symptoms: none.   TSH   Date Value Ref Range Status   08/10/2023 2.88 0.30 - 4.20 uIU/mL Final   06/16/2022 3.09 0.40 - 4.00 mU/L Final   03/11/2021 1.65 0.40 - 4.00 mU/L Final     Today's Vitals: Wt 85.3 kg (188 lb)   BMI 33.30 kg/m    ----------------    I spent 20 minutes with this patient today. All changes were made via collaborative practice agreement with Dr. Yen Faustin. A copy of the visit note was provided to the " patient's provider(s).    A summary of these recommendations was sent via [email or web address].    Lauren Bloch, PharmD, BCACP   Medication Therapy Management Pharmacist   [facility]    Telemedicine Visit Details  Type of service:  Video Conference via [employer]  Start Time:  3:35 pm  End Time: 3:55 PM     Medication Therapy Recommendations  Morbid obesity (H)    Current Medication: phentermine (ADIPEX-P) 15 MG capsule   Rationale: Undesirable effect - Adverse medication event - Safety   Recommendation: Discontinue Medication   Status: No Longer Relevant                Please do not hesitate to contact me if you have any questions/concerns.     Sincerely,     Lauren T. Bloch, RPH

## 2024-04-02 NOTE — PROGRESS NOTES
Medication Therapy Management (MTM) Encounter    ASSESSMENT:                            Medication Adherence/Access: No issues identified    Weight Management:   Progressing well, continue current regimen with no changes.     Hypertension   Blood pressure stable.     Hypothyroidism:   TSH within normal limits, due to ~ 20% weight loss seen since last TSH and > 6 months since last level, reasonable to check again.    PLAN:                            Continue current regimen.   TSH lab ordered to complete.     Follow-up: 2 months with Dr. Yen Faustin, Return in about 4 months (around 8/2/2024) for Medication Therapy Management Pharmacist Visit.    SUBJECTIVE/OBJECTIVE:                          Natalee Maya is a 53 year old female contacted via secure video for a follow-up visit.       Reason for visit: Wegovy.    Allergies/ADRs: Reviewed in chart  Past Medical History: Reviewed in chart  Tobacco: She reports that she has never smoked. She has never used smokeless tobacco.  Alcohol: not currently using    Medication Adherence/Access: no issues reported    Obesity   Weight Management:   Phentermine 15 mg daily in AM   Wegovy 1.7 mg once weekly Saturday     Followed by Dr. Yen Faustin, seen for Return Medical Weight Management. She reports she tried going off phentermine for 2 weeks to see if contributed to insomnia. She noticed that sleep improved, but did enjoy the energy boost she gets so restarted. He also reports issues with filling her Wegovy, unsure if refills issues or Prior Authorization issue. As of now Prior Authorization approved until 12/04/2024 for Wegovy. Did end up restarting phentermine and reports no issues sleeping as of now.   Diet/Eating Habits: Patient reports she is working on getting balanced foods, working on getting in protein with each meal.  Still getting in 2-3 meals per day. She does denote that at night she is fighting urge to snack, this is newer that has come back.  "Wants to monitor and make no changes to medications today. Working to get in more protein, 60 grams protein on average per day. Still using CCBR-SYNARC danish.   Exercise/Activity: Patient reports exercising in the last afternoon daily.     Current Weight: 188 lb   Initial Consult Weight: 230 lb   Cumulative Weight Loss: -42 lb, -18.2% from baseline      Wt Readings from Last 4 Encounters:   04/02/24 85.3 kg (188 lb)   02/01/24 91.2 kg (201 lb)   01/31/24 91.6 kg (202 lb)   01/04/24 96.7 kg (213 lb 1.6 oz)     Estimated body mass index is 33.3 kg/m  as calculated from the following:    Height as of 2/1/24: 1.6 m (5' 3\").    Weight as of this encounter: 85.3 kg (188 lb).    Hypertension   Losartan 100 mg daily   Patient reports no current medication side effects. No symptoms of dizziness  or lightheadedness.   Patient does not self-monitor blood pressure.       BP Readings from Last 3 Encounters:   01/04/24 124/87   08/10/23 112/80   06/13/23 130/79     Pulse Readings from Last 3 Encounters:   01/04/24 84   08/10/23 80   06/13/23 74     Hypothyroidism:   Levothyroxine 50 mcg daily + 12.5 mg twice weekly     Patient is having the following symptoms: none.   TSH   Date Value Ref Range Status   08/10/2023 2.88 0.30 - 4.20 uIU/mL Final   06/16/2022 3.09 0.40 - 4.00 mU/L Final   03/11/2021 1.65 0.40 - 4.00 mU/L Final     Today's Vitals: Wt 85.3 kg (188 lb)   BMI 33.30 kg/m    ----------------    I spent 20 minutes with this patient today. All changes were made via collaborative practice agreement with Dr. Yen Faustin. A copy of the visit note was provided to the patient's provider(s).    A summary of these recommendations was sent via [employer].    Lauren Bloch, PharmD, BCACP   Medication Therapy Management Pharmacist   [facility]    Telemedicine Visit Details  Type of service:  Video Conference via AmWell  Start Time:  3:35 pm  End Time: 3:55 PM     Medication Therapy " Recommendations  Morbid obesity (H)    Current Medication: phentermine (ADIPEX-P) 15 MG capsule   Rationale: Undesirable effect - Adverse medication event - Safety   Recommendation: Discontinue Medication   Status: No Longer Relevant

## 2024-04-02 NOTE — PATIENT INSTRUCTIONS
"Recommendations from today's MTM visit:                                                    MTM (medication therapy management) is a service provided by a clinical pharmacist designed to help you get the most of out of your medicines.      Continue current regimen.   TSH lab ordered to complete.     Follow-up: 2 months with Dr. Yen Faustin, Return in about 4 months (around 8/2/2024) for Medication Therapy Management Pharmacist Visit.    It was great speaking with you today.  I value your experience and would be very thankful for your time in providing feedback in our clinic survey. In the next few days, you may receive an email or text message from [facility] [employer] with a link to a survey related to your  clinical pharmacist.\"     To schedule another MTM appointment, please call the clinic directly or you may call the MTM scheduling line at [phone] or toll-free at [phone].     My Clinical Pharmacist's contact information:                                                      Please feel free to contact me with any questions or concerns you have.      Lauren Bloch, PharmD, BCACP   Medication Therapy Management Pharmacist   [facility]       "

## 2024-04-03 RX ORDER — SEMAGLUTIDE 1.7 MG/.75ML
INJECTION, SOLUTION SUBCUTANEOUS
Qty: 6 ML | Refills: 1 | Status: SHIPPED | OUTPATIENT
Start: 2024-04-03 | End: 2024-07-08

## 2024-06-10 ENCOUNTER — VIRTUAL VISIT (OUTPATIENT)
Dept: PEDIATRICS | Facility: CLINIC | Age: 54
End: 2024-06-10
Payer: COMMERCIAL

## 2024-06-10 ENCOUNTER — LAB (OUTPATIENT)
Dept: LAB | Facility: CLINIC | Age: 54
End: 2024-06-10
Payer: COMMERCIAL

## 2024-06-10 DIAGNOSIS — D50.9 IRON DEFICIENCY ANEMIA, UNSPECIFIED IRON DEFICIENCY ANEMIA TYPE: Primary | ICD-10-CM

## 2024-06-10 DIAGNOSIS — E03.8 SUBCLINICAL HYPOTHYROIDISM: ICD-10-CM

## 2024-06-10 DIAGNOSIS — E66.01 MORBID OBESITY (H): Primary | ICD-10-CM

## 2024-06-10 DIAGNOSIS — G47.00 INSOMNIA, UNSPECIFIED TYPE: ICD-10-CM

## 2024-06-10 DIAGNOSIS — I10 BENIGN ESSENTIAL HYPERTENSION: ICD-10-CM

## 2024-06-10 LAB
BASOPHILS # BLD AUTO: 0 10E3/UL (ref 0–0.2)
BASOPHILS NFR BLD AUTO: 0 %
CHOLEST SERPL-MCNC: 217 MG/DL
EOSINOPHIL # BLD AUTO: 0.1 10E3/UL (ref 0–0.7)
EOSINOPHIL NFR BLD AUTO: 2 %
ERYTHROCYTE [DISTWIDTH] IN BLOOD BY AUTOMATED COUNT: 13.1 % (ref 10–15)
FASTING STATUS PATIENT QL REPORTED: YES
HCT VFR BLD AUTO: 37.7 % (ref 35–47)
HDLC SERPL-MCNC: 55 MG/DL
HGB BLD-MCNC: 12.7 G/DL (ref 11.7–15.7)
IMM GRANULOCYTES # BLD: 0 10E3/UL
IMM GRANULOCYTES NFR BLD: 0 %
LDLC SERPL CALC-MCNC: 147 MG/DL
LYMPHOCYTES # BLD AUTO: 1 10E3/UL (ref 0.8–5.3)
LYMPHOCYTES NFR BLD AUTO: 32 %
MCH RBC QN AUTO: 31.1 PG (ref 26.5–33)
MCHC RBC AUTO-ENTMCNC: 33.7 G/DL (ref 31.5–36.5)
MCV RBC AUTO: 92 FL (ref 78–100)
MONOCYTES # BLD AUTO: 0.2 10E3/UL (ref 0–1.3)
MONOCYTES NFR BLD AUTO: 7 %
NEUTROPHILS # BLD AUTO: 1.7 10E3/UL (ref 1.6–8.3)
NEUTROPHILS NFR BLD AUTO: 59 %
NONHDLC SERPL-MCNC: 162 MG/DL
PLATELET # BLD AUTO: 207 10E3/UL (ref 150–450)
RBC # BLD AUTO: 4.09 10E6/UL (ref 3.8–5.2)
TRIGL SERPL-MCNC: 77 MG/DL
TSH SERPL DL<=0.005 MIU/L-ACNC: 3.23 UIU/ML (ref 0.3–4.2)
WBC # BLD AUTO: 3 10E3/UL (ref 4–11)

## 2024-06-10 PROCEDURE — 36415 COLL VENOUS BLD VENIPUNCTURE: CPT

## 2024-06-10 PROCEDURE — 84443 ASSAY THYROID STIM HORMONE: CPT

## 2024-06-10 PROCEDURE — 85025 COMPLETE CBC W/AUTO DIFF WBC: CPT

## 2024-06-10 PROCEDURE — 80061 LIPID PANEL: CPT

## 2024-06-10 PROCEDURE — 99443 PR PHYSICIAN TELEPHONE EVALUATION 21-30 MIN: CPT | Mod: 93 | Performed by: NURSE PRACTITIONER

## 2024-06-10 RX ORDER — LOSARTAN POTASSIUM 50 MG/1
50 TABLET ORAL DAILY
Qty: 90 TABLET | Refills: 3 | Status: SHIPPED | OUTPATIENT
Start: 2024-06-10 | End: 2025-06-05

## 2024-06-10 NOTE — PROGRESS NOTES
The longitudinal plan of care for the diagnosis(es)/condition(s) as documented were addressed during this visit. Due to the added complexity in care, I will continue to support Natalee in the subsequent management and with ongoing continuity of care.

## 2024-06-10 NOTE — PROGRESS NOTES
Natalee is a 53 year old who is being evaluated via a billable telephone visit.      Originating Location (pt. Location): Home    Distant Location (provider location):  Off-site    Assessment & Plan     Morbid obesity (H)  Doing well on phentermine and wegovy. Still losing weight  -Once at goal weight discussed stopping the phentermine. Has felt overly fatigued when trying to stop. Discussed that this may be withdrawal.    Benign essential hypertension  BPs running on the low side. No dizziness.  -Reduce to 50mg. Monitor at home  - losartan (COZAAR) 50 MG tablet; Take 1 tablet (50 mg) by mouth daily for 360 days    Subclinical hypothyroidism  - TSH with free T4 reflex; Future    Insomnia, unspecified type  Waking up in the night. Not improved off phentermine  -Reduce caffeine, increase exercise. If no improvement consider sleep study                Subjective   Natalee is a 53 year old, presenting for the following health issues:  No chief complaint on file.    HPI       Review of Systems  Constitutional, neuro, ENT, endocrine, pulmonary, cardiac, gastrointestinal, genitourinary, musculoskeletal, integument and psychiatric systems are negative, except as otherwise noted.      Objective           Vitals:  No vitals were obtained today due to virtual visit.    Physical Exam   General: Alert and no distress //Respiratory: No audible wheeze, cough, or shortness of breath // Psychiatric:  Appropriate affect, tone, and pace of words            Phone call duration: 22 minutes  Signed Electronically by: SHAQ PEREZ CNP

## 2024-06-14 ENCOUNTER — VIRTUAL VISIT (OUTPATIENT)
Dept: PSYCHOLOGY | Facility: CLINIC | Age: 54
End: 2024-06-14
Payer: COMMERCIAL

## 2024-06-14 DIAGNOSIS — F32.1 MAJOR DEPRESSIVE DISORDER, SINGLE EPISODE, MODERATE WITH ANXIOUS DISTRESS (H): Primary | ICD-10-CM

## 2024-06-14 PROCEDURE — 90834 PSYTX W PT 45 MINUTES: CPT | Mod: 95 | Performed by: SOCIAL WORKER

## 2024-06-14 ASSESSMENT — PATIENT HEALTH QUESTIONNAIRE - PHQ9
SUM OF ALL RESPONSES TO PHQ QUESTIONS 1-9: 1
10. IF YOU CHECKED OFF ANY PROBLEMS, HOW DIFFICULT HAVE THESE PROBLEMS MADE IT FOR YOU TO DO YOUR WORK, TAKE CARE OF THINGS AT HOME, OR GET ALONG WITH OTHER PEOPLE: NOT DIFFICULT AT ALL
SUM OF ALL RESPONSES TO PHQ QUESTIONS 1-9: 1

## 2024-06-14 NOTE — PROGRESS NOTES
Discharge Summary  Multiple Sessions    Client Name: Natalee Maya MRN#: [id number] YOB: 1970    Discharge Date:   June 14, 2024    Service Modality: Video Visit:      Provider verified identity through the following two step process.  Patient provided:  Patient is known previously to provider    Telemedicine Visit: The patient's condition can be safely assessed and treated via synchronous audio and visual telemedicine encounter.      Reason for Telemedicine Visit: Services only offered telehealth    Originating Site (Patient Location): Patient's home    Distant Site (Provider Location): Provider Remote Setting- Home Office    Consent:  The patient/guardian has verbally consented to: the potential risks and benefits of telemedicine (video visit) versus in person care; bill my insurance or make self-payment for services provided; and responsibility for payment of non-covered services.     Patient would like the video invitation sent by:  My Chart    Mode of Communication:  Video Conference via [employer]well    Distant Location (Provider):  Off-site    As the provider I attest to compliance with applicable laws and regulations related to telemedicine.    Service Type: Individual      Session Start Time: 7:32 AM  Session End Time: 8:20 AM      Session Length: 38-52 minutes     Session #: 34 (4 with DB)     Attendees: Client attended alone      Focus of Treatment Objective(s):  Client's presenting concerns included: Depressed Mood - alleviate symptoms  Stage of Change at time of Discharge: MAINTENANCE (Working to maintain change, with risk of relapse)    Medication Adherence:  NA    Chemical Use:  No Concern    Assessment: Current Emotional / Mental Status (status of significant symptoms):    Risk status (Self / Other harm or suicidal ideation)  Client denies current fears or concerns for personal safety.  Client denies current or recent suicidal ideation or behaviors.  Client denies  current or recent homicidal ideation or behaviors.  Client denies current or recent self injurious behavior or ideation.  Client denies other safety concerns.  A safety and risk management plan has been previously developed.  Patient consented to co-developed safety plan.  Safety and risk management plan was completed.  Patient agreed to use safety plan should any safety concerns arise.  A copy was given to the patient.    Appearance:   Appropriate   Eye Contact:   Good   Psychomotor Behavior: Normal   Attitude:   Cooperative  Interested Pleasant  Orientation:   All  Speech   Rate / Production: Normal    Volume:  Normal   Mood:    Stable  Affect:    Appropriate   Thought Content:  Clear   Thought Form:  Coherent  Goal Directed  Logical   Insight:   Good     DSM5 Diagnoses: (Sustained by DSM5 Criteria Listed Above)  Diagnoses: Major Depressive Disorder, Moderate, in full remission  Psychosocial & Contextual Factors: stressors related to parenting  Assessments Completed:  The following assessments were completed by patient for this visit:  PHQ9:       1/20/2022     4:14 PM 3/11/2022     6:45 AM 1/31/2023     6:40 AM 8/1/2023    10:30 AM 12/29/2023     8:22 AM 3/27/2024     4:50 AM 6/14/2024     6:37 AM   PHQ-9 SCORE   PHQ-9 Total Score MyChart 3 (Minimal depression) 2 (Minimal depression) 3 (Minimal depression) 0 4 (Minimal depression) 3 (Minimal depression) 1 (Minimal depression)   PHQ-9 Total Score 3 2 3 0 4 3 1       Reason for Discharge:  Client is satisfied with progress      Aftercare Plan:  Client may resume counseling services at any time in the future by calling the [facility] Intake Office, [phone].      Celeste Chavez, [facility]  June 14, 2024

## 2024-06-30 NOTE — ADDENDUM NOTE
Problem: Discharge Planning  Goal: Discharge to home or other facility with appropriate resources  Outcome: Progressing     Problem: Safety - Adult  Goal: Free from fall injury  6/29/2024 2314 by Trent Odell, RN  Outcome: Progressing  6/29/2024 1348 by Eloise Lozano, RN  Outcome: Progressing     Problem: Pain  Goal: Verbalizes/displays adequate comfort level or baseline comfort level  Outcome: Progressing     Problem: Chronic Conditions and Co-morbidities  Goal: Patient's chronic conditions and co-morbidity symptoms are monitored and maintained or improved  Outcome: Progressing      Addended by: COLETTE BRUSH on: 7/1/2020 10:26 AM     Modules accepted: Orders

## 2024-07-08 ENCOUNTER — VIRTUAL VISIT (OUTPATIENT)
Dept: CARDIOLOGY | Facility: CLINIC | Age: 54
End: 2024-07-08
Attending: INTERNAL MEDICINE
Payer: COMMERCIAL

## 2024-07-08 VITALS — WEIGHT: 171 LBS | BODY MASS INDEX: 30.29 KG/M2

## 2024-07-08 DIAGNOSIS — G47.00 INSOMNIA, UNSPECIFIED TYPE: ICD-10-CM

## 2024-07-08 DIAGNOSIS — D50.9 IRON DEFICIENCY ANEMIA, UNSPECIFIED IRON DEFICIENCY ANEMIA TYPE: ICD-10-CM

## 2024-07-08 DIAGNOSIS — E03.8 SUBCLINICAL HYPOTHYROIDISM: ICD-10-CM

## 2024-07-08 DIAGNOSIS — I10 BENIGN ESSENTIAL HYPERTENSION: ICD-10-CM

## 2024-07-08 DIAGNOSIS — E66.01 MORBID OBESITY (H): Primary | ICD-10-CM

## 2024-07-08 RX ORDER — SEMAGLUTIDE 1.7 MG/.75ML
1.7 INJECTION, SOLUTION SUBCUTANEOUS WEEKLY
Qty: 6 ML | Refills: 0 | Status: SHIPPED | OUTPATIENT
Start: 2024-07-08 | End: 2024-08-02 | Stop reason: DRUGHIGH

## 2024-07-08 NOTE — LETTER
7/8/2024      RE: Natalee Maya  [address] [facility] [address]       Dear Colleague,    Thank you for the opportunity to participate in the care of your patient, Natalee Maya, at the [facility] [place] at [facility]. Please see a copy of my visit note below.    Medication Therapy Management (MTM) Encounter    ASSESSMENT:                            Medication Adherence/Access: No issues identified    Hypothyroidism   Levothyroxine 50 mcg daily + 12.5 mg twice weekly in addition to the 50 mg those days     Patient is having the following symptoms: none.      Weight Management    To inform Dr. Yen Faustin of self stop of phentermine. To continue Wegovy at 1.7 mg weekly for now. Due to wearing off effect with Wegovy later in week did discuss the potential to increase to 1.7 mg weekly if needed but to continue at this dose for now.     Insomnia:   To continue to monitor, can trial melatonin 1-3 mg nightly as needed as easy over-the-counter to trial.     Hypertension   To decrease losartan to 50 mg daily as planned.   Blood pressure at goal < 140/90 mmHg.      Iron Deficiency Anemia:   To remain off ferrous sulfate for now, then follow up with primary care provider in 3 months off to recheck labs as planned.     PLAN:                            Continue Wegovy at 1.7 mg weekly  Pharmacist will inform Dr. Yen Faustin of self stop phentermine. Dr. Yen Faustin was okay with phentermine stop.   Decrease losartan to 50 mg daily as planned.   To recheck CBC labs after 3 months off iron as planned.   Can trial melatonin 1-3 mg nightly as needed for sleep, take 1-2 hours before bedtime.     Follow-up: No follow-ups on file.    SUBJECTIVE/OBJECTIVE:                          Natalee Maya is a 53 year old female seen for a follow-up visit.       Reason for visit: Wegovy checkin.    Allergies/ADRs: Reviewed in  "chart  Past Medical History: Reviewed in chart  Tobacco: She reports that she has never smoked. She has never used smokeless tobacco.  Alcohol: not currently using    Medication Adherence/Access: no issues reported    Hypothyroidism  Levothyroxine 50 mcg daily + 12.5 mg twice weekly in addition to the 50 mg those days     Patient is having the following symptoms: none.      Weight Management   Wegovy 1.7 mg once weekly Saturday     Followed by Dr. Yen Faustin, seen for [facility]. Patient reports she self stopped phentermine again to see if she can get off. Wishes to remain off phentermine. Has continued to lose weight. She is noticing wearing off effect on Thursday-Friday. She is now taking in more calorically and feels better with this, still losing 1 lb per week.   Diet/Eating Habits: Patient reports she is working on getting balanced foods, working on getting in protein with each meal.  Working to get in more protein, 60 grams protein on average per day. Still using [employer] danish. Now eating 2348-1966 calories per day.   Exercise/Activity: Patient reports exercising cardio 3-4 days/week - she is doing virtual [employer] ball, boxing.     Current Weight: 171 lb   Initial Consult Weight: 230 lb   Cumulative Weight Loss: -59 lb, -25.7% from baseline      Wt Readings from Last 4 Encounters:   07/08/24 77.6 kg (171 lb)   04/02/24 85.3 kg (188 lb)   02/01/24 91.2 kg (201 lb)   01/31/24 91.6 kg (202 lb)     Estimated body mass index is 30.29 kg/m  as calculated from the following:    Height as of 2/1/24: 1.6 m (5' 3\").    Weight as of this encounter: 77.6 kg (171 lb).    Insomnia:   No medications     Patient reports trouble staying asleep. She has practiced good sleep hygiene for a while now. Goes to bed at 9 PM, woke up at 1 AM awake and was 2-2:30 AM before able to fall back asleep. Not every night, ~ 1-3 times/week, this occurring but bothersome when does occur. She reports she is " generally well rested when awakening even on nights when this does happen. 6-7 total hours per night. Wants to continue to monitor and not do sleep study for now. Discussed sleep study in future with primary care provider if needed.     Hypertension  Losartan 100 mg daily, plan to decrease to 50 mg tablet     Patient reports no current medication side effects. No symptoms of dizziness  or lightheadedness.   Patient does not self-monitor blood pressure.    RX for losartan was decreased from 100 mg to 50 mg due to weight loss and current blood pressure but hasn't decreased dose yet, plans to start tomorrow.    BP Readings from Last 3 Encounters:   01/04/24 124/87   08/10/23 112/80   06/13/23 130/79        Iron Deficiency Anemia:   Ferrous sulfate 325 mg daily    She had started iron years ago when hgb low 2/11/2022 has remained on during that time. She had wanted to trial off to see if needed. Plan with primary care provider is to stop iron for short term then recheck CBC in 3-4 months off iron.     Today's Vitals: Wt 77.6 kg (171 lb)   BMI 30.29 kg/m    ----------------      I spent 20 minutes with this patient today. All changes were made via collaborative practice agreement with Dr. Yen Faustin. A copy of the visit note was provided to the patient's provider(s).    A summary of these recommendations was sent via [employer].    Lauren Bloch, PharmD, BCACP   Medication Therapy Management Pharmacist   [facility]    Telemedicine Visit Details  Type of service:  Video Conference via [employer]ule  Start Time:  8:35 AM  End Time:  8:55 AM     Medication Therapy Recommendations  Morbid obesity (H)    Current Medication: phentermine (ADIPEX-P) 15 MG capsule   Rationale: Patient prefers not to take - Adherence - Adherence   Recommendation: Discontinue Medication   Status: Accepted per Provider                Please do not hesitate to contact me if you have any  questions/concerns.     Sincerely,     Lauren T. Bloch, [facility]

## 2024-07-08 NOTE — PATIENT INSTRUCTIONS
"Recommendations from today's MTM visit:                                                         Continue Wegovy at 1.7 mg weekly  Pharmacist will inform Dr. Yen Faustin of self stop phentermine.   Decrease losartan to 50 mg daily as planned.   To recheck CBC labs after 3 months off iron as planned.   Can trial melatonin 1-3 mg nightly as needed for sleep, take 1-2 hours before bedtime.     Follow-up: Return in 4 months (on 11/12/2024) for Medication Therapy Management Pharmacist Visit, (scheduled today).    It was great speaking with you today.  I value your experience and would be very thankful for your time in providing feedback in our clinic survey. In the next few days, you may receive an email or text message from [employer] with a link to a survey related to your  clinical pharmacist.\"     To schedule another MTM appointment, please call the clinic directly or you may call the MTM scheduling line at [phone] or toll-free at [phone].     My Clinical Pharmacist's contact information:                                                      Please feel free to contact me with any questions or concerns you have.      Lauren Bloch, PharmD  Medication Therapy Management Pharmacist   [facility] [place]             "

## 2024-07-08 NOTE — Clinical Note
Patient self stopped phentermine to see if contributing to sleep issues and wants to trial off with solely wegovy. Continues to lose weight of phentermine on Wegovy at 1.7 mg weekly. Any issues with phentermine stop?Jessica RODRIGUEZ

## 2024-07-08 NOTE — PROGRESS NOTES
Medication Therapy Management (MTM) Encounter    ASSESSMENT:                            Medication Adherence/Access: No issues identified    Hypothyroidism   Levothyroxine 50 mcg daily + 12.5 mg twice weekly in addition to the 50 mg those days     Patient is having the following symptoms: none.      Weight Management    To inform Dr. Yen Faustin of self stop of phentermine. To continue Wegovy at 1.7 mg weekly for now. Due to wearing off effect with Wegovy later in week did discuss the potential to increase to 1.7 mg weekly if needed but to continue at this dose for now.     Insomnia:   To continue to monitor, can trial melatonin 1-3 mg nightly as needed as easy over-the-counter to trial.     Hypertension   To decrease losartan to 50 mg daily as planned.   Blood pressure at goal < 140/90 mmHg.      Iron Deficiency Anemia:   To remain off ferrous sulfate for now, then follow up with primary care provider in 3 months off to recheck labs as planned.     PLAN:                            Continue Wegovy at 1.7 mg weekly  Pharmacist will inform Dr. Yen Faustin of self stop phentermine. Dr. Yen Faustin was okay with phentermine stop.   Decrease losartan to 50 mg daily as planned.   To recheck CBC labs after 3 months off iron as planned.   Can trial melatonin 1-3 mg nightly as needed for sleep, take 1-2 hours before bedtime.     Follow-up: No follow-ups on file.    SUBJECTIVE/OBJECTIVE:                          Natalee Maya is a 53 year old female seen for a follow-up visit.       Reason for visit: Wegovy checkin.    Allergies/ADRs: Reviewed in chart  Past Medical History: Reviewed in chart  Tobacco: She reports that she has never smoked. She has never used smokeless tobacco.  Alcohol: not currently using    Medication Adherence/Access: no issues reported    Hypothyroidism   Levothyroxine 50 mcg daily + 12.5 mg twice weekly in addition to the 50 mg those days     Patient is having the  "following symptoms: none.      Weight Management    Wegovy 1.7 mg once weekly Saturday     Followed by Dr. Yen Faustin, seen for [facility]. Patient reports she self stopped phentermine again to see if she can get off. Wishes to remain off phentermine. Has continued to lose weight. She is noticing wearing off effect on Thursday-Friday. She is now taking in more calorically and feels better with this, still losing 1 lb per week.   Diet/Eating Habits: Patient reports she is working on getting balanced foods, working on getting in protein with each meal.  Working to get in more protein, 60 grams protein on average per day. Still using [employer] danish. Now eating 9438-0515 calories per day.   Exercise/Activity: Patient reports exercising cardio 3-4 days/week - she is doing virtual [employer] - racket ball, boxing.     Current Weight: 171 lb   Initial Consult Weight: 230 lb   Cumulative Weight Loss: -59 lb, -25.7% from baseline      Wt Readings from Last 4 Encounters:   07/08/24 77.6 kg (171 lb)   04/02/24 85.3 kg (188 lb)   02/01/24 91.2 kg (201 lb)   01/31/24 91.6 kg (202 lb)     Estimated body mass index is 30.29 kg/m  as calculated from the following:    Height as of 2/1/24: 1.6 m (5' 3\").    Weight as of this encounter: 77.6 kg (171 lb).    Insomnia:   No medications     Patient reports trouble staying asleep. She has practiced good sleep hygiene for a while now. Goes to bed at 9 PM, woke up at 1 AM awake and was 2-2:30 AM before able to fall back asleep. Not every night, ~ 1-3 times/week, this occurring but bothersome when does occur. She reports she is generally well rested when awakening even on nights when this does happen. 6-7 total hours per night. Wants to continue to monitor and not do sleep study for now. Discussed sleep study in future with primary care provider if needed.     Hypertension   Losartan 100 mg daily, plan to decrease to 50 mg tablet     Patient reports no current " medication side effects. No symptoms of dizziness  or lightheadedness.   Patient does not self-monitor blood pressure.    RX for losartan was decreased from 100 mg to 50 mg due to weight loss and current blood pressure but hasn't decreased dose yet, plans to start tomorrow.    BP Readings from Last 3 Encounters:   01/04/24 124/87   08/10/23 112/80   06/13/23 130/79        Iron Deficiency Anemia:   Ferrous sulfate 325 mg daily    She had started iron years ago when hgb low 2/11/2022 has remained on during that time. She had wanted to trial off to see if needed. Plan with primary care provider is to stop iron for short term then recheck CBC in 3-4 months off iron.     Today's Vitals: Wt 77.6 kg (171 lb)   BMI 30.29 kg/m    ----------------      I spent 20 minutes with this patient today. All changes were made via collaborative practice agreement with Dr. Yen Faustin. A copy of the visit note was provided to the patient's provider(s).    A summary of these recommendations was sent via [employer].    Lauren Bloch, PharmD, BCACP   Medication Therapy Management Pharmacist   [facility]    Telemedicine Visit Details  Type of service:  Video Conference via [employer]  Start Time:  8:35 AM  End Time:  8:55 AM     Medication Therapy Recommendations  Morbid obesity (H)    Current Medication: phentermine (ADIPEX-P) 15 MG capsule   Rationale: Patient prefers not to take - Adherence - Adherence   Recommendation: Discontinue Medication   Status: Accepted per Provider

## 2024-07-08 NOTE — NURSING NOTE
Is the patient currently in the state of [place]? YES    Location: HOME    Visit mode:VIDEO    If the visit is dropped, the patient can be reconnected by: VIDEO VISIT: Text to cell phone:   Telephone Information:   Mobile [phone]       Will anyone else be joining the visit? NO  (If patient encounters technical issues they should call [phone]    How would you like to obtain your AVS? MyChart    Are changes needed to the allergy or medication list? No    Are refills needed on medications prescribed by this physician? NO    Reason for visit: RECHECK    Macey KEATING

## 2024-07-08 NOTE — PROGRESS NOTES
"Virtual Visit Details    Type of service:  Video Visit     Originating Location (pt. Location): {video visit patient location:475113::\"Home\"}  {PROVIDER LOCATION On-site should be selected for visits conducted from your clinic location or adjoining [facility] hospital, academic office, or other nearby [facility] building. Off-site should be selected for all other provider locations, including home:095072}  Distant Location (provider location):  {virtual location provider:184922}  Platform used for Video Visit: {Virtual Visit Platforms:353082::\"[employer]\"}  "

## 2024-08-17 DIAGNOSIS — E03.9 HYPOTHYROIDISM, UNSPECIFIED TYPE: ICD-10-CM

## 2024-08-19 RX ORDER — LEVOTHYROXINE SODIUM 25 UG/1
TABLET ORAL
Qty: 12 TABLET | Refills: 0 | Status: SHIPPED | OUTPATIENT
Start: 2024-08-19

## 2024-09-17 ENCOUNTER — VIRTUAL VISIT (OUTPATIENT)
Dept: ENDOCRINOLOGY | Facility: CLINIC | Age: 54
End: 2024-09-17
Payer: COMMERCIAL

## 2024-09-17 VITALS — HEIGHT: 64 IN | WEIGHT: 168 LBS | BODY MASS INDEX: 28.68 KG/M2

## 2024-09-17 DIAGNOSIS — Z71.3 NUTRITIONAL COUNSELING: Primary | ICD-10-CM

## 2024-09-17 DIAGNOSIS — E66.01 MORBID OBESITY (H): ICD-10-CM

## 2024-09-17 PROCEDURE — G2211 COMPLEX E/M VISIT ADD ON: HCPCS | Mod: 95 | Performed by: INTERNAL MEDICINE

## 2024-09-17 PROCEDURE — 99214 OFFICE O/P EST MOD 30 MIN: CPT | Mod: 95 | Performed by: INTERNAL MEDICINE

## 2024-09-17 ASSESSMENT — PAIN SCALES - GENERAL: PAINLEVEL: NO PAIN (0)

## 2024-09-17 NOTE — NURSING NOTE
Current patient location: [address]  [facility] [facility] [address]    Is the patient currently in the state of [place]? YES    Visit mode:VIDEO    If the visit is dropped, the patient can be reconnected by: VIDEO VISIT: Text to cell phone:   Telephone Information:   Mobile [phone]       Will anyone else be joining the visit? NO  (If patient encounters technical issues they should call [phone])    How would you like to obtain your AVS? MyChart    Are changes needed to the allergy or medication list? No, Pt stated no changes to allergies, and Pt stated no med changes    Are refills needed on medications prescribed by this physician? NO    Rooming Documentation:  Questionnaire(s) completed      Reason for visit: RECHECK (DINA)    Maria Teresa KEATING

## 2024-09-17 NOTE — PATIENT INSTRUCTIONS
PLAN:   Continue Wegovy 2.4 mg weekly  Continue to work on lifestyle changes    FOLLOW-UP:    See MTM PharmD in 3-4 month(s)  See MD or PA in 7-8 month(s)    If you have any questions, please do not hesitate to call Weight management clinic at [phone] or [phone].  If you need to fax, please fax to [phone].    Sincerely,    Yen Faustin MD  Endocrinology

## 2024-09-17 NOTE — LETTER
2024       RE: Natalee Maya  [address]  [facility] [address]     Dear Colleague,    Thank you for referring your patient, Natalee Maya, to the [facility] WE[facility]NAGEMENT CLINIC [place] at [facility]. Please see a copy of my visit note below.      Return Medical Weight Management Note     Natalee Maya  MRN:  [id number]  :  1970  SHAN:  2024    Dear COLETTE GRULLON, SHAQ CNP,    I had the pleasure of seeing your patient Natalee Maya. She is a 54 year old female who I am continuing to see for treatment of obesity related to: hypothyroidism, HTN        2023     3:00 PM   --   I have the following health issues associated with obesity High Blood Pressure    Hypothyroidism   I have the following symptoms associated with obesity Depression    Lower Extremity Swelling    Fatigue   Weight history: Started gaining weight about 25 years ago after having kids. Weight has gradually increased overtime since then. Highest weight of 240 lbs, lowest weight of 120 lbs.  Contributing factors/barriers: mental health struggles, life stressors, emotional eating, FH of obesity in dad, lack of desire to exercise  Trials -- phentermine, saxenda, exercise, fasting    INTERVAL HISTORY:  Started Wegovy in 2023  Met with Lauren Bloch, MTM regularly with the recent visit in 2024.   Currently on Wegovy 2.4 mg for the past 2 weeks. No major side effects.  Helps a lot with lower her appetite  Stopped phentermine 2024 -- doing well without it    Diet: overall doing well, more fruit and veggie and protein  Exercise: walking, virtual reality program    CURRENT WEIGHT:   168 lbs 0 oz    Initial Weight (lbs): 217 lbs  Last Visits Weight: 98.4 kg (217 lb)  Cumulative weight loss (lbs): 49  Weight Loss Percentage: 22.58%        2024     9:45 AM   Changes and Difficulties   I have made the following changes  "to my diet since my last visit: I have stopped taking phentermine, increased Wegovy to 2.4 dose   With regards to my diet, I am still struggling with: Consistency   I have made the following changes to my activity/exercise since my last visit: Increased activity   With regards to my activity/exercise, I am still struggling with: Consistency       VITALS:  Ht 1.626 m (5' 4.02\")   Wt 76.2 kg (168 lb)   BMI 28.82 kg/m      MEDICATIONS:   Current Outpatient Medications   Medication Sig Dispense Refill    Ferrous Sulfate (IRON) 325 (65 Fe) MG tablet TAKE ONE TABLET BY MOUTH EVERY DAY, AVOID AROUND SAME TIME AS LEVOTHYROXINE 90 tablet 3    folic acid (FOLVITE) 1 MG tablet Take 1 tablet (1,000 mcg) by mouth daily 90 tablet 3    levothyroxine (SYNTHROID/LEVOTHROID) 25 MCG tablet TAKE ONE-HALF TABLET BY MOUTH TWO TIMES A WEEK IN ADDITION TO THE 50MCG TABLETS. 12 tablet 0    levothyroxine (SYNTHROID/LEVOTHROID) 50 MCG tablet Take 1 tablet (50 mcg) by mouth daily 90 tablet 3    losartan (COZAAR) 50 MG tablet Take 1 tablet (50 mg) by mouth daily for 360 days 90 tablet 3    Semaglutide-Weight Management (WEGOVY) 2.4 MG/0.75ML pen Inject 2.4 mg subcutaneously once a week 3 mL 2    phentermine (ADIPEX-P) 15 MG capsule Take 1 capsule (15 mg) by mouth every morning 90 capsule 3           9/16/2024     9:45 AM   Weight Loss Medication History Reviewed With Patient   Which weight loss medications are you currently taking on a regular basis? Wegovy   Are you having any side effects from the weight loss medication that we have prescribed you? No       ASSESSMENT:   Natalee Maya is a 54 year old female who I am continuing to see for treatment of obesity related to: hypothyroidism, HTN    Responded well with Wegovy. No major side effects  Diet has improved. More activity  Lost 22% of TBW    PLAN:   Continue Wegovy 2.4 mg weekly  Continue to work on lifestyle changes    FOLLOW-UP:    See MTM PharmD in 3-4 month(s)  See MD or PA in " 7-8 month(s)    Joined the call at 9/17/2024, 9:03:31 am.  Left the call at 9/17/2024, 9:13:43 am.  You were on the call for 10 minutes 12 seconds .    Sincerely,    Yen Faustin MD

## 2024-09-17 NOTE — PROGRESS NOTES
Virtual Visit Details    Type of service:  Video Visit     Originating Location (pt. Location): Home    Distant Location (provider location):  Off-site  Platform used for Video Visit: Yasmeen

## 2024-09-17 NOTE — PROGRESS NOTES
Return Medical Weight Management Note     Natalee Maya  MRN:  [id number]  :  1970  SHAN:  2024    Dear SHAQ PEREZ CNP,    I had the pleasure of seeing your patient Natalee Maya. She is a 54 year old female who I am continuing to see for treatment of obesity related to: hypothyroidism, HTN        2023     3:00 PM   --   I have the following health issues associated with obesity High Blood Pressure    Hypothyroidism   I have the following symptoms associated with obesity Depression    Lower Extremity Swelling    Fatigue   Weight history: Started gaining weight about 25 years ago after having kids. Weight has gradually increased overtime since then. Highest weight of 240 lbs, lowest weight of 120 lbs.  Contributing factors/barriers: mental health struggles, life stressors, emotional eating, FH of obesity in dad, lack of desire to exercise  Trials -- phentermine, saxenda, exercise, fasting    INTERVAL HISTORY:  Started Wegovy in 2023  Met with Lauren Bloch, MTM regularly with the recent visit in 2024.   Currently on Wegovy 2.4 mg for the past 2 weeks. No major side effects.  Helps a lot with lower her appetite  Stopped phentermine 2024 -- doing well without it    Diet: overall doing well, more fruit and veggie and protein  Exercise: walking, virtual reality program    CURRENT WEIGHT:   168 lbs 0 oz    Initial Weight (lbs): 217 lbs  Last Visits Weight: 98.4 kg (217 lb)  Cumulative weight loss (lbs): 49  Weight Loss Percentage: 22.58%        2024     9:45 AM   Changes and Difficulties   I have made the following changes to my diet since my last visit: I have stopped taking phentermine, increased Wegovy to 2.4 dose   With regards to my diet, I am still struggling with: Consistency   I have made the following changes to my activity/exercise since my last visit: Increased activity   With regards to my activity/exercise, I am still struggling with:  "Consistency       VITALS:  Ht 1.626 m (5' 4.02\")   Wt 76.2 kg (168 lb)   BMI 28.82 kg/m      MEDICATIONS:   Current Outpatient Medications   Medication Sig Dispense Refill    Ferrous Sulfate (IRON) 325 (65 Fe) MG tablet TAKE ONE TABLET BY MOUTH EVERY DAY, AVOID AROUND SAME TIME AS LEVOTHYROXINE 90 tablet 3    folic acid (FOLVITE) 1 MG tablet Take 1 tablet (1,000 mcg) by mouth daily 90 tablet 3    levothyroxine (SYNTHROID/LEVOTHROID) 25 MCG tablet TAKE ONE-HALF TABLET BY MOUTH TWO TIMES A WEEK IN ADDITION TO THE 50MCG TABLETS. 12 tablet 0    levothyroxine (SYNTHROID/LEVOTHROID) 50 MCG tablet Take 1 tablet (50 mcg) by mouth daily 90 tablet 3    losartan (COZAAR) 50 MG tablet Take 1 tablet (50 mg) by mouth daily for 360 days 90 tablet 3    Semaglutide-Weight Management (WEGOVY) 2.4 MG/0.75ML pen Inject 2.4 mg subcutaneously once a week 3 mL 2    phentermine (ADIPEX-P) 15 MG capsule Take 1 capsule (15 mg) by mouth every morning 90 capsule 3           9/16/2024     9:45 AM   Weight Loss Medication History Reviewed With Patient   Which weight loss medications are you currently taking on a regular basis? Wegovy   Are you having any side effects from the weight loss medication that we have prescribed you? No       ASSESSMENT:   Natalee Maya is a 54 year old female who I am continuing to see for treatment of obesity related to: hypothyroidism, HTN    Responded well with Wegovy. No major side effects  Diet has improved. More activity  Lost 22% of TBW    PLAN:   Continue Wegovy 2.4 mg weekly  Continue to work on lifestyle changes    FOLLOW-UP:    See MICHAEL PharmD in 3-4 month(s)  See MD or PA in 7-8 month(s)    Joined the call at 9/17/2024, 9:03:31 am.  Left the call at 9/17/2024, 9:13:43 am.  You were on the call for 10 minutes 12 seconds .    Sincerely,    Yen Faustin MD  "

## 2024-09-19 ENCOUNTER — TELEPHONE (OUTPATIENT)
Dept: ENDOCRINOLOGY | Facility: CLINIC | Age: 54
End: 2024-09-19
Payer: COMMERCIAL

## 2024-09-19 NOTE — TELEPHONE ENCOUNTER
Left Voicemail (1st Attempt) and Sent Mychart (1st Attempt) for the patient to call back and schedule the following:    Appointment type: [facility]  Provider: Dr Barlow or PA  Return date: April/May 2025  Specialty phone number: [phone]  Additional appointment(s) needed: n/a  Additonal Notes: n/a

## 2024-11-12 ENCOUNTER — VIRTUAL VISIT (OUTPATIENT)
Dept: PHARMACY | Facility: CLINIC | Age: 54
End: 2024-11-12
Attending: INTERNAL MEDICINE
Payer: COMMERCIAL

## 2024-11-12 VITALS — HEIGHT: 63 IN | WEIGHT: 170 LBS | BODY MASS INDEX: 30.12 KG/M2

## 2024-11-12 DIAGNOSIS — D50.9 IRON DEFICIENCY ANEMIA, UNSPECIFIED IRON DEFICIENCY ANEMIA TYPE: ICD-10-CM

## 2024-11-12 DIAGNOSIS — E66.01 MORBID OBESITY (H): Primary | ICD-10-CM

## 2024-11-12 ASSESSMENT — PAIN SCALES - GENERAL: PAINLEVEL_OUTOF10: NO PAIN (0)

## 2024-11-12 NOTE — PATIENT INSTRUCTIONS
"Recommendations from today's MTM visit:                                                         Continue Wegovy at 2.4 mg weekly   Complete DEXA Body Composition - information sent in [employer]  Continue to track in [employer] danish - consider 80-20 rule and healthful meals during majority of your week - protein/fiber forwardness.   Get labs completed.     Follow-up: Return in about 3 months (around 2/12/2025) for Medication Therapy Management Pharmacist Visit.    It was great speaking with you today.  I value your experience and would be very thankful for your time in providing feedback in our clinic survey. In the next few days, you may receive an email or text message from [employer] [facility] [employer] with a link to a survey related to your  clinical pharmacist.\"     To schedule another MTM appointment, please call the clinic directly or you may call the MTM scheduling line at [phone] or toll-free at [phone].     My Clinical Pharmacist's contact information:                                                      Please feel free to contact me with any questions or concerns you have.      Lauren Bloch, PharmD  Medication Therapy Management Pharmacist   [facility] Weight Management [place]      Body Compositional Analysis Options:   Tanita Body Composition Scale  Available in clinic at [facility] [address] [place] [address] - Comprehensive Weight Management Clinic, 4th floor  [facility] if wanting to complete, can then help to schedule nurse only appointment   Cost: free to any patient at the Comprehensive Weight Management Clinic   Complete in AM   Need to be fasting for at least 4 hours prior - no food or drink, not even water  No exercise prior  Work out/athletic clothing  DEXA scan  Completed at outside facility - [facility]  [email or web address]   [phone] to schedule or can schedule online  Closest location: [address] [place] [address], [address], [place] [address]  [employer] danish will have your " scan results, can also ask for paper copy  Cost: $150 per DEXA scan  How to prepare for Dexa Scan - [email or web address]  What to wear - work out/athletic clothing if possible, don't wear loose or bulky clothes such as sweatshirts and/or jeans  Remove all metal and jewelry  Avoid calcium supplements within 24 hours - no multi or TUMS  Fast for 2 hours prior to the scan (no food or drink)  Maintain good hydration but especially day of and prior to scan   Don't exercise anytime prior to on that day of the DEXA

## 2024-11-12 NOTE — NURSING NOTE
Current patient location: [address]  [facility] [address]    Is the patient currently in the state of [place]? YES    Visit mode:VIDEO    If the visit is dropped, the patient can be reconnected by:VIDEO VISIT: Text to cell phone:   Telephone Information:   Mobile [phone]       Will anyone else be joining the visit? NO  (If patient encounters technical issues they should call [phone]    Are changes needed to the allergy or medication list? No    Are refills needed on medications prescribed by this physician? NO    Rooming Documentation:  Questionnaire(s) not pre-assigned    Reason for visit: Medication Therapy Management    Rosangela KEATING

## 2024-11-12 NOTE — PROGRESS NOTES
Medication Therapy Management (MTM) Encounter    ASSESSMENT:                            Medication Adherence/Access: No issues identified.    Weight Management:   Progressed well, weight loss maintained. To continue Wegovy at 2.4 mg weekly. Discussed DEXA Body Composition Analysis. Discussed dietary/lifestyle modification.     Iron Deficiency Anemia:   To repeat CBC/iron studies now off iron for 3-4 months.     PLAN:                            Continue Wegovy at 2.4 mg weekly   Complete DEXA Body Composition - information sent in [employer]  Continue to track in [employer] danish - consider 80-20 rule and healthful meals during majority of your week - protein/fiber forwardness.   Get labs completed.     Follow-up: Return in about 3 months (around 2/12/2025) for Medication Therapy Management Pharmacist Visit.    SUBJECTIVE/OBJECTIVE:                          Natalee Maya is a 54 year old female seen for a follow-up visit.       Reason for visit: Weight Management follow up.    Allergies/ADRs: Reviewed in chart  Past Medical History: Reviewed in chart  Tobacco: She reports that she has never smoked. She has never used smokeless tobacco.  Alcohol: not currently using    Medication Adherence/Access: no issues reported.    Weight Management Wegovy 2.4 mg once weekly Saturday     Followed by Dr. Yen Faustin, seen for Return Medical Weight Management. She has remained off phentermine now for the last 3-4 months and doesn't feel drastic difference without it. Reports she wishes to continue her health journey, feels stuck so wants to discuss what to try differently. Wants to get overall picture of health.   Diet/Eating Habits: Patient reports she is working on getting balanced foods, working on getting in protein with each meal.  Still using [employer] danish, she finds this helps her to be accountable. Averaging 8873-1443 calories/day. She does feel she hasn't paid attention to food choices as much as previous. Still small  "portions.   Exercise/Activity: Patient reports consistently exercising cardio 3-4 days/week - she is doing virtual reality- racket ball, boxing. Had been doing pickle ball once weekly and was fun while could play outside with weather.      Initial Consult Weight: 230 lb      Current weight today: 170 lbs 0 oz  Cumulative Weight Loss: -60 lb, -26.1% from baseline     Wt Readings from Last 4 Encounters:   11/12/24 170 lb (77.1 kg)   09/17/24 168 lb (76.2 kg)   07/08/24 171 lb (77.6 kg)   04/02/24 188 lb (85.3 kg)     Estimated body mass index is 30.12 kg/m  as calculated from the following:    Height as of this encounter: 5' 2.99\" (1.6 m).    Weight as of this encounter: 170 lb (77.1 kg).    Iron Deficiency Anemia:      She had started iron years ago when hgb low 2/11/2022 has remained on during that time. She had wanted to trial off to see if needed. Plan with primary care provider is to stop iron for short term then recheck CBC in 3-4 months off iron. She has not completed iron studies again yet.     Today's Vitals: Ht 5' 2.99\" (1.6 m)   Wt 170 lb (77.1 kg)   BMI 30.12 kg/m    ----------------      I spent 20 minutes with this patient today. All changes were made via collaborative practice agreement with Dr. Yen Faustin. A copy of the visit note was provided to the patient's provider(s).    A summary of these recommendations was sent via [employer].    Lauren Bloch, PharmD, BCACP   Medication Therapy Management Pharmacist   [facility] Comprehensive [facility] Management Clinic    Telemedicine Visit Details  The patient's medications can be safely assessed via a telemedicine encounter.  Type of service:  Telephone visit  Originating Location (pt. Location): Home    Distant Location (provider location):  Off-site  Start Time:  3:40 PM  End Time:  4:00 PM     Medication Therapy Recommendations  No medication therapy recommendations to display   "

## 2024-11-24 DIAGNOSIS — E03.9 HYPOTHYROIDISM, UNSPECIFIED TYPE: ICD-10-CM

## 2024-11-25 RX ORDER — LEVOTHYROXINE SODIUM 25 UG/1
TABLET ORAL
Qty: 12 TABLET | Refills: 2 | Status: SHIPPED | OUTPATIENT
Start: 2024-11-25

## 2024-12-16 ENCOUNTER — TELEPHONE (OUTPATIENT)
Dept: ENDOCRINOLOGY | Facility: CLINIC | Age: 54
End: 2024-12-16

## 2024-12-16 NOTE — TELEPHONE ENCOUNTER
[facility]  Fax:[phone]  Spec: [phone]  MO: [phone]

## 2024-12-18 NOTE — TELEPHONE ENCOUNTER
Central Prior Authorization Team - Phone: [phone]     PA Initiation    Medication: WEGOVY 2.4 MG/0.75ML SC SOAJ  Insurance Company: [employer] - Phone [phone] Fax [phone]  Pharmacy Filling the Rx: [facility] - [place], [place] - [address]  Filling Pharmacy Phone: [phone]  Filling Pharmacy Fax:    Start Date: 12/18/2024

## 2024-12-23 NOTE — TELEPHONE ENCOUNTER
Central Prior Authorization Team - Phone: [phone]     Prior Authorization Approval    Medication: WEGOVY 2.4 MG/0.75ML SC SOAJ  Authorization Effective Date: 11/19/2024  Authorization Expiration Date: 12/19/2025  Approved Dose/Quantity: 3  Reference #:     Insurance Company: [employer] - Phone [phone] Fax [phone]  Expected CoPay: $    CoPay Card Available:      Financial Assistance Needed:   Which Pharmacy is filling the prescription: [facility] - [place], [place] - [facility] [address]   Pharmacy Notified: YES  Patient Notified: YES Instructed pharmacy to notify patient once order is ready.

## 2025-02-26 ENCOUNTER — PATIENT OUTREACH (OUTPATIENT)
Dept: CARE COORDINATION | Facility: CLINIC | Age: 55
End: 2025-02-26
Payer: COMMERCIAL

## 2025-02-28 PROBLEM — E66.01 MORBID OBESITY (H): Status: RESOLVED | Noted: 2018-08-16 | Resolved: 2025-02-28

## 2025-03-08 ENCOUNTER — LAB (OUTPATIENT)
Dept: LAB | Facility: CLINIC | Age: 55
End: 2025-03-08
Payer: COMMERCIAL

## 2025-03-08 DIAGNOSIS — D70.9 NEUTROPENIA, UNSPECIFIED TYPE: ICD-10-CM

## 2025-03-08 DIAGNOSIS — Z71.3 NUTRITIONAL COUNSELING: ICD-10-CM

## 2025-03-08 DIAGNOSIS — E66.01 MORBID OBESITY (H): ICD-10-CM

## 2025-03-08 DIAGNOSIS — E03.8 SUBCLINICAL HYPOTHYROIDISM: ICD-10-CM

## 2025-03-08 LAB
BASOPHILS # BLD AUTO: 0 10E3/UL (ref 0–0.2)
BASOPHILS NFR BLD AUTO: 1 %
EOSINOPHIL # BLD AUTO: 0.1 10E3/UL (ref 0–0.7)
EOSINOPHIL NFR BLD AUTO: 4 %
ERYTHROCYTE [DISTWIDTH] IN BLOOD BY AUTOMATED COUNT: 12.8 % (ref 10–15)
HCT VFR BLD AUTO: 37.2 % (ref 35–47)
HGB BLD-MCNC: 12.6 G/DL (ref 11.7–15.7)
IMM GRANULOCYTES # BLD: 0 10E3/UL
IMM GRANULOCYTES NFR BLD: 0 %
LYMPHOCYTES # BLD AUTO: 1 10E3/UL (ref 0.8–5.3)
LYMPHOCYTES NFR BLD AUTO: 46 %
MCH RBC QN AUTO: 30.9 PG (ref 26.5–33)
MCHC RBC AUTO-ENTMCNC: 33.9 G/DL (ref 31.5–36.5)
MCV RBC AUTO: 91 FL (ref 78–100)
MONOCYTES # BLD AUTO: 0.3 10E3/UL (ref 0–1.3)
MONOCYTES NFR BLD AUTO: 13 %
NEUTROPHILS # BLD AUTO: 0.8 10E3/UL (ref 1.6–8.3)
NEUTROPHILS NFR BLD AUTO: 36 %
NRBC # BLD AUTO: 0 10E3/UL
NRBC BLD AUTO-RTO: 0 /100
PLATELET # BLD AUTO: 212 10E3/UL (ref 150–450)
RBC # BLD AUTO: 4.08 10E6/UL (ref 3.8–5.2)
TSH SERPL DL<=0.005 MIU/L-ACNC: 1.61 UIU/ML (ref 0.3–4.2)
VIT D+METAB SERPL-MCNC: 20 NG/ML (ref 20–50)
WBC # BLD AUTO: 2.3 10E3/UL (ref 4–11)

## 2025-03-08 PROCEDURE — 82306 VITAMIN D 25 HYDROXY: CPT

## 2025-03-08 PROCEDURE — 84443 ASSAY THYROID STIM HORMONE: CPT

## 2025-03-08 PROCEDURE — 36415 COLL VENOUS BLD VENIPUNCTURE: CPT

## 2025-03-08 PROCEDURE — 85025 COMPLETE CBC W/AUTO DIFF WBC: CPT

## 2025-03-26 ENCOUNTER — OFFICE VISIT (OUTPATIENT)
Dept: DERMATOLOGY | Facility: CLINIC | Age: 55
End: 2025-03-26
Payer: COMMERCIAL

## 2025-03-26 DIAGNOSIS — Z12.83 SKIN CANCER SCREENING: Primary | ICD-10-CM

## 2025-03-26 DIAGNOSIS — L81.4 LENTIGO: ICD-10-CM

## 2025-03-26 DIAGNOSIS — D22.9 MULTIPLE NEVI: ICD-10-CM

## 2025-03-26 DIAGNOSIS — D18.01 CHERRY ANGIOMA: ICD-10-CM

## 2025-03-26 PROCEDURE — 1126F AMNT PAIN NOTED NONE PRSNT: CPT | Performed by: STUDENT IN AN ORGANIZED HEALTH CARE EDUCATION/TRAINING PROGRAM

## 2025-03-26 PROCEDURE — 99203 OFFICE O/P NEW LOW 30 MIN: CPT | Performed by: STUDENT IN AN ORGANIZED HEALTH CARE EDUCATION/TRAINING PROGRAM

## 2025-03-26 ASSESSMENT — PAIN SCALES - GENERAL: PAINLEVEL_OUTOF10: NO PAIN (0)

## 2025-03-26 NOTE — PATIENT INSTRUCTIONS
Patient Education       Proper skin care from [facility]:    -Eliminate harsh soaps as they strip the natural oils from the skin, often resulting in dry itchy skin ( i.e. Dial, Zest, Divehi Spring)  -Use mild soaps such as Cetaphil or Dove Sensitive Skin in the shower. You do not need to use soap on arms, legs, and trunk every time you shower unless visibly soiled.   -Avoid hot or cold showers.  -After showering, lightly dry off and apply moisturizing within 2-3 minutes. This will help trap moisture in the skin.   -Aggressive use of a moisturizer at least 1-2 times a day to the entire body (including -Vanicream, Cetaphil, Aquaphor or Cerave) and moisturize hands after every washing.  -We recommend using moisturizers that come in a tub that needs to be scooped out, not a pump. This has more of an oil base. It will hold moisture in your skin much better than a water base moisturizer. The above recommended are non-pore clogging.      Wear a sunscreen with at least SPF 30 on your face, ears, neck and V of the chest daily. Wear sunscreen on other areas of the body if those areas are exposed to the sun throughout the day. Sunscreens can contain physical and/or chemical blockers. Physical blockers are less likely to clog pores, these include zinc oxide and titanium dioxide. Reapply every two hour and after swimming.     Sunscreen examples: https://www.ewg.org/sunscreen/    UV radiation  UVA radiation remains constant throughout the day and throughout the year. It is a longer wavelength than UVB and therefore penetrates deeper into the skin leading to immediate and delayed tanning, photoaging, and skin cancer. 70-80% of UVA and UVB radiation occurs between the hours of 10am-2pm.  UVB radiation  UVB radiation causes the most harmful effects and is more significant during the summer months. However, snow and ice can reflect UVB radiation leading to skin damage during the winter months as well. UVB radiation is  responsible for tanning, burning, inflammation, delayed erythema (pinkness), pigmentation (brown spots), and skin cancer.     I recommend self monthly full body exams and yearly full body exams with a dermatology provider. If you develop a new or changing lesion please follow up for examination. Most skin cancers are pink and scaly or pink and pearly. However, we do see blue/brown/black skin cancers.  Consider the ABCDEs of melanoma when giving yourself your monthly full body exam ( don't forget the groin, buttocks, feet, toes, etc). A-asymmetry, B-borders, C-color, D-diameter, E-elevation or evolving. If you see any of these changes please follow up in clinic. If you cannot see your back I recommend purchasing a hand held mirror to use with a larger wall mirror.       Checking for Skin Cancer  You can find cancer early by checking your skin each month. There are 3 kinds of skin cancer. They are melanoma, basal cell carcinoma, and squamous cell carcinoma. Doing monthly skin checks is the best way to find new marks or skin changes. Follow the instructions below for checking your skin.   The ABCDEs of checking moles for melanoma   Check your moles or growths for signs of melanoma using ABCDE:   Asymmetry: the sides of the mole or growth don t match  Border: the edges are ragged, notched, or blurred  Color: the color within the mole or growth varies  Diameter: the mole or growth is larger than 6 mm (size of a pencil eraser)  Evolving: the size, shape, or color of the mole or growth is changing (evolving is not shown in the images below)    Checking for other types of skin cancer  Basal cell carcinoma or squamous cell carcinoma have symptoms such as:     A spot or mole that looks different from all other marks on your skin  Changes in how an area feels, such as itching, tenderness, or pain  Changes in the skin's surface, such as oozing, bleeding, or scaliness  A sore that does not heal  New swelling or redness beyond  the border of a mole    Who s at risk?  Anyone can get skin cancer. But you are at greater risk if you have:   Fair skin, light-colored hair, or light-colored eyes  Many moles or abnormal moles on your skin  A history of sunburns from sunlight or tanning beds  A family history of skin cancer  A history of exposure to radiation or chemicals  A weakened immune system  If you have had skin cancer in the past, you are at risk for recurring skin cancer.   How to check your skin  Do your monthly skin checkups in front of a full-length mirror. Check all parts of your body, including your:   Head (ears, face, neck, and scalp)  Torso (front, back, and sides)  Arms (tops, undersides, upper, and lower armpits)  Hands (palms, backs, and fingers, including under the nails)  Buttocks and genitals  Legs (front, back, and sides)  Feet (tops, soles, toes, including under the nails, and between toes)  If you have a lot of moles, take digital photos of them each month. Make sure to take photos both up close and from a distance. These can help you see if any moles change over time.   Most skin changes are not cancer. But if you see any changes in your skin, call your doctor right away. Only he or she can diagnose a problem. If you have skin cancer, seeing your doctor can be the first step toward getting the treatment that could save your life.   [employer] last reviewed this educational content on 4/1/2019 2000-2020 [employer]. [address] [place] [address], [place] [address]. All rights reserved. This information is not intended as a substitute for professional medical care. Always follow your healthcare professional's instructions.       When should I call my doctor?  If you are worsening or not improving, please, contact us or seek urgent care as noted below.     Who should I call with questions (adults)?  [facility] (adult and pediatric): [phone]  [facility]  [place] (adult): [phone]  [facility] ([place], [place], [place] and [place]) [phone]  For urgent needs outside of business hours call the [facility] at [phone] and ask for the dermatology resident on call to be paged  If this is a medical emergency and you are unable to reach an ER, Call 911      If you need a prescription refill, please contact your pharmacy. Refills are approved or denied by our Physicians during normal business hours, Monday through Fridays  Per office policy, refills will not be granted if you have not been seen within the past year (or sooner depending on your child's condition

## 2025-03-26 NOTE — PROGRESS NOTES
[facility] Dermatology Note  Encounter Date: Mar 26, 2025  Office Visit     Reviewed patients past medical history and pertinent chart review prior to patients visit today.     Dermatology Problem List:  Last skin check: 3/26/2025    Personal Hx: No personal history of skin cancer  Family Hx: Negative for family history of skin cancer.  _________________________________________    Assessment & Plan:     # Multiple nevi, trunk and extremities  # Solar lentigines  - Continued observation recommended.   - Nevi demonstrate no concerning features on dermoscopy. We discussed the importance of self exams at home.   - ABCDEs: Counseled ABCDEs of melanoma: Asymmetry, Border (irregularity), Color (not uniform, changes in color), Diameter (greater than 6 mm which is about the size of a pencil eraser), and Evolving (any changes in preexisting moles).  - Sun protection: Counseled SPF 30+ sunscreen, UPF clothing, sun avoidance, tanning bed avoidance.    # Cherry angiomas  - We discussed the benign nature of the skin lesions. No treatment required. Continued observation recommended. Follow up with any concerns.      Follow-up: 1 year(s) for follow up full body skin exam, prn for new or changing lesions or new concerns    All risks, benefits and alternatives were discussed with patient.  Patient is in agreement and understands the assessment and plan.  All questions were answered.  Alannah Whitmore PA-C  [facility] Dermatology  ____________________________________________    CC: Skin cancer screening exam    HPI:  Ms. Natalee Maya is a(n) 54 year old female who presents today as a return patient for a full body skin cancer screening.  She was last seen in dermatology on 6/20/2022 at which time 2 irritated skin tags were treated from the left axilla.    Today, patient has no specific cutaneous concerns today.     Patient is diligent with photoprotection. [place] and has a history of sun  damage. No history of blistering sun burns. Very rare history of tanning bed use. Patient is otherwise feeling well, without additional skin concerns.     Physical Exam:  Vitals: LMP 02/26/2025 (Exact Date)   SKIN: Total skin excluding the genitalia areas was performed. The exam included the head/face, neck, both arms, chest, back, abdomen, both legs, digits, mons pubis, buttock and nails.   -Mooney I  -multiple tan/brown flat round macules and raised papules scattered throughout trunk, extremities, and face. No worrisome features for malignancy noted on examination.  -scattered tan, homogenous macules scattered on sun exposed areas of trunk, extremities, and face  -several 1-2mm red dome shaped symmetric papules scattered on the trunk and extremities    - No other lesions of concern on areas examined.     Medications:  Current Outpatient Medications   Medication Sig Dispense Refill    folic acid (FOLVITE) 1 MG tablet Take 1 tablet (1,000 mcg) by mouth daily. 90 tablet 3    levothyroxine (SYNTHROID/LEVOTHROID) 25 MCG tablet Take 0.5 tablets (12.5 mcg) by mouth twice a week. In addition to the daily 50mcg tab 12 tablet 2    levothyroxine (SYNTHROID/LEVOTHROID) 50 MCG tablet Take 1 tablet (50 mcg) by mouth daily. 90 tablet 3    losartan (COZAAR) 50 MG tablet Take 1 tablet (50 mg) by mouth daily. 90 tablet 3    Semaglutide-Weight Management (WEGOVY) 2.4 MG/0.75ML pen Inject 2.4 mg subcutaneously once a week. 9 mL 3     No current facility-administered medications for this visit.      Past Medical History:   Patient Active Problem List   Diagnosis    Aortic anomaly    Subclinical hypothyroidism    Neutropenia, unspecified type (H24)-check yearly per heme/onc    Major depressive disorder, single episode, moderate with anxious distress (H)    Iron deficiency anemia-heavy menses. On iron     Past Medical History:   Diagnosis Date    Calculus of kidney     History of kidney stone -- Abstracted 7/22/02    Depressive  disorder     Hypertension     Lymphedema of lower extremity     Thyroid disease        CC Carolina Gong, APRN CNP  [address] [place] [address] on close of this encounter.

## 2025-03-26 NOTE — LETTER
3/26/2025      Natalee Maya  [address]  [facility] [address]      Dear Colleague,    Thank you for referring your patient, Natalee Maya, to the [facility]. Please see a copy of my visit note below.    [facility] Dermatology Note  Encounter Date: Mar 26, 2025  Office Visit     Reviewed patients past medical history and pertinent chart review prior to patients visit today.     Dermatology Problem List:  Last skin check: 3/26/2025    Personal Hx: No personal history of skin cancer  Family Hx: Negative for family history of skin cancer.  _________________________________________    Assessment & Plan:     # Multiple nevi, trunk and extremities  # Solar lentigines  - Continued observation recommended.   - Nevi demonstrate no concerning features on dermoscopy. We discussed the importance of self exams at home.   - ABCDEs: Counseled ABCDEs of melanoma: Asymmetry, Border (irregularity), Color (not uniform, changes in color), Diameter (greater than 6 mm which is about the size of a pencil eraser), and Evolving (any changes in preexisting moles).  - Sun protection: Counseled SPF 30+ sunscreen, UPF clothing, sun avoidance, tanning bed avoidance.    # Cherry angiomas  - We discussed the benign nature of the skin lesions. No treatment required. Continued observation recommended. Follow up with any concerns.      Follow-up: 1 year(s) for follow up full body skin exam, prn for new or changing lesions or new concerns    All risks, benefits and alternatives were discussed with patient.  Patient is in agreement and understands the assessment and plan.  All questions were answered.  Alannah Whitmore PA-C  [facility] Dermatology  ____________________________________________    CC: Skin cancer screening exam    HPI:  Ms. Natalee Maya is a(n) 54 year old female who presents today as a return patient for a full body skin cancer screening.  She was  last seen in dermatology on 6/20/2022 at which time 2 irritated skin tags were treated from the left axilla.    Today, patient has no specific cutaneous concerns today.     Patient is diligent with photoprotection. [place] and has a history of sun damage. No history of blistering sun burns. Very rare history of tanning bed use. Patient is otherwise feeling well, without additional skin concerns.     Physical Exam:  Vitals: LMP 02/26/2025 (Exact Date)   SKIN: Total skin excluding the genitalia areas was performed. The exam included the head/face, neck, both arms, chest, back, abdomen, both legs, digits, mons pubis, buttock and nails.   -Mooney I  -multiple tan/brown flat round macules and raised papules scattered throughout trunk, extremities, and face. No worrisome features for malignancy noted on examination.  -scattered tan, homogenous macules scattered on sun exposed areas of trunk, extremities, and face  -several 1-2mm red dome shaped symmetric papules scattered on the trunk and extremities    - No other lesions of concern on areas examined.     Medications:  Current Outpatient Medications   Medication Sig Dispense Refill     folic acid (FOLVITE) 1 MG tablet Take 1 tablet (1,000 mcg) by mouth daily. 90 tablet 3     levothyroxine (SYNTHROID/LEVOTHROID) 25 MCG tablet Take 0.5 tablets (12.5 mcg) by mouth twice a week. In addition to the daily 50mcg tab 12 tablet 2     levothyroxine (SYNTHROID/LEVOTHROID) 50 MCG tablet Take 1 tablet (50 mcg) by mouth daily. 90 tablet 3     losartan (COZAAR) 50 MG tablet Take 1 tablet (50 mg) by mouth daily. 90 tablet 3     Semaglutide-Weight Management (WEGOVY) 2.4 MG/0.75ML pen Inject 2.4 mg subcutaneously once a week. 9 mL 3     No current facility-administered medications for this visit.      Past Medical History:   Patient Active Problem List   Diagnosis     Aortic anomaly     Subclinical hypothyroidism     Neutropenia, unspecified type (H24)-check yearly per  heme/onc     Major depressive disorder, single episode, moderate with anxious distress (H)     Iron deficiency anemia-heavy menses. On iron     Past Medical History:   Diagnosis Date     Calculus of kidney     History of kidney stone -- Abstracted 7/22/02     Depressive disorder      Hypertension      Lymphedema of lower extremity      Thyroid disease        BILL Gong, APRN CNP  [address] [place] [address] on close of this encounter.      Again, thank you for allowing me to participate in the care of your patient.        Sincerely,        Myriam Whitmore PA-C    Electronically signed

## 2025-04-03 ENCOUNTER — ANCILLARY PROCEDURE (OUTPATIENT)
Dept: MAMMOGRAPHY | Facility: CLINIC | Age: 55
End: 2025-04-03
Attending: NURSE PRACTITIONER
Payer: COMMERCIAL

## 2025-04-03 DIAGNOSIS — Z12.31 VISIT FOR SCREENING MAMMOGRAM: ICD-10-CM

## 2025-04-07 DIAGNOSIS — D70.9 NEUTROPENIA, UNSPECIFIED TYPE: Primary | ICD-10-CM

## 2025-04-09 NOTE — PROGRESS NOTES
Oncology/Hematology Visit Note  2025    Reason for Visit: Follow up of leukopenia     History of Present Illness: Natalee Maya is a 54 year old female with intermittent leukopenia going back to at least 2018. Prior work-up with normal iron (on PO iron supplements), ferritin, B12, and folate. Smear has been unremarkable. Abdominal US is negative. No history of hepatitis or HIV of alohol use.     Interval History:  Natalee is here unaccompanied. She is feeling well. She denies any infectious concerns. No recent medical changes other than weight loss with Wegovy. She denies any bleeding concerns. No autoimmune issues. She is taking folic acid.     Current Outpatient Medications   Medication Sig Dispense Refill    folic acid (FOLVITE) 1 MG tablet Take 1 tablet (1,000 mcg) by mouth daily. 90 tablet 3    levothyroxine (SYNTHROID/LEVOTHROID) 25 MCG tablet Take 0.5 tablets (12.5 mcg) by mouth twice a week. In addition to the daily 50mcg tab 12 tablet 2    levothyroxine (SYNTHROID/LEVOTHROID) 50 MCG tablet Take 1 tablet (50 mcg) by mouth daily. 90 tablet 3    losartan (COZAAR) 50 MG tablet Take 1 tablet (50 mg) by mouth daily. 90 tablet 3    Semaglutide-Weight Management (WEGOVY) 2.4 MG/0.75ML pen Inject 2.4 mg subcutaneously once a week. 9 mL 3       Past Medical History  Past Medical History:   Diagnosis Date    Calculus of kidney     History of kidney stone -- Abstracted 02    Depressive disorder     Hypertension     Lymphedema of lower extremity     Thyroid disease      Past Surgical History:   Procedure Laterality Date    ABDOMEN SURGERY      COLONOSCOPY Left 2021    Procedure: COLONOSCOPY; incomplete secondary to pt discomfort. will schedule CT colonography today if possible.;  Surgeon: Ceasar Cardoza MD;  Location:  GI    FOOT SURGERY      2017    ORTHOPEDIC SURGERY  2017    [facility] NONSPECIFIC PROCEDURE       -- Abstracted 02     Allergies   Allergen Reactions     "No Known Drug Allergy      Social History   Social History     Tobacco Use    Smoking status: Never    Smokeless tobacco: Never   Vaping Use    Vaping status: Never Used   Substance Use Topics    Alcohol use: Yes     Comment: social    Drug use: No      Past medical history and social history were reviewed.    Physical Examination:  /80   Pulse 68   Temp 98  F (36.7  C) (Tympanic)   Ht 1.626 m (5' 4\")   Wt 81.6 kg (180 lb)   LMP 02/26/2025 (Exact Date)   SpO2 98%   BMI 30.90 kg/m    Wt Readings from Last 10 Encounters:   02/28/25 79.2 kg (174 lb 8 oz)   11/12/24 77.1 kg (170 lb)   09/17/24 76.2 kg (168 lb)   07/08/24 77.6 kg (171 lb)   04/02/24 85.3 kg (188 lb)   02/01/24 91.2 kg (201 lb)   01/31/24 91.6 kg (202 lb)   01/04/24 96.7 kg (213 lb 1.6 oz)   11/29/23 98.4 kg (217 lb)   11/28/23 98.4 kg (217 lb)     Constitutional: Well-appearing female in no acute distress.  Eyes:  No scleral icterus.  Cardiovascular: Extremities well perfused   Respiratory: Non-labored breathing   Neurologic: Cranial nerves II through XII are grossly intact.  Skin: No rashes, petechiae, or bruising noted on exposed skin.    Laboratory Data:   Latest Reference Range & Units 04/10/25 15:55   Sodium 135 - 145 mmol/L 139   Potassium 3.4 - 5.3 mmol/L 4.9   Chloride 98 - 107 mmol/L 104   Carbon Dioxide (CO2) 22 - 29 mmol/L 27   Urea Nitrogen 6.0 - 20.0 mg/dL 21.2 (H)   Creatinine 0.51 - 0.95 mg/dL 0.67   GFR Estimate >60 mL/min/1.73m2 >90   Calcium 8.8 - 10.4 mg/dL 9.3   Anion Gap 7 - 15 mmol/L 8   Albumin 3.5 - 5.2 g/dL 4.4   Protein Total 6.4 - 8.3 g/dL 6.6   Alkaline Phosphatase 40 - 150 U/L 54   ALT 0 - 50 U/L 23   AST 0 - 45 U/L 24   Bilirubin Total <=1.2 mg/dL 0.6   CRP Inflammation <5.00 mg/L <3.00   Glucose 70 - 99 mg/dL 89   (H): Data is abnormally high     Latest Reference Range & Units 04/10/25 15:55   Vitamin B12 232 - 1,245 pg/mL 581      Latest Reference Range & Units 04/10/25 15:55   WBC 4.0 - 11.0 10e3/uL 3.1 (L) "   Hemoglobin 11.7 - 15.7 g/dL 12.5   Hematocrit 35.0 - 47.0 % 38.2   Platelet Count 150 - 450 10e3/uL 219   RBC Count 3.80 - 5.20 10e6/uL 4.07   MCV 78 - 100 fL 94   MCH 26.5 - 33.0 pg 30.7   MCHC 31.5 - 36.5 g/dL 32.7   RDW 10.0 - 15.0 % 12.6   % Neutrophils % 45   % Lymphocytes % 45   % Monocytes % 8   % Eosinophils % 2   % Basophils % 1   % Immature Granulocytes % 0   Absolute Basophils 0.0 - 0.2 10e3/uL 0.0   Absolute Eosinophils 0.0 - 0.7 10e3/uL 0.1   Absolute Immature Granulocytes <=0.4 10e3/uL 0.0   Absolute Lymphocytes 0.8 - 5.3 10e3/uL 1.4   Absolute Monocytes 0.0 - 1.3 10e3/uL 0.3   Absolute Neutrophils 1.6 - 8.3 10e3/uL 1.4 (L)   % Retic 0.5 - 2.0 % 1.2   Absolute Retic 0.025 - 0.095 10e6/uL 0.051   (L): Data is abnormally low    Assessment and Plan:  # Leukopenia/Neutropenia  # Hx Folate Deficiency   Ongoing for at least 5 years, folate deficiency even longer as she was on high dose replacement during pregnancy due to son with spina bifida and folate levels drop when she stops supplement. Has had prior work-up including viral testing, nutritional markers, abdominal US, peripheral smear. Neutropenia has fluctuated over time. She has not had any symptoms or complications related to this, specifically no infectious issues  - Overall suspect she has chronic idiopathic neutropenia vs cyclic neutropenia (thought no associated symptoms that usually follow cyclic neutropenia)  - ANC was low last month at 0.8 but now has improved to 1.4   - Reviewed role of bmbx. Given normal hgb and plt as was as chronicity and stability of neutropenia it is unlikely she has a primary bone marrow disorder such as MPN/MDS. We discussed continuing to hold off on this for now and would consider if worsening neutropenia or other cell line involvement in the future  - Given no infectious concerns there is no role for G-CSF  - Will repeat CBC in 6 months for monitoring, we will follow  - Continue folic acid 1mg daily     25 minutes  spent on the date of the encounter doing chart review, review of test results, interpretation of tests, patient visit, and documentation     Xavier Ward PA-C  Department of Hematology and Oncology  [facility]

## 2025-04-10 ENCOUNTER — LAB (OUTPATIENT)
Dept: LAB | Facility: CLINIC | Age: 55
End: 2025-04-10
Payer: COMMERCIAL

## 2025-04-10 DIAGNOSIS — D70.9 NEUTROPENIA, UNSPECIFIED TYPE: ICD-10-CM

## 2025-04-10 LAB
BASOPHILS # BLD AUTO: 0 10E3/UL (ref 0–0.2)
BASOPHILS NFR BLD AUTO: 1 %
EOSINOPHIL # BLD AUTO: 0.1 10E3/UL (ref 0–0.7)
EOSINOPHIL NFR BLD AUTO: 2 %
ERYTHROCYTE [DISTWIDTH] IN BLOOD BY AUTOMATED COUNT: 12.6 % (ref 10–15)
HCT VFR BLD AUTO: 38.2 % (ref 35–47)
HGB BLD-MCNC: 12.5 G/DL (ref 11.7–15.7)
IMM GRANULOCYTES # BLD: 0 10E3/UL
IMM GRANULOCYTES NFR BLD: 0 %
LYMPHOCYTES # BLD AUTO: 1.4 10E3/UL (ref 0.8–5.3)
LYMPHOCYTES NFR BLD AUTO: 45 %
MCH RBC QN AUTO: 30.7 PG (ref 26.5–33)
MCHC RBC AUTO-ENTMCNC: 32.7 G/DL (ref 31.5–36.5)
MCV RBC AUTO: 94 FL (ref 78–100)
MONOCYTES # BLD AUTO: 0.3 10E3/UL (ref 0–1.3)
MONOCYTES NFR BLD AUTO: 8 %
NEUTROPHILS # BLD AUTO: 1.4 10E3/UL (ref 1.6–8.3)
NEUTROPHILS NFR BLD AUTO: 45 %
PLATELET # BLD AUTO: 219 10E3/UL (ref 150–450)
RBC # BLD AUTO: 4.07 10E6/UL (ref 3.8–5.2)
RETICS # AUTO: 0.05 10E6/UL (ref 0.03–0.1)
RETICS/RBC NFR AUTO: 1.2 % (ref 0.5–2)
WBC # BLD AUTO: 3.1 10E3/UL (ref 4–11)

## 2025-04-11 ENCOUNTER — ONCOLOGY VISIT (OUTPATIENT)
Dept: ONCOLOGY | Facility: CLINIC | Age: 55
End: 2025-04-11
Attending: PHYSICIAN ASSISTANT
Payer: COMMERCIAL

## 2025-04-11 VITALS
HEART RATE: 68 BPM | TEMPERATURE: 98 F | BODY MASS INDEX: 30.73 KG/M2 | OXYGEN SATURATION: 98 % | SYSTOLIC BLOOD PRESSURE: 128 MMHG | WEIGHT: 180 LBS | DIASTOLIC BLOOD PRESSURE: 80 MMHG | HEIGHT: 64 IN

## 2025-04-11 DIAGNOSIS — D70.9 NEUTROPENIA, UNSPECIFIED TYPE: Primary | ICD-10-CM

## 2025-04-11 LAB — CRP SERPL-MCNC: <3 MG/L

## 2025-04-11 PROCEDURE — 99213 OFFICE O/P EST LOW 20 MIN: CPT | Performed by: PHYSICIAN ASSISTANT

## 2025-04-11 ASSESSMENT — PAIN SCALES - GENERAL: PAINLEVEL_OUTOF10: NO PAIN (0)

## 2025-04-11 NOTE — NURSING NOTE
"Oncology Rooming Note    April 11, 2025 3:40 PM   Natalee Maya is a 54 year old female who presents for:    Chief Complaint   Patient presents with    Oncology Clinic Visit     Initial Vitals: /80   Pulse 68   Temp 98  F (36.7  C) (Tympanic)   Ht 1.626 m (5' 4\")   Wt 81.6 kg (180 lb)   LMP 02/26/2025 (Exact Date)   SpO2 98%   BMI 30.90 kg/m   Estimated body mass index is 30.9 kg/m  as calculated from the following:    Height as of this encounter: 1.626 m (5' 4\").    Weight as of this encounter: 81.6 kg (180 lb). Body surface area is 1.92 meters squared.  No Pain (0) Comment: Data Unavailable   Patient's last menstrual period was 02/26/2025 (exact date).  Allergies reviewed: Yes  Medications reviewed: Yes    Medications: Medication refills not needed today.  Pharmacy name entered into Nativo:    [facility] - [place], [place] - [address] [id number] [place], [place] - [address]  [address]    Frailty Screening:   Is the patient here for a new oncology consult visit in cancer care? 2. No    PHQ9:  Did this patient require a PHQ9?: No      Clinical concerns: f/u       Abby Forman CMA              "

## 2025-04-11 NOTE — LETTER
4/11/2025      Natalee Maya  [address]  [place] [address]      Dear Colleague,    Thank you for referring your patient, Natalee Maya, to the [facility] [place] [facility]. Please see a copy of my visit note below.    Oncology/Hematology Visit Note  Apr 11, 2025    Reason for Visit: Follow up of leukopenia     History of Present Illness: Natalee Maya is a 54 year old female with intermittent leukopenia going back to at least 2018. Prior work-up with normal iron (on PO iron supplements), ferritin, B12, and folate. Smear has been unremarkable. Abdominal US is negative. No history of hepatitis or HIV of alohol use.     Interval History:  Natalee is here unaccompanied. She is feeling well. She denies any infectious concerns. No recent medical changes other than weight loss with Wegovy. She denies any bleeding concerns. No autoimmune issues. She is taking folic acid.     Current Outpatient Medications   Medication Sig Dispense Refill     folic acid (FOLVITE) 1 MG tablet Take 1 tablet (1,000 mcg) by mouth daily. 90 tablet 3     levothyroxine (SYNTHROID/LEVOTHROID) 25 MCG tablet Take 0.5 tablets (12.5 mcg) by mouth twice a week. In addition to the daily 50mcg tab 12 tablet 2     levothyroxine (SYNTHROID/LEVOTHROID) 50 MCG tablet Take 1 tablet (50 mcg) by mouth daily. 90 tablet 3     losartan (COZAAR) 50 MG tablet Take 1 tablet (50 mg) by mouth daily. 90 tablet 3     Semaglutide-Weight Management (WEGOVY) 2.4 MG/0.75ML pen Inject 2.4 mg subcutaneously once a week. 9 mL 3       Past Medical History  Past Medical History:   Diagnosis Date     Calculus of kidney     History of kidney stone -- Abstracted 7/22/02     Depressive disorder      Hypertension      Lymphedema of lower extremity      Thyroid disease      Past Surgical History:   Procedure Laterality Date     ABDOMEN SURGERY       COLONOSCOPY Left 06/23/2021    Procedure: COLONOSCOPY; incomplete secondary to pt discomfort.  "will schedule CT colonography today if possible.;  Surgeon: Ceasar Cardoza MD;  Location:  GI     FOOT SURGERY      2017     ORTHOPEDIC SURGERY  2017     [facility] NONSPECIFIC PROCEDURE       -- Abstracted 02     Allergies   Allergen Reactions     No Known Drug Allergy      Social History   Social History     Tobacco Use     Smoking status: Never     Smokeless tobacco: Never   Vaping Use     Vaping status: Never Used   Substance Use Topics     Alcohol use: Yes     Comment: social     Drug use: No      Past medical history and social history were reviewed.    Physical Examination:  /80   Pulse 68   Temp 98  F (36.7  C) (Tympanic)   Ht 1.626 m (5' 4\")   Wt 81.6 kg (180 lb)   LMP 2025 (Exact Date)   SpO2 98%   BMI 30.90 kg/m    Wt Readings from Last 10 Encounters:   25 79.2 kg (174 lb 8 oz)   24 77.1 kg (170 lb)   24 76.2 kg (168 lb)   24 77.6 kg (171 lb)   24 85.3 kg (188 lb)   24 91.2 kg (201 lb)   24 91.6 kg (202 lb)   24 96.7 kg (213 lb 1.6 oz)   23 98.4 kg (217 lb)   23 98.4 kg (217 lb)     Constitutional: Well-appearing female in no acute distress.  Eyes:  No scleral icterus.  Cardiovascular: Extremities well perfused   Respiratory: Non-labored breathing   Neurologic: Cranial nerves II through XII are grossly intact.  Skin: No rashes, petechiae, or bruising noted on exposed skin.    Laboratory Data:   Latest Reference Range & Units 04/10/25 15:55   Sodium 135 - 145 mmol/L 139   Potassium 3.4 - 5.3 mmol/L 4.9   Chloride 98 - 107 mmol/L 104   Carbon Dioxide (CO2) 22 - 29 mmol/L 27   Urea Nitrogen 6.0 - 20.0 mg/dL 21.2 (H)   Creatinine 0.51 - 0.95 mg/dL 0.67   GFR Estimate >60 mL/min/1.73m2 >90   Calcium 8.8 - 10.4 mg/dL 9.3   Anion Gap 7 - 15 mmol/L 8   Albumin 3.5 - 5.2 g/dL 4.4   Protein Total 6.4 - 8.3 g/dL 6.6   Alkaline Phosphatase 40 - 150 U/L 54   ALT 0 - 50 U/L 23   AST 0 - 45 U/L 24   Bilirubin Total " <=1.2 mg/dL 0.6   CRP Inflammation <5.00 mg/L <3.00   Glucose 70 - 99 mg/dL 89   (H): Data is abnormally high     Latest Reference Range & Units 04/10/25 15:55   Vitamin B12 232 - 1,245 pg/mL 581      Latest Reference Range & Units 04/10/25 15:55   WBC 4.0 - 11.0 10e3/uL 3.1 (L)   Hemoglobin 11.7 - 15.7 g/dL 12.5   Hematocrit 35.0 - 47.0 % 38.2   Platelet Count 150 - 450 10e3/uL 219   RBC Count 3.80 - 5.20 10e6/uL 4.07   MCV 78 - 100 fL 94   MCH 26.5 - 33.0 pg 30.7   MCHC 31.5 - 36.5 g/dL 32.7   RDW 10.0 - 15.0 % 12.6   % Neutrophils % 45   % Lymphocytes % 45   % Monocytes % 8   % Eosinophils % 2   % Basophils % 1   % Immature Granulocytes % 0   Absolute Basophils 0.0 - 0.2 10e3/uL 0.0   Absolute Eosinophils 0.0 - 0.7 10e3/uL 0.1   Absolute Immature Granulocytes <=0.4 10e3/uL 0.0   Absolute Lymphocytes 0.8 - 5.3 10e3/uL 1.4   Absolute Monocytes 0.0 - 1.3 10e3/uL 0.3   Absolute Neutrophils 1.6 - 8.3 10e3/uL 1.4 (L)   % Retic 0.5 - 2.0 % 1.2   Absolute Retic 0.025 - 0.095 10e6/uL 0.051   (L): Data is abnormally low    Assessment and Plan:  # Leukopenia/Neutropenia  # Hx Folate Deficiency   Ongoing for at least 5 years, folate deficiency even longer as she was on high dose replacement during pregnancy due to son with spina bifida and folate levels drop when she stops supplement. Has had prior work-up including viral testing, nutritional markers, abdominal US, peripheral smear. Neutropenia has fluctuated over time. She has not had any symptoms or complications related to this, specifically no infectious issues  - Overall suspect she has chronic idiopathic neutropenia vs cyclic neutropenia (thought no associated symptoms that usually follow cyclic neutropenia)  - ANC was low last month at 0.8 but now has improved to 1.4   - Reviewed role of bmbx. Given normal hgb and plt as was as chronicity and stability of neutropenia it is unlikely she has a primary bone marrow disorder such as MPN/MDS. We discussed continuing to hold  off on this for now and would consider if worsening neutropenia or other cell line involvement in the future  - Given no infectious concerns there is no role for G-CSF  - Will repeat CBC in 6 months for monitoring, we will follow  - Continue folic acid 1mg daily     25 minutes spent on the date of the encounter doing chart review, review of test results, interpretation of tests, patient visit, and documentation     Xavier Ward PA-C  Department of Hematology and Oncology  [facility]       Again, thank you for allowing me to participate in the care of your patient.        Sincerely,        ISHA Jimenez    Electronically signed

## 2025-04-14 LAB
PATH REPORT.COMMENTS IMP SPEC: NORMAL
PATH REPORT.COMMENTS IMP SPEC: NORMAL
PATH REPORT.FINAL DX SPEC: NORMAL
PATH REPORT.MICROSCOPIC SPEC OTHER STN: NORMAL
PATH REPORT.MICROSCOPIC SPEC OTHER STN: NORMAL
PATH REPORT.RELEVANT HX SPEC: NORMAL

## 2025-05-20 ENCOUNTER — VIRTUAL VISIT (OUTPATIENT)
Dept: ENDOCRINOLOGY | Facility: CLINIC | Age: 55
End: 2025-05-20
Payer: COMMERCIAL

## 2025-05-20 VITALS — WEIGHT: 175 LBS | BODY MASS INDEX: 29.88 KG/M2 | HEIGHT: 64 IN

## 2025-05-20 DIAGNOSIS — E66.01 MORBID OBESITY (H): ICD-10-CM

## 2025-05-20 PROCEDURE — 98006 SYNCH AUDIO-VIDEO EST MOD 30: CPT | Performed by: INTERNAL MEDICINE

## 2025-05-20 PROCEDURE — G2211 COMPLEX E/M VISIT ADD ON: HCPCS | Performed by: INTERNAL MEDICINE

## 2025-05-20 PROCEDURE — 1126F AMNT PAIN NOTED NONE PRSNT: CPT | Mod: 95 | Performed by: INTERNAL MEDICINE

## 2025-05-20 ASSESSMENT — PAIN SCALES - GENERAL: PAINLEVEL_OUTOF10: NO PAIN (0)

## 2025-05-20 NOTE — PROGRESS NOTES
Return Medical Weight Management Note     Natalee Maya  MRN:  [id number]  :  1970  SHAN:  2025    Dear SHAQ PEREZ CNP,    I had the pleasure of seeing your patient Natalee Maya. She is a 54 year old female who I am continuing to see for treatment of obesity related to: hypothyroidism, HTN        2023     3:00 PM   --   I have the following health issues associated with obesity High Blood Pressure    Hypothyroidism   I have the following symptoms associated with obesity Depression    Lower Extremity Swelling    Fatigue   Weight history: Started gaining weight about 25 years ago after having kids. Weight has gradually increased overtime since then. Highest weight of 240 lbs, lowest weight of 120 lbs.  Contributing factors/barriers: mental health struggles, life stressors, emotional eating, FH of obesity in dad, lack of desire to exercise  Trials -- phentermine, saxenda, exercise, fasting    INTERVAL HISTORY:  Last visit 2024    Started Wegovy in 2023  Met with Lauren Bloch, MTM regularly with the recent visit in 2024  Currently on Wegovy 2.4 mg weekly since 2024. No side effects.  Helps a lot with lower her appetite  Stopped phentermine 2024 -- doing well without it    Weight is plateau    Diet: overall doing well, struggle with keeping protein and evening snack  Exercise: walking, weekly pickleball, virtual reality program    CURRENT WEIGHT:   175 lbs 0 oz    Initial Weight (lbs): 217 lbs     Cumulative weight loss (lbs): 42  Weight Loss Percentage: 19.35%        5/15/2025     8:36 AM   Changes and Difficulties   I have made the following changes to my diet since my last visit: Maintaining weight at 170-175   With regards to my diet, I am still struggling with: At plateau   I have made the following changes to my activity/exercise since my last visit: Consistent 3-4 days per week   With regards to my activity/exercise, I am still struggling with:  "Nothing       VITALS:  Ht 1.626 m (5' 4\")   Wt 79.4 kg (175 lb)   BMI 30.04 kg/m      MEDICATIONS:   Current Outpatient Medications   Medication Sig Dispense Refill    folic acid (FOLVITE) 1 MG tablet Take 1 tablet (1,000 mcg) by mouth daily. 90 tablet 3    levothyroxine (SYNTHROID/LEVOTHROID) 25 MCG tablet Take 0.5 tablets (12.5 mcg) by mouth twice a week. In addition to the daily 50mcg tab 12 tablet 2    levothyroxine (SYNTHROID/LEVOTHROID) 50 MCG tablet Take 1 tablet (50 mcg) by mouth daily. 90 tablet 3    losartan (COZAAR) 50 MG tablet Take 1 tablet (50 mg) by mouth daily. 90 tablet 3    Semaglutide-Weight Management (WEGOVY) 2.4 MG/0.75ML pen Inject 2.4 mg subcutaneously once a week. 9 mL 3           5/15/2025     8:36 AM   Weight Loss Medication History Reviewed With Patient   Which weight loss medications are you currently taking on a regular basis? Wegovy   Are you having any side effects from the weight loss medication that we have prescribed you? No       ASSESSMENT:   Natalee Maya is a 54 year old female who I am continuing to see for treatment of obesity related to: hypothyroidism, HTN    Responded well with Wegovy. No major side effects  Diet has improved. More activity  Lost 19% of TBW    PLAN:   Continue Wegovy 2.4 mg weekly - may consider switching to Zepbound in the future  Continue to work on lifestyle changes    FOLLOW-UP:    See MTM PharmD in 3 month(s)  See MD or PA in 6 month(s)    Joined the call at 5/20/2025, 4:23:31 pm.  Left the call at 5/20/2025, 4:28:19 pm.  You were on the call for 4 minutes 47 seconds.    Sincerely,    Yen Faustin MD  "

## 2025-05-20 NOTE — PROGRESS NOTES
Followed protocol Virtual Visit Details    Type of service:  Video Visit     Originating Location (pt. Location): Home    Distant Location (provider location):  Off-site  Platform used for Video Visit: Yasmeen

## 2025-05-20 NOTE — PATIENT INSTRUCTIONS
PLAN:   Continue Wegovy 2.4 mg weekly - may consider switching to Zepbound in the future  Continue to work on lifestyle changes    FOLLOW-UP:    See MTM PharmD in 3 month(s)  See MD or PA in 6 month(s)    If you have any questions, please do not hesitate to call Weight management clinic at [phone] or [phone].  If you need to fax, please fax to [phone].    Sincerely,    Yen Faustin MD  Endocrinology

## 2025-05-20 NOTE — LETTER
2025       RE: Natalee Maya  [address]  [facility] [address]     Dear Colleague,    Thank you for referring your patient, Natalee Maya, to the [facility] [place] [facility]. Please see a copy of my visit note below.    Virtual Visit Details    Type of service:  Video Visit     Originating Location (pt. Location): Home    Distant Location (provider location):  Off-site  Platform used for Video Visit: [facility] Medical Weight Management Note     Natalee Maya  MRN:  [id number]  :  1970  SHAN:  2025    Dear SHAQ PEREZ CNP,    I had the pleasure of seeing your patient Natalee Maya. She is a 54 year old female who I am continuing to see for treatment of obesity related to: hypothyroidism, HTN        2023     3:00 PM   --   I have the following health issues associated with obesity High Blood Pressure    Hypothyroidism   I have the following symptoms associated with obesity Depression    Lower Extremity Swelling    Fatigue   Weight history: Started gaining weight about 25 years ago after having kids. Weight has gradually increased overtime since then. Highest weight of 240 lbs, lowest weight of 120 lbs.  Contributing factors/barriers: mental health struggles, life stressors, emotional eating, FH of obesity in dad, lack of desire to exercise  Trials -- phentermine, saxenda, exercise, fasting    INTERVAL HISTORY:  Last visit 2024    Started Wegovy in 2023  Met with Lauren Bloch, MTM regularly with the recent visit in 2024  Currently on Wegovy 2.4 mg weekly since 2024. No side effects.  Helps a lot with lower her appetite  Stopped phentermine 2024 -- doing well without it    Weight is plateau    Diet: overall doing well, struggle with keeping protein and evening snack  Exercise: walking, weekly pickleball, virtual reality  "program    CURRENT WEIGHT:   175 lbs 0 oz    Initial Weight (lbs): 217 lbs     Cumulative weight loss (lbs): 42  Weight Loss Percentage: 19.35%        5/15/2025     8:36 AM   Changes and Difficulties   I have made the following changes to my diet since my last visit: Maintaining weight at 170-175   With regards to my diet, I am still struggling with: At plateau   I have made the following changes to my activity/exercise since my last visit: Consistent 3-4 days per week   With regards to my activity/exercise, I am still struggling with: Nothing       VITALS:  Ht 1.626 m (5' 4\")   Wt 79.4 kg (175 lb)   BMI 30.04 kg/m      MEDICATIONS:   Current Outpatient Medications   Medication Sig Dispense Refill     folic acid (FOLVITE) 1 MG tablet Take 1 tablet (1,000 mcg) by mouth daily. 90 tablet 3     levothyroxine (SYNTHROID/LEVOTHROID) 25 MCG tablet Take 0.5 tablets (12.5 mcg) by mouth twice a week. In addition to the daily 50mcg tab 12 tablet 2     levothyroxine (SYNTHROID/LEVOTHROID) 50 MCG tablet Take 1 tablet (50 mcg) by mouth daily. 90 tablet 3     losartan (COZAAR) 50 MG tablet Take 1 tablet (50 mg) by mouth daily. 90 tablet 3     Semaglutide-Weight Management (WEGOVY) 2.4 MG/0.75ML pen Inject 2.4 mg subcutaneously once a week. 9 mL 3           5/15/2025     8:36 AM   Weight Loss Medication History Reviewed With Patient   Which weight loss medications are you currently taking on a regular basis? Wegovy   Are you having any side effects from the weight loss medication that we have prescribed you? No       ASSESSMENT:   Natalee Maya is a 54 year old female who I am continuing to see for treatment of obesity related to: hypothyroidism, HTN    Responded well with Wegovy. No major side effects  Diet has improved. More activity  Lost 19% of TBW    PLAN:   Continue Wegovy 2.4 mg weekly - may consider switching to Zepbound in the future  Continue to work on lifestyle changes    FOLLOW-UP:    See MTM PharmD in 3 " month(s)  See MD or PA in 6 month(s)    Joined the call at 5/20/2025, 4:23:31 pm.  Left the call at 5/20/2025, 4:28:19 pm.  You were on the call for 4 minutes 47 seconds.    Sincerely,    Yen Faustin MD    Again, thank you for allowing me to participate in the care of your patient.      Sincerely,    Yen Faustin MD

## 2025-05-20 NOTE — NURSING NOTE
Current patient location: home     Is the patient currently in the state of [place]? YES    Visit mode: VIDEO    If the visit is dropped, the patient can be reconnected by:VIDEO VISIT: Text to cell phone:   Telephone Information:   Mobile [phone]       Will anyone else be joining the visit? NO  (If patient encounters technical issues they should call [phone]    Are changes needed to the allergy or medication list? Pt stated no changes to allergies and Pt stated no med changes    Are refills needed on medications prescribed by this physician? Discuss with provider.    Rooming Documentation:  Questionnaire(s) completed      Reason for visit: RECHECK    Wt other than 24 hrs:    Pain more than one location:  no  Jeanette KEATING

## 2025-06-03 ENCOUNTER — TELEPHONE (OUTPATIENT)
Dept: ENDOCRINOLOGY | Facility: CLINIC | Age: 55
End: 2025-06-03
Payer: COMMERCIAL

## 2025-06-03 NOTE — TELEPHONE ENCOUNTER
Left Voicemail (1st Attempt) for the patient to call back and schedule the following:    Appointment type: return   Provider:    Return date: MD/PA 6 months- also Jessica ORLANDO- 3 months  Specialty phone number: [phone]  Additional appointment(s) needed:   Additonal Notes:

## (undated) DEVICE — KIT ENDO TURNOVER/PROCEDURE W/CLEAN A SCOPE LINERS 103888

## (undated) RX ORDER — FENTANYL CITRATE 50 UG/ML
INJECTION, SOLUTION INTRAMUSCULAR; INTRAVENOUS
Status: DISPENSED
Start: 2021-06-23

## (undated) RX ORDER — DIPHENHYDRAMINE HYDROCHLORIDE 50 MG/ML
INJECTION INTRAMUSCULAR; INTRAVENOUS
Status: DISPENSED
Start: 2021-06-23

## (undated) RX ORDER — SIMETHICONE 40MG/0.6ML
SUSPENSION, DROPS(FINAL DOSAGE FORM)(ML) ORAL
Status: DISPENSED
Start: 2021-06-23